# Patient Record
Sex: FEMALE | Race: WHITE | NOT HISPANIC OR LATINO | Employment: OTHER | URBAN - METROPOLITAN AREA
[De-identification: names, ages, dates, MRNs, and addresses within clinical notes are randomized per-mention and may not be internally consistent; named-entity substitution may affect disease eponyms.]

---

## 2017-01-09 DIAGNOSIS — F41.1 GENERALIZED ANXIETY DISORDER: Primary | ICD-10-CM

## 2017-01-09 NOTE — TELEPHONE ENCOUNTER
BUPROPION 150MG       Last Written Prescription Date: 3/23/2016  Last Fill Quantity: 180; # refills: 2  Last Office Visit with [facility], [facility] or  Health prescribing provider:  11/30/2016        Last PHQ-9 score on record=   PHQ-9 SCORE 11/16/2015   Total Score -   Total Score 3       AST       24   11/30/2016  ALT       28   11/30/2016

## 2017-01-11 RX ORDER — BUPROPION HYDROCHLORIDE 150 MG/1
150 TABLET, EXTENDED RELEASE ORAL 2 TIMES DAILY
Qty: 180 TABLET | Refills: 1 | Status: SHIPPED | OUTPATIENT
Start: 2017-01-11 | End: 2017-07-05

## 2017-01-11 NOTE — TELEPHONE ENCOUNTER
Pt takes med for anxiety, doesn't have depression on problem list. Pt will be due for routine px & fasting labs after 07/18/17. Refilled for 6 months.    Daniel, RN  Triage Nurse

## 2017-07-05 DIAGNOSIS — F41.1 GENERALIZED ANXIETY DISORDER: ICD-10-CM

## 2017-07-05 NOTE — TELEPHONE ENCOUNTER
buPROPion (WELLBUTRIN SR) 150 MG 12 hr tablet       Last Written Prescription Date: 1/11/2017  Last Fill Quantity: 180; # refills: 1  Last Office Visit with [facility], [facility] or [facility] prescribing provider:  11/30/2016        Last PHQ-9 score on record=   PHQ-9 SCORE 11/16/2015   Total Score -   Total Score 3       Lab Results   Component Value Date    AST 24 11/30/2016     Lab Results   Component Value Date    ALT 28 11/30/2016

## 2017-07-10 RX ORDER — BUPROPION HYDROCHLORIDE 150 MG/1
150 TABLET, EXTENDED RELEASE ORAL 2 TIMES DAILY
Qty: 180 TABLET | Refills: 1 | Status: SHIPPED | OUTPATIENT
Start: 2017-07-10 | End: 2017-12-06

## 2017-07-10 NOTE — TELEPHONE ENCOUNTER
Prescription approved per [facility] Refill Protocol.    Refilled through November, 2017, then due for office visit.    Eli Lau, MSN, RN-BC  Care Coordinator  [facility]

## 2017-08-14 ENCOUNTER — OFFICE VISIT (OUTPATIENT)
Dept: URGENT CARE | Facility: URGENT CARE | Age: 62
End: 2017-08-14
Payer: COMMERCIAL

## 2017-08-14 VITALS
WEIGHT: 119.6 LBS | SYSTOLIC BLOOD PRESSURE: 126 MMHG | HEART RATE: 72 BPM | TEMPERATURE: 97.5 F | DIASTOLIC BLOOD PRESSURE: 72 MMHG | OXYGEN SATURATION: 98 % | BODY MASS INDEX: 19.75 KG/M2

## 2017-08-14 DIAGNOSIS — R30.0 DYSURIA: ICD-10-CM

## 2017-08-14 DIAGNOSIS — N39.0 URINARY TRACT INFECTION WITHOUT HEMATURIA, SITE UNSPECIFIED: Primary | ICD-10-CM

## 2017-08-14 DIAGNOSIS — R82.90 NONSPECIFIC FINDING ON EXAMINATION OF URINE: ICD-10-CM

## 2017-08-14 LAB
ALBUMIN UR-MCNC: NEGATIVE MG/DL
APPEARANCE UR: ABNORMAL
BACTERIA #/AREA URNS HPF: ABNORMAL /HPF
BILIRUB UR QL STRIP: NEGATIVE
COLOR UR AUTO: YELLOW
GLUCOSE UR STRIP-MCNC: NEGATIVE MG/DL
HGB UR QL STRIP: ABNORMAL
KETONES UR STRIP-MCNC: NEGATIVE MG/DL
LEUKOCYTE ESTERASE UR QL STRIP: ABNORMAL
NITRATE UR QL: NEGATIVE
NON-SQ EPI CELLS #/AREA URNS LPF: ABNORMAL /LPF
PH UR STRIP: 5.5 PH (ref 5–7)
RBC #/AREA URNS AUTO: ABNORMAL /HPF (ref 0–2)
SP GR UR STRIP: 1.02 (ref 1–1.03)
URN SPEC COLLECT METH UR: ABNORMAL
UROBILINOGEN UR STRIP-ACNC: 0.2 EU/DL (ref 0.2–1)
WBC #/AREA URNS AUTO: >100 /HPF (ref 0–2)

## 2017-08-14 PROCEDURE — 99213 OFFICE O/P EST LOW 20 MIN: CPT | Performed by: PHYSICIAN ASSISTANT

## 2017-08-14 PROCEDURE — 81001 URINALYSIS AUTO W/SCOPE: CPT | Performed by: PHYSICIAN ASSISTANT

## 2017-08-14 PROCEDURE — 87086 URINE CULTURE/COLONY COUNT: CPT | Performed by: PHYSICIAN ASSISTANT

## 2017-08-14 RX ORDER — FLUCONAZOLE 150 MG/1
150 TABLET ORAL
Qty: 2 TABLET | Refills: 0 | Status: SHIPPED | OUTPATIENT
Start: 2017-08-14 | End: 2017-12-06

## 2017-08-14 RX ORDER — CIPROFLOXACIN 500 MG/1
500 TABLET, FILM COATED ORAL 2 TIMES DAILY
Qty: 10 TABLET | Refills: 0 | Status: SHIPPED | OUTPATIENT
Start: 2017-08-14 | End: 2017-08-19

## 2017-08-14 NOTE — NURSING NOTE
"Chief Complaint   Patient presents with     Urgent Care     Urinary Problem     Pt states has had frequency and discomfort while urinating since this morning.        Initial /72 (BP Location: Right arm, Patient Position: Chair, Cuff Size: Adult Regular)  Pulse 72  Temp 97.5  F (36.4  C) (Tympanic)  Wt 119 lb 9.6 oz (54.3 kg)  SpO2 98%  BMI 19.75 kg/m2 Estimated body mass index is 19.75 kg/(m^2) as calculated from the following:    Height as of 11/30/16: 5' 5.25\" (1.657 m).    Weight as of this encounter: 119 lb 9.6 oz (54.3 kg).  Medication Reconciliation: unable or not appropriate to perform   Rosita Mathur CMA ([facility]) 8/14/2017 1:50 PM    "

## 2017-08-14 NOTE — PROGRESS NOTES
SUBJECTIVE:   Mar Zeng is a 61 year old female who  presents today for a possible UTI. Symptoms of dysuria, frequency, burning and suprapubic pain and pressure have been going on for 1day(s).  Hematuria no.  gradual onset and still presentand mild.  There is no history of fever, chills, nausea or vomiting.  No history of vaginal  discharge. This patient does have a history of urinary tract infections. Patient denies long duration, rigors, flank pain, temperature > 101 degrees F., Vomiting, significant nausea or diarrhea, taking Coumadin and GFR less than 30 within the last year or vaginal discharge, vaginal odor and vaginal itching     Past Medical History:   Diagnosis Date     NO ACTIVE PROBLEMS      Current Outpatient Prescriptions   Medication Sig Dispense Refill     ciprofloxacin (CIPRO) 500 MG tablet Take 1 tablet (500 mg) by mouth 2 times daily for 5 days 10 tablet 0     fluconazole (DIFLUCAN) 150 MG tablet Take 1 tablet (150 mg) by mouth every 3 days 2 tablet 0     buPROPion (WELLBUTRIN SR) 150 MG 12 hr tablet Take 1 tablet (150 mg) by mouth 2 times daily 180 tablet 1     fluticasone (FLONASE) 50 MCG/ACT nasal spray Spray 2 sprays into both nostrils daily 16 g 9     Fexofenadine HCl (ALLEGRA PO)        vitamin E (TOCOPHEROL) 100 UNIT capsule Take 100 Units by mouth daily       Calcium Carbonate-Vitamin D (CALCIUM + D) 600-200 MG-UNIT per tablet Take 1 tablet by mouth daily.       sulfamethoxazole-trimethoprim (BACTRIM DS) 800-160 MG per tablet Take 1 tablet by mouth 2 times daily (Patient not taking: Reported on 8/14/2017) 10 tablet 0     Social History   Substance Use Topics     Smoking status: Former Smoker     Types: Cigarettes     Smokeless tobacco: Never Used     Alcohol use 0.0 oz/week     0 Standard drinks or equivalent per week      Comment: avg:3-4 drink/wk       ROS:   Review of systems negative except as stated above.    OBJECTIVE:  /72 (BP Location: Right arm, Patient Position:  Chair, Cuff Size: Adult Regular)  Pulse 72  Temp 97.5  F (36.4  C) (Tympanic)  Wt 119 lb 9.6 oz (54.3 kg)  SpO2 98%  BMI 19.75 kg/m2  GENERAL APPEARANCE: healthy, alert and no distress  RESP: lungs clear to auscultation - no rales, rhonchi or wheezes  CV: regular rates and rhythm, normal S1 S2, no murmur noted  ABDOMEN:  soft, nontender, no HSM or masses and bowel sounds normal  BACK: No CVA tenderness  SKIN: no suspicious lesions or rashes    Results for orders placed or performed in visit on 08/14/17   UA reflex to Microscopic and Culture   Result Value Ref Range    Color Urine Yellow     Appearance Urine Slightly Cloudy     Glucose Urine Negative NEG mg/dL    Bilirubin Urine Negative NEG    Ketones Urine Negative NEG mg/dL    Specific Gravity Urine 1.025 1.003 - 1.035    Blood Urine Trace (A) NEG    pH Urine 5.5 5.0 - 7.0 pH    Protein Albumin Urine Negative NEG mg/dL    Urobilinogen Urine 0.2 0.2 - 1.0 EU/dL    Nitrite Urine Negative NEG    Leukocyte Esterase Urine Large (A) NEG    Source Midstream Urine    Urine Microscopic   Result Value Ref Range    WBC Urine >100 (A) 0 - 2 /HPF    RBC Urine 2-5 (A) 0 - 2 /HPF    Squamous Epithelial /LPF Urine Few FEW /LPF    Bacteria Urine Moderate (A) NEG /HPF         ASSESSMENT:   Lower, uncomplicated urinary tract infection.    PLAN:  Culture pending.  Cipro as directed.  Diflucan as needed as for yeast post antibiotic use  Drink plenty of fluids.  Prevention and treatment of UTI's discussed.Signs and symptoms of pyelonephritis mentioned.  Follow up with primary care physician if not improving

## 2017-08-14 NOTE — MR AVS SNAPSHOT
"              After Visit Summary   2017    Mar Zeng    MRN: [id number]           Patient Information     Date Of Birth          1955        Visit Information        Provider Department      2017 12:30 PM Anastasiya Palmer PA-C [facility]        Today's Diagnoses     Urinary tract infection without hematuria, site unspecified    -  1    Dysuria        Nonspecific finding on examination of urine           Follow-ups after your visit        Who to contact     If you have questions or need follow up information about today's clinic visit or your schedule please contact [facility] directly at [phone].  Normal or non-critical lab and imaging results will be communicated to you by PayNearMehart, letter or phone within 4 business days after the clinic has received the results. If you do not hear from us within 7 days, please contact the clinic through PayNearMehart or phone. If you have a critical or abnormal lab result, we will notify you by phone as soon as possible.  Submit refill requests through Pertino or call your pharmacy and they will forward the refill request to us. Please allow 3 business days for your refill to be completed.          Additional Information About Your Visit        MyChart Information     Pertino lets you send messages to your doctor, view your test results, renew your prescriptions, schedule appointments and more. To sign up, go to [email or web address] . Click on \"Log in\" on the left side of the screen, which will take you to the Welcome page. Then click on \"Sign up Now\" on the right side of the page.     You will be asked to enter the access code listed below, as well as some personal information. Please follow the directions to create your username and password.     Your access code is: [email or web address]  Expires: 2017  2:40 PM     Your access code will  in 90 days. If you need help or a new code, please call your [place] " clinic or [phone].        Care EveryWhere ID     This is your Care EveryWhere ID. This could be used by other organizations to access your [place] medical records  [id number]        Your Vitals Were     Pulse Temperature Pulse Oximetry BMI (Body Mass Index)          72 97.5  F (36.4  C) (Tympanic) 98% 19.75 kg/m2         Blood Pressure from Last 3 Encounters:   08/14/17 126/72   11/30/16 124/70   08/23/16 133/86    Weight from Last 3 Encounters:   08/14/17 119 lb 9.6 oz (54.3 kg)   11/30/16 124 lb 3.2 oz (56.3 kg)   08/23/16 118 lb (53.5 kg)              We Performed the Following     UA reflex to Microscopic and Culture     Urine Culture Aerobic Bacterial     Urine Microscopic          Today's Medication Changes          These changes are accurate as of: 8/14/17  2:40 PM.  If you have any questions, ask your nurse or doctor.               Start taking these medicines.        Dose/Directions    ciprofloxacin 500 MG tablet   Commonly known as:  CIPRO   Used for:  Urinary tract infection without hematuria, site unspecified   Started by:  Anastasiya Palmer PA-C        Dose:  500 mg   Take 1 tablet (500 mg) by mouth 2 times daily for 5 days   Quantity:  10 tablet   Refills:  0       fluconazole 150 MG tablet   Commonly known as:  DIFLUCAN   Used for:  Urinary tract infection without hematuria, site unspecified   Started by:  Anastasiya Palmer PA-C        Dose:  150 mg   Take 1 tablet (150 mg) by mouth every 3 days   Quantity:  2 tablet   Refills:  0            Where to get your medicines      These medications were sent to [id number] - [place], [place] - [address]  [address], [facility] [address]     Phone:  [phone]     ciprofloxacin 500 MG tablet    fluconazole 150 MG tablet                Primary Care Provider Office Phone # Fax #    [facility] Es[facility] [phone]       [address]  [facility]  [address]        Equal Access to Services     [facility]: Hadii fazal ferris bala Romeo, waadolfoda luqadaha, qaybta kameganda ildaandrewchuyita, waxmingo alexandra truongdesmondsally hauser. So [facility] [phone].    ATENCIÓN: Si habla español, tiene a narayan disposición servicios gratuitos de asistencia lingüística. Anthonyame al [phone].    We comply with applicable federal civil rights laws and [place] laws. We do not discriminate on the basis of race, color, national origin, age, disability sex, sexual orientation or gender identity.            Thank you!     Thank you for choosing [facility]  for your care. Our goal is always to provide you with excellent care. Hearing back from our patients is one way we can continue to improve our services. Please take a few minutes to complete the written survey that you may receive in the mail after your visit with us. Thank you!             Your Updated Medication List - Protect others around you: Learn how to safely use, store and throw away your medicines at www.disposemymeds.org.          This list is accurate as of: 8/14/17  2:40 PM.  Always use your most recent med list.                   Brand Name Dispense Instructions for use Diagnosis    ALLEGRA PO           buPROPion 150 MG 12 hr tablet    WELLBUTRIN SR    180 tablet    Take 1 tablet (150 mg) by mouth 2 times daily    Generalized anxiety disorder       calcium + D 600-200 MG-UNIT Tabs   Generic drug:  calcium carbonate-vitamin D      Take 1 tablet by mouth daily.        ciprofloxacin 500 MG tablet    CIPRO    10 tablet    Take 1 tablet (500 mg) by mouth 2 times daily for 5 days    Urinary tract infection without hematuria, site unspecified       fluconazole 150 MG tablet    DIFLUCAN    2 tablet    Take 1 tablet (150 mg) by mouth every 3 days    Urinary tract infection without hematuria, site unspecified       fluticasone 50 MCG/ACT spray    FLONASE    16 g    Spray 2 sprays into both nostrils daily    Plugged  feeling in ear, bilateral       sulfamethoxazole-trimethoprim 800-160 MG per tablet    BACTRIM DS    10 tablet    Take 1 tablet by mouth 2 times daily    Acute cystitis with hematuria       vitamin E 100 UNIT capsule    TOCOPHEROL     Take 100 Units by mouth daily

## 2017-08-16 LAB
BACTERIA SPEC CULT: NORMAL
SPECIMEN SOURCE: NORMAL

## 2017-09-20 ENCOUNTER — OFFICE VISIT (OUTPATIENT)
Dept: PODIATRY | Facility: CLINIC | Age: 62
End: 2017-09-20
Payer: COMMERCIAL

## 2017-09-20 VITALS
BODY MASS INDEX: 20.09 KG/M2 | WEIGHT: 120.6 LBS | SYSTOLIC BLOOD PRESSURE: 124 MMHG | DIASTOLIC BLOOD PRESSURE: 72 MMHG | HEIGHT: 65 IN

## 2017-09-20 DIAGNOSIS — L60.3 DYSTROPHIC NAIL: ICD-10-CM

## 2017-09-20 DIAGNOSIS — B35.1 ONYCHOMYCOSIS OF TOENAIL: Primary | ICD-10-CM

## 2017-09-20 PROCEDURE — 99203 OFFICE O/P NEW LOW 30 MIN: CPT | Performed by: PODIATRIST

## 2017-09-20 RX ORDER — THERMOMETER, ELECTRONIC,ORAL
EACH MISCELLANEOUS DAILY
Qty: 30 G | Refills: 3 | Status: SHIPPED | OUTPATIENT
Start: 2017-09-20 | End: 2017-12-06

## 2017-09-20 NOTE — MR AVS SNAPSHOT
After Visit Summary   9/20/2017    aMr Zeng    MRN: [id number]           Patient Information     Date Of Birth          1955        Visit Information        Provider Department      9/20/2017 9:30 AM Melissa Hu DPM, Podiatry/Foot and Ankle Surgery [facility]        Today's Diagnoses     Onychomycosis of toenail    -  1    Dystrophic nail          Care Instructions    NAIL FUNGUS / ONYCHOMYCOSIS   Nail fungus is not a hygiene problem and will not likely lead to significant medical   problems. The nails may get thick causing pain and possibly local skin infection.   Treatments include debridement (trimming), oral antifungals, topical antifungals and complete removal of the nail. Most fungal nails are not treated.   Topicals such as tea tree oil can be helpful for surface fungus and may, at best, limit   progression. Over the counter creams (such as Lamisil) can also be used however, their effectiveness is also quite low.  Topical treatment with Pen lac is expensive and often not covered by insurance. Pen lac has an approximate 8% success rate. Topical therapy recommendations is to apply twice a day for at least 3-4 months as it takes 9 months for new nail to grow out.    Experts suggest soaking your feet for 15 to 20 minutes in a mixture of 1 cup vinegar to 4 cups warm water. Be sure to rinse well and pat your feet dry when you're done. You can soak your feet like this daily. But if your skin becomes irritated, try soaking only two to three times a week. Vicks VapoRub, as with vinegar, there have been no controlled clinical trials to assess the effectiveness of Vicks VapoRub on nail fungus, but there have been numerous anecdotal reports that it works. There's no consensus on how often to apply this product, so check with your doctor before using it on your nails.     Oral therapies include Sporanox and Lamisil. Oral therapies are also expensive and not very effective.  "Side effects such as liver disease are the main concern. Return of fungus is common even if the treatment worked.     Other Tips:  - Penlac nail medication apply daily x 4 months; remove old polish first day of each week  - Antifungal cream/powder (Zeasorb) - apply daily to feet and shoes x 2 months  - Clean shoes with Lysol or in washing machine every few weeks  - Rotate shoe gear; give them 24 hours to dry out between days wearing them  - Clean pair of socks in morning, clean pair in afternoon if your feet sweat  - Shower shoes used in public showers/pools            Follow-ups after your visit        Who to contact     If you have questions or need follow up information about today's clinic visit or your schedule please contact [facility] directly at [phone].  Normal or non-critical lab and imaging results will be communicated to you by Competitive Technologieshart, letter or phone within 4 business days after the clinic has received the results. If you do not hear from us within 7 days, please contact the clinic through Competitive Technologieshart or phone. If you have a critical or abnormal lab result, we will notify you by phone as soon as possible.  Submit refill requests through The Daily Voice or call your pharmacy and they will forward the refill request to us. Please allow 3 business days for your refill to be completed.          Additional Information About Your Visit        The Daily Voice Information     The Daily Voice lets you send messages to your doctor, view your test results, renew your prescriptions, schedule appointments and more. To sign up, go to [email or web address] . Click on \"Log in\" on the left side of the screen, which will take you to the Welcome page. Then click on \"Sign up Now\" on the right side of the page.     You will be asked to enter the access code listed below, as well as some personal information. Please follow the directions to create your username and password.     Your access code is: [email or web address]  Expires: " "2017  2:40 PM     Your access code will  in 90 days. If you need help or a new code, please call your [place] clinic or [phone].        Care EveryWhere ID     This is your Care EveryWhere ID. This could be used by other organizations to access your [place] medical records  [id number]        Your Vitals Were     Height BMI (Body Mass Index)                5' 5.25\" (1.657 m) 19.92 kg/m2           Blood Pressure from Last 3 Encounters:   17 124/72   17 126/72   16 124/70    Weight from Last 3 Encounters:   17 120 lb 9.6 oz (54.7 kg)   17 119 lb 9.6 oz (54.3 kg)   16 124 lb 3.2 oz (56.3 kg)              Today, you had the following     No orders found for display         Today's Medication Changes          These changes are accurate as of: 17  9:47 AM.  If you have any questions, ask your nurse or doctor.               Start taking these medicines.        Dose/Directions    tolnaftate 1 % cream   Commonly known as:  TINACTIN   Used for:  Onychomycosis of toenail, Dystrophic nail   Started by:  Melissa Hu, DPMUSTAPHA, Podiatry/Foot and Ankle Surgery        Apply topically daily Apply to affected nails daily   Quantity:  30 g   Refills:  3            Where to get your medicines      These medications were sent to [facility] - [facility] [address]  [address], [facility] [address]     Phone:  [phone]     tolnaftate 1 % cream                Primary Care Provider Office Phone # Fax #    Gail [facility]errier [facility] [phone] [address] [place] [address]        Equal Access to Services     RO [facility]ERRER[facility]: Aries penningtono Soheena, waaxda luqadaha, qaybta kaalmada adeegyada, sophia hauser. So [facility] [phone].    ATENCIÓN: Si habla español, tiene a narayan disposición servicios gratuitos de asistencia lingüística. Llame al " [phone].    We comply with applicable federal civil rights laws and [place] laws. We do not discriminate on the basis of race, color, national origin, age, disability sex, sexual orientation or gender identity.            Thank you!     Thank you for choosing [facility]  for your care. Our goal is always to provide you with excellent care. Hearing back from our patients is one way we can continue to improve our services. Please take a few minutes to complete the written survey that you may receive in the mail after your visit with us. Thank you!             Your Updated Medication List - Protect others around you: Learn how to safely use, store and throw away your medicines at www.disposemymeds.org.          This list is accurate as of: 9/20/17  9:47 AM.  Always use your most recent med list.                   Brand Name Dispense Instructions for use Diagnosis    ALLEGRA PO           buPROPion 150 MG 12 hr tablet    WELLBUTRIN SR    180 tablet    Take 1 tablet (150 mg) by mouth 2 times daily    Generalized anxiety disorder       calcium + D 600-200 MG-UNIT Tabs   Generic drug:  calcium carbonate-vitamin D      Take 1 tablet by mouth daily.        fluconazole 150 MG tablet    DIFLUCAN    2 tablet    Take 1 tablet (150 mg) by mouth every 3 days    Urinary tract infection without hematuria, site unspecified       fluticasone 50 MCG/ACT spray    FLONASE    16 g    Spray 2 sprays into both nostrils daily    Plugged feeling in ear, bilateral       sulfamethoxazole-trimethoprim 800-160 MG per tablet    BACTRIM DS    10 tablet    Take 1 tablet by mouth 2 times daily    Acute cystitis with hematuria       tolnaftate 1 % cream    TINACTIN    30 g    Apply topically daily Apply to affected nails daily    Onychomycosis of toenail, Dystrophic nail       vitamin E 100 UNIT capsule    TOCOPHEROL     Take 100 Units by mouth daily

## 2017-09-20 NOTE — PATIENT INSTRUCTIONS
NAIL FUNGUS / ONYCHOMYCOSIS   Nail fungus is not a hygiene problem and will not likely lead to significant medical   problems. The nails may get thick causing pain and possibly local skin infection.   Treatments include debridement (trimming), oral antifungals, topical antifungals and complete removal of the nail. Most fungal nails are not treated.   Topicals such as tea tree oil can be helpful for surface fungus and may, at best, limit   progression. Over the counter creams (such as Lamisil) can also be used however, their effectiveness is also quite low.  Topical treatment with Pen lac is expensive and often not covered by insurance. Pen lac has an approximate 8% success rate. Topical therapy recommendations is to apply twice a day for at least 3-4 months as it takes 9 months for new nail to grow out.    Experts suggest soaking your feet for 15 to 20 minutes in a mixture of 1 cup vinegar to 4 cups warm water. Be sure to rinse well and pat your feet dry when you're done. You can soak your feet like this daily. But if your skin becomes irritated, try soaking only two to three times a week. Vicks VapoRub, as with vinegar, there have been no controlled clinical trials to assess the effectiveness of Vicks VapoRub on nail fungus, but there have been numerous anecdotal reports that it works. There's no consensus on how often to apply this product, so check with your doctor before using it on your nails.     Oral therapies include Sporanox and Lamisil. Oral therapies are also expensive and not very effective. Side effects such as liver disease are the main concern. Return of fungus is common even if the treatment worked.     Other Tips:  - Penlac nail medication apply daily x 4 months; remove old polish first day of each week  - Antifungal cream/powder (Zeasorb) - apply daily to feet and shoes x 2 months  - Clean shoes with Lysol or in washing machine every few weeks  - Rotate shoe gear; give them 24 hours to dry out  between days wearing them  - Clean pair of socks in morning, clean pair in afternoon if your feet sweat  - Shower shoes used in public showers/pools

## 2017-09-20 NOTE — LETTER
9/20/2017         RE: Mar Zeng  [address]  [facility] [address]        Dear Colleague,    Thank you for referring your patient, Mar Zeng, to the [facility]. Please see a copy of my visit note below.    PATIENT HISTORY:  Mar Zeng is a 62 year old female who presents to clinic for discoloration of both great toenails. Thinks it started after a pedicure about a month ago. No pain. Thinks it is a fungus ans is wondering what can be done to get rid of it.     Review of Systems:  Patient denies fever, chills, rash, wound, stiffness, limping, numbness, weakness, heart burn, blood in stool, chest pain with activity, calf pain when walking, shortness of breath with activity, chronic cough, easy bleeding/bruising, swelling of ankles, excessive thirst, fatigue, depression, anxiety.       PAST MEDICAL HISTORY:   Past Medical History:   Diagnosis Date     NO ACTIVE PROBLEMS         PAST SURGICAL HISTORY:   Past Surgical History:   Procedure Laterality Date     COLONOSCOPY  8/23/2016    Dr. Shaw [facility]     COLONOSCOPY N/A 8/23/2016    Procedure: COLONOSCOPY;  Surgeon: Peter Shaw MD;  Location: [facility]     FINGER SURGERY      right little finger joint replacement     HYSTERECTOMY VAGINAL  age 30    retained ovaries. no cervix      HYSTERECTOMY, PAP NO LONGER INDICATED       ORTHOPEDIC SURGERY       SURGICAL HISTORY OF -   10/25/13    left index cyst removal     SURGICAL HISTORY OF -       Bilat knee miniscus repair        MEDICATIONS:   Current Outpatient Prescriptions:      tolnaftate (TINACTIN) 1 % cream, Apply topically daily Apply to affected nails daily, Disp: 30 g, Rfl: 3     buPROPion (WELLBUTRIN SR) 150 MG 12 hr tablet, Take 1 tablet (150 mg) by mouth 2 times daily, Disp: 180 tablet, Rfl: 1     fluticasone (FLONASE) 50 MCG/ACT nasal spray, Spray 2 sprays into both nostrils daily, Disp: 16 g, Rfl: 9     Fexofenadine HCl (ALLEGRA PO), , Disp: , Rfl:      " vitamin E (TOCOPHEROL) 100 UNIT capsule, Take 100 Units by mouth daily, Disp: , Rfl:      Calcium Carbonate-Vitamin D (CALCIUM + D) 600-200 MG-UNIT per tablet, Take 1 tablet by mouth daily., Disp: , Rfl:      fluconazole (DIFLUCAN) 150 MG tablet, Take 1 tablet (150 mg) by mouth every 3 days (Patient not taking: Reported on 9/20/2017), Disp: 2 tablet, Rfl: 0     sulfamethoxazole-trimethoprim (BACTRIM DS) 800-160 MG per tablet, Take 1 tablet by mouth 2 times daily (Patient not taking: Reported on 8/14/2017), Disp: 10 tablet, Rfl: 0     ALLERGIES:    Allergies   Allergen Reactions     Codeine Itching     Hay Fever & [A.R.M.]         SOCIAL HISTORY:   Social History     Social History     Marital status:      Spouse name: N/A     Number of children: N/A     Years of education: N/A     Occupational History     Not on file.     Social History Main Topics     Smoking status: Former Smoker     Types: Cigarettes     Smokeless tobacco: Never Used     Alcohol use 0.0 oz/week     0 Standard drinks or equivalent per week      Comment: avg:3-4 drink/wk     Drug use: No     Sexual activity: Yes     Partners: Male     Other Topics Concern     Parent/Sibling W/ Cabg, Mi Or Angioplasty Before 65f 55m? No     Social History Narrative    [place] in [place]        FAMILY HISTORY:   Family History   Problem Relation Age of Onset     Hypertension Father      CANCER Father      CANCER Maternal Grandmother      CANCER Maternal Grandfather      CANCER Paternal Grandmother      CANCER Paternal Grandfather      Asthma Brother      Hypertension Brother      Thyroid Disease Daughter      Colon Cancer No family hx of         EXAM:Vitals: /72  Ht 1.657 m (5' 5.25\")  Wt 54.7 kg (120 lb 9.6 oz)  BMI 19.92 kg/m2  BMI= Body mass index is 19.92 kg/(m^2).    General appearance: Patient is alert and fully cooperative with history & exam.  No sign of distress is noted during the visit.     Psychiatric: Affect is pleasant & appropriate.  " Patient appears motivated to improve health.     Respiratory: Breathing is regular & unlabored while sitting.     HEENT: Hearing is intact to spoken word.  Speech is clear.  No gross evidence of visual impairment that would impact ambulation.     Dermatologic: discoloration, onycholysis and subungual debri noted to both great toenails.      Vascular: DP & PT pulses are intact & regular bilaterally.  No significant edema or varicosities noted.  CFT and skin temperature is normal to both lower extremities.     Neurologic: Lower extremity sensation is intact to light touch.  No evidence of weakness or contracture in the lower extremities.  No evidence of neuropathy.     Musculoskeletal: Patient is ambulatory without assistive device or brace.  No gross ankle deformity noted.  No foot or ankle joint effusion is noted.     ASSESSMENT:    Onychomycosis of toenail  Dystrophic nail       PLAN:  Reviewed patient's chart in epic. Discussed causes and treatments of nail fungus.  Explained that even if a culture comes back negative, a patient could still have nail fungus.  Discussed treatment options with patient and explained that there isn't one treatment that is 100% effective.  Discussed oral lamisil which is the most effective at about 70% but which can have liver effects.  Explained that if she wanted to try this that she would need serial blood draws to test her liver function.  Discussed over the counter antifungal creams.  Explained that these are about 50% effective and need to be applied twice a day for about 3-4 months.  Also talked about prescription penlac which is a nail laquer.  Again this is also only 50% effective.  Also discussed that if there was damage to the nail and the nail is now dystrophic that non of the above is going to change the nail.  If there was damage, there is note anything that can be done for the nail to correct it.  Discussed that if it becomes painful, we can remove the nail in clinic.         At this time, she is going to try prescription antifungal cream. Will call with questions or concerns.          Melissa Hu DPM, Podiatry/Foot and Ankle Surgery          Again, thank you for allowing me to participate in the care of your patient.        Sincerely,        Melissa Hu DPM, Podiatry/Foot and Ankle Surgery

## 2017-09-20 NOTE — NURSING NOTE
"Chief Complaint   Patient presents with     Toenail     pt worried about nail fungus on both great toes [id number]       Initial /72  Ht 5' 5.25\" (1.657 m)  Wt 120 lb 9.6 oz (54.7 kg)  BMI 19.92 kg/m2 Estimated body mass index is 19.92 kg/(m^2) as calculated from the following:    Height as of this encounter: 5' 5.25\" (1.657 m).    Weight as of this encounter: 120 lb 9.6 oz (54.7 kg).  Medication Reconciliation: complete   Jordin Adan MA      "

## 2017-09-20 NOTE — PROGRESS NOTES
PATIENT HISTORY:  Mar Zeng is a 62 year old female who presents to clinic for discoloration of both great toenails. Thinks it started after a pedicure about a month ago. No pain. Thinks it is a fungus ans is wondering what can be done to get rid of it.     Review of Systems:  Patient denies fever, chills, rash, wound, stiffness, limping, numbness, weakness, heart burn, blood in stool, chest pain with activity, calf pain when walking, shortness of breath with activity, chronic cough, easy bleeding/bruising, swelling of ankles, excessive thirst, fatigue, depression, anxiety.       PAST MEDICAL HISTORY:   Past Medical History:   Diagnosis Date     NO ACTIVE PROBLEMS         PAST SURGICAL HISTORY:   Past Surgical History:   Procedure Laterality Date     COLONOSCOPY  8/23/2016    Dr. Shaw [facility]     COLONOSCOPY N/A 8/23/2016    Procedure: COLONOSCOPY;  Surgeon: Peter Shaw MD;  Location:  GI     FINGER SURGERY      right little finger joint replacement     HYSTERECTOMY VAGINAL  age 30    retained ovaries. no cervix      HYSTERECTOMY, PAP NO LONGER INDICATED       ORTHOPEDIC SURGERY       SURGICAL HISTORY OF -   10/25/13    left index cyst removal     SURGICAL HISTORY OF -       Bilat knee miniscus repair        MEDICATIONS:   Current Outpatient Prescriptions:      tolnaftate (TINACTIN) 1 % cream, Apply topically daily Apply to affected nails daily, Disp: 30 g, Rfl: 3     buPROPion (WELLBUTRIN SR) 150 MG 12 hr tablet, Take 1 tablet (150 mg) by mouth 2 times daily, Disp: 180 tablet, Rfl: 1     fluticasone (FLONASE) 50 MCG/ACT nasal spray, Spray 2 sprays into both nostrils daily, Disp: 16 g, Rfl: 9     Fexofenadine HCl (ALLEGRA PO), , Disp: , Rfl:      vitamin E (TOCOPHEROL) 100 UNIT capsule, Take 100 Units by mouth daily, Disp: , Rfl:      Calcium Carbonate-Vitamin D (CALCIUM + D) 600-200 MG-UNIT per tablet, Take 1 tablet by mouth daily., Disp: , Rfl:      fluconazole (DIFLUCAN) 150 MG tablet, Take 1  "tablet (150 mg) by mouth every 3 days (Patient not taking: Reported on 9/20/2017), Disp: 2 tablet, Rfl: 0     sulfamethoxazole-trimethoprim (BACTRIM DS) 800-160 MG per tablet, Take 1 tablet by mouth 2 times daily (Patient not taking: Reported on 8/14/2017), Disp: 10 tablet, Rfl: 0     ALLERGIES:    Allergies   Allergen Reactions     Codeine Itching     Hay Fever & [A.R.M.]         SOCIAL HISTORY:   Social History     Social History     Marital status:      Spouse name: N/A     Number of children: N/A     Years of education: N/A     Occupational History     Not on file.     Social History Main Topics     Smoking status: Former Smoker     Types: Cigarettes     Smokeless tobacco: Never Used     Alcohol use 0.0 oz/week     0 Standard drinks or equivalent per week      Comment: avg:3-4 drink/wk     Drug use: No     Sexual activity: Yes     Partners: Male     Other Topics Concern     Parent/Sibling W/ Cabg, Mi Or Angioplasty Before 65f 55m? No     Social History Narrative    [place] in [place]        FAMILY HISTORY:   Family History   Problem Relation Age of Onset     Hypertension Father      CANCER Father      CANCER Maternal Grandmother      CANCER Maternal Grandfather      CANCER Paternal Grandmother      CANCER Paternal Grandfather      Asthma Brother      Hypertension Brother      Thyroid Disease Daughter      Colon Cancer No family hx of         EXAM:Vitals: /72  Ht 1.657 m (5' 5.25\")  Wt 54.7 kg (120 lb 9.6 oz)  BMI 19.92 kg/m2  BMI= Body mass index is 19.92 kg/(m^2).    General appearance: Patient is alert and fully cooperative with history & exam.  No sign of distress is noted during the visit.     Psychiatric: Affect is pleasant & appropriate.  Patient appears motivated to improve health.     Respiratory: Breathing is regular & unlabored while sitting.     HEENT: Hearing is intact to spoken word.  Speech is clear.  No gross evidence of visual impairment that would impact ambulation.   "   Dermatologic: discoloration, onycholysis and subungual debri noted to both great toenails.      Vascular: DP & PT pulses are intact & regular bilaterally.  No significant edema or varicosities noted.  CFT and skin temperature is normal to both lower extremities.     Neurologic: Lower extremity sensation is intact to light touch.  No evidence of weakness or contracture in the lower extremities.  No evidence of neuropathy.     Musculoskeletal: Patient is ambulatory without assistive device or brace.  No gross ankle deformity noted.  No foot or ankle joint effusion is noted.     ASSESSMENT:    Onychomycosis of toenail  Dystrophic nail       PLAN:  Reviewed patient's chart in epic. Discussed causes and treatments of nail fungus.  Explained that even if a culture comes back negative, a patient could still have nail fungus.  Discussed treatment options with patient and explained that there isn't one treatment that is 100% effective.  Discussed oral lamisil which is the most effective at about 70% but which can have liver effects.  Explained that if she wanted to try this that she would need serial blood draws to test her liver function.  Discussed over the counter antifungal creams.  Explained that these are about 50% effective and need to be applied twice a day for about 3-4 months.  Also talked about prescription penlac which is a nail laquer.  Again this is also only 50% effective.  Also discussed that if there was damage to the nail and the nail is now dystrophic that non of the above is going to change the nail.  If there was damage, there is note anything that can be done for the nail to correct it.  Discussed that if it becomes painful, we can remove the nail in clinic.        At this time, she is going to try prescription antifungal cream. Will call with questions or concerns.          Melissa Hu DPM, Podiatry/Foot and Ankle Surgery

## 2017-12-04 ENCOUNTER — TRANSFERRED RECORDS (OUTPATIENT)
Dept: HEALTH INFORMATION MANAGEMENT | Facility: CLINIC | Age: 62
End: 2017-12-04

## 2017-12-04 LAB
ALT SERPL-CCNC: 20 IU/L (ref 0–32)
AST SERPL-CCNC: 17 IU/L (ref 0–40)
CREAT SERPL-MCNC: 0.83 MG/DL (ref 0.57–1)
GFR SERPL CREATININE-BSD FRML MDRD: 75 ML/MIN/1.73M2
GLUCOSE SERPL-MCNC: 95 MG/DL (ref 65–99)
HBA1C MFR BLD: 5.6 % (ref 4.8–5.6)
POTASSIUM SERPL-SCNC: 4.2 MMOL/L (ref 3.5–5.2)
TSH SERPL-ACNC: 0.57 UIU/ML (ref 0.45–4.5)

## 2017-12-06 ENCOUNTER — OFFICE VISIT (OUTPATIENT)
Dept: FAMILY MEDICINE | Facility: CLINIC | Age: 62
End: 2017-12-06
Payer: COMMERCIAL

## 2017-12-06 VITALS
OXYGEN SATURATION: 100 % | WEIGHT: 118.5 LBS | TEMPERATURE: 97.9 F | SYSTOLIC BLOOD PRESSURE: 122 MMHG | DIASTOLIC BLOOD PRESSURE: 70 MMHG | BODY MASS INDEX: 19.57 KG/M2 | HEART RATE: 82 BPM

## 2017-12-06 DIAGNOSIS — G47.00 INSOMNIA, UNSPECIFIED TYPE: ICD-10-CM

## 2017-12-06 DIAGNOSIS — F41.1 GENERALIZED ANXIETY DISORDER: ICD-10-CM

## 2017-12-06 DIAGNOSIS — Z12.31 ENCOUNTER FOR SCREENING MAMMOGRAM FOR BREAST CANCER: ICD-10-CM

## 2017-12-06 DIAGNOSIS — Z00.00 ENCOUNTER FOR ROUTINE ADULT HEALTH EXAMINATION WITHOUT ABNORMAL FINDINGS: Primary | ICD-10-CM

## 2017-12-06 DIAGNOSIS — J34.89 RHINORRHEA: ICD-10-CM

## 2017-12-06 PROCEDURE — 84443 ASSAY THYROID STIM HORMONE: CPT | Performed by: NURSE PRACTITIONER

## 2017-12-06 PROCEDURE — 80053 COMPREHEN METABOLIC PANEL: CPT | Performed by: NURSE PRACTITIONER

## 2017-12-06 PROCEDURE — 36415 COLL VENOUS BLD VENIPUNCTURE: CPT | Performed by: NURSE PRACTITIONER

## 2017-12-06 PROCEDURE — 99213 OFFICE O/P EST LOW 20 MIN: CPT | Mod: 25 | Performed by: NURSE PRACTITIONER

## 2017-12-06 PROCEDURE — 99396 PREV VISIT EST AGE 40-64: CPT | Performed by: NURSE PRACTITIONER

## 2017-12-06 PROCEDURE — 80061 LIPID PANEL: CPT | Performed by: NURSE PRACTITIONER

## 2017-12-06 RX ORDER — MIRTAZAPINE 15 MG/1
7.5 TABLET, FILM COATED ORAL AT BEDTIME
Qty: 30 TABLET | Refills: 1 | Status: SHIPPED | OUTPATIENT
Start: 2017-12-06 | End: 2018-01-29

## 2017-12-06 RX ORDER — BUPROPION HYDROCHLORIDE 150 MG/1
150 TABLET, EXTENDED RELEASE ORAL DAILY
Qty: 90 TABLET | Refills: 3 | Status: SHIPPED | OUTPATIENT
Start: 2017-12-06 | End: 2019-10-16

## 2017-12-06 RX ORDER — MONTELUKAST SODIUM 10 MG/1
10 TABLET ORAL AT BEDTIME
Qty: 30 TABLET | Refills: 1 | Status: SHIPPED | OUTPATIENT
Start: 2017-12-06 | End: 2018-02-23

## 2017-12-06 NOTE — NURSING NOTE
"Chief Complaint   Patient presents with     Physical       Initial /70  Pulse 82  Temp 97.9  F (36.6  C) (Oral)  Wt 118 lb 8 oz (53.8 kg)  SpO2 100%  BMI 19.57 kg/m2 Estimated body mass index is 19.57 kg/(m^2) as calculated from the following:    Height as of 9/20/17: 5' 5.25\" (1.657 m).    Weight as of this encounter: 118 lb 8 oz (53.8 kg).  Medication Reconciliation: complete   Mariaa SMART M.A.      "

## 2017-12-06 NOTE — MR AVS SNAPSHOT
After Visit Summary   12/6/2017    Mar Zeng    MRN: [id number]           Patient Information     Date Of Birth          1955        Visit Information        Provider Department      12/6/2017 10:00 AM Gail Crespo Ra, [facility]        Today's Diagnoses     Encounter for screening mammogram for breast cancer    -  1    Generalized anxiety disorder        Insomnia, unspecified type        Rhinorrhea          Care Instructions    Start the mirtazapine (remeron) every evening.  Also take the singulair every evening- this is for the runny nose.  Update me in about 1-2 months regarding your progress.    Preventive Health Recommendations  Female Ages 50 - 64    Yearly exam: See your health care provider every year in order to  o Review health changes.   o Discuss preventive care.    o Review your medicines if your doctor has prescribed any.      Get a Pap test every three years (unless you have an abnormal result and your provider advises testing more often).    If you get Pap tests with HPV test, you only need to test every 5 years, unless you have an abnormal result.     You do not need a Pap test if your uterus was removed (hysterectomy) and you have not had cancer.    You should be tested each year for STDs (sexually transmitted diseases) if you're at risk.     Have a mammogram every 1 to 2 years.    Have a colonoscopy at age 50, or have a yearly FIT test (stool test). These exams screen for colon cancer.      Have a cholesterol test every 5 years, or more often if advised.    Have a diabetes test (fasting glucose) every three years. If you are at risk for diabetes, you should have this test more often.     If you are at risk for osteoporosis (brittle bone disease), think about having a bone density scan (DEXA).    Shots: Get a flu shot each year. Get a tetanus shot every 10 years.    Nutrition:     Eat at least 5 servings of fruits and vegetables each  "day.    Eat whole-grain bread, whole-wheat pasta and brown rice instead of white grains and rice.    Talk to your provider about Calcium and Vitamin D.     Lifestyle    Exercise at least 150 minutes a week (30 minutes a day, 5 days a week). This will help you control your weight and prevent disease.    Limit alcohol to one drink per day.    No smoking.     Wear sunscreen to prevent skin cancer.     See your dentist every six months for an exam and cleaning.    See your eye doctor every 1 to 2 years.            Follow-ups after your visit        Future tests that were ordered for you today     Open Future Orders        Priority Expected Expires Ordered    *MA Screening Digital Bilateral Routine  12/6/2018 12/6/2017            Who to contact     If you have questions or need follow up information about today's clinic visit or your schedule please contact [facility] directly at [phone].  Normal or non-critical lab and imaging results will be communicated to you by [email or web address], letter or phone within 4 business days after the clinic has received the results. If you do not hear from us within 7 days, please contact the clinic through [email or web address] or phone. If you have a critical or abnormal lab result, we will notify you by phone as soon as possible.  Submit refill requests through [employer] or call your pharmacy and they will forward the refill request to us. Please allow 3 business days for your refill to be completed.          Additional Information About Your Visit        [email or web address] Information     [employer] lets you send messages to your doctor, view your test results, renew your prescriptions, schedule appointments and more. To sign up, go to [email or web address] . Click on \"Log in\" on the left side of the screen, which will take you to the Welcome page. Then click on \"Sign up Now\" on the right side of the page.     You will be asked to enter the access code listed below, as well as some personal " information. Please follow the directions to create your username and password.     Your access code is: [email or web address]  Expires: 3/6/2018 10:42 AM     Your access code will  in 90 days. If you need help or a new code, please call your [place] clinic or [phone].        Care EveryWhere ID     This is your Care EveryWhere ID. This could be used by other organizations to access your [place] medical records  [id number]        Your Vitals Were     Pulse Temperature Pulse Oximetry BMI (Body Mass Index)          82 97.9  F (36.6  C) (Oral) 100% 19.57 kg/m2         Blood Pressure from Last 3 Encounters:   17 122/70   17 124/72   17 126/72    Weight from Last 3 Encounters:   17 118 lb 8 oz (53.8 kg)   17 120 lb 9.6 oz (54.7 kg)   17 119 lb 9.6 oz (54.3 kg)              We Performed the Following     Comprehensive metabolic panel (BMP + Alb, Alk Phos, ALT, AST, Total. Bili, TP)     Lipid panel reflex to direct LDL Fasting     OFFICE/OUTPT VISIT,EST,LEVL III     TSH with free T4 reflex          Today's Medication Changes          These changes are accurate as of: 17 10:42 AM.  If you have any questions, ask your nurse or doctor.               Start taking these medicines.        Dose/Directions    mirtazapine 15 MG tablet   Commonly known as:  REMERON   Used for:  Insomnia, unspecified type   Started by:  Gail Crespo Ra, APRN CNP        Dose:  7.5 mg   Take 0.5 tablets (7.5 mg) by mouth At Bedtime   Quantity:  30 tablet   Refills:  1       montelukast 10 MG tablet   Commonly known as:  SINGULAIR   Used for:  Rhinorrhea   Started by:  Gail Crespo Ra, APRN CNP        Dose:  10 mg   Take 1 tablet (10 mg) by mouth At Bedtime   Quantity:  30 tablet   Refills:  1         These medicines have changed or have updated prescriptions.        Dose/Directions    buPROPion 150 MG 12 hr tablet   Commonly known as:  WELLBUTRIN SR   This may have changed:  when to take this   Used  for:  Generalized anxiety disorder   Changed by:  Gail Crespo Ra, APRN CNP        Dose:  150 mg   Take 1 tablet (150 mg) by mouth daily   Quantity:  90 tablet   Refills:  3            Where to get your medicines      These medications were sent to [facility] - [place], [place] - [address]  [address], [facility] [address]     Phone:  [phone]     buPROPion 150 MG 12 hr tablet    mirtazapine 15 MG tablet    montelukast 10 MG tablet                Primary Care Provider Office Phone # Fax #    [facility] Ra [phone] [address] [facility] [address]        Equal Access to Services     [facility]: Hadii aad ku hadasho Soomaali, waaxda luqadaha, qaybta kaalmada adeegyada, waxay yarelyin hayyun siddiqi . So [facility] [place] [phone].    ATENCIÓN: Si habla español, tiene a narayan disposición servicios gratuitos de asistencia lingüística. [facility] [phone].    We comply with applicable federal civil rights laws and [place] laws. We do not discriminate on the basis of race, color, national origin, age, disability, sex, sexual orientation, or gender identity.            Thank you!     Thank you for choosing [facility]  for your care. Our goal is always to provide you with excellent care. Hearing back from our patients is one way we can continue to improve our services. Please take a few minutes to complete the written survey that you may receive in the mail after your visit with us. Thank you!             Your Updated Medication List - Protect others around you: Learn how to safely use, store and throw away your medicines at www.disposemymeds.org.          This list is accurate as of: 12/6/17 10:42 AM.  Always use your most recent med list.                   Brand Name Dispense Instructions for use Diagnosis    ALLEGRA PO           buPROPion 150 MG 12 hr tablet    WELLBUTRIN SR     90 tablet    Take 1 tablet (150 mg) by mouth daily    Generalized anxiety disorder       calcium + D 600-200 MG-UNIT Tabs   Generic drug:  calcium carbonate-vitamin D      Take 1 tablet by mouth daily.        fluticasone 50 MCG/ACT spray    FLONASE    16 g    Spray 2 sprays into both nostrils daily    Plugged feeling in ear, bilateral       mirtazapine 15 MG tablet    REMERON    30 tablet    Take 0.5 tablets (7.5 mg) by mouth At Bedtime    Insomnia, unspecified type       montelukast 10 MG tablet    SINGULAIR    30 tablet    Take 1 tablet (10 mg) by mouth At Bedtime    Rhinorrhea       vitamin E 100 UNIT capsule    TOCOPHEROL     Take 100 Units by mouth daily

## 2017-12-06 NOTE — PATIENT INSTRUCTIONS
Start the mirtazapine (remeron) every evening.  Also take the singulair every evening- this is for the runny nose.  Update me in about 1-2 months regarding your progress.    Preventive Health Recommendations  Female Ages 50 - 64    Yearly exam: See your health care provider every year in order to  o Review health changes.   o Discuss preventive care.    o Review your medicines if your doctor has prescribed any.      Get a Pap test every three years (unless you have an abnormal result and your provider advises testing more often).    If you get Pap tests with HPV test, you only need to test every 5 years, unless you have an abnormal result.     You do not need a Pap test if your uterus was removed (hysterectomy) and you have not had cancer.    You should be tested each year for STDs (sexually transmitted diseases) if you're at risk.     Have a mammogram every 1 to 2 years.    Have a colonoscopy at age 50, or have a yearly FIT test (stool test). These exams screen for colon cancer.      Have a cholesterol test every 5 years, or more often if advised.    Have a diabetes test (fasting glucose) every three years. If you are at risk for diabetes, you should have this test more often.     If you are at risk for osteoporosis (brittle bone disease), think about having a bone density scan (DEXA).    Shots: Get a flu shot each year. Get a tetanus shot every 10 years.    Nutrition:     Eat at least 5 servings of fruits and vegetables each day.    Eat whole-grain bread, whole-wheat pasta and brown rice instead of white grains and rice.    Talk to your provider about Calcium and Vitamin D.     Lifestyle    Exercise at least 150 minutes a week (30 minutes a day, 5 days a week). This will help you control your weight and prevent disease.    Limit alcohol to one drink per day.    No smoking.     Wear sunscreen to prevent skin cancer.     See your dentist every six months for an exam and cleaning.    See your eye doctor every 1 to 2  years.

## 2017-12-06 NOTE — PROGRESS NOTES
SUBJECTIVE:   CC: Mar Zegn is an 62 year old woman who presents for preventive health visit.     Physical   Annual:     Getting at least 3 servings of Calcium per day::  Yes    Bi-annual eye exam::  Yes    Dental care twice a year::  Yes    Sleep apnea or symptoms of sleep apnea::  Sleep apnea    Diet::  Regular (no restrictions)    Frequency of exercise::  2-3 days/week    Duration of exercise::  30-45 minutes    Taking medications regularly::  Yes    Medication side effects::  None    Additional concerns today::  YES  Thyroid check    Pt has had years of insomnia.  Tried trazodone and ambien without success.  Was on benzos prn at one point.  Wondering if there is another option.    Also has chronic rhinorrhea.  Uses flonase and allegra and claritin.  No success.    Today's PHQ-2 Score: PHQ-2 ( 1999 Pfizer) 12/6/2017   Q1: Little interest or pleasure in doing things 0   Q2: Feeling down, depressed or hopeless 0   PHQ-2 Score 0   Q1: Little interest or pleasure in doing things Not at all   Q2: Feeling down, depressed or hopeless Not at all   PHQ-2 Score 0       Abuse: Current or Past(Physical, Sexual or Emotional)- No  Do you feel safe in your environment - Yes    Social History   Substance Use Topics     Smoking status: Former Smoker     Types: Cigarettes     Smokeless tobacco: Never Used     Alcohol use 0.0 oz/week     0 Standard drinks or equivalent per week      Comment: avg:3-4 drink/wk     The patient does not drink >3 drinks per day nor >7 drinks per week.    Reviewed orders with patient.  Reviewed health maintenance and updated orders accordingly - Yes  Patient Active Problem List   Diagnosis     CARDIOVASCULAR SCREENING; LDL GOAL LESS THAN 160     Multiple pigmented nevi     Advanced directives, counseling/discussion     Seasonal allergic rhinitis     Hematoma of rectus sheath     Generalized anxiety disorder     Past Surgical History:   Procedure Laterality Date     COLONOSCOPY  8/23/2016     Dr. Shaw [facility]     COLONOSCOPY N/A 8/23/2016    Procedure: COLONOSCOPY;  Surgeon: Peter Shaw MD;  Location: [facility]     FINGER SURGERY      right little finger joint replacement     HYSTERECTOMY VAGINAL  age 30    retained ovaries. no cervix      HYSTERECTOMY, PAP NO LONGER INDICATED       ORTHOPEDIC SURGERY       SURGICAL HISTORY OF -   10/25/13    left index cyst removal     SURGICAL HISTORY OF -       Bilat knee miniscus repair       Social History   Substance Use Topics     Smoking status: Former Smoker     Types: Cigarettes     Smokeless tobacco: Never Used     Alcohol use 0.0 oz/week     0 Standard drinks or equivalent per week      Comment: avg:3-4 drink/wk     Family History   Problem Relation Age of Onset     Hypertension Father      CANCER Father      CANCER Maternal Grandmother      CANCER Maternal Grandfather      CANCER Paternal Grandmother      CANCER Paternal Grandfather      Asthma Brother      Hypertension Brother      Thyroid Disease Daughter      Colon Cancer No family hx of              Patient over age 50, mutual decision to screen reflected in health maintenance.      Pertinent mammograms are reviewed under the imaging tab.  History of abnormal Pap smear: Status post benign hysterectomy. Health Maintenance and Surgical History updated.    Reviewed and updated as needed this visit by clinical staffTobacco  Allergies  Meds  Med Hx  Surg Hx  Fam Hx  Soc Hx        Reviewed and updated as needed this visit by Provider            Review of Systems  C: NEGATIVE for fever, chills, change in weight  I: NEGATIVE for worrisome rashes, moles or lesions  E: NEGATIVE for vision changes or irritation  ENT: NEGATIVE for ear, mouth and throat problems  R: NEGATIVE for significant cough or SOB  B: NEGATIVE for masses, tenderness or discharge  CV: NEGATIVE for chest pain, palpitations or peripheral edema  GI: NEGATIVE for nausea, abdominal pain, heartburn, or change in bowel habits  : NEGATIVE for  unusual urinary or vaginal symptoms. No vaginal bleeding.  M: NEGATIVE for significant arthralgias or myalgia  N: NEGATIVE for weakness, dizziness or paresthesias  P: NEGATIVE for changes in mood or affect      OBJECTIVE:   There were no vitals taken for this visit.  Physical Exam  GENERAL: healthy, alert and no distress  EYES: Eyes grossly normal to inspection, PERRL and conjunctivae and sclerae normal  HENT: ear canals and TM's normal, nose and mouth without ulcers or lesions  NECK: no adenopathy, no asymmetry, masses, or scars and thyroid normal to palpation  RESP: lungs clear to auscultation - no rales, rhonchi or wheezes  BREAST: normal without masses, tenderness or nipple discharge and no palpable axillary masses or adenopathy  CV: regular rate and rhythm, normal S1 S2, no S3 or S4, no murmur, click or rub, no peripheral edema and peripheral pulses strong  ABDOMEN: soft, nontender, no hepatosplenomegaly, no masses and bowel sounds normal  MS: no gross musculoskeletal defects noted, no edema  SKIN: no suspicious lesions or rashes  NEURO: Normal strength and tone, mentation intact and speech normal  PSYCH: mentation appears normal, affect normal/bright    ASSESSMENT/PLAN:   1. Encounter for routine adult health examination without abnormal findings  - *MA Screening Digital Bilateral; Future  - Lipid panel reflex to direct LDL Fasting  - Comprehensive metabolic panel (BMP + Alb, Alk Phos, ALT, AST, Total. Bili, TP)  - TSH with free T4 reflex  62 y.o. Female, here for WWE.  Will schedule mammogram.  Colonoscopy utd.    2. Insomnia, unspecified type  Discussed options.  Reviewed r/b/se.  Trial remeron.  Update me in about 1-2 months.  Pt agrees with plan and verbalized understanding.  - mirtazapine (REMERON) 15 MG tablet; Take 0.5 tablets (7.5 mg) by mouth At Bedtime  Dispense: 30 tablet; Refill: 1  - OFFICE/OUTPT VISIT,EST,LEVL III    3. Rhinorrhea  Discussed options.  Trial singulair.  - montelukast (SINGULAIR)  "10 MG tablet; Take 1 tablet (10 mg) by mouth At Bedtime  Dispense: 30 tablet; Refill: 1  - OFFICE/OUTPT VISIT,JOSE CARLOS FAIR III    4. Generalized anxiety disorder  Continue with current regimen.  - buPROPion (WELLBUTRIN SR) 150 MG 12 hr tablet; Take 1 tablet (150 mg) by mouth daily  Dispense: 90 tablet; Refill: 3    5. Encounter for screening mammogram for breast cancer  - *MA Screening Digital Bilateral; Future    COUNSELING:  Reviewed preventive health counseling, as reflected in patient instructions         reports that she has quit smoking. Her smoking use included Cigarettes. She has never used smokeless tobacco.    Estimated body mass index is 19.92 kg/(m^2) as calculated from the following:    Height as of 9/20/17: 5' 5.25\" (1.657 m).    Weight as of 9/20/17: 120 lb 9.6 oz (54.7 kg).         Counseling Resources:  ATP IV Guidelines  Pooled Cohorts Equation Calculator  Breast Cancer Risk Calculator  FRAX Risk Assessment  ICSI Preventive Guidelines  Dietary Guidelines for Americans, 2010  USDA's MyPlate  ASA Prophylaxis  Lung CA Screening    DUKE Esquivel Ra, CNP  [facility]  "

## 2017-12-07 ENCOUNTER — HOSPITAL ENCOUNTER (OUTPATIENT)
Dept: MAMMOGRAPHY | Facility: CLINIC | Age: 62
Discharge: HOME | End: 2017-12-07
Attending: NURSE PRACTITIONER | Admitting: NURSE PRACTITIONER
Payer: COMMERCIAL

## 2017-12-07 DIAGNOSIS — Z00.00 ENCOUNTER FOR ROUTINE ADULT HEALTH EXAMINATION WITHOUT ABNORMAL FINDINGS: ICD-10-CM

## 2017-12-07 DIAGNOSIS — Z12.31 ENCOUNTER FOR SCREENING MAMMOGRAM FOR BREAST CANCER: ICD-10-CM

## 2017-12-07 LAB
ALBUMIN SERPL-MCNC: 3.9 G/DL (ref 3.4–5)
ALP SERPL-CCNC: 82 U/L (ref 40–150)
ALT SERPL W P-5'-P-CCNC: 26 U/L (ref 0–50)
ANION GAP SERPL CALCULATED.3IONS-SCNC: 7 MMOL/L (ref 3–14)
AST SERPL W P-5'-P-CCNC: 14 U/L (ref 0–45)
BILIRUB SERPL-MCNC: 0.5 MG/DL (ref 0.2–1.3)
BUN SERPL-MCNC: 16 MG/DL (ref 7–30)
CALCIUM SERPL-MCNC: 8.8 MG/DL (ref 8.5–10.1)
CHLORIDE SERPL-SCNC: 104 MMOL/L (ref 94–109)
CHOLEST SERPL-MCNC: 224 MG/DL
CO2 SERPL-SCNC: 30 MMOL/L (ref 20–32)
CREAT SERPL-MCNC: 0.92 MG/DL (ref 0.52–1.04)
GFR SERPL CREATININE-BSD FRML MDRD: 62 ML/MIN/1.7M2
GLUCOSE SERPL-MCNC: 83 MG/DL (ref 70–99)
HDLC SERPL-MCNC: 91 MG/DL
LDLC SERPL CALC-MCNC: 118 MG/DL
NONHDLC SERPL-MCNC: 133 MG/DL
POTASSIUM SERPL-SCNC: 4.3 MMOL/L (ref 3.4–5.3)
PROT SERPL-MCNC: 7 G/DL (ref 6.8–8.8)
SODIUM SERPL-SCNC: 141 MMOL/L (ref 133–144)
TRIGL SERPL-MCNC: 76 MG/DL
TSH SERPL DL<=0.005 MIU/L-ACNC: 0.73 MU/L (ref 0.4–4)

## 2017-12-07 PROCEDURE — G0202 SCR MAMMO BI INCL CAD: HCPCS

## 2017-12-11 ENCOUNTER — OFFICE VISIT (OUTPATIENT)
Dept: FAMILY MEDICINE | Facility: CLINIC | Age: 62
End: 2017-12-11
Payer: COMMERCIAL

## 2017-12-11 VITALS
DIASTOLIC BLOOD PRESSURE: 80 MMHG | HEART RATE: 68 BPM | BODY MASS INDEX: 20.69 KG/M2 | WEIGHT: 125.3 LBS | TEMPERATURE: 97.7 F | OXYGEN SATURATION: 100 % | SYSTOLIC BLOOD PRESSURE: 128 MMHG

## 2017-12-11 DIAGNOSIS — R82.998 CASTS URINARY HYALINE: Primary | ICD-10-CM

## 2017-12-11 LAB
ALBUMIN UR-MCNC: ABNORMAL MG/DL
ALBUMIN UR-MCNC: NEGATIVE MG/DL
APPEARANCE UR: ABNORMAL
APPEARANCE UR: CLEAR
BILIRUB UR QL STRIP: ABNORMAL
BILIRUB UR QL STRIP: NEGATIVE
COLOR UR AUTO: ABNORMAL
COLOR UR AUTO: YELLOW
GLUCOSE UR STRIP-MCNC: ABNORMAL MG/DL
GLUCOSE UR STRIP-MCNC: NEGATIVE MG/DL
HGB UR QL STRIP: ABNORMAL
HGB UR QL STRIP: NEGATIVE
KETONES UR STRIP-MCNC: ABNORMAL MG/DL
KETONES UR STRIP-MCNC: NEGATIVE MG/DL
LEUKOCYTE ESTERASE UR QL STRIP: ABNORMAL
LEUKOCYTE ESTERASE UR QL STRIP: NEGATIVE
NITRATE UR QL: ABNORMAL
NITRATE UR QL: NEGATIVE
PH UR STRIP: 5.5 PH (ref 5–7)
PH UR STRIP: ABNORMAL PH (ref 5–7)
RBC #/AREA URNS AUTO: NORMAL /HPF
SOURCE: ABNORMAL
SOURCE: NORMAL
SP GR UR STRIP: 1.01 (ref 1–1.03)
SP GR UR STRIP: ABNORMAL (ref 1–1.03)
UROBILINOGEN UR STRIP-ACNC: 0.2 EU/DL (ref 0.2–1)
UROBILINOGEN UR STRIP-ACNC: ABNORMAL EU/DL (ref 0.2–1)
WBC #/AREA URNS AUTO: NORMAL /HPF

## 2017-12-11 PROCEDURE — 81001 URINALYSIS AUTO W/SCOPE: CPT | Performed by: NURSE PRACTITIONER

## 2017-12-11 PROCEDURE — 99213 OFFICE O/P EST LOW 20 MIN: CPT | Performed by: NURSE PRACTITIONER

## 2017-12-11 NOTE — MR AVS SNAPSHOT
"              After Visit Summary   2017    Mar Zeng    MRN: [id number]           Patient Information     Date Of Birth          1955        Visit Information        Provider Department      2017 10:20 AM Gail Crespo Ra, APRN CNP [facility]        Today's Diagnoses     Casts urinary hyaline    -  1       Follow-ups after your visit        Who to contact     If you have questions or need follow up information about today's clinic visit or your schedule please contact [facility] directly at [phone].  Normal or non-critical lab and imaging results will be communicated to you by [employer], letter or phone within 4 business days after the clinic has received the results. If you do not hear from us within 7 days, please contact the clinic through [employer] or phone. If you have a critical or abnormal lab result, we will notify you by phone as soon as possible.  Submit refill requests through [employer] or call your pharmacy and they will forward the refill request to us. Please allow 3 business days for your refill to be completed.          Additional Information About Your Visit        MyChart Information     [employer] lets you send messages to your doctor, view your test results, renew your prescriptions, schedule appointments and more. To sign up, go to [email or web address] . Click on \"Log in\" on the left side of the screen, which will take you to the Welcome page. Then click on \"Sign up Now\" on the right side of the page.     You will be asked to enter the access code listed below, as well as some personal information. Please follow the directions to create your username and password.     Your access code is: [email or web address]  Expires: 3/6/2018 10:42 AM     Your access code will  in 90 days. If you need help or a new code, please call your [facility] or [phone].        Care EveryWhere ID     This is your Care EveryWhere ID. This could be used " by other organizations to access your [facility] medical records  [id number]        Your Vitals Were     Pulse Temperature Pulse Oximetry BMI (Body Mass Index)          68 97.7  F (36.5  C) (Oral) 100% 20.69 kg/m2         Blood Pressure from Last 3 Encounters:   12/11/17 128/80   12/06/17 122/70   09/20/17 124/72    Weight from Last 3 Encounters:   12/11/17 125 lb 4.8 oz (56.8 kg)   12/06/17 118 lb 8 oz (53.8 kg)   09/20/17 120 lb 9.6 oz (54.7 kg)              We Performed the Following     *UA reflex to Microscopic and Culture ([place] [facility] (except [place] and [place])     UA with Microscopic        Primary Care Provider Office Phone # Fax #    Gail Toñito Crespo APRMICHELA [facility] [phone] [address] [facility] [address]        Equal Access to Services     RO HERRERA : Hadii aad ku hadasho Soomaali, waaxda luqadaha, qaybta kaalmada adeegyada, waxay yarelyin hayadrianon charmaine siddiqi . So [facility] [phone].    ATENCIÓN: Si habla español, tiene a narayan disposición servicios gratuitos de asistencia lingüística. Llame al [phone].    We comply with applicable federal civil rights laws and [place] laws. We do not discriminate on the basis of race, color, national origin, age, disability, sex, sexual orientation, or gender identity.            Thank you!     Thank you for choosing [facility]  for your care. Our goal is always to provide you with excellent care. Hearing back from our patients is one way we can continue to improve our services. Please take a few minutes to complete the written survey that you may receive in the mail after your visit with us. Thank you!             Your Updated Medication List - Protect others around you: Learn how to safely use, store and throw away your medicines at www.disposemymeds.org.          This list is accurate as of: 12/11/17 11:54 AM.  Always use your most recent med list.                   Brand Name Dispense  Instructions for use Diagnosis    ALLEGRA PO           buPROPion 150 MG 12 hr tablet    WELLBUTRIN SR    90 tablet    Take 1 tablet (150 mg) by mouth daily    Generalized anxiety disorder       calcium + D 600-200 MG-UNIT Tabs   Generic drug:  calcium carbonate-vitamin D      Take 1 tablet by mouth daily.        fluticasone 50 MCG/ACT spray    FLONASE    16 g    Spray 2 sprays into both nostrils daily    Plugged feeling in ear, bilateral       mirtazapine 15 MG tablet    REMERON    30 tablet    Take 0.5 tablets (7.5 mg) by mouth At Bedtime    Insomnia, unspecified type       montelukast 10 MG tablet    SINGULAIR    30 tablet    Take 1 tablet (10 mg) by mouth At Bedtime    Rhinorrhea       vitamin E 100 UNIT capsule    TOCOPHEROL     Take 100 Units by mouth daily

## 2017-12-11 NOTE — NURSING NOTE
"Chief Complaint   Patient presents with     Recheck Medication     Urine check from Derm       Initial /80  Pulse 68  Temp 97.7  F (36.5  C) (Oral)  Wt 125 lb 4.8 oz (56.8 kg)  SpO2 100%  BMI 20.69 kg/m2 Estimated body mass index is 20.69 kg/(m^2) as calculated from the following:    Height as of 9/20/17: 5' 5.25\" (1.657 m).    Weight as of this encounter: 125 lb 4.8 oz (56.8 kg).  Medication Reconciliation: complete   Mariaa SMART M.A.      "

## 2017-12-11 NOTE — PROGRESS NOTES
SUBJECTIVE:   Mar Zeng is a 62 year old female who presents to clinic today for the following health issues:      Follow up to UA from Dermatology    Pt presents with requests for follow up from labs done at outside clinic.  She denies any urinary symptoms.  She had a UA completed which was normal aside from the presence of hyaline casts.  Was told she needed to be seen by her PCP.  She has had normal UAs over the past year and also has had a normal metabolic panel.    Problem list and histories reviewed & adjusted, as indicated.  Additional history: as documented    Patient Active Problem List   Diagnosis     CARDIOVASCULAR SCREENING; LDL GOAL LESS THAN 160     Multiple pigmented nevi     Advanced directives, counseling/discussion     Seasonal allergic rhinitis     Hematoma of rectus sheath     Generalized anxiety disorder     Past Surgical History:   Procedure Laterality Date     COLONOSCOPY  8/23/2016    Dr. Shaw [facility]     COLONOSCOPY N/A 8/23/2016    Procedure: COLONOSCOPY;  Surgeon: Peter Shaw MD;  Location: [facility]     FINGER SURGERY      right little finger joint replacement     HYSTERECTOMY VAGINAL  age 30    retained ovaries. no cervix      HYSTERECTOMY, PAP NO LONGER INDICATED       ORTHOPEDIC SURGERY       SURGICAL HISTORY OF -   10/25/13    left index cyst removal     SURGICAL HISTORY OF -       Bilat knee miniscus repair       Social History   Substance Use Topics     Smoking status: Former Smoker     Types: Cigarettes     Smokeless tobacco: Never Used     Alcohol use 0.0 oz/week     0 Standard drinks or equivalent per week      Comment: avg:3-4 drink/wk     Family History   Problem Relation Age of Onset     Hypertension Father      CANCER Father      CANCER Maternal Grandmother      CANCER Maternal Grandfather      CANCER Paternal Grandmother      CANCER Paternal Grandfather      Asthma Brother      Hypertension Brother      Thyroid Disease Daughter      Colon Cancer No family hx  of              Reviewed and updated as needed this visit by clinical staffTobacco  Allergies  Meds  Med Hx  Surg Hx  Fam Hx  Soc Hx      Reviewed and updated as needed this visit by Provider         ROS:  SEE HPI.    OBJECTIVE:     /80  Pulse 68  Temp 97.7  F (36.5  C) (Oral)  Wt 125 lb 4.8 oz (56.8 kg)  SpO2 100%  BMI 20.69 kg/m2  Body mass index is 20.69 kg/(m^2).  GENERAL: healthy, alert and no distress  PSYCH: mentation appears normal, affect normal/bright    Diagnostic Test Results:  Results for orders placed or performed in visit on 12/11/17 (from the past 24 hour(s))   *UA reflex to Microscopic and Culture ([place] [facility] (except [place] and [place])   Result Value Ref Range    Color Urine Canceled, Test credited     Appearance Urine Canceled, Test credited     Glucose Urine Canceled, Test credited (A) NEG^Negative mg/dL    Bilirubin Urine Canceled, Test credited (A) NEG^Negative    Ketones Urine Canceled, Test credited (A) NEG^Negative mg/dL    Specific Gravity Urine Canceled, Test credited 1.003 - 1.035    Blood Urine Canceled, Test credited (A) NEG^Negative    pH Urine Canceled, Test credited 5.0 - 7.0 pH    Protein Albumin Urine Canceled, Test credited (A) NEG^Negative mg/dL    Urobilinogen Urine Canceled, Test credited 0.2 - 1.0 EU/dL    Nitrite Urine Canceled, Test credited (A) NEG^Negative    Leukocyte Esterase Urine Canceled, Test credited (A) NEG^Negative    Source Canceled, Test credited    UA with Microscopic   Result Value Ref Range    Color Urine Yellow     Appearance Urine Clear     Glucose Urine Negative NEG^Negative mg/dL    Bilirubin Urine Negative NEG^Negative    Ketones Urine Negative NEG^Negative mg/dL    Specific Gravity Urine 1.015 1.003 - 1.035    pH Urine 5.5 5.0 - 7.0 pH    Protein Albumin Urine Negative NEG^Negative mg/dL    Urobilinogen Urine 0.2 0.2 - 1.0 EU/dL    Nitrite Urine Negative NEG^Negative    Blood Urine Negative NEG^Negative    Leukocyte  Esterase Urine Negative NEG^Negative    Source Midstream Urine     WBC Urine O - 2 OTO2^O - 2 /HPF    RBC Urine O - 2 OTO2^O - 2 /HPF       ASSESSMENT/PLAN:   1. Casts urinary hyaline  Pt here for repeat after outside lab showed hyaline casts.  Explained that this is not uncommon and is typically transient.  Normal today, no follow up needed.  Pt agrees with plan and verbalized understanding.  - *UA reflex to Microscopic and Culture ([place] [facility] (except [place] and [place])  - UA with Microscopic    DUKE Esquivel Ra, CNP  [facility]

## 2017-12-18 ENCOUNTER — TELEPHONE (OUTPATIENT)
Dept: FAMILY MEDICINE | Facility: CLINIC | Age: 62
End: 2017-12-18

## 2017-12-18 DIAGNOSIS — J11.1 INFLUENZA: Primary | ICD-10-CM

## 2017-12-18 RX ORDER — OSELTAMIVIR PHOSPHATE 75 MG/1
75 CAPSULE ORAL 2 TIMES DAILY
Qty: 10 CAPSULE | Refills: 0 | Status: SHIPPED | OUTPATIENT
Start: 2017-12-18 | End: 2018-01-25

## 2017-12-18 NOTE — TELEPHONE ENCOUNTER
Pt calls.  Her granddtr was diagnosed with the flu.  She put the whole family on tamiflu.  She is ache and hot and cold.  She is in [place] visiting family.      She would like it to go to [facility], [address] [place] [address], [phone] is the phone number.  The fax number is [phone].    Spoke to Gail and she will write a prescription for this.

## 2018-01-25 ENCOUNTER — TELEPHONE (OUTPATIENT)
Dept: FAMILY MEDICINE | Facility: CLINIC | Age: 63
End: 2018-01-25

## 2018-01-25 NOTE — LETTER
[facility]  [address] [facility] [address]  Phone: [phone]    January 25, 2018        Mar Zeng  [address] [address]          To whom it may concern:    RE: Mar Zeng    Please excuse Mar from work through 1/27/18.    Please contact me for questions or concerns.      Sincerely,        DUKE Esquivel Ra CNP

## 2018-01-25 NOTE — TELEPHONE ENCOUNTER
Patient informed. Agreed with home cares.     Patient stated she feels her stomach is still 'so so'. She will call for the Zofran if needed.     She is requesting a note for work. She is to work Friday and Saturday and does not feel she may make it.     LITO, are you ok with writing this as she was not actually seen in clinic? Please advise.     Mila CHARLTON RN, BSN, PHN  [place] [facility]

## 2018-01-25 NOTE — TELEPHONE ENCOUNTER
Patient is requesting Tamiflu.  Her daughter was ill with the same symptoms and was given Tamiflu.  Advised patient that this may only shorten course of illness by one day, and she is improving now.  No chronic health conditions    Has had vomiting and diarrhea since Tuesday.  Achey all over.  Headache over her eyes.  Temp for the past two days-unsure how high her fever went.  Today temp is 99.  No vomiting or diarrhea today.  No blood noted in the emesis or stool.  No complaint of abdominal pain.  Has been able to drink clear liquids-mostly water.  Urinating several times per day.    Advised patient that this sounds like a viral gastroenteritis.  Discussed drinking small amounts of clear liquids frequently and gradually increasing this amount as tolerated.   If no emesis or diarrhea, may advance to BRAT diet as tolerated.  Influenza-Like Illness (JOANA) Protocol    Mar Zeng      Age: 62 year old     YOB: 1955    Are you currently sick or have you had close contact with someone who is currently sick?  Yes, this patient is currently sick with GI symptoms only.     Adult Clinical Evaluation    Is this patient experiencing ANY of the following?  Unconsciousness or unresponsiveness No   Difficulty breathing or swallowing No   Blue or dusky lips, skin, or nail beds No   Chest pain No   Severe confusion or delirium No   Seizure activity: ongoing or stopped No   Severe dehydration or signs of shock No   Patient sounds very sick on the phone No     Is this patient experiencing ANY of the following?  Fever > 104 or shaking chills No   Wheezing with minimal response to usual wheezing medications or new wheezing No   Repeated vomiting or diarrhea with signs of dehydration (no urination within last 12 hours) No   Flu-like symptoms that initially improved but returned with fever and a worse cough No   Stiff or painful neck No   Severe headache No     Does the patient have any of the following?  Measured  fever > 100 degrees No-Not today   Chills or feels very warm to the touch No   Cough No   Sore throat No   Muscle/ body aches Yes   Headaches Yes   Fatigue (tiredness) Yes     Nursing Plan  Routed chart to provider    Provided home care instructions    General home care instruction:      Avoid contact with people in your household who are at increased risk for more severe complications of influenza (such as pregnant women or people who have a chronic health condition, for example diabetes, heart disease, asthma, or emphysema).    Stay home from work, school, childcare or other public places until your fever (37.8 degrees Celsius [100 degrees Fahrenheit]) has been gone for at least 24 hours, except to seek medical care. (Fever should be gone without the use of fever-reducing medications.) Use a surgical mask if available, or cover your mouth and nose with a tissue if possible if you need to seek medical care. Contact your work place, school, or  as they may have longer exclusion times.    You may continue to shed virus after your fever is gone. Limit your contact with high-risk individuals for 10 days after your symptoms started and be especially careful to cover your coughs/sneezes and wash your hands.    Cover your cough and wash your hands often, and especially after coughing, sneezing, blowing your nose.    Drink plenty of fluids (such as water, broth, sports drinks, electrolyte beverages for children) to prevent dehydration.    Avoid tobacco and second hand smoke.    Get plenty of rest.    Use over-the-counter pain relievers as needed per  instructions.    Do not give aspirin (acetylsalicylic acid) or products that contain aspirin (e.g. bismuth subsalicylate - Pepto Bismol) to children or teenagers 18 years or younger.    A small number of people with influenza do not have fever. If you have respiratory symptoms and are at increased risk for complications of influenza, contact your health care  provider to discuss these symptoms.    For parents of infants:    If possible, only family members who are not sick should care for infants.    Wash your hands with soap and water, or an alcohol-based hand rub (if your hands are not visibly soiled) before caring for your infant.    Cover your mouth and nose with a tissue when coughing or sneezing, and clean your hands.    Contact a health care provider to discuss your illness within 1-2 days if you are    Pregnant    Immunocompromised    Call 911 if you experience:    Difficulty breathing or shortness of breath    Pain or pressure in the chest    Confusion or less responsive than normal    Seizure activity: ongoing or stopped    Severe dehydration or signs of shock     Blue or dusky lips, skin, or nail beds    If further questions/concerns or if new symptoms develop, call your PCP or [facility] as soon as possible.    When to seek medical attention    Contact your health care provider right away if you experience:    A painful sore throat accompanied by fever persists for more than 48 hours    Ear pain, sinus pain, persistent vomiting and/or diarrhea    Oral temperature greater than 104  Fahrenheit (40  Celsius)    Dehydration (e.g., mouth feeling dry, dizzy when sitting/standing, decreased urine output)    Severe or persistent vomiting; unable to keep fluids down    Improvement in flu-like symptoms (fever and cough or sore throat) but then return of fever and worse cough or sore throat    Not drinking enough fluid    Any other concerns not stated above      Additional educational resources include:    http://www.[employer]    http://www.cdc.gov/flu/  Evelia Hughes

## 2018-01-25 NOTE — TELEPHONE ENCOUNTER
Agree, she likely does not need tamiflu with symptoms improving.  Would only help with fever and she is already seeing this improve.  Also took tamiflu within the past month.  Would recommend she complete conservative cares as recommended by nurse.  If she is still having nausea I could send some zofran in for her.  Let me know if she is interested in that- just don't think she would get enough from tamiflu and it would very possibly cause additional GI symptoms.  LITO

## 2018-01-27 ENCOUNTER — MYC MEDICAL ADVICE (OUTPATIENT)
Dept: FAMILY MEDICINE | Facility: CLINIC | Age: 63
End: 2018-01-27

## 2018-01-27 DIAGNOSIS — F41.1 GENERALIZED ANXIETY DISORDER: Primary | ICD-10-CM

## 2018-01-29 RX ORDER — HYDROXYZINE HYDROCHLORIDE 25 MG/1
25-50 TABLET, FILM COATED ORAL
Qty: 60 TABLET | Refills: 1 | Status: SHIPPED | OUTPATIENT
Start: 2018-01-29 | End: 2018-12-17

## 2018-01-29 NOTE — TELEPHONE ENCOUNTER
Could try hydroxyzine (vistaril) prn.  Non habit forming.  Works quickly.  Wears off relatively quickly.  Let me know.  LITO

## 2018-02-23 DIAGNOSIS — J34.89 RHINORRHEA: ICD-10-CM

## 2018-02-23 NOTE — TELEPHONE ENCOUNTER
"Requested Prescriptions   Pending Prescriptions Disp Refills     montelukast (SINGULAIR) 10 MG tablet [Pharmacy Med Name: MONTELUKAST 10MG TABLETS]  Last Written Prescription Date:  12/6/17  Last Fill Quantity: 30,  # refills: 1   Last office visit: 12/11/2017 with prescribing provider:  12/11/2017     Future Office Visit:     90 tablet 0     Sig: TAKE 1 TABLET(10 MG) BY MOUTH AT BEDTIME    Leukotriene Inhibitors Protocol Passed    2/23/2018  2:24 PM       Passed - Patient is age 12 or older    If patient is under 16, ok to refill using age based dosing.          Passed - Recent or future visit with authorizing provider's specialty    Patient had office visit in the last year or has a visit in the next 30 days with authorizing provider.  See \"Patient Info\" tab in inbasket, or \"Choose Columns\" in Meds & Orders section of the refill encounter.               "

## 2018-02-27 RX ORDER — MONTELUKAST SODIUM 10 MG/1
TABLET ORAL
Qty: 90 TABLET | Refills: 1 | Status: SHIPPED | OUTPATIENT
Start: 2018-02-27 | End: 2018-12-17

## 2018-02-27 NOTE — TELEPHONE ENCOUNTER
Prescription approved per [facility].  Kelsi Tipton RN

## 2018-03-01 DIAGNOSIS — H93.8X3 PLUGGED FEELING IN EAR, BILATERAL: ICD-10-CM

## 2018-03-01 RX ORDER — FLUTICASONE PROPIONATE 50 MCG
SPRAY, SUSPENSION (ML) NASAL
Qty: 48 ML | Refills: 2 | Status: SHIPPED | OUTPATIENT
Start: 2018-03-01 | End: 2019-05-13

## 2018-03-01 NOTE — TELEPHONE ENCOUNTER
"Requested Prescriptions   Pending Prescriptions Disp Refills     fluticasone (FLONASE) 50 MCG/ACT spray [Pharmacy Med Name: FLUTICASONE 50MCG KANG SP (120SP) RX]  Last Written Prescription Date:  9/28/16  Last Fill Quantity: 16G,  # refills: 9   Last office visit: 12/11/2017 with prescribing provider:  12/11/2017     Future Office Visit:     48 mL 0     Sig: SHAKE LIQUID AND USE 2 SPRAYS IN EACH NOSTRIL DAILY    Inhaled Steroids Protocol Passed    3/1/2018 10:20 AM       Passed - Patient is age 12 or older       Passed - Recent or future visit with authorizing provider's specialty    Patient had office visit in the last year or has a visit in the next 30 days with authorizing provider.  See \"Patient Info\" tab in inbasket, or \"Choose Columns\" in Meds & Orders section of the refill encounter.               "

## 2018-06-09 ENCOUNTER — OFFICE VISIT (OUTPATIENT)
Dept: URGENT CARE | Facility: URGENT CARE | Age: 63
End: 2018-06-09
Payer: COMMERCIAL

## 2018-06-09 VITALS
DIASTOLIC BLOOD PRESSURE: 68 MMHG | OXYGEN SATURATION: 98 % | HEART RATE: 78 BPM | SYSTOLIC BLOOD PRESSURE: 126 MMHG | TEMPERATURE: 98.9 F | WEIGHT: 127.8 LBS | BODY MASS INDEX: 21.1 KG/M2

## 2018-06-09 DIAGNOSIS — M25.562 ACUTE PAIN OF LEFT KNEE: ICD-10-CM

## 2018-06-09 DIAGNOSIS — R30.0 DYSURIA: Primary | ICD-10-CM

## 2018-06-09 DIAGNOSIS — M77.52 LEFT ANKLE TENDINITIS: ICD-10-CM

## 2018-06-09 LAB
ALBUMIN UR-MCNC: NEGATIVE MG/DL
APPEARANCE UR: CLEAR
BILIRUB UR QL STRIP: NEGATIVE
COLOR UR AUTO: YELLOW
GLUCOSE UR STRIP-MCNC: NEGATIVE MG/DL
HGB UR QL STRIP: NEGATIVE
KETONES UR STRIP-MCNC: NEGATIVE MG/DL
LEUKOCYTE ESTERASE UR QL STRIP: NEGATIVE
NITRATE UR QL: NEGATIVE
PH UR STRIP: 6 PH (ref 5–7)
SOURCE: NORMAL
SP GR UR STRIP: <=1.005 (ref 1–1.03)
SPECIMEN SOURCE: NORMAL
UROBILINOGEN UR STRIP-ACNC: 0.2 EU/DL (ref 0.2–1)
WET PREP SPEC: NORMAL

## 2018-06-09 PROCEDURE — 99213 OFFICE O/P EST LOW 20 MIN: CPT | Performed by: FAMILY MEDICINE

## 2018-06-09 PROCEDURE — 87210 SMEAR WET MOUNT SALINE/INK: CPT | Performed by: PHYSICIAN ASSISTANT

## 2018-06-09 PROCEDURE — 81003 URINALYSIS AUTO W/O SCOPE: CPT | Performed by: PHYSICIAN ASSISTANT

## 2018-06-09 RX ORDER — IBUPROFEN 800 MG/1
800 TABLET, FILM COATED ORAL DAILY
COMMUNITY
End: 2022-11-10

## 2018-06-09 NOTE — PROGRESS NOTES
SUBJECTIVE:   Mar Zeng is a 62 year old female presenting with two complaints:      Complaint #1:  Urinary frequency and urgency for the past two days.  In addition, patient has noticed white milky vaginal discharge since yesterday.  no fevers.  No nausea.  No vomiting.  No flank pain.  .  Onset of symptoms was one day ago.  Course of illness is still present. .      Complaint #2:  Patient underwent a left knee replacement in March 2018.   She has now noticed swelling at the upper left calf, posterior knee pain, swelling at the left inner ankle.  No recent injury.  Patient rides her bicycle a lot.  Normal ROM at the left knee and normal ROM at the left ankle.       Treatments:  Ice, elevation, Ibuprofen    Past Medical History:   Diagnosis Date     NO ACTIVE PROBLEMS    Left Knee Replacement Surgery March 2018    Current Outpatient Prescriptions   Medication Sig Dispense Refill     ASPIRIN PO        buPROPion (WELLBUTRIN SR) 150 MG 12 hr tablet Take 1 tablet (150 mg) by mouth daily 90 tablet 3     Calcium Carbonate-Vitamin D (CALCIUM + D) 600-200 MG-UNIT per tablet Take 1 tablet by mouth daily.       Fexofenadine HCl (ALLEGRA PO)        fluticasone (FLONASE) 50 MCG/ACT spray SHAKE LIQUID AND USE 2 SPRAYS IN EACH NOSTRIL DAILY 48 mL 2     hydrOXYzine (ATARAX) 25 MG tablet Take 1-2 tablets (25-50 mg) by mouth nightly as needed for other (sleep) (Patient not taking: Reported on 6/9/2018) 60 tablet 1     ibuprofen (ADVIL/MOTRIN) 800 MG tablet Take 800 mg by mouth daily       montelukast (SINGULAIR) 10 MG tablet TAKE 1 TABLET(10 MG) BY MOUTH AT BEDTIME 90 tablet 1     vitamin E (TOCOPHEROL) 100 UNIT capsule Take 100 Units by mouth daily       Social History   Substance Use Topics     Smoking status: Former Smoker     Types: Cigarettes     Smokeless tobacco: Never Used     Alcohol use 0.0 oz/week     0 Standard drinks or equivalent per week      Comment: avg:3-4 drink/wk       ROS:  Review of systems negative  except as stated above.    OBJECTIVE:  /68 (BP Location: Right arm, Patient Position: Chair, Cuff Size: Adult Regular)  Pulse 78  Temp 98.9  F (37.2  C) (Oral)  Wt 127 lb 12.8 oz (58 kg)  SpO2 98%  BMI 21.1 kg/m2  GENERAL APPEARANCE: healthy, alert and no distress  Extremities:   Left Knee:  No edema.  No increased warmth There is pain with palpation over the muscles of the popliteal fossa.  No cysts at the popliteal fossa.  Normal ROM at the left knee.  Tests for ligament stability are within normal limits.  Normal Cindy Test.  No pain with movement of the patella.    Left Ankle:  There is tenderness just posterior to the medial malleolus.  Normal ROM.  No other areas of tenderness.      GAIT:  within normal limits     BACK:  No costovertebral angle pain with percussioin.    SKIN: no suspicious lesions or rashes    LAB:    Results for orders placed or performed in visit on 06/09/18   UA reflex to Microscopic and Culture   Result Value Ref Range    Color Urine Yellow     Appearance Urine Clear     Glucose Urine Negative NEG^Negative mg/dL    Bilirubin Urine Negative NEG^Negative    Ketones Urine Negative NEG^Negative mg/dL    Specific Gravity Urine <=1.005 1.003 - 1.035    Blood Urine Negative NEG^Negative    pH Urine 6.0 5.0 - 7.0 pH    Protein Albumin Urine Negative NEG^Negative mg/dL    Urobilinogen Urine 0.2 0.2 - 1.0 EU/dL    Nitrite Urine Negative NEG^Negative    Leukocyte Esterase Urine Negative NEG^Negative    Source Midstream Urine    Wet prep   Result Value Ref Range    Specimen Description Vagina     Wet Prep No Trichomonas seen     Wet Prep No clue cells seen     Wet Prep No yeast seen          ASSESSMENT:    1)  Dysuria and vaginal discharge  2)  Left Knee Pain (Acute).  No evidence of dislocation/subluxation/infection/inflammation.    3)  Left ankle tendinitis.      PLAN:    For the knee pain and ankle pain:  Ibuprofen, tylenol  Ice  Rest  follow up with the primary care provider if not  better in 10-14 days.     Lonnie Rowland MD

## 2018-06-09 NOTE — MR AVS SNAPSHOT
After Visit Summary   6/9/2018    Mar Zeng    MRN: [id number]           Patient Information     Date Of Birth          1955        Visit Information        Provider Department      6/9/2018 3:30 PM Lonnie Rowland MD [facility]        Today's Diagnoses     Dysuria    -  1    Acute pain of left knee        Left ankle tendinitis          Care Instructions    Rest the left knee and left ankle for 7-10 days.     Place ice onto the painful areas for 10 minutes at a time, every 2-3 hours while awake.      follow up with your primary care provider if not better in 10-14 days.     Continue the Ibuprofen.  You can also add Tylenol for additional pain relief.                Follow-ups after your visit        Who to contact     If you have questions or need follow up information about today's clinic visit or your schedule please contact [facility] directly at [phone].  Normal or non-critical lab and imaging results will be communicated to you by MyChart, letter or phone within 4 business days after the clinic has received the results. If you do not hear from us within 7 days, please contact the clinic through [employer] or phone. If you have a critical or abnormal lab result, we will notify you by phone as soon as possible.  Submit refill requests through [employer] or call your pharmacy and they will forward the refill request to us. Please allow 3 business days for your refill to be completed.          Additional Information About Your Visit        MyChart Information     [employer] gives you secure access to your electronic health record. If you see a primary care provider, you can also send messages to your care team and make appointments. If you have questions, please call your primary care clinic.  If you do not have a primary care provider, please call [phone] and they will assist you.        Care EveryWhere ID     This is your Care EveryWhere ID. This could be  used by other organizations to access your [place] medical records  [id number]        Your Vitals Were     Pulse Temperature Pulse Oximetry BMI (Body Mass Index)          78 98.9  F (37.2  C) (Oral) 98% 21.1 kg/m2         Blood Pressure from Last 3 Encounters:   06/09/18 126/68   12/11/17 128/80   12/06/17 122/70    Weight from Last 3 Encounters:   06/09/18 127 lb 12.8 oz (58 kg)   12/11/17 125 lb 4.8 oz (56.8 kg)   12/06/17 118 lb 8 oz (53.8 kg)              We Performed the Following     UA reflex to Microscopic and Culture     Wet prep        Primary Care Provider Office Phone # Fax #    Gail DUKE Guerrier [facility] [phone] [id number]  [facility] [id number]        Equal Access to Services     [facility] JAVIER : Hadii aad ku hadasho Soomaali, waaxda luqadaha, qaybta kaalmada adeegyada, sophia morris hayyun siddiqi . So [facility] [phone].    ATENCIÓN: Si habla español, tiene a narayan disposición servicios gratuitos de asistencia lingüística. Anthonyame al [phone].    We comply with applicable federal civil rights laws and [place] laws. We do not discriminate on the basis of race, color, national origin, age, disability, sex, sexual orientation, or gender identity.            Thank you!     Thank you for choosing [facility]  for your care. Our goal is always to provide you with excellent care. Hearing back from our patients is one way we can continue to improve our services. Please take a few minutes to complete the written survey that you may receive in the mail after your visit with us. Thank you!             Your Updated Medication List - Protect others around you: Learn how to safely use, store and throw away your medicines at www.disposemymeds.org.          This list is accurate as of 6/9/18  4:31 PM.  Always use your most recent med list.                   Brand Name Dispense Instructions for use Diagnosis    ALLEGRA PO           ASPIRIN PO            buPROPion 150 MG 12 hr tablet    WELLBUTRIN SR    90 tablet    Take 1 tablet (150 mg) by mouth daily    Generalized anxiety disorder       calcium + D 600-200 MG-UNIT Tabs   Generic drug:  calcium carbonate-vitamin D      Take 1 tablet by mouth daily.        fluticasone 50 MCG/ACT spray    FLONASE    48 mL    SHAKE LIQUID AND USE 2 SPRAYS IN EACH NOSTRIL DAILY    Plugged feeling in ear, bilateral       hydrOXYzine 25 MG tablet    ATARAX    60 tablet    Take 1-2 tablets (25-50 mg) by mouth nightly as needed for other (sleep)    Generalized anxiety disorder       ibuprofen 800 MG tablet    ADVIL/MOTRIN     Take 800 mg by mouth daily        montelukast 10 MG tablet    SINGULAIR    90 tablet    TAKE 1 TABLET(10 MG) BY MOUTH AT BEDTIME    Rhinorrhea       vitamin E 100 UNIT capsule    TOCOPHEROL     Take 100 Units by mouth daily

## 2018-09-05 ENCOUNTER — TRANSFERRED RECORDS (OUTPATIENT)
Dept: HEALTH INFORMATION MANAGEMENT | Facility: CLINIC | Age: 63
End: 2018-09-05

## 2018-09-27 ENCOUNTER — OFFICE VISIT (OUTPATIENT)
Dept: FAMILY MEDICINE | Facility: CLINIC | Age: 63
End: 2018-09-27
Payer: COMMERCIAL

## 2018-09-27 VITALS
RESPIRATION RATE: 16 BRPM | TEMPERATURE: 98.5 F | WEIGHT: 126.7 LBS | HEIGHT: 65 IN | SYSTOLIC BLOOD PRESSURE: 118 MMHG | OXYGEN SATURATION: 98 % | BODY MASS INDEX: 21.11 KG/M2 | DIASTOLIC BLOOD PRESSURE: 80 MMHG | HEART RATE: 81 BPM

## 2018-09-27 DIAGNOSIS — R53.83 FATIGUE, UNSPECIFIED TYPE: Primary | ICD-10-CM

## 2018-09-27 DIAGNOSIS — Z23 NEED FOR PROPHYLACTIC VACCINATION AND INOCULATION AGAINST INFLUENZA: ICD-10-CM

## 2018-09-27 DIAGNOSIS — M25.50 MULTIPLE JOINT PAIN: ICD-10-CM

## 2018-09-27 LAB
CRP SERPL-MCNC: <2.9 MG/L (ref 0–8)
ERYTHROCYTE [DISTWIDTH] IN BLOOD BY AUTOMATED COUNT: 12.8 % (ref 10–15)
ERYTHROCYTE [SEDIMENTATION RATE] IN BLOOD BY WESTERGREN METHOD: 9 MM/H (ref 0–30)
HCT VFR BLD AUTO: 36.2 % (ref 35–47)
HGB BLD-MCNC: 11.7 G/DL (ref 11.7–15.7)
MCH RBC QN AUTO: 29.1 PG (ref 26.5–33)
MCHC RBC AUTO-ENTMCNC: 32.3 G/DL (ref 31.5–36.5)
MCV RBC AUTO: 90 FL (ref 78–100)
PLATELET # BLD AUTO: 305 10E9/L (ref 150–450)
RBC # BLD AUTO: 4.02 10E12/L (ref 3.8–5.2)
WBC # BLD AUTO: 6.5 10E9/L (ref 4–11)

## 2018-09-27 PROCEDURE — 86431 RHEUMATOID FACTOR QUANT: CPT | Performed by: NURSE PRACTITIONER

## 2018-09-27 PROCEDURE — 85652 RBC SED RATE AUTOMATED: CPT | Performed by: NURSE PRACTITIONER

## 2018-09-27 PROCEDURE — 99214 OFFICE O/P EST MOD 30 MIN: CPT | Performed by: NURSE PRACTITIONER

## 2018-09-27 PROCEDURE — 86038 ANTINUCLEAR ANTIBODIES: CPT | Performed by: NURSE PRACTITIONER

## 2018-09-27 PROCEDURE — 86140 C-REACTIVE PROTEIN: CPT | Performed by: NURSE PRACTITIONER

## 2018-09-27 PROCEDURE — 86618 LYME DISEASE ANTIBODY: CPT | Performed by: NURSE PRACTITIONER

## 2018-09-27 PROCEDURE — 80048 BASIC METABOLIC PNL TOTAL CA: CPT | Performed by: NURSE PRACTITIONER

## 2018-09-27 PROCEDURE — 90471 IMMUNIZATION ADMIN: CPT | Performed by: NURSE PRACTITIONER

## 2018-09-27 PROCEDURE — 84443 ASSAY THYROID STIM HORMONE: CPT | Performed by: NURSE PRACTITIONER

## 2018-09-27 PROCEDURE — 36415 COLL VENOUS BLD VENIPUNCTURE: CPT | Performed by: NURSE PRACTITIONER

## 2018-09-27 PROCEDURE — 85027 COMPLETE CBC AUTOMATED: CPT | Performed by: NURSE PRACTITIONER

## 2018-09-27 PROCEDURE — 90686 IIV4 VACC NO PRSV 0.5 ML IM: CPT | Performed by: NURSE PRACTITIONER

## 2018-09-27 NOTE — MR AVS SNAPSHOT
After Visit Summary   9/27/2018    Mar Zeng    MRN: [id number]           Patient Information     Date Of Birth          1955        Visit Information        Provider Department      9/27/2018 2:20 PM Gail Crespo Ra, APRN CNP [facility]        Today's Diagnoses     Fatigue, unspecified type    -  1    Multiple joint pain          Care Instructions    We will talk more after labs come back.            Follow-ups after your visit        Follow-up notes from your care team     Return in about 2 weeks (around 10/11/2018) for follow up.      Who to contact     If you have questions or need follow up information about today's clinic visit or your schedule please contact [facility] directly at [phone].  Normal or non-critical lab and imaging results will be communicated to you by [employer], letter or phone within 4 business days after the clinic has received the results. If you do not hear from us within 7 days, please contact the clinic through [employer] or phone. If you have a critical or abnormal lab result, we will notify you by phone as soon as possible.  Submit refill requests through [employer] or call your pharmacy and they will forward the refill request to us. Please allow 3 business days for your refill to be completed.          Additional Information About Your Visit        MyChart Information     [employer] gives you secure access to your electronic health record. If you see a primary care provider, you can also send messages to your care team and make appointments. If you have questions, please call your primary care clinic.  If you do not have a primary care provider, please call [phone] and they will assist you.        Care EveryWhere ID     This is your Care EveryWhere ID. This could be used by other organizations to access your [place] medical records  [id number]        Your Vitals Were     Pulse Temperature Respirations Height Pulse  "Oximetry BMI (Body Mass Index)    81 98.5  F (36.9  C) (Tympanic) 16 5' 5\" (1.651 m) 98% 21.08 kg/m2       Blood Pressure from Last 3 Encounters:   09/27/18 118/80   06/09/18 126/68   12/11/17 128/80    Weight from Last 3 Encounters:   09/27/18 126 lb 11.2 oz (57.5 kg)   06/09/18 127 lb 12.8 oz (58 kg)   12/11/17 125 lb 4.8 oz (56.8 kg)              We Performed the Following     Anti Nuclear Elizabeth IgG by IFA with Reflex     Basic metabolic panel     CBC with platelets     CRP, inflammation     ESR: Erythrocyte sedimentation rate     Lyme Disease Elizabeth with reflex to WB Serum     Rheumatoid factor     TSH with free T4 reflex        Primary Care Provider Office Phone # Fax #    Gail Crespo, APRN [facility] [phone]       [id number] [facility] [address]        Equal Access to Services     RO HERRERA : Hadii aad ku hadasho Soomaali, waaxda luqadaha, qaybta kaalmada adeegyada, waxay idiin hayaan charmaine siddiqi . So [facility] [phone].    ATENCIÓN: Si habla arjun, tiene a narayan disposición servicios gratuitos de asistencia lingüística. Llame al [phone].    We comply with applicable federal civil rights laws and [place] laws. We do not discriminate on the basis of race, color, national origin, age, disability, sex, sexual orientation, or gender identity.            Thank you!     Thank you for choosing [facility]  for your care. Our goal is always to provide you with excellent care. Hearing back from our patients is one way we can continue to improve our services. Please take a few minutes to complete the written survey that you may receive in the mail after your visit with us. Thank you!             Your Updated Medication List - Protect others around you: Learn how to safely use, store and throw away your medicines at www.disposemymeds.org.          This list is accurate as of 9/27/18  2:44 PM.  Always use your most recent med list.                   Brand Name Dispense " Instructions for use Diagnosis    ALLEGRA PO           ASPIRIN PO           buPROPion 150 MG 12 hr tablet    WELLBUTRIN SR    90 tablet    Take 1 tablet (150 mg) by mouth daily    Generalized anxiety disorder       calcium + D 600-200 MG-UNIT Tabs   Generic drug:  calcium carbonate-vitamin D      Take 1 tablet by mouth daily.        fluticasone 50 MCG/ACT spray    FLONASE    48 mL    SHAKE LIQUID AND USE 2 SPRAYS IN EACH NOSTRIL DAILY    Plugged feeling in ear, bilateral       hydrOXYzine 25 MG tablet    ATARAX    60 tablet    Take 1-2 tablets (25-50 mg) by mouth nightly as needed for other (sleep)    Generalized anxiety disorder       ibuprofen 800 MG tablet    ADVIL/MOTRIN     Take 800 mg by mouth daily        montelukast 10 MG tablet    SINGULAIR    90 tablet    TAKE 1 TABLET(10 MG) BY MOUTH AT BEDTIME    Rhinorrhea       vitamin E 100 UNIT capsule    TOCOPHEROL     Take 100 Units by mouth daily

## 2018-09-27 NOTE — PROGRESS NOTES
SUBJECTIVE:   Mar Zeng is a 63 year old female who presents to clinic today for the following health issues:      Fatigue      Duration: few weeks    Description (location/character/radiation): fatigue, body aches    Intensity:  severe    Accompanying signs and symptoms: None    History (similar episodes/previous evaluation): None    Precipitating or alleviating factors: None    Therapies tried and outcome: supplements      Pt presents with concerns regarding symptoms for the past month.  Generalized fatigue and multiple intermittent joint pain.  No fevers.  No URI symptoms.  No chest pain, palpitations, sob.  Normal intake.  Normal bowel habits.  No rashes.  She has trouble with sleep chronically, wakes frequently.  No change.  Mood is stable.  No recent travel.  No med changes.    Problem list and histories reviewed & adjusted, as indicated.  Additional history: as documented    Patient Active Problem List   Diagnosis     CARDIOVASCULAR SCREENING; LDL GOAL LESS THAN 160     Multiple pigmented nevi     Advanced directives, counseling/discussion     Seasonal allergic rhinitis     Hematoma of rectus sheath     Generalized anxiety disorder     Past Surgical History:   Procedure Laterality Date     COLONOSCOPY  8/23/2016    Dr. Shaw [facility]     COLONOSCOPY N/A 8/23/2016    Procedure: COLONOSCOPY;  Surgeon: Peter Shaw MD;  Location:  GI     FINGER SURGERY      right little finger joint replacement     HYSTERECTOMY VAGINAL  age 30    retained ovaries. no cervix      HYSTERECTOMY, PAP NO LONGER INDICATED       ORTHOPEDIC SURGERY       SURGICAL HISTORY OF -   10/25/13    left index cyst removal     SURGICAL HISTORY OF -       Bilat knee miniscus repair       Social History   Substance Use Topics     Smoking status: Former Smoker     Types: Cigarettes     Smokeless tobacco: Never Used     Alcohol use 0.0 oz/week     0 Standard drinks or equivalent per week      Comment: avg:3-4 drink/wk     Family  "History   Problem Relation Age of Onset     Hypertension Father      Cancer Father      Cancer Maternal Grandmother      Cancer Maternal Grandfather      Cancer Paternal Grandmother      Cancer Paternal Grandfather      Asthma Brother      Hypertension Brother      Thyroid Disease Daughter      Colon Cancer No family hx of            Reviewed and updated as needed this visit by clinical staff       Reviewed and updated as needed this visit by Provider         ROS:  SEE HPI.    OBJECTIVE:     /80 (BP Location: Right arm, Patient Position: Chair, Cuff Size: Adult Regular)  Pulse 81  Temp 98.5  F (36.9  C) (Tympanic)  Resp 16  Ht 5' 5\" (1.651 m)  Wt 126 lb 11.2 oz (57.5 kg)  SpO2 98%  BMI 21.08 kg/m2  Body mass index is 21.08 kg/(m^2).  GENERAL: healthy, alert and no distress  EYES: Eyes grossly normal to inspection  HENT: ear canals and TM's normal, nose and mouth without ulcers or lesions  NECK: no adenopathy, no asymmetry, masses, or scars and thyroid normal to palpation  RESP: lungs clear to auscultation - no rales, rhonchi or wheezes  CV: regular rate and rhythm, normal S1 S2, no S3 or S4, no murmur, click or rub, no peripheral edema and peripheral pulses strong  ABDOMEN: soft, nontender, no hepatosplenomegaly, no masses and bowel sounds normal  PSYCH: mentation appears normal, affect normal/bright    Diagnostic Test Results:  none     ASSESSMENT/PLAN:   1. Fatigue, unspecified type  63 y.o. Female, hx of anxiety, here today with concerns regarding fatigue and joint pain.  Discussed initial work up.  Will talk more after labs are reviewed.  Pt agrees with plan and verbalized understanding.  - CBC with platelets  - Basic metabolic panel  - TSH with free T4 reflex  - Rheumatoid factor  - Anti Nuclear Elizabeth IgG by IFA with Reflex  - ESR: Erythrocyte sedimentation rate  - Lyme Disease Elizabeth with reflex to WB Serum  - CRP, inflammation    2. Multiple joint pain  - CBC with platelets  - Basic metabolic panel  - " TSH with free T4 reflex  - Rheumatoid factor  - Anti Nuclear Elizabeth IgG by IFA with Reflex  - ESR: Erythrocyte sedimentation rate  - Lyme Disease Elizabeth with reflex to WB Serum  - CRP, inflammation    DUKE Esquivel Ra, CNP  [facility]

## 2018-09-27 NOTE — PROGRESS NOTES

## 2018-09-28 LAB
ANA SER QL IF: NEGATIVE
ANION GAP SERPL CALCULATED.3IONS-SCNC: 7 MMOL/L (ref 3–14)
B BURGDOR IGG+IGM SER QL: 0.04 (ref 0–0.89)
BUN SERPL-MCNC: 16 MG/DL (ref 7–30)
CALCIUM SERPL-MCNC: 8.6 MG/DL (ref 8.5–10.1)
CHLORIDE SERPL-SCNC: 107 MMOL/L (ref 94–109)
CO2 SERPL-SCNC: 27 MMOL/L (ref 20–32)
CREAT SERPL-MCNC: 1.18 MG/DL (ref 0.52–1.04)
GFR SERPL CREATININE-BSD FRML MDRD: 46 ML/MIN/1.7M2
GLUCOSE SERPL-MCNC: 90 MG/DL (ref 70–99)
POTASSIUM SERPL-SCNC: 4.1 MMOL/L (ref 3.4–5.3)
RHEUMATOID FACT SER NEPH-ACNC: <20 IU/ML (ref 0–20)
SODIUM SERPL-SCNC: 141 MMOL/L (ref 133–144)
TSH SERPL DL<=0.005 MIU/L-ACNC: 0.59 MU/L (ref 0.4–4)

## 2018-10-01 DIAGNOSIS — R79.89 ELEVATED SERUM CREATININE: Primary | ICD-10-CM

## 2018-10-05 ENCOUNTER — TELEPHONE (OUTPATIENT)
Dept: FAMILY MEDICINE | Facility: CLINIC | Age: 63
End: 2018-10-05

## 2018-10-05 NOTE — TELEPHONE ENCOUNTER
Looks like [facility] message sent 10/1/18, but she does not know how to use that. Adv her of LITO's note. She had no questions- offered to schedule her lab, but [place] was [place] and couldn't do anything for a couple months.   Mackenzie CEDILLO RN

## 2018-12-07 ENCOUNTER — TELEPHONE (OUTPATIENT)
Dept: FAMILY MEDICINE | Facility: CLINIC | Age: 63
End: 2018-12-07

## 2018-12-07 DIAGNOSIS — M25.50 MULTIPLE JOINT PAIN: Primary | ICD-10-CM

## 2018-12-07 NOTE — TELEPHONE ENCOUNTER
Reason for call:  Other   Patient called regarding (reason for call): call back  Additional comments: Patient looking for a rheumatology referral. Patient would like a couple of options to check out.    Phone number to reach patient:  Home number on file [phone] (home)    Best Time:  any    Can we leave a detailed message on this number?  YES     Obdulia [facility] - TC/FD  12/7/2018 8:25 AM

## 2018-12-07 NOTE — TELEPHONE ENCOUNTER
Called and informed patient. She opted to not schedule with [facility] due to wait. Provided phone number to consultants.

## 2018-12-17 ENCOUNTER — HOSPITAL ENCOUNTER (OUTPATIENT)
Dept: MAMMOGRAPHY | Facility: CLINIC | Age: 63
Discharge: HOME | End: 2018-12-17
Attending: NURSE PRACTITIONER | Admitting: NURSE PRACTITIONER
Payer: COMMERCIAL

## 2018-12-17 ENCOUNTER — TELEPHONE (OUTPATIENT)
Dept: FAMILY MEDICINE | Facility: CLINIC | Age: 63
End: 2018-12-17

## 2018-12-17 ENCOUNTER — OFFICE VISIT (OUTPATIENT)
Dept: FAMILY MEDICINE | Facility: CLINIC | Age: 63
End: 2018-12-17
Payer: COMMERCIAL

## 2018-12-17 VITALS
BODY MASS INDEX: 20.68 KG/M2 | DIASTOLIC BLOOD PRESSURE: 74 MMHG | TEMPERATURE: 98.3 F | OXYGEN SATURATION: 99 % | SYSTOLIC BLOOD PRESSURE: 112 MMHG | RESPIRATION RATE: 14 BRPM | WEIGHT: 124.3 LBS | HEART RATE: 101 BPM

## 2018-12-17 DIAGNOSIS — Z12.31 VISIT FOR SCREENING MAMMOGRAM: ICD-10-CM

## 2018-12-17 DIAGNOSIS — R53.83 FATIGUE, UNSPECIFIED TYPE: Primary | ICD-10-CM

## 2018-12-17 DIAGNOSIS — F41.1 GENERALIZED ANXIETY DISORDER: Primary | ICD-10-CM

## 2018-12-17 PROCEDURE — 77063 BREAST TOMOSYNTHESIS BI: CPT

## 2018-12-17 PROCEDURE — 99213 OFFICE O/P EST LOW 20 MIN: CPT | Performed by: NURSE PRACTITIONER

## 2018-12-17 RX ORDER — LORAZEPAM 1 MG/1
1 TABLET ORAL
COMMUNITY
Start: 2011-12-06 | End: 2018-12-19

## 2018-12-17 NOTE — PROGRESS NOTES
SUBJECTIVE:   Mar Zeng is a 63 year old female who presents to clinic today for the following health issues:      Medication Followup of Lorazepam    Taking Medication as prescribed: yes    Side Effects:  None    Medication Helping Symptoms:  yes     Pt presents with requests for refill of lorazepam.  She uses this for sleep about once monthly.  She has used this as needed for years.  Still has some of the med at home, unsure of strength.  The prescription is .    Problem list and histories reviewed & adjusted, as indicated.  Additional history: as documented    Patient Active Problem List   Diagnosis     CARDIOVASCULAR SCREENING; LDL GOAL LESS THAN 160     Multiple pigmented nevi     Advanced directives, counseling/discussion     Seasonal allergic rhinitis     Hematoma of rectus sheath     Generalized anxiety disorder     Past Surgical History:   Procedure Laterality Date     COLONOSCOPY  2016    Dr. Shaw [facility]     COLONOSCOPY N/A 2016    Procedure: COLONOSCOPY;  Surgeon: Peter Shaw MD;  Location: [facility]     FINGER SURGERY      right little finger joint replacement     HYSTERECTOMY VAGINAL  age 30    retained ovaries. no cervix      HYSTERECTOMY, PAP NO LONGER INDICATED       ORTHOPEDIC SURGERY       SURGICAL HISTORY OF -   10/25/13    left index cyst removal     SURGICAL HISTORY OF -       Bilat knee miniscus repair       Social History     Tobacco Use     Smoking status: Former Smoker     Types: Cigarettes     Smokeless tobacco: Never Used   Substance Use Topics     Alcohol use: Yes     Alcohol/week: 0.0 oz     Comment: avg:3-4 drink/wk     Family History   Problem Relation Age of Onset     Hypertension Father      Cancer Father      Cancer Maternal Grandmother      Cancer Maternal Grandfather      Cancer Paternal Grandmother      Cancer Paternal Grandfather      Asthma Brother      Hypertension Brother      Thyroid Disease Daughter      Colon Cancer No family hx of             Reviewed and updated as needed this visit by clinical staff       Reviewed and updated as needed this visit by Provider         ROS:  SEE HPI.    OBJECTIVE:     /74 (BP Location: Right arm, Patient Position: Chair, Cuff Size: Adult Regular)   Pulse 101   Temp 98.3  F (36.8  C) (Tympanic)   Resp 14   Wt 56.4 kg (124 lb 4.8 oz)   SpO2 99%   BMI 20.68 kg/m    Body mass index is 20.68 kg/m .  GENERAL: healthy, alert and no distress  PSYCH: mentation appears normal, affect normal/bright    Diagnostic Test Results:  none     ASSESSMENT/PLAN:   1. Generalized anxiety disorder  Uses lorazepam once monthly.  Will let me know the strength of her prescription and I will send in #15.  Reviewed no drinking, driving, or big decisions.  Pt agrees with plan and verbalized understanding.    DUKE Esquivel Ra [facility]

## 2018-12-19 DIAGNOSIS — G47.00 INSOMNIA, UNSPECIFIED TYPE: Primary | ICD-10-CM

## 2018-12-19 RX ORDER — LORAZEPAM 1 MG/1
1 TABLET ORAL
Qty: 15 TABLET | Refills: 0 | Status: SHIPPED | OUTPATIENT
Start: 2018-12-19 | End: 2022-10-26

## 2018-12-19 NOTE — TELEPHONE ENCOUNTER
Medication not listed in the current med list or history, please advise.     LORazepam (ATIVAN) 1 MG tablet

## 2018-12-19 NOTE — TELEPHONE ENCOUNTER
"Last Written Prescription Date:  historical  Last Fill Quantity: n/a,  # refills: n/a   Last office visit: 12/17/2018 with prescribing provider:  MACKENZIE Crespo   Future Office Visit:      Kj SAMAYOA 12/17/18:   Generalized anxiety disorder  Uses lorazepam once monthly.  Will let me know the strength of her prescription and I will send in #15.  Reviewed no drinking, driving, or big decisions.  Pt agrees with plan and verbalized understanding.    Called patient. She no longer had this information at home so she had \"FV looked it up on the computer\". It showed 1 mg tablets.     Routing to [facility].    Molly HERRING Triage RN      "

## 2019-03-05 ENCOUNTER — OFFICE VISIT (OUTPATIENT)
Dept: PEDIATRICS | Facility: CLINIC | Age: 64
End: 2019-03-05
Payer: COMMERCIAL

## 2019-03-05 VITALS
TEMPERATURE: 98.5 F | RESPIRATION RATE: 20 BRPM | SYSTOLIC BLOOD PRESSURE: 128 MMHG | WEIGHT: 125 LBS | BODY MASS INDEX: 20.8 KG/M2 | DIASTOLIC BLOOD PRESSURE: 70 MMHG | HEART RATE: 80 BPM

## 2019-03-05 DIAGNOSIS — N30.00 ACUTE CYSTITIS WITHOUT HEMATURIA: Primary | ICD-10-CM

## 2019-03-05 DIAGNOSIS — H04.203 WATERY EYES: ICD-10-CM

## 2019-03-05 DIAGNOSIS — Z79.2 PROPHYLACTIC ANTIBIOTIC: ICD-10-CM

## 2019-03-05 DIAGNOSIS — R30.0 DYSURIA: ICD-10-CM

## 2019-03-05 DIAGNOSIS — R82.90 NONSPECIFIC FINDING ON EXAMINATION OF URINE: ICD-10-CM

## 2019-03-05 LAB
ALBUMIN UR-MCNC: 30 MG/DL
APPEARANCE UR: CLEAR
BACTERIA #/AREA URNS HPF: ABNORMAL /HPF
BILIRUB UR QL STRIP: NEGATIVE
COLOR UR AUTO: YELLOW
GLUCOSE UR STRIP-MCNC: NEGATIVE MG/DL
HGB UR QL STRIP: ABNORMAL
KETONES UR STRIP-MCNC: NEGATIVE MG/DL
LEUKOCYTE ESTERASE UR QL STRIP: ABNORMAL
NITRATE UR QL: NEGATIVE
NON-SQ EPI CELLS #/AREA URNS LPF: ABNORMAL /LPF
PH UR STRIP: 6 PH (ref 5–7)
RBC #/AREA URNS AUTO: ABNORMAL /HPF
SOURCE: ABNORMAL
SP GR UR STRIP: 1.01 (ref 1–1.03)
UROBILINOGEN UR STRIP-ACNC: 0.2 EU/DL (ref 0.2–1)
WBC #/AREA URNS AUTO: >100 /HPF

## 2019-03-05 PROCEDURE — 87086 URINE CULTURE/COLONY COUNT: CPT | Performed by: FAMILY MEDICINE

## 2019-03-05 PROCEDURE — 99214 OFFICE O/P EST MOD 30 MIN: CPT | Performed by: FAMILY MEDICINE

## 2019-03-05 PROCEDURE — 81001 URINALYSIS AUTO W/SCOPE: CPT | Performed by: FAMILY MEDICINE

## 2019-03-05 PROCEDURE — 87088 URINE BACTERIA CULTURE: CPT | Performed by: FAMILY MEDICINE

## 2019-03-05 PROCEDURE — 87186 SC STD MICRODIL/AGAR DIL: CPT | Performed by: FAMILY MEDICINE

## 2019-03-05 RX ORDER — NITROFURANTOIN 25; 75 MG/1; MG/1
100 CAPSULE ORAL 2 TIMES DAILY
Qty: 10 CAPSULE | Refills: 0 | Status: SHIPPED | OUTPATIENT
Start: 2019-03-05 | End: 2019-03-10

## 2019-03-05 RX ORDER — POLYMYXIN B SULFATE AND TRIMETHOPRIM 1; 10000 MG/ML; [USP'U]/ML
1-2 SOLUTION OPHTHALMIC EVERY 4 HOURS
Qty: 1 BOTTLE | Refills: 0 | Status: SHIPPED | OUTPATIENT
Start: 2019-03-05 | End: 2019-03-05

## 2019-03-05 RX ORDER — FLUCONAZOLE 150 MG/1
150 TABLET ORAL ONCE
Qty: 2 TABLET | Refills: 0 | Status: SHIPPED | OUTPATIENT
Start: 2019-03-05 | End: 2019-03-05

## 2019-03-05 NOTE — PATIENT INSTRUCTIONS
Take medication macrobid twice a day for 5 days    Symptoms should improve within the next 2-3 days. If no improvement, call or return for recommendation    Drink at least 5-6 glasses of water a day    If you develop flank pain, fevers, nausea/vomiting call immediately for assistance    --  If this continues to bother you make appt to see optometry or ophthalmology

## 2019-03-05 NOTE — PROGRESS NOTES
Subjective:   Mar Zeng is a 63 year old female who presents for   Chief Complaint   Patient presents with     Urinary Problem     Eye Problem     Lt eye.  Feels like something in it. irritated and runny   1)  Symptoms started 3 days ago when she was in [place]. No pain with urination but has uncomfortable feeling. No fevers/chills/vomiting/nausea/flank pain  Pt presents in clinic today with concerns of frequent urination x two to three days. Hx of UTI's.    2) Symptoms not getting better with this eye over last few days. She has ongoing runny eye(not worsening but still present). Vision in this left eye is not blurry. No redness. Waking up in the morning there has been a little bit of mattering.     Patient Active Problem List    Diagnosis Date Noted     Generalized anxiety disorder 06/08/2015     Priority: Medium     Diagnosis updated by automated process. Provider to review and confirm.       Hematoma of rectus sheath 01/23/2014     Priority: Medium     Seasonal allergic rhinitis 08/30/2013     Priority: Medium     Advanced directives, counseling/discussion 01/22/2013     Priority: Medium     6/9/15 Advance Care Planning invitation sent. SABRINA Carbajal RN      Discussed advance care planning with patient; however, patient declined at this time. 1/22/2013          Multiple pigmented nevi 12/20/2012     Priority: Medium     CARDIOVASCULAR SCREENING; LDL GOAL LESS THAN 160 03/12/2012     Priority: Medium       Current Outpatient Medications   Medication     ASPIRIN PO     buPROPion (WELLBUTRIN SR) 150 MG 12 hr tablet     Calcium Carbonate-Vitamin D (CALCIUM + D) 600-200 MG-UNIT per tablet     Fexofenadine HCl (ALLEGRA PO)     fluconazole (DIFLUCAN) 150 MG tablet     fluticasone (FLONASE) 50 MCG/ACT spray     ibuprofen (ADVIL/MOTRIN) 800 MG tablet     LORazepam (ATIVAN) 1 MG tablet     nitroFURantoin macrocrystal-monohydrate (MACROBID) 100 MG capsule     vitamin E (TOCOPHEROL) 100 UNIT capsule     No current  facility-administered medications for this visit.      ROS:  As above per HPI    Objective:   /70   Pulse 80   Temp 98.5  F (36.9  C) (Oral)   Resp 20   Wt 56.7 kg (125 lb)   BMI 20.80 kg/m  , Body mass index is 20.8 kg/m .  Gen:  NAD, well-nourished, sitting in chair comfortably  HEENT: EOMI, sclera anicteric, Head normocephalic, ; nares patent; moist mucous membranes, no hyperemia of eye, no eye discharge, pupils equil and reactive to light  Neck: trachea midline, no thyromegaly  CV:  Hemodynamically stable  Pulm:  no increased work of breathing  Extrem: no cyanosis, edema or clubbing  Skin: no obvious rashes or abnormalities  Psych: Euthymic, linear thoughts, normal rate of speech    Results for orders placed or performed in visit on 03/05/19   *UA reflex to Microscopic and Culture ([place] and [facility] (except [place] and [place])   Result Value Ref Range    Color Urine Yellow     Appearance Urine Clear     Glucose Urine Negative NEG^Negative mg/dL    Bilirubin Urine Negative NEG^Negative    Ketones Urine Negative NEG^Negative mg/dL    Specific Gravity Urine 1.015 1.003 - 1.035    Blood Urine Moderate (A) NEG^Negative    pH Urine 6.0 5.0 - 7.0 pH    Protein Albumin Urine 30 (A) NEG^Negative mg/dL    Urobilinogen Urine 0.2 0.2 - 1.0 EU/dL    Nitrite Urine Negative NEG^Negative    Leukocyte Esterase Urine Large (A) NEG^Negative    Source Midstream Urine    Urine Microscopic   Result Value Ref Range    WBC Urine >100 (A) OTO5^0 - 5 /HPF    RBC Urine 2-5 (A) OTO2^O - 2 /HPF    Squamous Epithelial /LPF Urine Few FEW^Few /LPF    Bacteria Urine Moderate (A) NEG^Negative /HPF     Assessment & Plan:     Mar Zeng, 63 year old female who presents with:  Acute cystitis without hematuria  No evidence of pyelonephritis. Macrobid 5 days prescribed. Will f/u results of urine test  - nitroFURantoin macrocrystal-monohydrate (MACROBID) 100 MG capsule  Dispense: 10 capsule; Refill: 0  - *UA reflex  to Microscopic and Culture ([place] and [facility] (except [place] and [place])  - Urine Culture Aerobic Bacterial  - Urine Microscopic    Prophylactic antibiotic  - fluconazole (DIFLUCAN) 150 MG tablet  Dispense: 2 tablet; Refill: 0    Watery eyes  Mild symptoms without disturbances in visual acuity. No sensation of foreign body in eye. Eyelid without abnormality and visual inspection grossly does not reveal foreign body. If symptoms were to continue or worsen would recommend full eye exam from specialist. Doubt iritis. Doesn't appear to be conjunctivitis.       Shane George MD   [facility]    The use of Dragon/LK FREEMAN dictation services may have been used to construct the content in this note; any grammatical or spelling errors are non-intentional. Please contact the author of this note directly if you are in need of any clarification.

## 2019-03-06 LAB
BACTERIA SPEC CULT: ABNORMAL
SPECIMEN SOURCE: ABNORMAL

## 2019-03-08 ENCOUNTER — OFFICE VISIT (OUTPATIENT)
Dept: FAMILY MEDICINE | Facility: CLINIC | Age: 64
End: 2019-03-08
Payer: COMMERCIAL

## 2019-03-08 VITALS
RESPIRATION RATE: 16 BRPM | SYSTOLIC BLOOD PRESSURE: 130 MMHG | TEMPERATURE: 97.4 F | HEART RATE: 79 BPM | OXYGEN SATURATION: 99 % | DIASTOLIC BLOOD PRESSURE: 80 MMHG | WEIGHT: 132.5 LBS | BODY MASS INDEX: 22.08 KG/M2 | HEIGHT: 65 IN

## 2019-03-08 DIAGNOSIS — R82.90 NONSPECIFIC FINDING ON EXAMINATION OF URINE: ICD-10-CM

## 2019-03-08 DIAGNOSIS — R30.0 DYSURIA: Primary | ICD-10-CM

## 2019-03-08 DIAGNOSIS — R30.0 DYSURIA: ICD-10-CM

## 2019-03-08 DIAGNOSIS — N39.0 URINARY TRACT INFECTION WITHOUT HEMATURIA, SITE UNSPECIFIED: Primary | ICD-10-CM

## 2019-03-08 LAB
ALBUMIN UR-MCNC: 100 MG/DL
APPEARANCE UR: CLEAR
BACTERIA #/AREA URNS HPF: ABNORMAL /HPF
BILIRUB UR QL STRIP: NEGATIVE
COLOR UR AUTO: YELLOW
GLUCOSE UR STRIP-MCNC: NEGATIVE MG/DL
HGB UR QL STRIP: ABNORMAL
KETONES UR STRIP-MCNC: NEGATIVE MG/DL
LEUKOCYTE ESTERASE UR QL STRIP: ABNORMAL
NITRATE UR QL: NEGATIVE
PH UR STRIP: 6 PH (ref 5–7)
RBC #/AREA URNS AUTO: ABNORMAL /HPF
SOURCE: ABNORMAL
SP GR UR STRIP: 1.02 (ref 1–1.03)
UROBILINOGEN UR STRIP-ACNC: 0.2 EU/DL (ref 0.2–1)
WBC #/AREA URNS AUTO: >100 /HPF
WBC CLUMPS #/AREA URNS HPF: PRESENT /HPF

## 2019-03-08 PROCEDURE — 99213 OFFICE O/P EST LOW 20 MIN: CPT | Performed by: PHYSICIAN ASSISTANT

## 2019-03-08 PROCEDURE — 81001 URINALYSIS AUTO W/SCOPE: CPT | Performed by: PHYSICIAN ASSISTANT

## 2019-03-08 PROCEDURE — 87086 URINE CULTURE/COLONY COUNT: CPT | Performed by: PHYSICIAN ASSISTANT

## 2019-03-08 RX ORDER — SULFAMETHOXAZOLE/TRIMETHOPRIM 800-160 MG
1 TABLET ORAL 2 TIMES DAILY
Qty: 14 TABLET | Refills: 0 | Status: SHIPPED | OUTPATIENT
Start: 2019-03-08 | End: 2019-03-15

## 2019-03-08 ASSESSMENT — MIFFLIN-ST. JEOR: SCORE: 1151.9

## 2019-03-08 NOTE — PROGRESS NOTES
SUBJECTIVE:   Mar Zeng is a 63 year old female who presents to clinic today for the following health issues:    URINARY TRACT SYMPTOMS      Duration: Seen on 3/5/19 at [facility]     Description  dysuria and frequency    Intensity:  moderate    Accompanying signs and symptoms:  Fever/chills: no   Flank pain no   Nausea and vomiting: no   Vaginal symptoms: none  Abdominal/Pelvic Pain: YES- lower    History  History of frequent UTI's: YES  History of kidney stones: no   Sexually Active: YES  Possibility of pregnancy: No    Precipitating or alleviating factors: None    Therapies tried and outcome: course of antibiotics - Macrobid   Outcome: effective until last night     -Mar is a 62yo female who presents with continued urinary symptoms since being started on abx by [facility] on 3/5  -She was initially seen for urinary frequency but without dysuria  -The urine did show uti and was started on macrobid  -the symptoms seemed to improve very briefly however LAST night frequency increased and now pain  -there is no blood  -no fevers, chills  -no pain to the flank      Problem list and histories reviewed & adjusted, as indicated.  Additional history: as documented    Patient Active Problem List   Diagnosis     CARDIOVASCULAR SCREENING; LDL GOAL LESS THAN 160     Multiple pigmented nevi     Advanced directives, counseling/discussion     Seasonal allergic rhinitis     Hematoma of rectus sheath     Generalized anxiety disorder     Past Surgical History:   Procedure Laterality Date     COLONOSCOPY  8/23/2016    Dr. Shaw [facility]     COLONOSCOPY N/A 8/23/2016    Procedure: COLONOSCOPY;  Surgeon: Peter Shaw MD;  Location:  GI     FINGER SURGERY      right little finger joint replacement     HYSTERECTOMY VAGINAL  age 30    retained ovaries. no cervix      HYSTERECTOMY, PAP NO LONGER INDICATED       ORTHOPEDIC SURGERY       SURGICAL HISTORY OF -   10/25/13    left index cyst removal     SURGICAL HISTORY OF  "-       Bilat knee miniscus repair       Social History     Tobacco Use     Smoking status: Former Smoker     Types: Cigarettes     Smokeless tobacco: Never Used   Substance Use Topics     Alcohol use: Yes     Alcohol/week: 0.0 oz     Comment: avg:3-4 drink/wk     Family History   Problem Relation Age of Onset     Hypertension Father      Cancer Father      Cancer Maternal Grandmother      Cancer Maternal Grandfather      Cancer Paternal Grandmother      Cancer Paternal Grandfather      Asthma Brother      Hypertension Brother      Thyroid Disease Daughter      Colon Cancer No family hx of            Reviewed and updated as needed this visit by clinical staff       Reviewed and updated as needed this visit by Provider         ROS:  Constitutional, HEENT, cardiovascular, pulmonary, gi and gu systems are negative, except as otherwise noted.    OBJECTIVE:     /80   Pulse 79   Temp 97.4  F (36.3  C) (Tympanic)   Resp 16   Ht 1.651 m (5' 5\")   Wt 60.1 kg (132 lb 8 oz)   SpO2 99%   BMI 22.05 kg/m    Body mass index is 22.05 kg/m .  GENERAL: healthy, alert and no distress  RESP: lungs clear to auscultation - no rales, rhonchi or wheezes  CV: regular rates and rhythm  ABDOMEN: tenderness suprapubic  BACK: no CVA tenderness    Diagnostic Test Results:  Results for orders placed or performed in visit on 03/08/19 (from the past 24 hour(s))   *UA reflex to Microscopic and Culture ([place] and [facility] (except [place] and [place])   Result Value Ref Range    Color Urine Yellow     Appearance Urine Clear     Glucose Urine Negative NEG^Negative mg/dL    Bilirubin Urine Negative NEG^Negative    Ketones Urine Negative NEG^Negative mg/dL    Specific Gravity Urine 1.025 1.003 - 1.035    Blood Urine Moderate (A) NEG^Negative    pH Urine 6.0 5.0 - 7.0 pH    Protein Albumin Urine 100 (A) NEG^Negative mg/dL    Urobilinogen Urine 0.2 0.2 - 1.0 EU/dL    Nitrite Urine Negative NEG^Negative    Leukocyte Esterase Urine " Large (A) NEG^Negative    Source Midstream Urine    Urine Microscopic   Result Value Ref Range    WBC Urine >100 (A) OTO5^0 - 5 /HPF    RBC Urine 25-50 (A) OTO2^O - 2 /HPF    WBC Clumps Present (A) NEG^Negative /HPF    Bacteria Urine Moderate (A) NEG^Negative /HPF       ASSESSMENT/PLAN:   1. Urinary tract infection without hematuria, site unspecified  2. Dysuria  3. Nonspecific finding on examination of urine  Mar's culture from [facility] showed sensitivity to macrobid but her symptoms have worsened and in fact her urine has not improved. I will have her stop the current abx and switch to bactrim. Follow-up per symptoms or culture   - sulfamethoxazole-trimethoprim (BACTRIM DS/SEPTRA DS) 800-160 MG tablet; Take 1 tablet by mouth 2 times daily for 7 days  Dispense: 14 tablet; Refill: 0  - *UA reflex to Microscopic and Culture ([place] and [facility] (except [place] and [place])  - Urine Microscopic  - Urine Culture Aerobic Bacterial      Jordin Proctor PA-C  [facility]

## 2019-03-09 LAB
BACTERIA SPEC CULT: NORMAL
SPECIMEN SOURCE: NORMAL

## 2019-03-19 DIAGNOSIS — R82.90 ABNORMAL URINALYSIS: Primary | ICD-10-CM

## 2019-03-26 DIAGNOSIS — R79.89 ELEVATED SERUM CREATININE: ICD-10-CM

## 2019-03-26 DIAGNOSIS — R82.90 ABNORMAL URINALYSIS: ICD-10-CM

## 2019-03-26 LAB
ALBUMIN UR-MCNC: NEGATIVE MG/DL
ANION GAP SERPL CALCULATED.3IONS-SCNC: 5 MMOL/L (ref 3–14)
APPEARANCE UR: CLEAR
BILIRUB UR QL STRIP: NEGATIVE
BUN SERPL-MCNC: 11 MG/DL (ref 7–30)
CALCIUM SERPL-MCNC: 9 MG/DL (ref 8.5–10.1)
CHLORIDE SERPL-SCNC: 101 MMOL/L (ref 94–109)
CO2 SERPL-SCNC: 31 MMOL/L (ref 20–32)
COLOR UR AUTO: YELLOW
CREAT SERPL-MCNC: 0.74 MG/DL (ref 0.52–1.04)
GFR SERPL CREATININE-BSD FRML MDRD: 85 ML/MIN/{1.73_M2}
GLUCOSE SERPL-MCNC: 83 MG/DL (ref 70–99)
GLUCOSE UR STRIP-MCNC: NEGATIVE MG/DL
HGB UR QL STRIP: NEGATIVE
KETONES UR STRIP-MCNC: NEGATIVE MG/DL
LEUKOCYTE ESTERASE UR QL STRIP: NEGATIVE
NITRATE UR QL: NEGATIVE
PH UR STRIP: 7 PH (ref 5–7)
POTASSIUM SERPL-SCNC: 3.7 MMOL/L (ref 3.4–5.3)
SODIUM SERPL-SCNC: 137 MMOL/L (ref 133–144)
SOURCE: NORMAL
SP GR UR STRIP: 1.01 (ref 1–1.03)
UROBILINOGEN UR STRIP-ACNC: 0.2 EU/DL (ref 0.2–1)

## 2019-03-26 PROCEDURE — 36415 COLL VENOUS BLD VENIPUNCTURE: CPT | Performed by: NURSE PRACTITIONER

## 2019-03-26 PROCEDURE — 80048 BASIC METABOLIC PNL TOTAL CA: CPT | Performed by: NURSE PRACTITIONER

## 2019-03-26 PROCEDURE — 81003 URINALYSIS AUTO W/O SCOPE: CPT | Performed by: PHYSICIAN ASSISTANT

## 2019-05-06 ENCOUNTER — TRANSFERRED RECORDS (OUTPATIENT)
Dept: HEALTH INFORMATION MANAGEMENT | Facility: CLINIC | Age: 64
End: 2019-05-06

## 2019-05-13 DIAGNOSIS — H93.8X3 PLUGGED FEELING IN EAR, BILATERAL: ICD-10-CM

## 2019-05-13 RX ORDER — FLUTICASONE PROPIONATE 50 MCG
SPRAY, SUSPENSION (ML) NASAL
Qty: 48 ML | Refills: 2 | Status: SHIPPED | OUTPATIENT
Start: 2019-05-13 | End: 2022-11-10

## 2019-05-13 NOTE — TELEPHONE ENCOUNTER
"Requested Prescriptions   Pending Prescriptions Disp Refills     fluticasone (FLONASE) 50 MCG/ACT nasal spray [Pharmacy Med Name: FLUTICASONE 50MCG NASAL SP (120) RX]  Last Written Prescription Date:  3/1/18  Last Fill Quantity: 48ml,  # refills: 2    Last office visit: 3/8/2019 with prescribing provider:  Jordin Proctor PA-C       Future Office Visit:     48 mL 0     Sig: SHAKE LIQUID AND USE 2 SPRAYS IN EACH NOSTRIL DAILY       Inhaled Steroids Protocol Passed - 5/13/2019  3:23 AM        Passed - Patient is age 12 or older        Passed - Recent (12 mo) or future (30 days) visit within the authorizing provider's specialty     Patient had office visit in the last 12 months or has a visit in the next 30 days with authorizing provider or within the authorizing provider's specialty.  See \"Patient Info\" tab in inbasket, or \"Choose Columns\" in Meds & Orders section of the refill encounter.              Passed - Medication is active on med list          "

## 2019-05-13 NOTE — TELEPHONE ENCOUNTER
Prescription approved per [facility] Refill Protocol.    Shruthi Martini RN

## 2019-10-10 ENCOUNTER — NURSE TRIAGE (OUTPATIENT)
Dept: FAMILY MEDICINE | Facility: CLINIC | Age: 64
End: 2019-10-10

## 2019-10-10 NOTE — TELEPHONE ENCOUNTER
Patient states today's pain is similar to when she had the hematoma of rectus sheath back in 2014 but not as severe, left lower abdomen. When she pushes there it doesn't really hurt. No discoloration, swelling or injury noted. Patient states she is gaining weight now, when before she lost weight. Rating pain today as off and on, a dull 2. Sometimes when she eats it gets worse. No diarrhea or constipation, no vomiting. Does not hurt worse with activity/movement. She has not found anything that helps it. It lessens at times but never completely goes away.     Huddled with PCP. Advises UC for tonight.    She is currently in [place] watching her 3 grandchildren. Would like to schedule an appointment with Gail next week.   Appointment was scheduled, but strongly advised if no one else can watch the grandchildren, then just bring them in to the local UC so she can be assessed down there. Patient stated understanding and is in agreement with plan.    Stanislaw Moon RN

## 2019-10-16 ENCOUNTER — OFFICE VISIT (OUTPATIENT)
Dept: FAMILY MEDICINE | Facility: CLINIC | Age: 64
End: 2019-10-16
Payer: MEDICARE

## 2019-10-16 VITALS
WEIGHT: 129.1 LBS | BODY MASS INDEX: 21.48 KG/M2 | OXYGEN SATURATION: 98 % | RESPIRATION RATE: 16 BRPM | SYSTOLIC BLOOD PRESSURE: 136 MMHG | DIASTOLIC BLOOD PRESSURE: 80 MMHG | HEART RATE: 72 BPM | TEMPERATURE: 98.1 F

## 2019-10-16 DIAGNOSIS — F41.1 GENERALIZED ANXIETY DISORDER: ICD-10-CM

## 2019-10-16 DIAGNOSIS — R10.32 LLQ ABDOMINAL PAIN: Primary | ICD-10-CM

## 2019-10-16 LAB
ALBUMIN UR-MCNC: NEGATIVE MG/DL
ANION GAP SERPL CALCULATED.3IONS-SCNC: 7 MMOL/L (ref 3–14)
APPEARANCE UR: CLEAR
BACTERIA #/AREA URNS HPF: ABNORMAL /HPF
BASOPHILS # BLD AUTO: 0 10E9/L (ref 0–0.2)
BASOPHILS NFR BLD AUTO: 0.5 %
BILIRUB UR QL STRIP: NEGATIVE
BUN SERPL-MCNC: 16 MG/DL (ref 7–30)
CALCIUM SERPL-MCNC: 9 MG/DL (ref 8.5–10.1)
CHLORIDE SERPL-SCNC: 104 MMOL/L (ref 94–109)
CO2 SERPL-SCNC: 25 MMOL/L (ref 20–32)
COLOR UR AUTO: YELLOW
CREAT SERPL-MCNC: 0.8 MG/DL (ref 0.52–1.04)
DIFFERENTIAL METHOD BLD: NORMAL
EOSINOPHIL # BLD AUTO: 0.1 10E9/L (ref 0–0.7)
EOSINOPHIL NFR BLD AUTO: 1.9 %
ERYTHROCYTE [DISTWIDTH] IN BLOOD BY AUTOMATED COUNT: 12.7 % (ref 10–15)
GFR SERPL CREATININE-BSD FRML MDRD: 78 ML/MIN/{1.73_M2}
GLUCOSE SERPL-MCNC: 83 MG/DL (ref 70–99)
GLUCOSE UR STRIP-MCNC: NEGATIVE MG/DL
HCT VFR BLD AUTO: 38.2 % (ref 35–47)
HGB BLD-MCNC: 12.5 G/DL (ref 11.7–15.7)
HGB UR QL STRIP: ABNORMAL
HYALINE CASTS #/AREA URNS LPF: ABNORMAL /LPF
KETONES UR STRIP-MCNC: NEGATIVE MG/DL
LEUKOCYTE ESTERASE UR QL STRIP: NEGATIVE
LYMPHOCYTES # BLD AUTO: 1.8 10E9/L (ref 0.8–5.3)
LYMPHOCYTES NFR BLD AUTO: 30.7 %
MCH RBC QN AUTO: 30 PG (ref 26.5–33)
MCHC RBC AUTO-ENTMCNC: 32.7 G/DL (ref 31.5–36.5)
MCV RBC AUTO: 92 FL (ref 78–100)
MONOCYTES # BLD AUTO: 0.7 10E9/L (ref 0–1.3)
MONOCYTES NFR BLD AUTO: 12.7 %
NEUTROPHILS # BLD AUTO: 3.2 10E9/L (ref 1.6–8.3)
NEUTROPHILS NFR BLD AUTO: 54.2 %
NITRATE UR QL: NEGATIVE
NON-SQ EPI CELLS #/AREA URNS LPF: ABNORMAL /LPF
PH UR STRIP: 6 PH (ref 5–7)
PLATELET # BLD AUTO: 318 10E9/L (ref 150–450)
POTASSIUM SERPL-SCNC: 4.3 MMOL/L (ref 3.4–5.3)
RBC # BLD AUTO: 4.17 10E12/L (ref 3.8–5.2)
RBC #/AREA URNS AUTO: ABNORMAL /HPF
SODIUM SERPL-SCNC: 136 MMOL/L (ref 133–144)
SOURCE: ABNORMAL
SP GR UR STRIP: 1.01 (ref 1–1.03)
UROBILINOGEN UR STRIP-ACNC: 0.2 EU/DL (ref 0.2–1)
WBC # BLD AUTO: 5.8 10E9/L (ref 4–11)
WBC #/AREA URNS AUTO: ABNORMAL /HPF

## 2019-10-16 PROCEDURE — 85025 COMPLETE CBC W/AUTO DIFF WBC: CPT | Performed by: NURSE PRACTITIONER

## 2019-10-16 PROCEDURE — 81001 URINALYSIS AUTO W/SCOPE: CPT | Performed by: NURSE PRACTITIONER

## 2019-10-16 PROCEDURE — 80048 BASIC METABOLIC PNL TOTAL CA: CPT | Performed by: NURSE PRACTITIONER

## 2019-10-16 PROCEDURE — 99214 OFFICE O/P EST MOD 30 MIN: CPT | Performed by: NURSE PRACTITIONER

## 2019-10-16 PROCEDURE — 36415 COLL VENOUS BLD VENIPUNCTURE: CPT | Performed by: NURSE PRACTITIONER

## 2019-10-16 RX ORDER — BUPROPION HYDROCHLORIDE 150 MG/1
150 TABLET ORAL EVERY MORNING
Qty: 90 TABLET | Refills: 0 | Status: SHIPPED | OUTPATIENT
Start: 2019-10-16 | End: 2020-01-10

## 2019-10-16 NOTE — PROGRESS NOTES
Subjective     Mar Zeng is a 64 year old female who presents to clinic today for the following health issues:    HPI   Abdominal Pain      Duration: 2 weeks    Description (location/character/radiation): Left lower abdomin       Associated flank pain: mild    Intensity:  moderate    Accompanying signs and symptoms:        Fever/Chills: no        Gas/Bloating: no        Nausea/vomitting: no        Diarrhea: no        Dysuria or Hematuria: no     History (previous similar pain/trauma/previous testing): none    Precipitating or alleviating factors:       Pain worse with eating/BM/urination: eating, sitting up       Pain relieved by BM: no     Therapies tried and outcome: None    LMP:  not applicable    Symptoms over the past 2 weeks, no change.  No fevers.  Taking in food and fluids as usual.  No black or bloody stools, no change in bowel habits.  Pain LLQ/ L side pelvic area.  Also low back L side.  No blood in the urine.  No redness or rash.  Feeling more fatigued than usual.    Patient Active Problem List   Diagnosis     CARDIOVASCULAR SCREENING; LDL GOAL LESS THAN 160     Multiple pigmented nevi     Advanced directives, counseling/discussion     Seasonal allergic rhinitis     Hematoma of rectus sheath     Generalized anxiety disorder     Past Surgical History:   Procedure Laterality Date     COLONOSCOPY  8/23/2016    Dr. Shaw [facility]     COLONOSCOPY N/A 8/23/2016    Procedure: COLONOSCOPY;  Surgeon: Peter Shaw MD;  Location: [facility]     FINGER SURGERY      right little finger joint replacement     HYSTERECTOMY VAGINAL  age 30    retained ovaries. no cervix      HYSTERECTOMY, PAP NO LONGER INDICATED       ORTHOPEDIC SURGERY       SURGICAL HISTORY OF -   10/25/13    left index cyst removal     SURGICAL HISTORY OF -       Bilat knee miniscus repair       Social History     Tobacco Use     Smoking status: Former Smoker     Types: Cigarettes     Smokeless tobacco: Never Used   Substance Use Topics      Alcohol use: Yes     Alcohol/week: 0.0 standard drinks     Comment: avg:3-4 drink/wk     Family History   Problem Relation Age of Onset     Hypertension Father      Cancer Father      Cancer Maternal Grandmother      Cancer Maternal Grandfather      Cancer Paternal Grandmother      Cancer Paternal Grandfather      Asthma Brother      Hypertension Brother      Thyroid Disease Daughter      Colon Cancer No family hx of              Reviewed and updated as needed this visit by Provider         Review of Systems   SEE HPI.        Objective    /80 (BP Location: Right arm, Patient Position: Chair, Cuff Size: Adult Regular)   Pulse 72   Temp 98.1  F (36.7  C) (Tympanic)   Resp 16   Wt 58.6 kg (129 lb 1.6 oz)   SpO2 98%   BMI 21.48 kg/m    Body mass index is 21.48 kg/m .  Physical Exam   GENERAL: healthy, alert and no distress  NECK: no adenopathy, no asymmetry, masses, or scars and thyroid normal to palpation  RESP: lungs clear to auscultation - no rales, rhonchi or wheezes  CV: regular rate and rhythm, normal S1 S2, no S3 or S4, no murmur, click or rub, no peripheral edema and peripheral pulses strong  ABDOMEN: soft, nontender, no hepatosplenomegaly, no masses and bowel sounds normal  PSYCH: mentation appears normal, affect normal/bright    Diagnostic Test Results:  Lab Results   Component Value Date    WBC 5.8 10/16/2019     Lab Results   Component Value Date    RBC 4.17 10/16/2019     Lab Results   Component Value Date    HGB 12.5 10/16/2019     Lab Results   Component Value Date    HCT 38.2 10/16/2019     No components found for: MCT  Lab Results   Component Value Date    MCV 92 10/16/2019     Lab Results   Component Value Date    MCH 30.0 10/16/2019     Lab Results   Component Value Date    MCHC 32.7 10/16/2019     Lab Results   Component Value Date    RDW 12.7 10/16/2019     Lab Results   Component Value Date     10/16/2019     Color Urine (no units)   Date Value   10/16/2019 Yellow     Appearance  Urine (no units)   Date Value   10/16/2019 Clear     Glucose Urine (mg/dL)   Date Value   10/16/2019 Negative     Bilirubin Urine (no units)   Date Value   10/16/2019 Negative     Ketones Urine (mg/dL)   Date Value   10/16/2019 Negative     Specific Gravity Urine (no units)   Date Value   10/16/2019 1.015     pH Urine (pH)   Date Value   10/16/2019 6.0     Protein Albumin Urine (mg/dL)   Date Value   10/16/2019 Negative     Urobilinogen Urine (EU/dL)   Date Value   10/16/2019 0.2     Nitrite Urine (no units)   Date Value   10/16/2019 Negative     Leukocyte Esterase Urine (no units)   Date Value   10/16/2019 Negative           Assessment & Plan     1. LLQ abdominal pain  64 y.o. female, here with LLQ pain x2 weeks, no improvement.  CBC unremarkable, normal UA.  BMP pending.  S/p hysterectomy.  Normal colonoscopy 2016.  Discussed options.  CT ordered.  Pt agrees with plan and verbalized understanding.  - *UA reflex to Microscopic and Culture ([place] and [facility] (except [place] and [place])  - CBC with platelets and differential  - Basic metabolic panel  - CT Abdomen Pelvis w Contrast; Future    2. Generalized anxiety disorder  Taking wellbutrin for the past few months without results.  Wondering if additional change is needed to help with symptoms.  Add prozac daily.  Follow up in 6 weeks.  Pt agrees with plan and verbalized understanding.  - FLUoxetine (PROZAC) 20 MG capsule; Take 1 capsule (20 mg) by mouth daily  Dispense: 90 capsule; Refill: 0  - buPROPion (WELLBUTRIN XL) 150 MG 24 hr tablet; Take 1 tablet (150 mg) by mouth every morning  Dispense: 90 tablet; Refill: 0      No follow-ups on file.    DUKE Esquivel Ra [facility]

## 2019-10-17 ENCOUNTER — HOSPITAL ENCOUNTER (OUTPATIENT)
Dept: CT IMAGING | Facility: CLINIC | Age: 64
Discharge: HOME | End: 2019-10-17
Attending: NURSE PRACTITIONER | Admitting: NURSE PRACTITIONER
Payer: MEDICARE

## 2019-10-17 ENCOUNTER — MYC MEDICAL ADVICE (OUTPATIENT)
Dept: FAMILY MEDICINE | Facility: CLINIC | Age: 64
End: 2019-10-17

## 2019-10-17 ENCOUNTER — TELEPHONE (OUTPATIENT)
Dept: FAMILY MEDICINE | Facility: CLINIC | Age: 64
End: 2019-10-17

## 2019-10-17 DIAGNOSIS — R10.32 ABDOMINAL PAIN, LEFT LOWER QUADRANT: Primary | ICD-10-CM

## 2019-10-17 DIAGNOSIS — R10.32 LLQ ABDOMINAL PAIN: ICD-10-CM

## 2019-10-17 PROCEDURE — 25000128 H RX IP 250 OP 636: Performed by: NURSE PRACTITIONER

## 2019-10-17 PROCEDURE — 25000125 ZZHC RX 250: Performed by: NURSE PRACTITIONER

## 2019-10-17 PROCEDURE — 74177 CT ABD & PELVIS W/CONTRAST: CPT

## 2019-10-17 RX ORDER — IOPAMIDOL 755 MG/ML
500 INJECTION, SOLUTION INTRAVASCULAR ONCE
Status: COMPLETED | OUTPATIENT
Start: 2019-10-17 | End: 2019-10-17

## 2019-10-17 RX ADMIN — IOPAMIDOL 65 ML: 755 INJECTION, SOLUTION INTRAVENOUS at 10:41

## 2019-10-17 RX ADMIN — SODIUM CHLORIDE 46 ML: 9 INJECTION, SOLUTION INTRAVENOUS at 10:41

## 2019-10-17 NOTE — TELEPHONE ENCOUNTER
LM to contact clinic.    CT abdomen neg diverticulitis or for specific cause for abdominal pain.    Did show area of dilation in kidney and possible obstruction.    No infection or blood noted by [facility] in lab results.    Would recommend seeing Urology for evaluation of pain.    If patient agrees route to [facility] to place referral.    Sherry Greenwood RN

## 2019-10-17 NOTE — TELEPHONE ENCOUNTER
Discussed provider message with patient. No change in symptoms. Ok to place referral.    Placed urology referral per verbal [facility].    Sherry Greenwood RN

## 2019-10-30 ENCOUNTER — OFFICE VISIT (OUTPATIENT)
Dept: UROLOGY | Facility: CLINIC | Age: 64
End: 2019-10-30
Payer: MEDICARE

## 2019-10-30 VITALS
HEIGHT: 66 IN | WEIGHT: 125 LBS | DIASTOLIC BLOOD PRESSURE: 70 MMHG | SYSTOLIC BLOOD PRESSURE: 130 MMHG | BODY MASS INDEX: 20.09 KG/M2 | HEART RATE: 72 BPM

## 2019-10-30 DIAGNOSIS — R39.15 URINARY URGENCY: Primary | ICD-10-CM

## 2019-10-30 DIAGNOSIS — R10.84 ABDOMINAL PAIN, GENERALIZED: Primary | ICD-10-CM

## 2019-10-30 DIAGNOSIS — R10.84 ABDOMINAL PAIN, GENERALIZED: ICD-10-CM

## 2019-10-30 LAB
ALBUMIN UR-MCNC: ABNORMAL MG/DL
APPEARANCE UR: CLEAR
BILIRUB UR QL STRIP: NEGATIVE
COLOR UR AUTO: YELLOW
GLUCOSE UR STRIP-MCNC: NEGATIVE MG/DL
HGB UR QL STRIP: ABNORMAL
KETONES UR STRIP-MCNC: ABNORMAL MG/DL
LEUKOCYTE ESTERASE UR QL STRIP: ABNORMAL
NITRATE UR QL: NEGATIVE
PH UR STRIP: 6.5 PH (ref 5–7)
SOURCE: ABNORMAL
SP GR UR STRIP: 1.02 (ref 1–1.03)
UROBILINOGEN UR STRIP-ACNC: 0.2 EU/DL (ref 0.2–1)

## 2019-10-30 PROCEDURE — 99203 OFFICE O/P NEW LOW 30 MIN: CPT | Performed by: PHYSICIAN ASSISTANT

## 2019-10-30 PROCEDURE — 81003 URINALYSIS AUTO W/O SCOPE: CPT | Mod: QW | Performed by: PHYSICIAN ASSISTANT

## 2019-10-30 RX ORDER — ESTRADIOL 0.1 MG/G
CREAM VAGINAL
Qty: 42.5 G | Refills: 3 | Status: SHIPPED | OUTPATIENT
Start: 2019-10-30 | End: 2020-07-23

## 2019-10-30 ASSESSMENT — MIFFLIN-ST. JEOR: SCORE: 1128.75

## 2019-10-30 ASSESSMENT — PAIN SCALES - GENERAL: PAINLEVEL: NO PAIN (1)

## 2019-10-30 NOTE — LETTER
10/30/2019       RE: Mar Zeng  [address] [facility] [address]     Dear Colleague,    Thank you for referring your patient, Mar Zeng, to the [facility] [place] at [facility]. Please see a copy of my visit note below.    CC: pelvicaliectasis, LLQ abdominal pain    HPI:  Mar Zeng is a 64 year old female who presents in consultation from Gail Crespo for evaluation of the above. Noted on CT 10/17/19 after complaining of 2 months of intermittent LLQ pain. Notes urgency, frequency and a time of recurrent UTIs (earlier this year, e coli). No gross hematuria.     Past Medical History:   Diagnosis Date     NO ACTIVE PROBLEMS      Past Surgical History:   Procedure Laterality Date     COLONOSCOPY  8/23/2016    Dr. Shaw [facility]     COLONOSCOPY N/A 8/23/2016    Procedure: COLONOSCOPY;  Surgeon: Peter Shaw MD;  Location:  GI     FINGER SURGERY      right little finger joint replacement     HYSTERECTOMY VAGINAL  age 30    retained ovaries. no cervix      HYSTERECTOMY, PAP NO LONGER INDICATED       ORTHOPEDIC SURGERY       SURGICAL HISTORY OF -   10/25/13    left index cyst removal     SURGICAL HISTORY OF -       Bilat knee miniscus repair       Social History     Socioeconomic History     Marital status:      Spouse name: Not on file     Number of children: Not on file     Years of education: Not on file     Highest education level: Not on file   Occupational History     Not on file   Social Needs     Financial resource strain: Not on file     Food insecurity:     Worry: Not on file     Inability: Not on file     Transportation needs:     Medical: Not on file     Non-medical: Not on file   Tobacco Use     Smoking status: Former Smoker     Types: Cigarettes     Smokeless tobacco: Never Used   Substance and Sexual Activity     Alcohol use: Yes     Alcohol/week: 0.0 standard drinks     " Comment: avg:3-4 drink/wk     Drug use: No     Sexual activity: Yes     Partners: Male   Lifestyle     Physical activity:     Days per week: Not on file     Minutes per session: Not on file     Stress: Not on file   Relationships     Social connections:     Talks on phone: Not on file     Gets together: Not on file     Attends Mu-[employer] service: Not on file     Active member of club or organization: Not on file     Attends meetings of clubs or organizations: Not on file     Relationship status: Not on file     Intimate partner violence:     Fear of current or ex partner: Not on file     Emotionally abused: Not on file     Physically abused: Not on file     Forced sexual activity: Not on file   Other Topics Concern     Parent/sibling w/ CABG, MI or angioplasty before 65F 55M? No   Social History Narrative    [place] in [place]     Family History   Problem Relation Age of Onset     Hypertension Father      Cancer Father      Cancer Maternal Grandmother      Cancer Maternal Grandfather      Cancer Paternal Grandmother      Cancer Paternal Grandfather      Asthma Brother      Hypertension Brother      Thyroid Disease Daughter      Colon Cancer No family hx of      ROS:14 point ROS neg other than the symptoms noted above in the HPI.    Allergies   Allergen Reactions     Codeine Itching     Hay Fever & [A.R.M.]      Current Outpatient Medications   Medication     ASPIRIN PO     buPROPion (WELLBUTRIN XL) 150 MG 24 hr tablet     Calcium Carbonate-Vitamin D (CALCIUM + D) 600-200 MG-UNIT per tablet     Fexofenadine HCl (ALLEGRA PO)     FLUoxetine (PROZAC) 20 MG capsule     fluticasone (FLONASE) 50 MCG/ACT nasal spray     ibuprofen (ADVIL/MOTRIN) 800 MG tablet     LORazepam (ATIVAN) 1 MG tablet     vitamin E (TOCOPHEROL) 100 UNIT capsule     No current facility-administered medications for this visit.      PEx:   /70   Pulse 72   Ht 1.676 m (5' 6\")   Wt 56.7 kg (125 lb)   BMI 20.18 kg/m       PSYCH: NAD  EYES: " EOMI  MOUTH: MMM  NECK: Supple, no notable adenopathy  RESP: Unlabored breathing  CARDIAC: No LE edema  SKIN: Warm, no rashes  ABD: soft, Nontender  NEURO: AAO x3    CT ABDOMEN PELVIS WITH CONTRAST 10/17/2019 10:49 AM     TECHNIQUE: Images from diaphragm to pubic symphysis 65mL Isovue-370 IV  contrast.  Radiation dose for this scan was reduced using automated exposure  control, adjustment of the mA and/or kV according to patient size, or  iterative reconstruction technique.     HISTORY: Abdominal pain pain, diverticulitis suspected; left lower  quadrant abdominal pain     COMPARISON: 8/4/2014 CT abdomen and pelvis     FINDINGS:   Abdomen and Pelvis: Lung bases clear. Normal-appearing liver,  gallbladder, spleen, pancreas and adrenal glands. There is dilated  right renal pelvicalyceal system. Symmetric enhancement of the kidneys  and there is no right perinephric stranding. No hydroureter. Possibly  due to a right ureteropelvic junction level obstruction but this is  new since the prior CT.     No periaortic or pelvic adenopathy. Unremarkable bladder. No free  fluid. No acute bowel abnormality. Appendix is not identified. No  aggressive bone lesions.                                                              IMPRESSION:   1. No evidence for acute diverticulitis or specific cause for left  lower quadrant pain identified.  2. There is pelvicaliectasis of the right renal collecting system  without hydroureter or definite cause identified suggesting  obstruction at the ureteropelvic junction level. No right perinephric  stranding. Symmetric enhancement of both nephrograms.       Urine: trace blood, leuk est (Not enough for further testing)          A/P: Mar Zeng is a 64 year old female with pelvicaliectasis, LLQ pain, urgency, frequency  -Lasix renogram and follow up with Dr. Arriola.   -Estrogen cream three times a week near urethra (pea-sized amount): If >$40, she will let me know and we can get via a  compound pharmacy.    Eduarda Bolton PA-C  [facility]    20 minutes were spent with the patient today, > 50% in counseling and coordination of care

## 2019-10-30 NOTE — PROGRESS NOTES
CC: pelvicaliectasis, LLQ abdominal pain    HPI:  Mar Zeng is a 64 year old female who presents in consultation from Gail Crespo for evaluation of the above. Noted on CT 10/17/19 after complaining of 2 months of intermittent LLQ pain. Notes urgency, frequency and a time of recurrent UTIs (earlier this year, e coli). No gross hematuria.     Past Medical History:   Diagnosis Date     NO ACTIVE PROBLEMS        Past Surgical History:   Procedure Laterality Date     COLONOSCOPY  8/23/2016    Dr. Shaw [facility]     COLONOSCOPY N/A 8/23/2016    Procedure: COLONOSCOPY;  Surgeon: Peter Shaw MD;  Location: [facility]     FINGER SURGERY      right little finger joint replacement     HYSTERECTOMY VAGINAL  age 30    retained ovaries. no cervix      HYSTERECTOMY, PAP NO LONGER INDICATED       ORTHOPEDIC SURGERY       SURGICAL HISTORY OF -   10/25/13    left index cyst removal     SURGICAL HISTORY OF -       Bilat knee miniscus repair       Social History     Socioeconomic History     Marital status:      Spouse name: Not on file     Number of children: Not on file     Years of education: Not on file     Highest education level: Not on file   Occupational History     Not on file   Social Needs     Financial resource strain: Not on file     Food insecurity:     Worry: Not on file     Inability: Not on file     Transportation needs:     Medical: Not on file     Non-medical: Not on file   Tobacco Use     Smoking status: Former Smoker     Types: Cigarettes     Smokeless tobacco: Never Used   Substance and Sexual Activity     Alcohol use: Yes     Alcohol/week: 0.0 standard drinks     Comment: avg:3-4 drink/wk     Drug use: No     Sexual activity: Yes     Partners: Male   Lifestyle     Physical activity:     Days per week: Not on file     Minutes per session: Not on file     Stress: Not on file   Relationships     Social connections:     Talks on phone: Not on file     Gets together: Not on file     Attends  "Luther[employer] service: Not on file     Active member of club or organization: Not on file     Attends meetings of clubs or organizations: Not on file     Relationship status: Not on file     Intimate partner violence:     Fear of current or ex partner: Not on file     Emotionally abused: Not on file     Physically abused: Not on file     Forced sexual activity: Not on file   Other Topics Concern     Parent/sibling w/ CABG, MI or angioplasty before 65F 55M? No   Social History Narrative    [place] in [place]       Family History   Problem Relation Age of Onset     Hypertension Father      Cancer Father      Cancer Maternal Grandmother      Cancer Maternal Grandfather      Cancer Paternal Grandmother      Cancer Paternal Grandfather      Asthma Brother      Hypertension Brother      Thyroid Disease Daughter      Colon Cancer No family hx of        ROS:14 point ROS neg other than the symptoms noted above in the HPI.    Allergies   Allergen Reactions     Codeine Itching     Hay Fever & [A.R.M.]        Current Outpatient Medications   Medication     ASPIRIN PO     buPROPion (WELLBUTRIN XL) 150 MG 24 hr tablet     Calcium Carbonate-Vitamin D (CALCIUM + D) 600-200 MG-UNIT per tablet     Fexofenadine HCl (ALLEGRA PO)     FLUoxetine (PROZAC) 20 MG capsule     fluticasone (FLONASE) 50 MCG/ACT nasal spray     ibuprofen (ADVIL/MOTRIN) 800 MG tablet     LORazepam (ATIVAN) 1 MG tablet     vitamin E (TOCOPHEROL) 100 UNIT capsule     No current facility-administered medications for this visit.          PEx:   /70   Pulse 72   Ht 1.676 m (5' 6\")   Wt 56.7 kg (125 lb)   BMI 20.18 kg/m      PSYCH: NAD  EYES: EOMI  MOUTH: MMM  NECK: Supple, no notable adenopathy  RESP: Unlabored breathing  CARDIAC: No LE edema  SKIN: Warm, no rashes  ABD: soft, Nontender  NEURO: AAO x3    CT ABDOMEN PELVIS WITH CONTRAST 10/17/2019 10:49 AM     TECHNIQUE: Images from diaphragm to pubic symphysis 65mL Isovue-370 IV  contrast.  Radiation dose for this " scan was reduced using automated exposure  control, adjustment of the mA and/or kV according to patient size, or  iterative reconstruction technique.     HISTORY: Abdominal pain pain, diverticulitis suspected; left lower  quadrant abdominal pain     COMPARISON: 8/4/2014 CT abdomen and pelvis     FINDINGS:   Abdomen and Pelvis: Lung bases clear. Normal-appearing liver,  gallbladder, spleen, pancreas and adrenal glands. There is dilated  right renal pelvicalyceal system. Symmetric enhancement of the kidneys  and there is no right perinephric stranding. No hydroureter. Possibly  due to a right ureteropelvic junction level obstruction but this is  new since the prior CT.     No periaortic or pelvic adenopathy. Unremarkable bladder. No free  fluid. No acute bowel abnormality. Appendix is not identified. No  aggressive bone lesions.                                                                      IMPRESSION:   1. No evidence for acute diverticulitis or specific cause for left  lower quadrant pain identified.  2. There is pelvicaliectasis of the right renal collecting system  without hydroureter or definite cause identified suggesting  obstruction at the ureteropelvic junction level. No right perinephric  stranding. Symmetric enhancement of both nephrograms.          Urine: trace blood, leuk est (Not enough for further testing)          A/P: Mar Zeng is a 64 year old female with pelvicaliectasis, LLQ pain, urgency, frequency  -Lasix renogram and follow up with Dr. Arriola.   -Estrogen cream three times a week near urethra (pea-sized amount): If >$40, she will let me know and we can get via a compound pharmacy.    Eduarda Bolton PA-C  [facility]    20 minutes were spent with the patient today, > 50% in counseling and coordination of care

## 2019-10-30 NOTE — PATIENT INSTRUCTIONS
Lasix renogram and follow up with Dr. Arriola.     Below is a list of things that can irritate the bladder and should be eliminated:      Caffeinated soft drinks.    Coffee.    Tea.    Chocolate.    Tomato-based foods.    Acidic juices and fruits. (includes cranberry juice)    Alcohol.    Carbonated drinks.    Aspartame/Nutrasweet.    Estrogen cream three times a week near urethra (pea-sized amount): If >$40, let me know and we can get via a compound pharmacy.

## 2019-11-04 ENCOUNTER — HOSPITAL ENCOUNTER (OUTPATIENT)
Dept: NUCLEAR MEDICINE | Facility: CLINIC | Age: 64
Setting detail: NUCLEAR MEDICINE
Discharge: HOME | End: 2019-11-04
Attending: PHYSICIAN ASSISTANT | Admitting: PHYSICIAN ASSISTANT
Payer: MEDICARE

## 2019-11-04 DIAGNOSIS — R10.84 ABDOMINAL PAIN, GENERALIZED: ICD-10-CM

## 2019-11-04 PROCEDURE — 34300033 ZZH RX 343: Performed by: PHYSICIAN ASSISTANT

## 2019-11-04 PROCEDURE — A9562 TC99M MERTIATIDE: HCPCS | Performed by: PHYSICIAN ASSISTANT

## 2019-11-04 PROCEDURE — 25000128 H RX IP 250 OP 636

## 2019-11-04 PROCEDURE — 78708 K FLOW/FUNCT IMAGE W/DRUG: CPT

## 2019-11-04 RX ORDER — FUROSEMIDE 10 MG/ML
INJECTION INTRAMUSCULAR; INTRAVENOUS
Status: COMPLETED
Start: 2019-11-04 | End: 2019-11-04

## 2019-11-04 RX ADMIN — TECHNESCAN TC 99M MERTIATIDE 10 MCI.: 1 INJECTION, POWDER, LYOPHILIZED, FOR SOLUTION INTRAVENOUS at 08:13

## 2019-11-04 RX ADMIN — FUROSEMIDE 20 MG: 10 INJECTION, SOLUTION INTRAVENOUS at 08:13

## 2019-11-20 ENCOUNTER — TELEPHONE (OUTPATIENT)
Dept: UROLOGY | Facility: CLINIC | Age: 64
End: 2019-11-20

## 2019-11-20 NOTE — TELEPHONE ENCOUNTER
Called Mar back with this response. Left message requesting she call to re-schedule with Dr. Arriola, or if has has any questions.  SHERRON Gustafson, HEIDY Adam, Sarah L, RN   Caller: Unspecified (Today,  8:12 AM)             I did not realize she has symptoms, she should keep follow up with MD in that case to discuss.     [facility]    Phone Message    May a detailed message be left on voicemail: no    Reason for Call: Other: Pt wanted to discuss why she still has kidney pain and a dilated area in her left kidney. This is based on her NM Renogram results. Please call Pt back.     Action Taken: Message routed to:  Other:  CLINICAL POOL   Yes

## 2019-11-22 NOTE — TELEPHONE ENCOUNTER
Left message to discuss her ongoing issues. Lasix renogram is normal and should not have pain related to kidney.     Eduarda Bolton PA-C  [facility]  [phone]

## 2019-11-25 ENCOUNTER — OFFICE VISIT (OUTPATIENT)
Dept: UROLOGY | Facility: CLINIC | Age: 64
End: 2019-11-25
Payer: MEDICARE

## 2019-11-25 VITALS
OXYGEN SATURATION: 98 % | WEIGHT: 125 LBS | SYSTOLIC BLOOD PRESSURE: 146 MMHG | HEIGHT: 66 IN | DIASTOLIC BLOOD PRESSURE: 70 MMHG | BODY MASS INDEX: 20.09 KG/M2 | HEART RATE: 71 BPM

## 2019-11-25 DIAGNOSIS — R39.15 URINARY URGENCY: Primary | ICD-10-CM

## 2019-11-25 LAB — RESIDUAL VOLUME (RV) (EXTERNAL): 0

## 2019-11-25 PROCEDURE — 51798 US URINE CAPACITY MEASURE: CPT | Performed by: PHYSICIAN ASSISTANT

## 2019-11-25 PROCEDURE — 99213 OFFICE O/P EST LOW 20 MIN: CPT | Mod: 25 | Performed by: PHYSICIAN ASSISTANT

## 2019-11-25 ASSESSMENT — MIFFLIN-ST. JEOR: SCORE: 1128.75

## 2019-11-25 ASSESSMENT — PAIN SCALES - GENERAL: PAINLEVEL: MILD PAIN (2)

## 2019-11-25 NOTE — NURSING NOTE
Chief Complaint   Patient presents with     Follow Up     patient is here for follow up.      Landy Montano, CMA

## 2019-11-25 NOTE — LETTER
11/25/2019       RE: Mar Zeng  [address] [facility] [address]     Dear Colleague,    Thank you for referring your patient, Mar Zeng, to the [facility] [place] at [facility]. Please see a copy of my visit note below.    CC: pelvicaliectasis, LLQ abdominal pain    HPI:  Mar Zeng is a 64 year old female who presents in follow up of the above. Noted on CT 10/17/19 after complaining of 2-3 months of intermittent LLQ pain. Notes urgency, frequency and a time of recurrent UTIs (earlier this year, e coli). No gross hematuria.     Lasix renogram was normal. Pain continues and wonders about next step.     Past Medical History:   Diagnosis Date     Mumps      NO ACTIVE PROBLEMS        Past Surgical History:   Procedure Laterality Date     COLONOSCOPY  8/23/2016    Dr. Shaw [facility]     COLONOSCOPY N/A 8/23/2016    Procedure: COLONOSCOPY;  Surgeon: Peter Shaw MD;  Location: [facility]     FINGER SURGERY      right little finger joint replacement     HYSTERECTOMY VAGINAL  age 30    retained ovaries. no cervix      HYSTERECTOMY, PAP NO LONGER INDICATED       ORTHOPEDIC SURGERY       SURGICAL HISTORY OF -   10/25/13    left index cyst removal     SURGICAL HISTORY OF -       Bilat knee miniscus repair       Social History     Socioeconomic History     Marital status:      Spouse name: Not on file     Number of children: Not on file     Years of education: Not on file     Highest education level: Not on file   Occupational History     Not on file   Social Needs     Financial resource strain: Not on file     Food insecurity:     Worry: Not on file     Inability: Not on file     Transportation needs:     Medical: Not on file     Non-medical: Not on file   Tobacco Use     Smoking status: Former Smoker     Packs/day: 0.00     Types: Cigarettes     Smokeless tobacco: Never Used   Substance and Sexual  Activity     Alcohol use: Yes     Alcohol/week: 0.0 standard drinks     Comment: avg:3-4 drink/wk     Drug use: No     Sexual activity: Yes     Partners: Male   Lifestyle     Physical activity:     Days per week: Not on file     Minutes per session: Not on file     Stress: Not on file   Relationships     Social connections:     Talks on phone: Not on file     Gets together: Not on file     Attends [employer] service: Not on file     Active member of club or organization: Not on file     Attends meetings of clubs or organizations: Not on file     Relationship status: Not on file     Intimate partner violence:     Fear of current or ex partner: Not on file     Emotionally abused: Not on file     Physically abused: Not on file     Forced sexual activity: Not on file   Other Topics Concern     Parent/sibling w/ CABG, MI or angioplasty before 65F 55M? No   Social History Narrative    [place] in [place]       Family History   Problem Relation Age of Onset     Hypertension Father      Cancer Father      Cancer Maternal Grandmother      Cancer Maternal Grandfather      Cancer Paternal Grandmother      Cancer Paternal Grandfather      Asthma Brother      Hypertension Brother      Thyroid Disease Daughter      Colon Cancer No family hx of        ROS:14 point ROS neg other than the symptoms noted above in the HPI.    Allergies   Allergen Reactions     Codeine Itching     Hay Fever & [A.R.M.]        Current Outpatient Medications   Medication     buPROPion (WELLBUTRIN XL) 150 MG 24 hr tablet     estradiol (ESTRACE VAGINAL) 0.1 MG/GM vaginal cream     FLUoxetine (PROZAC) 20 MG capsule     fluticasone (FLONASE) 50 MCG/ACT nasal spray     LORazepam (ATIVAN) 1 MG tablet     ASPIRIN PO     Calcium Carbonate-Vitamin D (CALCIUM + D) 600-200 MG-UNIT per tablet     Fexofenadine HCl (ALLEGRA PO)     ibuprofen (ADVIL/MOTRIN) 800 MG tablet     vitamin E (TOCOPHEROL) 100 UNIT capsule     No current facility-administered medications for this  "visit.          PEx:   BP (!) 146/70 (BP Location: Left arm, Patient Position: Sitting, Cuff Size: Adult Regular)   Pulse 71   Ht 1.676 m (5' 6\")   Wt 56.7 kg (125 lb)   SpO2 98%   BMI 20.18 kg/m       PSYCH: NAD  EYES: EOMI  MOUTH: MMM  NECK: Supple, no notable adenopathy  RESP: Unlabored breathing  CARDIAC: No LE edema  SKIN: Warm, no rashes  ABD: soft, Nontender  NEURO: AAO x3    CT ABDOMEN PELVIS WITH CONTRAST 10/17/2019 10:49 AM     TECHNIQUE: Images from diaphragm to pubic symphysis 65mL Isovue-370 IV  contrast.  Radiation dose for this scan was reduced using automated exposure  control, adjustment of the mA and/or kV according to patient size, or  iterative reconstruction technique.     HISTORY: Abdominal pain pain, diverticulitis suspected; left lower  quadrant abdominal pain     COMPARISON: 8/4/2014 CT abdomen and pelvis     FINDINGS:   Abdomen and Pelvis: Lung bases clear. Normal-appearing liver,  gallbladder, spleen, pancreas and adrenal glands. There is dilated  right renal pelvicalyceal system. Symmetric enhancement of the kidneys  and there is no right perinephric stranding. No hydroureter. Possibly  due to a right ureteropelvic junction level obstruction but this is  new since the prior CT.     No periaortic or pelvic adenopathy. Unremarkable bladder. No free  fluid. No acute bowel abnormality. Appendix is not identified. No  aggressive bone lesions.                                                                      IMPRESSION:   1. No evidence for acute diverticulitis or specific cause for left  lower quadrant pain identified.  2. There is pelvicaliectasis of the right renal collecting system  without hydroureter or definite cause identified suggesting  obstruction at the ureteropelvic junction level. No right perinephric  stranding. Symmetric enhancement of both nephrograms.          Urine: trace blood, leuk est (Not enough for further testing)          A/P: Marmara Watkinsin is a 64 year " old female with pelvicaliectasis, LLQ pain, urgency, frequency  -Lasix renogram normal.   -Doubt urologic source for her pain. Would follow up with PCP and discuss if a TV US would be warranted.     Eduarda Bolton PA-C  [facility]    20 minutes were spent with the patient today, > 50% in counseling and coordination of care                  Again, thank you for allowing me to participate in the care of your patient.      Sincerely,    Eduarda Bolton PA-C, HEIDY

## 2019-11-25 NOTE — PROGRESS NOTES
CC: pelvicaliectasis, LLQ abdominal pain    HPI:  Mar Zeng is a 64 year old female who presents in follow up of the above. Noted on CT 10/17/19 after complaining of 2-3 months of intermittent LLQ pain. Notes urgency, frequency and a time of recurrent UTIs (earlier this year, e coli). No gross hematuria.     Lasix renogram was normal. Pain continues and wonders about next step.     Past Medical History:   Diagnosis Date     Mumps      NO ACTIVE PROBLEMS        Past Surgical History:   Procedure Laterality Date     COLONOSCOPY  8/23/2016    Dr. Shaw [facility]     COLONOSCOPY N/A 8/23/2016    Procedure: COLONOSCOPY;  Surgeon: Peter Shaw MD;  Location: [facility]     FINGER SURGERY      right little finger joint replacement     HYSTERECTOMY VAGINAL  age 30    retained ovaries. no cervix      HYSTERECTOMY, PAP NO LONGER INDICATED       ORTHOPEDIC SURGERY       SURGICAL HISTORY OF -   10/25/13    left index cyst removal     SURGICAL HISTORY OF -       Bilat knee miniscus repair       Social History     Socioeconomic History     Marital status:      Spouse name: Not on file     Number of children: Not on file     Years of education: Not on file     Highest education level: Not on file   Occupational History     Not on file   Social Needs     Financial resource strain: Not on file     Food insecurity:     Worry: Not on file     Inability: Not on file     Transportation needs:     Medical: Not on file     Non-medical: Not on file   Tobacco Use     Smoking status: Former Smoker     Packs/day: 0.00     Types: Cigarettes     Smokeless tobacco: Never Used   Substance and Sexual Activity     Alcohol use: Yes     Alcohol/week: 0.0 standard drinks     Comment: avg:3-4 drink/wk     Drug use: No     Sexual activity: Yes     Partners: Male   Lifestyle     Physical activity:     Days per week: Not on file     Minutes per session: Not on file     Stress: Not on file   Relationships     Social connections:     Talks  "on phone: Not on file     Gets together: Not on file     Attends [employer] service: Not on file     Active member of club or organization: Not on file     Attends meetings of clubs or organizations: Not on file     Relationship status: Not on file     Intimate partner violence:     Fear of current or ex partner: Not on file     Emotionally abused: Not on file     Physically abused: Not on file     Forced sexual activity: Not on file   Other Topics Concern     Parent/sibling w/ CABG, MI or angioplasty before 65F 55M? No   Social History Narrative    [place] in [place]       Family History   Problem Relation Age of Onset     Hypertension Father      Cancer Father      Cancer Maternal Grandmother      Cancer Maternal Grandfather      Cancer Paternal Grandmother      Cancer Paternal Grandfather      Asthma Brother      Hypertension Brother      Thyroid Disease Daughter      Colon Cancer No family hx of        ROS:14 point ROS neg other than the symptoms noted above in the HPI.    Allergies   Allergen Reactions     Codeine Itching     Hay Fever & [A.R.M.]        Current Outpatient Medications   Medication     buPROPion (WELLBUTRIN XL) 150 MG 24 hr tablet     estradiol (ESTRACE VAGINAL) 0.1 MG/GM vaginal cream     FLUoxetine (PROZAC) 20 MG capsule     fluticasone (FLONASE) 50 MCG/ACT nasal spray     LORazepam (ATIVAN) 1 MG tablet     ASPIRIN PO     Calcium Carbonate-Vitamin D (CALCIUM + D) 600-200 MG-UNIT per tablet     Fexofenadine HCl (ALLEGRA PO)     ibuprofen (ADVIL/MOTRIN) 800 MG tablet     vitamin E (TOCOPHEROL) 100 UNIT capsule     No current facility-administered medications for this visit.          PEx:   BP (!) 146/70 (BP Location: Left arm, Patient Position: Sitting, Cuff Size: Adult Regular)   Pulse 71   Ht 1.676 m (5' 6\")   Wt 56.7 kg (125 lb)   SpO2 98%   BMI 20.18 kg/m      PSYCH: NAD  EYES: EOMI  MOUTH: MMM  NECK: Supple, no notable adenopathy  RESP: Unlabored breathing  CARDIAC: No LE edema  SKIN: " Warm, no rashes  ABD: soft, Nontender  NEURO: AAO x3    CT ABDOMEN PELVIS WITH CONTRAST 10/17/2019 10:49 AM     TECHNIQUE: Images from diaphragm to pubic symphysis 65mL Isovue-370 IV  contrast.  Radiation dose for this scan was reduced using automated exposure  control, adjustment of the mA and/or kV according to patient size, or  iterative reconstruction technique.     HISTORY: Abdominal pain pain, diverticulitis suspected; left lower  quadrant abdominal pain     COMPARISON: 8/4/2014 CT abdomen and pelvis     FINDINGS:   Abdomen and Pelvis: Lung bases clear. Normal-appearing liver,  gallbladder, spleen, pancreas and adrenal glands. There is dilated  right renal pelvicalyceal system. Symmetric enhancement of the kidneys  and there is no right perinephric stranding. No hydroureter. Possibly  due to a right ureteropelvic junction level obstruction but this is  new since the prior CT.     No periaortic or pelvic adenopathy. Unremarkable bladder. No free  fluid. No acute bowel abnormality. Appendix is not identified. No  aggressive bone lesions.                                                                      IMPRESSION:   1. No evidence for acute diverticulitis or specific cause for left  lower quadrant pain identified.  2. There is pelvicaliectasis of the right renal collecting system  without hydroureter or definite cause identified suggesting  obstruction at the ureteropelvic junction level. No right perinephric  stranding. Symmetric enhancement of both nephrograms.          Urine: trace blood, leuk est (Not enough for further testing)          A/P: Mar Zeng is a 64 year old female with pelvicaliectasis, LLQ pain, urgency, frequency  -Lasix renogram normal.   -Doubt urologic source for her pain. Would follow up with PCP and discuss if a TV US would be warranted.     Eduarda Bolton PA-C  [facility]    20 minutes were spent with the patient today, > 50% in counseling and coordination of  care

## 2019-12-03 ENCOUNTER — TELEPHONE (OUTPATIENT)
Dept: FAMILY MEDICINE | Facility: CLINIC | Age: 64
End: 2019-12-03

## 2019-12-03 DIAGNOSIS — R10.32 LLQ ABDOMINAL PAIN: Primary | ICD-10-CM

## 2019-12-03 NOTE — TELEPHONE ENCOUNTER
Can we reach out to pt and see how she is doing.  Any more pain?  If so I talked with urology and we agreed to start with a pelvic US.  I can order if she would like to proceed due to continued symptoms.  Let me know.  LITO.

## 2019-12-03 NOTE — TELEPHONE ENCOUNTER
Called the pt to advise of below.  Number given to call to schedule if she does not hear from them.

## 2019-12-03 NOTE — TELEPHONE ENCOUNTER
Called the pt to discuss.      She is still having pain and it is off and on.  It is every day, but not constant.  It is kind of a weird, odd, uncomfortable feeling.    She would like the pelvic us ordered.

## 2020-01-09 DIAGNOSIS — F41.1 GENERALIZED ANXIETY DISORDER: ICD-10-CM

## 2020-01-09 NOTE — TELEPHONE ENCOUNTER
"Requested Prescriptions   Pending Prescriptions Disp Refills     FLUoxetine (PROZAC) 20 MG capsule [Pharmacy Med Name: FLUOXETINE 20MG CAPSULES] 90 capsule 0     Sig: TAKE 1 CAPSULE(20 MG) BY MOUTH DAILY   Last Written Prescription Date:  10/16/19  Last Fill Quantity: 90,  # refills: 0   Last office visit: 10/16/2019 with prescribing provider:  Gail Crespo Ra, APRN CNP   Future Office Visit:        SSRIs Protocol Passed - 1/9/2020  5:53 AM   PHQ-9 SCORE 6/1/2011 11/16/2015   PHQ-9 Total Score 10 -   PHQ-9 Total Score - 3     TARIQ-7 SCORE 11/16/2015   Total Score 0            Passed - Recent (12 mo) or future (30 days) visit within the authorizing provider's specialty     Patient has had an office visit with the authorizing provider or a provider within the authorizing providers department within the previous 12 mos or has a future within next 30 days. See \"Patient Info\" tab in inbasket, or \"Choose Columns\" in Meds & Orders section of the refill encounter.              Passed - Medication is Bupropion     If the medication is Bupropion (Wellbutrin), and the patient is taking for smoking cessation; OK to refill.          Passed - Medication is active on med list        Passed - Patient is age 18 or older        Passed - No active pregnancy on record        Passed - No positive pregnancy test in last 12 months        buPROPion (WELLBUTRIN XL) 150 MG 24 hr tablet [Pharmacy Med Name: BUPROPION XL 150MG TABLETS (24 H)] 90 tablet 0     Sig: TAKE 1 TABLET(150 MG) BY MOUTH EVERY MORNING   Last Written Prescription Date:  10/16/19  Last Fill Quantity: 90,  # refills: 0   Last office visit: 10/16/2019 with prescribing provider:  Gail Crespo Ra, APRN CNP   Future Office Visit:        SSRIs Protocol Passed - 1/9/2020  5:53 AM   PHQ-9 SCORE 6/1/2011 11/16/2015   PHQ-9 Total Score 10 -   PHQ-9 Total Score - 3     TARIQ-7 SCORE 11/16/2015   Total Score 0            Passed - Recent (12 mo) or future (30 days) visit within the " "authorizing provider's specialty     Patient has had an office visit with the authorizing provider or a provider within the authorizing providers department within the previous 12 mos or has a future within next 30 days. See \"Patient Info\" tab in inbasket, or \"Choose Columns\" in Meds & Orders section of the refill encounter.              Passed - Medication is Bupropion     If the medication is Bupropion (Wellbutrin), and the patient is taking for smoking cessation; OK to refill.          Passed - Medication is active on med list        Passed - Patient is age 18 or older        Passed - No active pregnancy on record        Passed - No positive pregnancy test in last 12 months         "

## 2020-01-10 RX ORDER — BUPROPION HYDROCHLORIDE 150 MG/1
TABLET ORAL
Qty: 90 TABLET | Refills: 0 | Status: SHIPPED | OUTPATIENT
Start: 2020-01-10 | End: 2020-04-08

## 2020-01-10 NOTE — TELEPHONE ENCOUNTER
LOV note RTC 6 weeks. Huddled with NIURKA Beckwith. Verbal ok to send refill to pharmacy.    Sherry Greenwood RN

## 2020-03-15 ENCOUNTER — HEALTH MAINTENANCE LETTER (OUTPATIENT)
Age: 65
End: 2020-03-15

## 2020-03-16 ENCOUNTER — E-VISIT (OUTPATIENT)
Dept: FAMILY MEDICINE | Facility: CLINIC | Age: 65
End: 2020-03-16
Payer: COMMERCIAL

## 2020-03-16 DIAGNOSIS — Z53.9 ERRONEOUS ENCOUNTER--DISREGARD: Primary | ICD-10-CM

## 2020-04-08 DIAGNOSIS — F41.1 GENERALIZED ANXIETY DISORDER: ICD-10-CM

## 2020-04-08 RX ORDER — BUPROPION HYDROCHLORIDE 150 MG/1
TABLET ORAL
Qty: 90 TABLET | Refills: 0 | Status: SHIPPED | OUTPATIENT
Start: 2020-04-08 | End: 2020-07-16

## 2020-07-16 DIAGNOSIS — F41.1 GENERALIZED ANXIETY DISORDER: ICD-10-CM

## 2020-07-16 RX ORDER — BUPROPION HYDROCHLORIDE 150 MG/1
TABLET ORAL
Qty: 90 TABLET | Refills: 0 | Status: SHIPPED | OUTPATIENT
Start: 2020-07-16 | End: 2020-08-14

## 2020-07-16 NOTE — TELEPHONE ENCOUNTER
Prescription approved per [facility] Refill Protocol.    Sherry Greenwood RN

## 2020-07-23 ENCOUNTER — VIRTUAL VISIT (OUTPATIENT)
Dept: FAMILY MEDICINE | Facility: CLINIC | Age: 65
End: 2020-07-23
Payer: MEDICARE

## 2020-07-23 DIAGNOSIS — F41.1 GENERALIZED ANXIETY DISORDER: Primary | ICD-10-CM

## 2020-07-23 PROCEDURE — 99441 ZZC PHYSICIAN TELEPHONE EVALUATION 5-10 MIN: CPT | Performed by: NURSE PRACTITIONER

## 2020-07-23 RX ORDER — CITALOPRAM HYDROBROMIDE 20 MG/1
20 TABLET ORAL DAILY
Qty: 90 TABLET | Refills: 0 | Status: SHIPPED | OUTPATIENT
Start: 2020-07-23 | End: 2020-09-10

## 2020-07-23 NOTE — PROGRESS NOTES
"Mar Zeng is a 64 year old female who is being evaluated via a billable telephone visit.      The patient has been notified of following:     \"This telephone visit will be conducted via a call between you and your physician/provider. We have found that certain health care needs can be provided without the need for a physical exam.  This service lets us provide the care you need with a short phone conversation.  If a prescription is necessary we can send it directly to your pharmacy.  If lab work is needed we can place an order for that and you can then stop by our lab to have the test done at a later time.    Telephone visits are billed at different rates depending on your insurance coverage. During this emergency period, for some insurers they may be billed the same as an in-person visit.  Please reach out to your insurance provider with any questions.    If during the course of the call the physician/provider feels a telephone visit is not appropriate, you will not be charged for this service.\"    Patient has given verbal consent for Telephone visit?  Yes    What phone number would you like to be contacted at? [phone]    How would you like to obtain your AVS? Declined     Subjective     Mar Zeng is a 64 year old female who presents via phone visit today for the following health issues:    HPI    Patient would like to discuss possible ADHD medication, states that her family has started to notice her being more hyperactive and states that she has been having trouble sleeping        Increased anxiety.  Has been with family lately who told her she should be tested for adhd.  Difficulty focusing.  Not able to sit still.  Some difficulty sleeping.  Did not tolerate prozac, felt it made her heart beat faster.  Has not taken anything else in the past.    Patient Active Problem List   Diagnosis     CARDIOVASCULAR SCREENING; LDL GOAL LESS THAN 160     Multiple pigmented nevi     Advanced " directives, counseling/discussion     Seasonal allergic rhinitis     Hematoma of rectus sheath     Generalized anxiety disorder     Past Surgical History:   Procedure Laterality Date     COLONOSCOPY  8/23/2016    Dr. Shaw [facility]     COLONOSCOPY N/A 8/23/2016    Procedure: COLONOSCOPY;  Surgeon: Peter Shaw MD;  Location: [facility]     FINGER SURGERY      right little finger joint replacement     HYSTERECTOMY VAGINAL  age 30    retained ovaries. no cervix      HYSTERECTOMY, PAP NO LONGER INDICATED       ORTHOPEDIC SURGERY       SURGICAL HISTORY OF -   10/25/13    left index cyst removal     SURGICAL HISTORY OF -       Bilat knee miniscus repair       Social History     Tobacco Use     Smoking status: Former Smoker     Packs/day: 0.00     Types: Cigarettes     Smokeless tobacco: Never Used   Substance Use Topics     Alcohol use: Yes     Alcohol/week: 0.0 standard drinks     Comment: avg:3-4 drink/wk     Family History   Problem Relation Age of Onset     Hypertension Father      Cancer Father      Cancer Maternal Grandmother      Cancer Maternal Grandfather      Cancer Paternal Grandmother      Cancer Paternal Grandfather      Asthma Brother      Hypertension Brother      Thyroid Disease Daughter      Colon Cancer No family hx of            Reviewed and updated as needed this visit by Provider         Review of Systems   Constitutional, HEENT, cardiovascular, pulmonary, gi and gu systems are negative, except as otherwise noted.       Objective   Reported vitals:  There were no vitals taken for this visit.   healthy, alert and no distress  PSYCH: Alert and oriented times 3; coherent speech, normal   rate and volume, able to articulate logical thoughts, able   to abstract reason, no tangential thoughts, no hallucinations   or delusions  Her affect is normal  RESP: No cough, no audible wheezing, able to talk in full sentences  Remainder of exam unable to be completed due to telephone visits    Diagnostic Test  Results:  none         Assessment/Plan:    1. Generalized anxiety disorder  Discussed options.  Add celexa.  Reviewed r/b/se.  Pt agrees with plan and verbalized understanding.  - citalopram (CELEXA) 20 MG tablet; Take 1 tablet (20 mg) by mouth daily  Dispense: 90 tablet; Refill: 0    No follow-ups on file.      Phone call duration:  6 minutes    DUKE Esquivel Ra CNP

## 2020-07-23 NOTE — PATIENT INSTRUCTIONS
Start the citalopram in the evening.  Remember, if you have some mild side effects, these should resolve after the first week.  You should start to feel some sort of an improvement over the first 2 weeks.  Let me know if you do not.  See me in 4 weeks for a recheck, sooner if necessary.

## 2020-08-14 ENCOUNTER — ANCILLARY PROCEDURE (OUTPATIENT)
Dept: MAMMOGRAPHY | Facility: CLINIC | Age: 65
End: 2020-08-14
Payer: MEDICARE

## 2020-08-14 ENCOUNTER — OFFICE VISIT (OUTPATIENT)
Dept: FAMILY MEDICINE | Facility: CLINIC | Age: 65
End: 2020-08-14
Payer: MEDICARE

## 2020-08-14 ENCOUNTER — DOCUMENTATION ONLY (OUTPATIENT)
Dept: LAB | Facility: CLINIC | Age: 65
End: 2020-08-14

## 2020-08-14 VITALS
TEMPERATURE: 98.3 F | HEART RATE: 74 BPM | DIASTOLIC BLOOD PRESSURE: 74 MMHG | OXYGEN SATURATION: 98 % | HEIGHT: 65 IN | WEIGHT: 135.6 LBS | BODY MASS INDEX: 22.59 KG/M2 | RESPIRATION RATE: 16 BRPM | SYSTOLIC BLOOD PRESSURE: 136 MMHG

## 2020-08-14 DIAGNOSIS — Z12.31 VISIT FOR SCREENING MAMMOGRAM: ICD-10-CM

## 2020-08-14 DIAGNOSIS — Z23 ENCOUNTER FOR IMMUNIZATION: ICD-10-CM

## 2020-08-14 DIAGNOSIS — Z13.6 CARDIOVASCULAR SCREENING; LDL GOAL LESS THAN 160: Primary | ICD-10-CM

## 2020-08-14 DIAGNOSIS — Z00.00 ENCOUNTER FOR MEDICARE ANNUAL WELLNESS EXAM: Primary | ICD-10-CM

## 2020-08-14 DIAGNOSIS — F41.1 GENERALIZED ANXIETY DISORDER: ICD-10-CM

## 2020-08-14 DIAGNOSIS — J30.2 SEASONAL ALLERGIC RHINITIS, UNSPECIFIED TRIGGER: ICD-10-CM

## 2020-08-14 DIAGNOSIS — G47.00 INSOMNIA, UNSPECIFIED TYPE: ICD-10-CM

## 2020-08-14 DIAGNOSIS — Z78.0 ASYMPTOMATIC MENOPAUSAL STATE: ICD-10-CM

## 2020-08-14 DIAGNOSIS — Z13.820 SCREENING FOR OSTEOPOROSIS: ICD-10-CM

## 2020-08-14 PROCEDURE — 77067 SCR MAMMO BI INCL CAD: CPT | Mod: TC

## 2020-08-14 PROCEDURE — 99213 OFFICE O/P EST LOW 20 MIN: CPT | Mod: 25 | Performed by: NURSE PRACTITIONER

## 2020-08-14 PROCEDURE — G0402 INITIAL PREVENTIVE EXAM: HCPCS | Performed by: NURSE PRACTITIONER

## 2020-08-14 PROCEDURE — 90732 PPSV23 VACC 2 YRS+ SUBQ/IM: CPT | Performed by: NURSE PRACTITIONER

## 2020-08-14 PROCEDURE — G0009 ADMIN PNEUMOCOCCAL VACCINE: HCPCS | Performed by: NURSE PRACTITIONER

## 2020-08-14 RX ORDER — LORATADINE 10 MG/1
10 TABLET ORAL DAILY PRN
COMMUNITY
End: 2022-11-10

## 2020-08-14 RX ORDER — LORAZEPAM 1 MG/1
1 TABLET ORAL
Qty: 15 TABLET | Refills: 0 | Status: CANCELLED | OUTPATIENT
Start: 2020-08-14

## 2020-08-14 RX ORDER — MONTELUKAST SODIUM 10 MG/1
10 TABLET ORAL AT BEDTIME
Qty: 90 TABLET | Refills: 0 | Status: SHIPPED | OUTPATIENT
Start: 2020-08-14 | End: 2020-11-16

## 2020-08-14 RX ORDER — BUPROPION HYDROCHLORIDE 150 MG/1
TABLET ORAL
Qty: 90 TABLET | Refills: 3 | Status: SHIPPED | OUTPATIENT
Start: 2020-08-14 | End: 2021-10-11

## 2020-08-14 RX ORDER — CITALOPRAM HYDROBROMIDE 20 MG/1
20 TABLET ORAL DAILY
Qty: 90 TABLET | Refills: 0 | Status: CANCELLED | OUTPATIENT
Start: 2020-08-14

## 2020-08-14 ASSESSMENT — ENCOUNTER SYMPTOMS
DIARRHEA: 0
HEMATOCHEZIA: 0
ABDOMINAL PAIN: 0
FEVER: 0
EYE PAIN: 0
CHILLS: 0
MYALGIAS: 0
FREQUENCY: 0
SHORTNESS OF BREATH: 0
CONSTIPATION: 0
ARTHRALGIAS: 0
HEMATURIA: 0
JOINT SWELLING: 0
HEARTBURN: 0
NERVOUS/ANXIOUS: 1
PARESTHESIAS: 0
SORE THROAT: 0
WEAKNESS: 0
DIZZINESS: 0
COUGH: 0
PALPITATIONS: 0
DYSURIA: 0
NAUSEA: 0
HEADACHES: 1

## 2020-08-14 ASSESSMENT — ANXIETY QUESTIONNAIRES
3. WORRYING TOO MUCH ABOUT DIFFERENT THINGS: NOT AT ALL
2. NOT BEING ABLE TO STOP OR CONTROL WORRYING: NOT AT ALL
IF YOU CHECKED OFF ANY PROBLEMS ON THIS QUESTIONNAIRE, HOW DIFFICULT HAVE THESE PROBLEMS MADE IT FOR YOU TO DO YOUR WORK, TAKE CARE OF THINGS AT HOME, OR GET ALONG WITH OTHER PEOPLE: SOMEWHAT DIFFICULT
7. FEELING AFRAID AS IF SOMETHING AWFUL MIGHT HAPPEN: NOT AT ALL
1. FEELING NERVOUS, ANXIOUS, OR ON EDGE: SEVERAL DAYS
5. BEING SO RESTLESS THAT IT IS HARD TO SIT STILL: NEARLY EVERY DAY
GAD7 TOTAL SCORE: 7
6. BECOMING EASILY ANNOYED OR IRRITABLE: NOT AT ALL

## 2020-08-14 ASSESSMENT — ACTIVITIES OF DAILY LIVING (ADL): CURRENT_FUNCTION: NO ASSISTANCE NEEDED

## 2020-08-14 ASSESSMENT — PATIENT HEALTH QUESTIONNAIRE - PHQ9
5. POOR APPETITE OR OVEREATING: NEARLY EVERY DAY
SUM OF ALL RESPONSES TO PHQ QUESTIONS 1-9: 9

## 2020-08-14 ASSESSMENT — MIFFLIN-ST. JEOR: SCORE: 1160.96

## 2020-08-14 NOTE — PROGRESS NOTES
"SUBJECTIVE:   Mar Zeng is a 64 year old female who presents for Preventive Visit.    Are you in the first 12 months of your Medicare coverage?  Yes,  Visual Acuity:  Right Eye: 20/25   Left Eye: 2040  Both Eyes: 20/32    Healthy Habits:     In general, how would you rate your overall health?  Good    Frequency of exercise:  4-5 days/week    Duration of exercise:  15-30 minutes    Do you usually eat at least 4 servings of fruit and vegetables a day, include whole grains    & fiber and avoid regularly eating high fat or \"junk\" foods?  Yes    Taking medications regularly:  Yes    Barriers to taking medications:  Side effects    Medication side effects:  Other (headaches)    Ability to successfully perform activities of daily living:  No assistance needed    Home Safety:  No safety concerns identified    Hearing Impairment:  No hearing concerns    In the past 6 months, have you been bothered by leaking of urine?  No    In general, how would you rate your overall mental or emotional health?  Good      PHQ-2 Total Score: 0    Additional concerns today:  No    Patient notes she is having headaches every day since starting Celexa  Wondering about taking ibuprofen for people age 65+  Would like to discuss PCV13 and shingles vaccines    Do you feel safe in your environment? Yes    Have you ever done Advance Care Planning? (For example, a Health Directive, POLST, or a discussion with a medical provider or your loved ones about your wishes): No, advance care planning information given to patient to review.  Patient declined advance care planning discussion at this time.      Fall risk  Fallen 2 or more times in the past year?: No  Any fall with injury in the past year?: No    Cognitive Screening   1) Repeat 3 items (Leader, Season, Table)    2) Clock draw: NORMAL  3) 3 item recall: Recalls 3 objects  Results: 3 items recalled: COGNITIVE IMPAIRMENT LESS LIKELY    Mini-CogTM Copyright S [employer]on[employer]. Licensed by the author " for use in [facility]; reprinted with permission (denice@.[facility]). All rights reserved.      Do you have sleep apnea, excessive snoring or daytime drowsiness?: no    Reviewed and updated as needed this visit by clinical staff  Tobacco  Allergies  Meds  Med Hx  Surg Hx  Fam Hx  Soc Hx        Reviewed and updated as needed this visit by Provider        Social History     Tobacco Use     Smoking status: Former Smoker     Packs/day: 0.00     Types: Cigarettes     Smokeless tobacco: Never Used   Substance Use Topics     Alcohol use: Yes     Alcohol/week: 0.0 standard drinks     Comment: avg:3-4 drink/wk       Alcohol Use 8/14/2020   Prescreen: >3 drinks/day or >7 drinks/week? No   Prescreen: >3 drinks/day or >7 drinks/week? -       Current providers sharing in care for this patient include:   Patient Care Team:  Gail Crespo Ra, APRN CNP as PCP - General (Family Practice)  Gail Crespo Ra, APRN CNP as Assigned PCP    The following health maintenance items are reviewed in Epic and correct as of today:  Health Maintenance   Topic Date Due     DEXA  1955     HEPATITIS C SCREENING  1955     HIV SCREENING  09/07/1969     ZOSTER IMMUNIZATION (1 of 2) 09/07/2004     TARIQ ASSESSMENT  05/16/2016     PNEUMOCOCCAL IMMUNIZATION 65+ LOW/MEDIUM RISK (1 of 2 - PCV13) 09/07/2019     ADVANCE CARE PLANNING  06/09/2020     INFLUENZA VACCINE (1) 09/01/2020     FALL RISK ASSESSMENT  10/16/2020     MAMMO SCREENING  12/17/2020     MEDICARE ANNUAL WELLNESS VISIT  08/14/2021     LIPID  12/06/2022     DTAP/TDAP/TD IMMUNIZATION (4 - Td) 07/18/2026     COLORECTAL CANCER SCREENING  08/23/2026     PHQ-2  Completed     IPV IMMUNIZATION  Aged Out     MENINGITIS IMMUNIZATION  Aged Out     HEPATITIS B IMMUNIZATION  Aged Out     Patient Active Problem List   Diagnosis     CARDIOVASCULAR SCREENING; LDL GOAL LESS THAN 160     Multiple pigmented nevi     Advanced directives, counseling/discussion     Seasonal allergic  rhinitis     Hematoma of rectus sheath     Generalized anxiety disorder     Past Surgical History:   Procedure Laterality Date     [facility]  [facility]/23/2016    [facility]     COLONOSCOPY N/A 8/23/2016    Procedure: COLONOSCOPY;  Surgeon: Peter Shaw MD;  Location: [facility]     FINGER SURGERY      right little finger joint replacement     HYSTERECTOMY VAGINAL  age 30    retained ovaries. no cervix      HYSTERECTOMY, PAP NO LONGER INDICATED       ORTHOPEDIC SURGERY       SURGICAL HISTORY OF -   10/25/13    left index cyst removal     SURGICAL HISTORY OF -       Bilat knee miniscus repair       Social History     Tobacco Use     Smoking status: Former Smoker     Packs/day: 0.00     Types: Cigarettes     Smokeless tobacco: Never Used   Substance Use Topics     Alcohol use: Yes     Alcohol/week: 0.0 standard drinks     Comment: avg:3-4 drink/wk     Family History   Problem Relation Age of Onset     Hypertension Father      Cancer Father      Cancer Maternal Grandmother      Cancer Maternal Grandfather      Cancer Paternal Grandmother      Cancer Paternal Grandfather      No Known Problems Mother      No Known Problems Sister      No Known Problems Son      No Known Problems Son      Asthma Brother      Hypertension Brother      Thyroid Disease Daughter      Colon Cancer No family hx of          Pneumonia Vaccine:Adults age 65+ who have not received previous Pneumovax (PPSV23) or PCV13 as an adult: Should first be given PCV13 AND then should be given PPSV23 6-12 months after PCV13    Review of Systems   Constitutional: Negative for chills and fever.   HENT: Negative for congestion, ear pain, hearing loss and sore throat.    Eyes: Negative for pain and visual disturbance.   Respiratory: Negative for cough and shortness of breath.    Cardiovascular: Negative for chest pain, palpitations and peripheral edema.   Gastrointestinal: Negative for abdominal pain, constipation, diarrhea, heartburn, hematochezia and nausea.  "  Genitourinary: Negative for dysuria, frequency, genital sores, hematuria and urgency.   Musculoskeletal: Negative for arthralgias, joint swelling and myalgias.   Skin: Negative for rash.   Neurological: Positive for headaches. Negative for dizziness, weakness and paresthesias.   Psychiatric/Behavioral: Negative for mood changes. The patient is nervous/anxious.        OBJECTIVE:   /74 (BP Location: Right arm, Patient Position: Chair, Cuff Size: Adult Regular)   Pulse 74   Temp 98.3  F (36.8  C) (Oral)   Resp 16   Ht 1.651 m (5' 5\")   Wt 61.5 kg (135 lb 9.6 oz)   LMP  (LMP Unknown)   SpO2 98%   Breastfeeding No   BMI 22.57 kg/m   Estimated body mass index is 22.57 kg/m  as calculated from the following:    Height as of this encounter: 1.651 m (5' 5\").    Weight as of this encounter: 61.5 kg (135 lb 9.6 oz).  Physical Exam  GENERAL: healthy, alert and no distress  EYES: Eyes grossly normal to inspection  HENT: ear canals and TM's normal, nose and mouth without ulcers or lesions  NECK: no adenopathy, no asymmetry, masses, or scars and thyroid normal to palpation  RESP: lungs clear to auscultation - no rales, rhonchi or wheezes  CV: regular rate and rhythm, normal S1 S2, no S3 or S4, no murmur, click or rub, no peripheral edema and peripheral pulses strong  ABDOMEN: soft, nontender, no hepatosplenomegaly, no masses and bowel sounds normal  NEURO: Normal strength and tone, mentation intact and speech normal  PSYCH: mentation appears normal, affect normal/bright    Diagnostic Test Results:  none     ASSESSMENT / PLAN:   1. Encounter for Medicare annual wellness exam    2. Generalized anxiety disorder  Continue with citalopram, update me in 1 week.  - buPROPion (WELLBUTRIN XL) 150 MG 24 hr tablet; TAKE 1 TABLET(150 MG) BY MOUTH EVERY MORNING  Dispense: 90 tablet; Refill: 3    3. Insomnia, unspecified type  Uses ativan when needed, not routine.  Still has prescription left.    4. Encounter for " "immunization  - SCREENING QUESTIONS FOR ADULT IMMUNIZATIONS  - ADMIN MEDICARE: Pneumococcal Vaccine ()    5. Screening for osteoporosis  - DX Hip/Pelvis/Spine; Future    6. Asymptomatic menopausal state   - DX Hip/Pelvis/Spine; Future    7. Seasonal allergic rhinitis, unspecified trigger  Trial singulair.  - montelukast (SINGULAIR) 10 MG tablet; Take 1 tablet (10 mg) by mouth At Bedtime  Dispense: 90 tablet; Refill: 0    COUNSELING:  Reviewed preventive health counseling, as reflected in patient instructions    Estimated body mass index is 22.57 kg/m  as calculated from the following:    Height as of this encounter: 1.651 m (5' 5\").    Weight as of this encounter: 61.5 kg (135 lb 9.6 oz).         reports that she has quit smoking. Her smoking use included cigarettes. She smoked 0.00 packs per day. She has never used smokeless tobacco.      Appropriate preventive services were discussed with this patient, including applicable screening as appropriate for cardiovascular disease, diabetes, osteopenia/osteoporosis, and glaucoma.  As appropriate for age/gender, discussed screening for colorectal cancer, prostate cancer, breast cancer, and cervical cancer. Checklist reviewing preventive services available has been given to the patient.    Reviewed patients plan of care and provided an AVS. The Basic Care Plan (routine screening as documented in Health Maintenance) for Mar meets the Care Plan requirement. This Care Plan has been established and reviewed with the Patient.    Counseling Resources:  ATP IV Guidelines  Pooled Cohorts Equation Calculator  Breast Cancer Risk Calculator  FRAX Risk Assessment  ICSI Preventive Guidelines  Dietary Guidelines for Americans, 2010  USDA's MyPlate  ASA Prophylaxis  Lung CA Screening    DUKE Esquivel Ra, CNP  [facility]    Identified Health Risks:  "

## 2020-08-14 NOTE — PATIENT INSTRUCTIONS
Patient Education   Personalized Prevention Plan  You are due for the preventive services outlined below.  Your care team is available to assist you in scheduling these services.  If you have already completed any of these items, please share that information with your care team to update in your medical record.  Health Maintenance Due   Topic Date Due     Osteoporosis Screening  1955     Hepatitis C Screening  1955     HIV Screening  09/07/1969     Zoster (Shingles) Vaccine (1 of 2) 09/07/2004     Anxiety Assessment  05/16/2016     Pneumococcal Vaccine (1 of 2 - PCV13) 09/07/2019     Discuss Advance Care Planning  06/09/2020

## 2020-08-15 ASSESSMENT — ANXIETY QUESTIONNAIRES: GAD7 TOTAL SCORE: 7

## 2020-08-18 DIAGNOSIS — Z13.6 CARDIOVASCULAR SCREENING; LDL GOAL LESS THAN 160: ICD-10-CM

## 2020-08-18 LAB
ALBUMIN SERPL-MCNC: 3.6 G/DL (ref 3.4–5)
ALP SERPL-CCNC: 99 U/L (ref 40–150)
ALT SERPL W P-5'-P-CCNC: 19 U/L (ref 0–50)
ANION GAP SERPL CALCULATED.3IONS-SCNC: 8 MMOL/L (ref 3–14)
AST SERPL W P-5'-P-CCNC: 23 U/L (ref 0–45)
BILIRUB SERPL-MCNC: 0.3 MG/DL (ref 0.2–1.3)
BUN SERPL-MCNC: 11 MG/DL (ref 7–30)
CALCIUM SERPL-MCNC: 8.8 MG/DL (ref 8.5–10.1)
CHLORIDE SERPL-SCNC: 106 MMOL/L (ref 94–109)
CHOLEST SERPL-MCNC: 231 MG/DL
CO2 SERPL-SCNC: 27 MMOL/L (ref 20–32)
CREAT SERPL-MCNC: 0.8 MG/DL (ref 0.52–1.04)
GFR SERPL CREATININE-BSD FRML MDRD: 77 ML/MIN/{1.73_M2}
GLUCOSE SERPL-MCNC: 82 MG/DL (ref 70–99)
HDLC SERPL-MCNC: 72 MG/DL
LDLC SERPL CALC-MCNC: 137 MG/DL
NONHDLC SERPL-MCNC: 159 MG/DL
POTASSIUM SERPL-SCNC: 4.1 MMOL/L (ref 3.4–5.3)
PROT SERPL-MCNC: 7 G/DL (ref 6.8–8.8)
SODIUM SERPL-SCNC: 141 MMOL/L (ref 133–144)
TRIGL SERPL-MCNC: 110 MG/DL

## 2020-08-18 PROCEDURE — 80053 COMPREHEN METABOLIC PANEL: CPT | Performed by: NURSE PRACTITIONER

## 2020-08-18 PROCEDURE — 80061 LIPID PANEL: CPT | Performed by: NURSE PRACTITIONER

## 2020-08-18 PROCEDURE — 36415 COLL VENOUS BLD VENIPUNCTURE: CPT | Performed by: NURSE PRACTITIONER

## 2020-08-26 DIAGNOSIS — F41.1 GENERALIZED ANXIETY DISORDER: Primary | ICD-10-CM

## 2020-08-26 NOTE — TELEPHONE ENCOUNTER
Reason for call:  Other   Patient called regarding (reason for call): call back  Additional comments: Patient stated that Gail told her to call back if the headaches hadn't gone away after a week. Patient is still experiencing the headaches.    Phone number to reach patient:  Cell number on file:    Telephone Information:   Mobile [phone]       Best Time:  any    Can we leave a detailed message on this number?  YES    Travel screening: Not Applicable     [place],

## 2020-08-26 NOTE — TELEPHONE ENCOUNTER
"Patient stated had discussed HA at px appt. Possibly due to Celexa.     C/o daily \"bad\" HAs. Denies nausea and vision disturbances.    Please advise.    Sherry Greenwood RN    "

## 2020-08-28 NOTE — TELEPHONE ENCOUNTER
She could trial without celexa and see if the headaches resolve.  If so, would recommend starting different med.  LITO.

## 2020-08-28 NOTE — TELEPHONE ENCOUNTER
Called patient- and she stated she never had headaches before she started this medication and she has not had these type of headaches for years- so she is thinking it is from the medication.     She is willing to try without- but would like to know how long- she does think this is because of the medication.     Will send to LITO- please advise.     Aria Arias RN on 8/28/2020 at 3:42 PM

## 2020-09-01 NOTE — TELEPHONE ENCOUNTER
Pt calls back.  Advised of below.  She has not had headaches since she went off the medicine last week - the end of the week.  She is interested in knowing what options there are.      Will forward to Gail Crespo.

## 2020-09-01 NOTE — TELEPHONE ENCOUNTER
Should resolve over the course of the week.  Confirm this at the end of the week and we can talk about other options.  If she is concerned about symptoms returning quickly we could start something right away.  Let me know what she prefers.  LITO.

## 2020-09-09 NOTE — TELEPHONE ENCOUNTER
Could trial buspar 5mg BID.  This is specifically for anxiety.  Let me know if she is interested.  LITO.

## 2020-09-09 NOTE — TELEPHONE ENCOUNTER
Pt calls back.  Advised of below.  Yes, she would be interested in trying buspar.      Will forward to Gail Crespo.

## 2020-09-10 RX ORDER — BUSPIRONE HYDROCHLORIDE 5 MG/1
TABLET ORAL
Qty: 60 TABLET | Refills: 1 | Status: SHIPPED | OUTPATIENT
Start: 2020-09-10 | End: 2021-06-07

## 2020-09-10 RX ORDER — BUSPIRONE HYDROCHLORIDE 5 MG/1
5 TABLET ORAL 2 TIMES DAILY
Qty: 60 TABLET | Status: CANCELLED | OUTPATIENT
Start: 2020-09-10

## 2020-12-07 ENCOUNTER — ANCILLARY PROCEDURE (OUTPATIENT)
Dept: BONE DENSITY | Facility: CLINIC | Age: 65
End: 2020-12-07
Attending: NURSE PRACTITIONER
Payer: MEDICARE

## 2020-12-07 DIAGNOSIS — Z78.0 ASYMPTOMATIC MENOPAUSAL STATE: ICD-10-CM

## 2020-12-07 DIAGNOSIS — Z13.820 SCREENING FOR OSTEOPOROSIS: ICD-10-CM

## 2020-12-07 PROCEDURE — 77080 DXA BONE DENSITY AXIAL: CPT | Performed by: INTERNAL MEDICINE

## 2020-12-15 ENCOUNTER — TELEPHONE (OUTPATIENT)
Dept: FAMILY MEDICINE | Facility: CLINIC | Age: 65
End: 2020-12-15

## 2020-12-15 NOTE — TELEPHONE ENCOUNTER
Reason for Call:  Request for results:    Name of test or procedure: dexa    Date of test of procedure: 12-7-20    Location of the test or procedure: [facility] to leave the result message on voice mail or with a family member? YES    Phone number Patient can be reached at:  Cell number on file:    Telephone Information:   Mobile [phone]       Additional comments: patient would like a call with results.  She is not using her MyChart.    Call taken on 12/15/2020 at 10:14 AM by PIOTR SOTO

## 2020-12-21 ENCOUNTER — TELEPHONE (OUTPATIENT)
Dept: FAMILY MEDICINE | Facility: CLINIC | Age: 65
End: 2020-12-21

## 2020-12-21 NOTE — TELEPHONE ENCOUNTER
Reason for Call:  Request for results:     Name of test or procedure: dexa     Date of test of procedure: 12-7-20     Location of the test or procedure: [facility]     OK to leave the result message on voice mail or with a family member? YES     Phone number Patient can be reached at:  Cell number on file:        Telephone Information:   Mobile [phone]         Additional comments: patient would like a call with results.  She is not using her MyChart.  She states this is her 3rd call to the clinic (see encounter from 12/15). She would like a call back today if possible.

## 2021-01-10 ENCOUNTER — HEALTH MAINTENANCE LETTER (OUTPATIENT)
Age: 66
End: 2021-01-10

## 2021-02-04 ENCOUNTER — NURSE TRIAGE (OUTPATIENT)
Dept: NURSING | Facility: CLINIC | Age: 66
End: 2021-02-04

## 2021-02-04 DIAGNOSIS — Z20.822 EXPOSURE TO COVID-19 VIRUS: ICD-10-CM

## 2021-02-04 DIAGNOSIS — Z20.822 EXPOSURE TO COVID-19 VIRUS: Primary | ICD-10-CM

## 2021-02-04 NOTE — TELEPHONE ENCOUNTER
"\"Going to be watching my grandkids next week and  Would like a Covid antibody test.\"  Patient with possible exposure 21 days ago.    This does NOT test you for 'active' infection or tell you if you are contagious.    Are you a healthcare worker? no  Do you currently have a cough, fever, body aches, shortness of breath, or difficulty breathing?  No  Did you previously have cough, fever, body aches, shortness of breath, or difficulty breathing that have now resolved? No previous covid symptoms.   Have you been exposed to (or come into close contact with) someone who tested POSITIVE for COVID-19 or someone who had a possible case of COVID-19?  Possible exposure 21 days ago.  Possible exposure > 14 days ago.  Lab order placed per SARS-CoV-2 Serology test Standing Order using indication \"Possible exposure >14 days ago\" and diagnosis code  \"Exposure to COVID-19 Virus\" (Z20.828)      The patient was informed: \"Testing is limited each day and it may take time for testing to be available to everyone who has called. You will receive a call within 48-72 hours to schedule the serology testing. Please confirm the best number to reach you is [phone]. If you have any questions about scheduling, call [phone].\"     Jodi Jimenez RN  [facility]          "

## 2021-02-06 LAB
SARS-COV-2 AB PNL SERPL IA: NORMAL
SARS-COV-2 IGG SERPL IA-ACNC: NORMAL

## 2021-03-09 DIAGNOSIS — J30.2 SEASONAL ALLERGIC RHINITIS, UNSPECIFIED TRIGGER: ICD-10-CM

## 2021-03-10 RX ORDER — MONTELUKAST SODIUM 10 MG/1
TABLET ORAL
Qty: 90 TABLET | Refills: 1 | Status: SHIPPED | OUTPATIENT
Start: 2021-03-10 | End: 2021-06-07

## 2021-06-07 ENCOUNTER — VIRTUAL VISIT (OUTPATIENT)
Dept: FAMILY MEDICINE | Facility: CLINIC | Age: 66
End: 2021-06-07
Payer: MEDICARE

## 2021-06-07 DIAGNOSIS — F41.1 GENERALIZED ANXIETY DISORDER: ICD-10-CM

## 2021-06-07 DIAGNOSIS — R41.840 CONCENTRATION DEFICIT: Primary | ICD-10-CM

## 2021-06-07 PROCEDURE — 99441 PR PHYSICIAN TELEPHONE EVALUATION 5-10 MIN: CPT | Mod: 95 | Performed by: NURSE PRACTITIONER

## 2021-06-07 ASSESSMENT — PAIN SCALES - GENERAL: PAINLEVEL: NO PAIN (0)

## 2021-06-07 NOTE — PROGRESS NOTES
"Mar is a 65 year old who is being evaluated via a billable telephone visit.      What phone number would you like to be contacted at? [phone]  How would you like to obtain your AVS? MyChart    Assessment & Plan     Concentration deficit  Testing ordered.  Discuss after results obtained.  - MENTAL HEALTH REFERRAL  - Adult; Assessments and Testing; ADHD; G: [facility] [phone]; We will contact you to schedule the appointment or please call with any questions    Generalized anxiety disorder  Pt does not believe this is contributing to her symptoms.  Continue with current regimen.      No follow-ups on file.    Gail Crespo, DUKE CNP  [facility] [place]    Subjective   Mar is a 65 year old who presents for the following health issues     HPI     Concern - ADHD concerns  Onset: x 1 month increased  Description: harder to concentrate \"hardly can stand myself\"  Intensity: n/a  Progression of Symptoms:  worsening  Accompanying Signs & Symptoms: agitated in side  Previous history of similar problem: no  Precipitating factors:        Worsened by: n/a   Alleviating factors:        Improved by: n/a  Therapies tried and outcome:  none     Interested in testing for adhd.  Feels her anxiety is well controlled with her medication but her lack of ability to focus causes anxiety at times.  She finds it hard to finish tasks or concentrate.  She previously would wake with panic attacks but this does not occur.  She wakes in the night but no anxiety or panic.  She has considered testing in the past but now believes it is necessary.    Review of Systems   Constitutional, HEENT, cardiovascular, pulmonary, gi and gu systems are negative, except as otherwise noted.      Objective           Vitals:  No vitals were obtained today due to virtual visit.    Physical Exam   healthy, alert and no distress  PSYCH: Alert and oriented times 3; coherent speech, normal   rate and volume, able to " articulate logical thoughts, able   to abstract reason, no tangential thoughts, no hallucinations   or delusions  Her affect is normal  RESP: No cough, no audible wheezing, able to talk in full sentences  Remainder of exam unable to be completed due to telephone visits                Phone call duration: 5 minutes

## 2021-06-11 ENCOUNTER — OFFICE VISIT (OUTPATIENT)
Dept: FAMILY MEDICINE | Facility: CLINIC | Age: 66
End: 2021-06-11
Payer: MEDICARE

## 2021-06-11 VITALS
HEIGHT: 65 IN | DIASTOLIC BLOOD PRESSURE: 74 MMHG | WEIGHT: 135 LBS | OXYGEN SATURATION: 98 % | RESPIRATION RATE: 16 BRPM | HEART RATE: 78 BPM | SYSTOLIC BLOOD PRESSURE: 124 MMHG | BODY MASS INDEX: 22.49 KG/M2 | TEMPERATURE: 98.4 F

## 2021-06-11 DIAGNOSIS — B35.1 ONYCHOMYCOSIS: Primary | ICD-10-CM

## 2021-06-11 PROCEDURE — 99213 OFFICE O/P EST LOW 20 MIN: CPT | Performed by: PHYSICIAN ASSISTANT

## 2021-06-11 RX ORDER — TERBINAFINE HYDROCHLORIDE 250 MG/1
250 TABLET ORAL DAILY
Qty: 90 TABLET | Refills: 0 | Status: CANCELLED | OUTPATIENT
Start: 2021-06-11 | End: 2021-09-09

## 2021-06-11 ASSESSMENT — ANXIETY QUESTIONNAIRES
3. WORRYING TOO MUCH ABOUT DIFFERENT THINGS: SEVERAL DAYS
7. FEELING AFRAID AS IF SOMETHING AWFUL MIGHT HAPPEN: NOT AT ALL
6. BECOMING EASILY ANNOYED OR IRRITABLE: NEARLY EVERY DAY
IF YOU CHECKED OFF ANY PROBLEMS ON THIS QUESTIONNAIRE, HOW DIFFICULT HAVE THESE PROBLEMS MADE IT FOR YOU TO DO YOUR WORK, TAKE CARE OF THINGS AT HOME, OR GET ALONG WITH OTHER PEOPLE: SOMEWHAT DIFFICULT
1. FEELING NERVOUS, ANXIOUS, OR ON EDGE: NEARLY EVERY DAY
5. BEING SO RESTLESS THAT IT IS HARD TO SIT STILL: NEARLY EVERY DAY
GAD7 TOTAL SCORE: 14
2. NOT BEING ABLE TO STOP OR CONTROL WORRYING: SEVERAL DAYS

## 2021-06-11 ASSESSMENT — PAIN SCALES - GENERAL: PAINLEVEL: NO PAIN (0)

## 2021-06-11 ASSESSMENT — MIFFLIN-ST. JEOR: SCORE: 1153.24

## 2021-06-11 ASSESSMENT — PATIENT HEALTH QUESTIONNAIRE - PHQ9: 5. POOR APPETITE OR OVEREATING: NEARLY EVERY DAY

## 2021-06-11 NOTE — PROGRESS NOTES
"    Assessment & Plan     Onychomycosis  Discussed options. She would like to follow-up with podiatry before deciding about medication. Referral placed.  - Orthopedic & Spine  Referral; Future    Return in about 2 months (around 8/11/2021) for Preventive Physical Exam.    HEIDY Pinedo [facility] ALEX Manuel is a 65 year old who presents for the following health issues     HPI     DiscussToenail fungus    Ongoing thickened toenails for the past year at least. She was seen for this in [place] and prescribed a topical medication that she has been using without improvement. Would like to know other options. No toe pain. Otherwise feeling well. No other concerns at this time.    Review of Systems   Constitutional, HEENT, cardiovascular, pulmonary, gi and gu systems are negative, except as otherwise noted.      Objective    /74   Pulse 78   Temp 98.4  F (36.9  C) (Oral)   Resp 16   Ht 1.651 m (5' 5\")   Wt 61.2 kg (135 lb)   LMP  (LMP Unknown)   SpO2 98%   BMI 22.47 kg/m    Body mass index is 22.47 kg/m .  Physical Exam   GENERAL: healthy, alert and no distress  RESP: lungs clear to auscultation - no rales, rhonchi or wheezes  CV: regular rate and rhythm, normal S1 S2, no S3 or S4, no murmur, click or rub, no peripheral edema and peripheral pulses strong  NEURO: Normal strength and tone, mentation intact and speech normal  PSYCH: mentation appears normal, affect normal/bright  Foot exam: normal DP and PT pulses, no trophic changes or ulcerative lesions, and nail exam shows several thickened toenails  "

## 2021-06-12 ASSESSMENT — ANXIETY QUESTIONNAIRES: GAD7 TOTAL SCORE: 14

## 2021-07-06 ENCOUNTER — VIRTUAL VISIT (OUTPATIENT)
Dept: PSYCHOLOGY | Facility: CLINIC | Age: 66
End: 2021-07-06
Attending: NURSE PRACTITIONER
Payer: MEDICARE

## 2021-07-06 DIAGNOSIS — F41.1 GENERALIZED ANXIETY DISORDER: Primary | ICD-10-CM

## 2021-07-06 PROCEDURE — 90834 PSYTX W PT 45 MINUTES: CPT | Mod: 95 | Performed by: PSYCHOLOGIST

## 2021-07-13 ENCOUNTER — VIRTUAL VISIT (OUTPATIENT)
Dept: PSYCHOLOGY | Facility: CLINIC | Age: 66
End: 2021-07-13
Payer: MEDICARE

## 2021-07-13 DIAGNOSIS — F90.0 ADHD (ATTENTION DEFICIT HYPERACTIVITY DISORDER), INATTENTIVE TYPE: Primary | ICD-10-CM

## 2021-07-13 PROCEDURE — 90791 PSYCH DIAGNOSTIC EVALUATION: CPT | Mod: 95 | Performed by: PSYCHOLOGIST

## 2021-07-26 ASSESSMENT — COLUMBIA-SUICIDE SEVERITY RATING SCALE - C-SSRS
ATTEMPT LIFETIME: NO
ATTEMPT PAST THREE MONTHS: NO
1. IN THE PAST MONTH, HAVE YOU WISHED YOU WERE DEAD OR WISHED YOU COULD GO TO SLEEP AND NOT WAKE UP?: NO
2. HAVE YOU ACTUALLY HAD ANY THOUGHTS OF KILLING YOURSELF?: NO
1. IN THE PAST MONTH, HAVE YOU WISHED YOU WERE DEAD OR WISHED YOU COULD GO TO SLEEP AND NOT WAKE UP?: NO
2. HAVE YOU ACTUALLY HAD ANY THOUGHTS OF KILLING YOURSELF LIFETIME?: NO

## 2021-07-26 NOTE — PROGRESS NOTES
Progress Note - Initial Visit  Disclaimer: Voice recognition software was used to generate this note. As a result, wrong word or 'sound-a-like' substitutions may have occurred due to the inherent limitations of voice recognition software. There may be errors in the script that have gone undetected. Please consider this when interpreting information found in this chart.     Client Name:  Mar Zeng Date: 2021         Service Type: Individual     Visit Start Time: 1:00 PM  visit End Time: 1:45 PM    Visit #: 1    Attendees: Client attended alone    Service Modality:  Video Visit:      Provider verified identity through the following two step process.  Patient provided:  Patient     Telemedicine Visit: The patient's condition can be safely assessed and treated via synchronous audio and visual telemedicine encounter.      Reason for Telemedicine Visit: Services only offered telehealth    Originating Site (Patient Location): Patient's home    Distant Site (Provider Location): Provider Remote Setting- Home Office    Consent:  The patient/guardian has verbally consented to: the potential risks and benefits of telemedicine (video visit) versus in person care; bill my insurance or make self-payment for services provided; and responsibility for payment of non-covered services.     Patient would like the video invitation sent by:  My Chart    Mode of Communication:  Video Conference via well    As the provider I attest to compliance with applicable laws and regulations related to telemedicine.       DATA:   Interactive Complexity: No   Crisis: No     Presenting Concerns/  Current Stressors:   Client presents for session 1 of ADHD assessment.  She notes her children and friends are complaining that she is driving him crazy.  She has difficulty sitting still, difficulty focusing, starting multiple projects and taking forever to finish the ones that she does.  Things have gotten really bad in the  last few months but others have been noting for several years.  Has to graduate, one quarter college.  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  Currently she babysits a lot.  Describes herself as an average student did well in typing gym and physical education.  Recalls a number of potential talks both in school and on the job.  She has trouble finding things.  And has compensated for this by making her house very neat and constantly rearranging things.  Has difficulty remembering where she puts stuff if she does not.  She is not aware of any family history and parents are .  Recalls that her family wanted to put her on medications when she was in school but she declined.  Notes one granddaughter on medication for ADHD and reporting good results.  Finds that she overcompensates for her difficulties, very hard worker, never late on deadlines.   after 26 years of marriage 3 children.  She is not sure of any symptoms in her children.  She believes her attention problems did play a role in the dissolution of her marriage.  No personal or family history of drug abuse.  She reports she has been on and off Wellbutrin for many years since her divorce she occasionally awakens in the middle of the night with a panic attack; symptoms include palpitation, akathisia which may last 1 to 2 hours.  She reports no panic attacks for several years.  She was a second of 3 children growing up in [facility] [place] [facility], describes a good childhood denies any risky or self-injurious behavior.      ASSESSMENT:  Mental Status Assessment:  Appearance:   Appropriate   Eye Contact:   Good   Psychomotor Behavior: Restless   Attitude:   Cooperative  Interested  Orientation:   All  Speech   Rate / Production: Normal/ Responsive   Volume:  Normal   Mood:    Normal  Affect:    Appropriate   Thought  Content:  Clear   Thought Form:  Coherent   Insight:    Fair       Safety Issues and Plan for Safety and Risk Management:     [place] Suicide Severity Rating Scale (Lifetime/Recent)  [place] Suicide Severity Rating (Lifetime/Recent) 7/26/2021   1. Wish to be Dead (Lifetime) No   1. Wish to be Dead (Recent) No   2. Non-Specific Active Suicidal Thoughts (Lifetime) No   2. Non-Specific Active Suicidal Thoughts (Recent) No   Actual Attempt (Lifetime) No   Actual Attempt (Past 3 Months) No   Has subject engaged in non-suicidal self-injurious behavior? (Lifetime) No   Has subject engaged in non-suicidal self-injurious behavior? (Past 3 Months) No     Patient denies current fears or concerns for personal safety.  Patient denies current or recent suicidal ideation or behaviors.  Patient denies current or recent homicidal ideation or behaviors.  Patient denies current or recent self injurious behavior or ideation.  Patient denies other safety concerns.  Recommended that patient call 911 or go to the local ED should there be a change in any of these risk factors.  Patient reports there are no firearms in the house.     Diagnostic Criteria:  A. Excessive anxiety and worry about a number of events or activities (such as work or school performance).   B. The person finds it difficult to control the worry.  C. Select 3 or more symptoms (required for diagnosis). Only one item is required in children.   - Restlessness or feeling keyed up or on edge.    - Difficulty concentrating or mind going blank.    - Muscle tension.       DSM5 Diagnoses: (Sustained by DSM5 Criteria Listed Above)  Diagnoses: 300.02 (F41.1) Generalized Anxiety Disorder  Psychosocial & Contextual Factors: Rule out ADHD inattentive  WHODAS 2.0 (12 item): No flowsheet data found.  Intervention:   Educated on treatment planning and started identifying goals and interventions for treatment plan  Collateral Reports Completed:  Not Applicable      PLAN: (Homework,  other):  1. Provider will continue Diagnostic Assessment.  Client will complete ADHD rating scales.  Client has been having difficulty with computer so these will be mailed.  Fabio Mark  July 26, 2021

## 2021-08-04 NOTE — PROGRESS NOTES
Progress Note - Initial Visit  Disclaimer: Voice recognition software was used to generate this note. As a result, wrong word or 'sound-a-like' substitutions may have occurred due to the inherent limitations of voice recognition software. There may be errors in the script that have gone undetected. Please consider this when interpreting information found in this chart.     Client Name:  Mar Zeng Date: 2021         Service Type: Individual     Visit Start Time: 5:00 PM  visit End Time: 5:45 PM    Visit #: 2    Attendees: Client attended alone    Service Modality:  Video Visit:      Provider verified identity through the following two step process.  Patient provided:  Patient     Telemedicine Visit: The patient's condition can be safely assessed and treated via synchronous audio and visual telemedicine encounter.      Reason for Telemedicine Visit: Services only offered telehealth    Originating Site (Patient Location): Patient's home    Distant Site (Provider Location): Provider Remote Setting- Home Office    Consent:  The patient/guardian has verbally consented to: the potential risks and benefits of telemedicine (video visit) versus in person care; bill my insurance or make self-payment for services provided; and responsibility for payment of non-covered services.     Patient would like the video invitation sent by:  My Chart    Mode of Communication:  Video Conference via Amwell    As the provider I attest to compliance with applicable laws and regulations related to telemedicine.       DATA:   Interactive Complexity: No   Crisis: No     Presenting Concerns/  Current Stressors:   Completed ADHD diagnostic assmt. See [facility] extended documentation.      ASSESSMENT:  Mental Status Assessment:  Appearance:   Appropriate   Eye Contact:   Good   Psychomotor Behavior: Restless   Attitude:   Cooperative  Interested  Orientation:   All  Speech   Rate / Production: Normal/  Responsive   Volume:  Normal   Mood:    Normal  Affect:    Appropriate   Thought Content:  Clear   Thought Form:  Coherent   Insight:    Fair       Safety Issues and Plan for Safety and Risk Management:     [place] Suicide Severity Rating Scale (Lifetime/Recent)  [place] Suicide Severity Rating (Lifetime/Recent) 7/26/2021   1. Wish to be Dead (Lifetime) No   1. Wish to be Dead (Recent) No   2. Non-Specific Active Suicidal Thoughts (Lifetime) No   2. Non-Specific Active Suicidal Thoughts (Recent) No   Actual Attempt (Lifetime) No   Actual Attempt (Past 3 Months) No   Has subject engaged in non-suicidal self-injurious behavior? (Lifetime) No   Has subject engaged in non-suicidal self-injurious behavior? (Past 3 Months) No     Patient denies current fears or concerns for personal safety.  Patient denies current or recent suicidal ideation or behaviors.  Patient denies current or recent homicidal ideation or behaviors.  Patient denies current or recent self injurious behavior or ideation.  Patient denies other safety concerns.  Recommended that patient call 911 or go to the local ED should there be a change in any of these risk factors.  Patient reports there are no firearms in the house.     Diagnostic Criteria:  A. Excessive anxiety and worry about a number of events or activities (such as work or school performance).   B. The person finds it difficult to control the worry.  C. Select 3 or more symptoms (required for diagnosis). Only one item is required in children.   - Restlessness or feeling keyed up or on edge.    - Difficulty concentrating or mind going blank.    - Muscle tension.       DSM5 Diagnoses: (Sustained by DSM5 Criteria Listed Above)  Diagnoses: 300.02 (F41.1) Generalized Anxiety Disorder  Psychosocial & Contextual Factors: Rule out ADHD inattentive  WHODAS 2.0 (12 item): No flowsheet data found.  Intervention:   Educated on treatment planning and started identifying goals and interventions for  treatment plan  Collateral Reports Completed:  Not Applicable      PLAN: (Homework, other):   Client will complete ADHD rating scales.  Client has been having difficulty with computer so these will be mailed.  Fabio Mark  July 26, 2021

## 2021-08-20 ENCOUNTER — ANCILLARY PROCEDURE (OUTPATIENT)
Dept: MAMMOGRAPHY | Facility: CLINIC | Age: 66
End: 2021-08-20
Attending: NURSE PRACTITIONER
Payer: MEDICARE

## 2021-08-20 DIAGNOSIS — Z12.31 VISIT FOR SCREENING MAMMOGRAM: ICD-10-CM

## 2021-08-20 PROCEDURE — 77063 BREAST TOMOSYNTHESIS BI: CPT | Mod: TC | Performed by: RADIOLOGY

## 2021-08-20 PROCEDURE — 77067 SCR MAMMO BI INCL CAD: CPT | Mod: TC | Performed by: RADIOLOGY

## 2021-08-23 ENCOUNTER — VIRTUAL VISIT (OUTPATIENT)
Dept: PSYCHOLOGY | Facility: CLINIC | Age: 66
End: 2021-08-23
Payer: MEDICARE

## 2021-08-23 DIAGNOSIS — F90.2 ADHD (ATTENTION DEFICIT HYPERACTIVITY DISORDER), COMBINED TYPE: Primary | ICD-10-CM

## 2021-08-23 PROCEDURE — 90834 PSYTX W PT 45 MINUTES: CPT | Mod: 95 | Performed by: PSYCHOLOGIST

## 2021-08-23 PROCEDURE — 96130 PSYCL TST EVAL PHYS/QHP 1ST: CPT | Mod: 95 | Performed by: PSYCHOLOGIST

## 2021-08-23 PROCEDURE — 96131 PSYCL TST EVAL PHYS/QHP EA: CPT | Mod: 95 | Performed by: PSYCHOLOGIST

## 2021-08-27 ENCOUNTER — VIRTUAL VISIT (OUTPATIENT)
Dept: FAMILY MEDICINE | Facility: CLINIC | Age: 66
End: 2021-08-27
Payer: MEDICARE

## 2021-08-27 DIAGNOSIS — R41.840 CONCENTRATION DEFICIT: Primary | ICD-10-CM

## 2021-08-27 PROCEDURE — 99441 PR PHYSICIAN TELEPHONE EVALUATION 5-10 MIN: CPT | Performed by: NURSE PRACTITIONER

## 2021-08-27 RX ORDER — TIMOLOL MALEATE 5 MG/ML
1 SOLUTION/ DROPS OPHTHALMIC
COMMUNITY
Start: 2021-01-19

## 2021-08-27 RX ORDER — AMOXICILLIN 500 MG/1
CAPSULE ORAL
COMMUNITY
Start: 2021-01-06 | End: 2023-09-29

## 2021-08-27 RX ORDER — DOXYCYCLINE 100 MG/1
TABLET ORAL
COMMUNITY
Start: 2021-01-06 | End: 2022-02-08

## 2021-08-27 RX ORDER — HYDROCORTISONE 2.5 %
CREAM (GRAM) TOPICAL
COMMUNITY
Start: 2021-01-06 | End: 2022-11-10

## 2021-08-27 RX ORDER — FLUOROURACIL 50 MG/G
CREAM TOPICAL
COMMUNITY
Start: 2021-03-05

## 2021-08-27 ASSESSMENT — PAIN SCALES - GENERAL: PAINLEVEL: NO PAIN (0)

## 2021-08-27 NOTE — PROGRESS NOTES
"Mar is a 65 year old who is being evaluated via a billable telephone visit.      What phone number would you like to be contacted at? ***  How would you like to obtain your AVS? {AVS Preference:180581}    {PROVIDER CHARTING PREFERENCE:031117}    Subjective   Mar is a 65 year old who presents for the following health issues {ACCOMPANIED BY STATEMENT (Optional):130669}    HPI     Concern - Patient would like to discuss ADHD report.  Onset: ***  Description: ***  Intensity: {.:057327}  Progression of Symptoms:  {.:500840}  Accompanying Signs & Symptoms: ***  Previous history of similar problem: ***  Precipitating factors:        Worsened by: ***  Alleviating factors:        Improved by: ***  Therapies tried and outcome: {NONE DEFAULTED:822009::\" none \"}    {additonal problems for provider to add (Optional):053091}    Review of Systems   {ROS COMP (Optional):043090}      Objective           Vitals:  No vitals were obtained today due to virtual visit.    Physical Exam   {GENERAL APPEARANCE:50::\"healthy\",\"alert\",\"no distress\"}  PSYCH: Alert and oriented times 3; coherent speech, normal   rate and volume, able to articulate logical thoughts, able   to abstract reason, no tangential thoughts, no hallucinations   or delusions  Her affect is { :8164472::\"normal\"}  RESP: No cough, no audible wheezing, able to talk in full sentences  Remainder of exam unable to be completed due to telephone visits    {Diagnostic Test Results (Optional):280481}    {AMBULATORY ATTESTATION (Optional):246948}        Phone call duration: *** minutes  "

## 2021-08-27 NOTE — PROGRESS NOTES
Mar is a 65 year old who is being evaluated via a billable telephone visit.      What phone number would you like to be contacted at? [phone]  How would you like to obtain your AVS? Zeeharsilvia    Assessment & Plan     Concentration deficit  Wait for report.  We will then start treatment with stimulant and follow up 3 weeks after starting.  Pt agrees with plan and verbalized understanding.    No follow-ups on file.    Gail Crespo, DUKE FALL  [facility]   Mar is a 65 year old who presents for the following health issues     HPI     Patient would like to discuss ADHD report.  Patient stated that she doesn't want the Covid 19 immunization at this moment.    Recently completed adhd testing.  Talked with psychologist.  They are writing the report currently but she reports dx of adhd.  She is interested in treating with medication.  Continues wellbutrin for anxiety.  Mood is stable.  Sleep is a chronic issue.      Review of Systems   Constitutional, HEENT, cardiovascular, pulmonary, gi and gu systems are negative, except as otherwise noted.      Objective           Vitals:  No vitals were obtained today due to virtual visit.    Physical Exam   healthy, alert and no distress  PSYCH: Alert and oriented times 3; coherent speech, normal   rate and volume, able to articulate logical thoughts, able   to abstract reason, no tangential thoughts, no hallucinations   or delusions  Her affect is normal  RESP: No cough, no audible wheezing, able to talk in full sentences  Remainder of exam unable to be completed due to telephone visits                Phone call duration: 5 minutes

## 2021-08-30 ENCOUNTER — FCC EXTENDED DOCUMENTATION (OUTPATIENT)
Dept: PSYCHOLOGY | Facility: CLINIC | Age: 66
End: 2021-08-30

## 2021-08-30 NOTE — PROGRESS NOTES
[facility]  ADHD Evaluation    This note consists of symbols derived from keyboarding, dictation and/or voice recognition software. As a result, there may be errors in the script that have gone undetected. Please consider this when interpreting information found in this chart.     Patient: Mar Zeng  YOB: 1955  MRN: [id number]  Date(s) of assessment:  Diagnostic Assessment 7/6/2021 and 7/13/2021, Allyssa self-report and collateral measures scored and interpreted 8/23/2021 and Feedback session 8/23/2021    Information about appointment:  Client attended three sessions to aid in determining client's mental health diagnosis or diagnoses and treatment recommendations that best address client concerns. Client records includingmedical were reviewed. A diagnostic assessment was conducted at the initial appointment. Client completed several rating scales to assist in assessing attention-related and other mental health symptoms that may be causing impairments in functioning. Rating scales were also completed by a collateral contact.    Assessment tools:      Allyssa Adult ADHD Rating Scale-IV: Self and Other Reports (BAARS-IV), Allyssa Functional Impairment Scale: Self and Other Reports (BFIS), Allyssa Deficits in Executive Functioning Scale: Self and Other Reports (BDEFS), Patient Health Questionnaire-9 (PHQ-9) and Generalized Anxiety Disorder-7 (TARIQ-7)    Assessment Results:    Behavioral Observations:  She notes her children and friends are complaining that she is driving him crazy.  She has difficulty sitting still, difficulty focusing, starting multiple projects and taking forever to finish the ones that she does.  Things have gotten really bad in the last few months but others have been noting for several years.  Has to graduate, one quarter college.  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  Currently she babysits a lot.  Describes herself as an average student did well in typing gym and physical education.  Recalls a number of potential talks both in school and on the job.  She has trouble finding things.  And has compensated for this by making her house very neat and constantly rearranging things.  Has difficulty remembering where she puts stuff if she does not.  She is not aware of any family history and parents are .  Recalls that her family wanted to put her on medications when she was in school but she declined.  Notes one granddaughter on medication for ADHD and reporting good results.  Finds that she overcompensates for her difficulties, very hard worker, never late on deadlines.   after 26 years of marriage 3 children.  She is not sure of any symptoms in her children.  She believes her attention problems did play a role in the dissolution of her marriage.  No personal or family history of drug abuse.  She reports she has been on and off Wellbutrin for many years since her divorce she occasionally awakens in the middle of the night with a panic attack; symptoms include palpitation, akathisia which may last 1 to 2 hours.  She reports no panic attacks for several years.  She was a second of 3 children growing up in [facility], describes a good childhood denies any risky or self-injurious behavior.       Allyssa Adult ADHD Rating Scale-IV: Self and Other Reports (BAARS-IV)  The BAARS-IV assesses for symptoms of ADHD that are experienced in one's daily life. This assessment measure includes self and collateral rating scales designed to provide information regarding current and childhood symptoms of ADHD including inattention, hyperactivity, and impulsivity. Self-report scores are reported as percentiles. Scores at the 76th-83rd percentile are considered marginal, scores at the 84th-92nd percentile are considered borderline,  "scores at the 93rd-95th percentile are considered mild, scores at the 96th-98th percentile are considered moderate, and those at the 99th percentile are considered severe. Collateral or \"other\" rating scales are reported as number of symptoms observed in comparison to those reported by the client. Norms and percentile scores are not available for collateral reports.     Current Symptoms Scale--Self Report:   Client completed the self-report inventory of current symptoms.   BAARS-IV SR Raw Score %tile     %tile   Inattention Total  30 99  Symptom Count 8 99   Hyperactivity Total 20 99  Symptom Count 5    Impulsivity Total 13 99  Symptom Count 4 99   Sluggish Cog Tempo 28 99  Symptom Count 7 99   Total ADHD Score 63 99  Total ADHD SX Count 17 99           Client indicated that the reported symptoms have resulted in impaired functioning in school, home, work and social relationships. Overall, the results suggest the client is experiencing Severe ADHD symptoms.     Current Symptoms Scale--Other Report:  Client's Daughter completed the collateral report inventory of current symptoms.     BAARS-IV OR  Raw Score     Inattention Total  23 Symptom Count 4   Hyperactivity Total 18 Symptom Count 4   Impulsivity Total 14 Symptom Count 3   Sluggish Cog Tempo 21 Symptom Count 4   Total ADHD Score 55 Total ADHD SX Count 11         The collateral contact indicated the client demonstrates impaired functioning in home and social relationships} The collateral- and self-report scores are significantly different. The collateral contact's scores were generally lower than the client's report.    Childhood Symptoms Scale--Self-Report:  Client completed the self-report inventory of childhood symptoms.   BAARS-IV Child SR Raw Score %tile     %tile   Inattention Total  29 99  Symptom Count 7 99   Impulsivity Total 33 99  Symptom Count 9 99   Total Score 62 99        Symptom Count 16 99              Client indicated that the reported symptoms " "resulted in impaired functioning in school, home, work and social relationships Overall, the results suggest the client experienced  Severe symptoms of ADHD as a child.     Childhood Symptoms Scale--Other Report:  Unfortunately due to client's age and family situation childhood collateral information was not available.                          Allyssa Functional Impairment Scale: Self and Other Reports (BFIS)  The BFIS is used to assess an individuals' psychosocial impairment in major life/daily activities that may be due to a mental health disorder. This assessment measure includes self and collateral rating scales. Self-report scores are reported as percentiles. Scores at the 76th-83rd percentile are considered marginal, scores at the 84th-92nd percentile are considered borderline, scores at the 93rd-95th percentile are considered mild, scores at the 96th-98th percentile are considered moderate, and those at the 99th percentile are considered severe.Collateral or \"other\" rating scales are reported as number of symptoms observed in comparison to those reported by the client. Norms and percentile scores are not available for collateral reports.     Results indicate the client identified impairment (scores at or greater than 93rd percentile) in the following areas: home-family, home-chores, social-friends and daily responsibilities The client's Mean Impairment Score was 2.90 (84th percentile) indicating the client is reporting Borderline impairment in functioning across domains. Client's daughter completed the collateral rating scale, which indicated discrepant results. The collateral contact's scores were generally higher than the client's report.     Allyssa Deficits in Executive Functioning Scale (BDEFS)  The BDEFS is a measure used for evaluating dimensions of adult executive functioning in daily life.This assessment measure includes self and collateral rating scales. Self-report scores are reported as " "percentiles. Scores at the 76th-83rd percentile are considered marginal, scores at the 84th-92nd percentile are considered borderline, scores at the 93rd-95th percentile are considered mild, scores at the 96th-98th percentile are considered moderate, and those at the 99th percentile are considered severe.Collateral or \"other\" rating scales are reported as number of symptoms observed in comparison to those reported by the client. Norms and percentile scores are not available for collateral reports.     BDEFS-LF SR  Raw Score %tile   Time Management Section 1 Total  53 99   Org/problem solving Section 2 Total 77 99   Self Restraint Section 3 Total 57 99   Self Motivation Section 4 Total 19 92   Emotional regulation Section 5 Total 39 99   Total EF Summary Score 245 99   EF Symptom Count 55    ADHD-EF Index Score 24 97         These scores suggest the client has Severe deficits in executive functioning. These deficits are likely to be due to ADHD.     Client's daughter completed the collateral rating scale, which indicated similar results. The collateral contact's scores were generally lower than the client's report.    Patient Health Questionnaire- 9 (PHQ-9)   This questionnaire is designed to assess for depression in adults.  Based on the score, she is experiencing moderate symptoms of depression. Client identified the following symptoms of depression: difficulty with sleep, poor appetite or overeating, feeling bad about self, poor concentration and restlessness or lethargy.       Generalized Anxiety Disorder Questionnaire (TARIQ-7)  This questionnaire is designed to assess for anxiety in adults.  Based on the score, she is experiencing moderate symptoms of anxiety. Client identified the following symptoms of anxiety: feeling on edge/nervous/anxious, difficulty controlling worry, worrying about many different things, trouble relaxing, being restless and becoming easily annoyed or irritable      Summary (based on " clinical interview, review of records, test results):    Current Symptoms:  Inattention:Severe   Hyperactivity:Severe   Childhood Symptoms:Severe   Functional impairment 4/10 Domains. Severity, Borderline  Deficits in Executive Functioning: Severe   Depression: moderate   Anxiety: moderate     ADHD is a neurodevelopmental disorder. As such, to qualify for a diagnosis of ADHD an individual must show some symptoms of the disorder before the age of 12; these symptoms must currently be present to a degree that is inconsistent with their developmental level; the symptoms must negatively impact the individual;  they must be present for more than six months;and  they must occur in multiple areas of the individuals life (e.g. Home and school).     Client would appear to meet criteria for ADHD combined type presentation.  This is the most common in adults.  Her general modest impairment in functional activities is likely the result of 65 years spent trying to manage this disorder on her own.  She shows occupations and educational levels that fit with her personality and style and this is very common for individuals of this age.  Also as a female who graduated high school before 1980 the fact that it was recommended she go on medications as a child is significant.      DSM5 Diagnoses: (Sustained by DSM5 Criteria Listed Above)  Diagnoses: Attention-Deficit/Hyperactivity Disorder  314.01 (F90.2) Combined presentation;   Psychosocial & Contextual Factors: Associated depression and anxiety  WHODAS 2.0 (12 item) Raw Score: See FCC extended documentation      Recommendations:    Schedule an appointment with your physician to discuss a medication evaluation., Access resources through websites, books, and articles such as those provided  in the handout., Consider working with an ADHD  or individual therapist to learn skills to  assist with symptom management, as well as ways to improve relationships,  etc that may have been  impacted by your symptoms. , Consider attending workshops or support groups through [employer] ([email or web address]). and Schedule a follow-up appointment with me in about six weeks to review symptoms, treatment involvement, and struggles and/or successes.    Fabio Mark

## 2021-08-30 NOTE — PROGRESS NOTES
Progress Note  Disclaimer: Voice recognition software was used to generate this note. As a result, wrong word or 'sound-a-like' substitutions may have occurred due to the inherent limitations of voice recognition software. There may be errors in the script that have gone undetected. Please consider this when interpreting information found in this chart.       Client Name: Mar Zeng     Date: 8/23/2021      Service Type: Individual      Session Start Time: 4:00 PM  session End Time: 4:45 PM     Session Length: 45    Session #: 3    Attendees: Client attended alone    Service Modality:  Video Visit:      Provider verified identity through the following two step process.  Patient provided:  Patient photo    Telemedicine Visit: The patient's condition can be safely assessed and treated via synchronous audio and visual telemedicine encounter.      Reason for Telemedicine Visit: Services only offered telehealth    Originating Site (Patient Location): Patient's other Boat    Distant Site (Provider Location): Provider Remote Setting- Home Office    Consent:  The patient/guardian has verbally consented to: the potential risks and benefits of telemedicine (video visit) versus in person care; bill my insurance or make self-payment for services provided; and responsibility for payment of non-covered services.     Patient would like the video invitation sent by:  My Chart    Mode of Communication:  Video Conference via [facility]    As the provider I attest to compliance with applicable laws and regulations related to telemedicine.     Treatment Plan Last Reviewed: 8/23/2021  PHQ-9 / TARIQ-7 : See flow sheets    DATA  Interactive Complexity: No  Crisis: No            DATA   ADHD Assessment feedback session. Reviewed results of testing. See [facility] extended documentation for results. Explained diagnosis and treatment options. Recommended medication evaluation be scheduled with PCP.  Also provided and reviewed ADHD patient handout. Pt will schedule follow-up with me after seeing PCP.     Progress Since Last Session (Related to Symptoms / Goals / Homework):   Symptoms: Improving Starting to make some changes    Homework: Achieved / completed to satisfaction      Episode of Care Goals: Achieved / completed to satisfaction - ACTION (Actively working towards change); Intervened by reinforcing change plan / affirming steps taken     Current / Ongoing Stressors and Concerns:   Difficulty with attention and concentration     Treatment Objective(s) Addressed in This Session:   Reviewed results of testing, provided ADHD Psychoeducation tips and resources, encouraged client to make appointment for medication evaluation.       Intervention:   psychoeducation regarding ADHD        ASSESSMENT: Current Emotional / Mental Status (status of significant symptoms):   Risk status (Self / Other harm or suicidal ideation)   Client denies current fears or concerns for personal safety.   Client denies current or recent suicidal ideation or behaviors.   Client denies current or recent homicidal ideation or behaviors.   Client denies current or recent self injurious behavior or ideation.   Client denies other safety concerns.   Recommended that patient call 911 or go to the local ED should there be a change in any of these risk factors.     Appearance:   Appropriate    Eye Contact:   Good    Psychomotor Behavior: Normal    Attitude:   Cooperative    Orientation:   All   Speech    Rate / Production: Normal     Volume:  Normal    Mood:    Normal   Affect:    Appropriate    Thought Content:  Clear    Thought Form:  Coherent  Logical    Insight:    Good      Medication Review:   No changes to current psychiatric medication(s)     Medication Compliance:   Yes     Changes in Health Issues:   None reported     Chemical Use Review:   Substance Use: Chemical use reviewed, no active concerns identified      Tobacco Use: No  current tobacco use.       Collateral Reports Completed:   Not Applicable    PLAN: (Client Tasks / Therapist Tasks / Other)  See [facility] extended documentation for testing report.        Fabio Mark    Psychological Testing   Billing/Services Summary       Initial interview/Record Review 7/6/2021 08752    Intake 7/13/2021 56596    Interactive feedback Session 8/23/2021 97588    Testing Evaluation Services Base: 48310  (1st 60 mins) Add-on: 51058  (each addtl 60 mins)   Test Scoring and Interpretation  (Start/Stop), (Date, Day #) 60 minutes   Integration/Report Generation   (Start/Stop), (Date, Day #) 60 minutes   Clinical Decision Making/Battery Modification   (Start/Stop), (Date, Day #) 0 minutes   Post-Service Work   (Start/Stop), (Date, Day #) 0 minutes   Total Time: 120 minutes (2 hours, 00 minutes)   Total Units:              Diagnosis(es): (ICD-10)  F 90.2

## 2021-10-09 DIAGNOSIS — F41.1 GENERALIZED ANXIETY DISORDER: ICD-10-CM

## 2021-10-11 RX ORDER — BUPROPION HYDROCHLORIDE 150 MG/1
TABLET ORAL
Qty: 90 TABLET | Refills: 1 | Status: SHIPPED | OUTPATIENT
Start: 2021-10-11 | End: 2022-03-18

## 2021-10-23 ENCOUNTER — HEALTH MAINTENANCE LETTER (OUTPATIENT)
Age: 66
End: 2021-10-23

## 2021-10-26 ENCOUNTER — VIRTUAL VISIT (OUTPATIENT)
Dept: FAMILY MEDICINE | Facility: CLINIC | Age: 66
End: 2021-10-26
Payer: MEDICARE

## 2021-10-26 DIAGNOSIS — F90.2 ATTENTION DEFICIT HYPERACTIVITY DISORDER (ADHD), COMBINED TYPE: Primary | ICD-10-CM

## 2021-10-26 PROCEDURE — 99441 PR PHYSICIAN TELEPHONE EVALUATION 5-10 MIN: CPT | Mod: 95 | Performed by: NURSE PRACTITIONER

## 2021-10-26 RX ORDER — DEXTROAMPHETAMINE SACCHARATE, AMPHETAMINE ASPARTATE, DEXTROAMPHETAMINE SULFATE AND AMPHETAMINE SULFATE 5; 5; 5; 5 MG/1; MG/1; MG/1; MG/1
20 TABLET ORAL DAILY
Qty: 30 TABLET | Refills: 0 | Status: SHIPPED | OUTPATIENT
Start: 2021-10-26 | End: 2022-02-08

## 2021-10-26 NOTE — PROGRESS NOTES
"Mar is a 66 year old who is being evaluated via a billable telephone visit.      What phone number would you like to be contacted at? [phone]  How would you like to obtain your AVS? MyChart    Assessment & Plan     Attention deficit hyperactivity disorder (ADHD), combined type  No effects with 10mg once daily.  Increase to 20mg daily.  Recheck in 2 weeks.  - amphetamine-dextroamphetamine (ADDERALL) 20 MG tablet; Take 1 tablet (20 mg) by mouth daily                 Return in about 2 weeks (around 11/9/2021) for telephone visit, follow up.    Gail Crespo, DU[facility] CNP  [facility]    Marely Manuel is a 66 year old who presents for the following health issues     HPI     Medication Followup of \"all of them\"     Taking Medication as prescribed yes    Side Effects:  None    Medication Helping Symptoms:  NO     Med follow up.  No effects from 10mg of adderall.  No side effects.      Review of Systems   Constitutional, HEENT, cardiovascular, pulmonary, gi and gu systems are negative, except as otherwise noted.      Objective           Vitals:  No vitals were obtained today due to virtual visit.    Physical Exam   healthy, alert and no distress  PSYCH: Alert and oriented times 3; coherent speech, normal   rate and volume, able to articulate logical thoughts, able   to abstract reason, no tangential thoughts, no hallucinations   or delusions  Her affect is normal  RESP: No cough, no audible wheezing, able to talk in full sentences  Remainder of exam unable to be completed due to telephone visits                Phone call duration: 5 minutes  "

## 2021-11-02 ENCOUNTER — VIRTUAL VISIT (OUTPATIENT)
Dept: PSYCHOLOGY | Facility: CLINIC | Age: 66
End: 2021-11-02
Payer: MEDICARE

## 2021-11-02 DIAGNOSIS — F90.2 ADHD (ATTENTION DEFICIT HYPERACTIVITY DISORDER), COMBINED TYPE: Primary | ICD-10-CM

## 2021-11-02 PROCEDURE — 90834 PSYTX W PT 45 MINUTES: CPT | Mod: 95 | Performed by: PSYCHOLOGIST

## 2021-11-02 NOTE — PROGRESS NOTES
Progress Note  Disclaimer: Voice recognition software was used to generate this note. As a result, wrong word or 'sound-a-like' substitutions may have occurred due to the inherent limitations of voice recognition software. There may be errors in the script that have gone undetected. Please consider this when interpreting information found in this chart.       Client Name: Mar Zeng     Date: 8/23/2021      Service Type: Individual      Session Start Time: 3:00 PM  session End Time: 3:45 PM     Session Length: 45    Session #: 4    Attendees: Client attended alone    Service Modality:  Video Visit:      Provider verified identity through the following two step process.  Patient provided:  Patient photo    Telemedicine Visit: The patient's condition can be safely assessed and treated via synchronous audio and visual telemedicine encounter.      Reason for Telemedicine Visit: Services only offered telehealth    Originating Site (Patient Location): Patient's other Boat    Distant Site (Provider Location): Provider Remote Setting- Home Office    Consent:  The patient/guardian has verbally consented to: the potential risks and benefits of telemedicine (video visit) versus in person care; bill my insurance or make self-payment for services provided; and responsibility for payment of non-covered services.     Patient would like the video invitation sent by:  My Chart    Mode of Communication:  Video Conference via [facility]    As the provider I attest to compliance with applicable laws and regulations related to telemedicine.     Treatment Plan Last Reviewed: 8/23/2021  PHQ-9 / TARIQ-7 : See flow sheets    DATA  Interactive Complexity: No  Crisis: No            DATA   ADHD Assessment follow-up session. Client is walking and doing stationary bike, diet is good, low carb.      Progress Since Last Session (Related to Symptoms / Goals / Homework):   Symptoms: Improving  Starting to make some changes    Homework: Achieved / completed to satisfaction      Episode of Care Goals: Achieved / completed to satisfaction - ACTION (Actively working towards change); Intervened by reinforcing change plan / affirming steps taken     Current / Ongoing Stressors and Concerns:   Difficulty with attention and concentration     Treatment Objective(s) Addressed in This Session:     PCP upped adderall to 20 nothing yet. Wellbutrin 150 since 2006. Started for panic.   Has been traveling a lot with her kids. So not much done on ADHD resources.   Going away for Thanksgiving with kids. First vacation with everybody in a long time.        Intervention:   psychoeducation regarding ADHD   Talked about sleep hygeine.     ASSESSMENT: Current Emotional / Mental Status (status of significant symptoms):   Risk status (Self / Other harm or suicidal ideation)   Client denies current fears or concerns for personal safety.   Client denies current or recent suicidal ideation or behaviors.   Client denies current or recent homicidal ideation or behaviors.   Client denies current or recent self injurious behavior or ideation.   Client denies other safety concerns.   Recommended that patient call 911 or go to the local ED should there be a change in any of these risk factors.     Appearance:   Appropriate    Eye Contact:   Good    Psychomotor Behavior: Normal    Attitude:   Cooperative    Orientation:   All   Speech    Rate / Production: Normal     Volume:  Normal    Mood:    Normal   Affect:    Appropriate    Thought Content:  Clear    Thought Form:  Coherent  Logical    Insight:    Good      Medication Review:   No changes to current psychiatric medication(s)     Medication Compliance:   Yes     Changes in Health Issues:   None reported  Diagnosis:  1. ADHD (attention deficit hyperactivity disorder), combined type         Chemical Use Review:   Substance Use: Chemical use reviewed, no active concerns identified       Tobacco Use: No current tobacco use.       Collateral Reports Completed:   Not Applicable    PLAN: (Client Tasks / Therapist Tasks / Other)  See [facility] extended documentation for testing report.        Fabio Mark

## 2021-11-09 ENCOUNTER — VIRTUAL VISIT (OUTPATIENT)
Dept: FAMILY MEDICINE | Facility: CLINIC | Age: 66
End: 2021-11-09
Payer: MEDICARE

## 2021-11-09 DIAGNOSIS — F90.2 ATTENTION DEFICIT HYPERACTIVITY DISORDER (ADHD), COMBINED TYPE: Primary | ICD-10-CM

## 2021-11-09 PROCEDURE — 99441 PR PHYSICIAN TELEPHONE EVALUATION 5-10 MIN: CPT | Mod: 95 | Performed by: NURSE PRACTITIONER

## 2021-11-09 RX ORDER — DEXTROAMPHETAMINE SACCHARATE, AMPHETAMINE ASPARTATE, DEXTROAMPHETAMINE SULFATE AND AMPHETAMINE SULFATE 5; 5; 5; 5 MG/1; MG/1; MG/1; MG/1
20 TABLET ORAL 2 TIMES DAILY
Qty: 60 TABLET | Refills: 0 | Status: SHIPPED | OUTPATIENT
Start: 2021-11-09 | End: 2022-02-08

## 2021-11-09 NOTE — PROGRESS NOTES
Mar is a 66 year old who is being evaluated via a billable telephone visit.      What phone number would you like to be contacted at? [phone]  How would you like to obtain your AVS? Maycol    Assessment & Plan     Attention deficit hyperactivity disorder (ADHD), combined type  Some improvement with increased dose.  Plan to step up to 20mg in the am, 10mg in the afternoon.  If needed increase to 20mg BID after 1 week.  Follow up in 4 weeks.  - amphetamine-dextroamphetamine (ADDERALL) 20 MG tablet; Take 1 tablet (20 mg) by mouth 2 times daily        Return in about 4 weeks (around 12/7/2021) for telephone visit, adhd recheck.    Gail Crespo, [facility]    Marely Manuel is a 66 year old who presents for the following health issues     HPI     Medication Followup of Adderall    Taking Medication as prescribed: yes    Side Effects:  None    Medication Helping Symptoms:  NO     Medication Followup of Wellbutrin    Taking Medication as prescribed: yes    Side Effects:  None    Medication Helping Symptoms:  yes     Increased to 20mg once per day.  Noticed some improvement in irritability.  Daughter still thinks she doesn't notice a difference.  Pt feels it is more helpful earlier in the day.  Tolerating well.    Review of Systems   Constitutional, HEENT, cardiovascular, pulmonary, gi and gu systems are negative, except as otherwise noted.      Objective           Vitals:  No vitals were obtained today due to virtual visit.    Physical Exam   healthy, alert and no distress  PSYCH: Alert and oriented times 3; coherent speech, normal   rate and volume, able to articulate logical thoughts, able   to abstract reason, no tangential thoughts, no hallucinations   or delusions  Her affect is normal  RESP: No cough, no audible wheezing, able to talk in full sentences  Remainder of exam unable to be completed due to telephone visits                Phone call duration: 5 minutes

## 2021-12-06 ENCOUNTER — VIRTUAL VISIT (OUTPATIENT)
Dept: FAMILY MEDICINE | Facility: CLINIC | Age: 66
End: 2021-12-06
Payer: MEDICARE

## 2021-12-06 DIAGNOSIS — F90.2 ATTENTION DEFICIT HYPERACTIVITY DISORDER (ADHD), COMBINED TYPE: Primary | ICD-10-CM

## 2021-12-06 PROCEDURE — 99441 PR PHYSICIAN TELEPHONE EVALUATION 5-10 MIN: CPT | Mod: 95 | Performed by: NURSE PRACTITIONER

## 2021-12-06 NOTE — Clinical Note
Can we get this to the prior auth team.  She is not tolerating IR adderall but nothing else is covered.  Would like to know options.  LITO

## 2021-12-06 NOTE — PROGRESS NOTES
Mar is a 66 year old who is being evaluated via a billable telephone visit.      What phone number would you like to be contacted at?   How would you like to obtain your AVS? MyChart    Assessment & Plan     Attention deficit hyperactivity disorder (ADHD), combined type  Discussed options.  Not tolerating IR dosing.  Will work with PA team.          No follow-ups on file.    Gail Crespo, [facility] CNP  [facility] [place]    Subjective   Mar is a 66 year old who presents for the following health issues     HPI     Increased dose.  Difficulty with side effects.  Caused more sleep issues.  + headaches.      Review of Systems   Constitutional, HEENT, cardiovascular, pulmonary, gi and gu systems are negative, except as otherwise noted.      Objective           Vitals:  No vitals were obtained today due to virtual visit.    Physical Exam   healthy, alert and no distress  PSYCH: Alert and oriented times 3; coherent speech, normal   rate and volume, able to articulate logical thoughts, able   to abstract reason, no tangential thoughts, no hallucinations   or delusions  Her affect is normal  RESP: No cough, no audible wheezing, able to talk in full sentences  Remainder of exam unable to be completed due to telephone visits                Phone call duration: 5 minutes

## 2021-12-09 PROBLEM — F90.2 ATTENTION DEFICIT HYPERACTIVITY DISORDER (ADHD), COMBINED TYPE: Status: ACTIVE | Noted: 2021-12-09

## 2021-12-10 ENCOUNTER — TELEPHONE (OUTPATIENT)
Dept: FAMILY MEDICINE | Facility: CLINIC | Age: 66
End: 2021-12-10
Payer: MEDICARE

## 2021-12-10 NOTE — TELEPHONE ENCOUNTER
Prior Authorization Retail Medication Request    Medication/Dose:   ICD code (if different than what is on RX):  Attention deficit hyperactivity disorder (ADHD), combined type [F90.2]   Previously Tried and Failed:  See chart  Rationale:      Insurance Name:  MEDICARE [[facility]  Insurance ID:  [id number]       Pharmacy Information (if different than what is on RX)  Name:    Phone:

## 2021-12-13 NOTE — TELEPHONE ENCOUNTER
I called the patient's pharmacy in [place] and they said that this needs to be prescribed by a [place] doctor.       Prior Authorization Not Needed per Insurance    Medication: amphetamine-dextroamphetamine (ADDERALL) 20 MG tablet   Insurance Company: [employer] - Phone [phone] Fax [phone]  Expected CoPay:      Pharmacy Filling the Rx: [id number] [address] AT [facility] [address]  Pharmacy Notified: Yes  Patient Notified: No

## 2021-12-13 NOTE — TELEPHONE ENCOUNTER
Central Prior Authorization Team   Phone: [phone]    PA Initiation    Medication: amphetamine-dextroamphetamine (ADDERALL) 20 MG tablet   Insurance Company: [employer]EE [employer] - Phone [phone] Fax [phone]  Pharmacy Filling the Rx: [facility] #[id number] - [place], [place] - [address] AT [facility] [address]  Filling Pharmacy Phone: [phone]  Filling Pharmacy Fax:    Start Date: 12/13/2021

## 2021-12-16 ENCOUNTER — TELEPHONE (OUTPATIENT)
Dept: FAMILY MEDICINE | Facility: CLINIC | Age: 66
End: 2021-12-16
Payer: MEDICARE

## 2021-12-16 NOTE — TELEPHONE ENCOUNTER
Patient calling stating Gail was going to look into prescribing an alternative for Adderall 20mg for patient since she is not able to tolerate it anymore.  States she tried once daily and twice daily dosing and was not able to take it.  See last Virtual Visit notes on 12/6/2021.    Patient still waiting.  States that she contacted [employer] and said that Concerta would be an alternative option?  Was given number for [employer] Prior Auth fax:  [phone].    Please call patient back when this has been resolved.  [phone]    Lynda Rose RN

## 2021-12-21 ENCOUNTER — APPOINTMENT (OUTPATIENT)
Dept: URGENT CARE | Facility: CLINIC | Age: 66
End: 2021-12-21
Payer: MEDICARE

## 2021-12-21 ENCOUNTER — MYC MEDICAL ADVICE (OUTPATIENT)
Dept: FAMILY MEDICINE | Facility: CLINIC | Age: 66
End: 2021-12-21
Payer: MEDICARE

## 2021-12-21 ENCOUNTER — TELEPHONE (OUTPATIENT)
Dept: FAMILY MEDICINE | Facility: CLINIC | Age: 66
End: 2021-12-21
Payer: MEDICARE

## 2021-12-21 DIAGNOSIS — F90.2 ATTENTION DEFICIT HYPERACTIVITY DISORDER (ADHD), COMBINED TYPE: Primary | ICD-10-CM

## 2021-12-21 RX ORDER — METHYLPHENIDATE HYDROCHLORIDE 18 MG/1
18 TABLET ORAL EVERY MORNING
Qty: 30 TABLET | Refills: 0 | Status: SHIPPED | OUTPATIENT
Start: 2021-12-21 | End: 2022-03-18

## 2021-12-21 NOTE — TELEPHONE ENCOUNTER
Spoke to Gail Zak. She had requested to check with the pt to see what she wants to do.  When Horacio at [facility] pharmacy had tried to run concerta, it looks like it was not going to be paid by insurance.  She is happy to send that in if that is what the pt wants.    She could also send in adderall, but that was giving the pt headaches.    Gail had not received any prior auth to sign.    Called the pt to discuss.  She would like concerta sent in.  She thinks it may need a prior auth.  Advised Gail can order it, she can check with the pharmacy [id number] and the pharmacy usually lets us know also if it needs a prior auth.   Advised the pt to follow up with the pharmacy tonight and she can be in touch with us tomorrow if it needs a prior auth and we will send to our prior auth team right away.

## 2021-12-21 NOTE — TELEPHONE ENCOUNTER
methylphenidate HCl ER (CONCERTA) 18 MG CR tablet 30 tablet 0 12/21/2021  --   Sig - Route: Take 1 tablet (18 mg) by mouth every morning - Oral   Sent to pharmacy as: Methylphenidate HCl ER 18 MG Oral Tablet Extended Release (CONCERTA)   Class: E-Prescribe   Earliest Fill Date: 12/21/2021   Order: [id number]   E-Prescribing Status: Receipt confirmed by pharmacy (12/21/2021  2:52 PM CST)     Prior Authorization Retail Medication Request    Medication/Dose: Methylphenidate 18 mg   ICD code (if different than what is on RX):  F90.2   Previously Tried and Failed:  Adderall- Headaches   Rationale:      Insurance Name:  Medicare and [employer]BS  Insurance ID:        Pharmacy Information (if different than what is on RX)  Name:  [facility]   Phone:  [phone]

## 2021-12-22 NOTE — TELEPHONE ENCOUNTER
Central Prior Authorization Team   Phone: [phone]    PA Initiation    Medication: Methylphenidate 18 mg   Insurance Company: [employer] - Phone [phone] Fax [phone]  Pharmacy Filling the Rx: [facility] #[id number] [facility] [id number]  Filling Pharmacy Phone: [phone]  Filling Pharmacy Fax:    Start Date: 12/22/2021

## 2021-12-22 NOTE — TELEPHONE ENCOUNTER
Prior Authorization Approval    Authorization Effective Date: 11/22/2021  Authorization Expiration Date: 12/22/2022  Medication: Methylphenidate 18 mg   Approved Dose/Quantity:    Reference #:     Insurance Company: [employer] - Phone [phone] Fax [phone]  Expected CoPay:       CoPay Card Available:      Foundation Assistance Needed:    Which Pharmacy is filling the prescription (Not needed for infusion/clinic administered): [facility] #[id number] [place] [address]  Pharmacy Notified: Yes  Patient Notified: Yes  **Instructed pharmacy to notify patient when script is ready to /ship.**

## 2022-02-08 ENCOUNTER — VIRTUAL VISIT (OUTPATIENT)
Dept: FAMILY MEDICINE | Facility: CLINIC | Age: 67
End: 2022-02-08
Payer: MEDICARE

## 2022-02-08 DIAGNOSIS — F90.2 ATTENTION DEFICIT HYPERACTIVITY DISORDER (ADHD), COMBINED TYPE: Primary | ICD-10-CM

## 2022-02-08 PROCEDURE — 99213 OFFICE O/P EST LOW 20 MIN: CPT | Mod: 95 | Performed by: NURSE PRACTITIONER

## 2022-02-08 RX ORDER — DEXTROAMPHETAMINE SACCHARATE, AMPHETAMINE ASPARTATE MONOHYDRATE, DEXTROAMPHETAMINE SULFATE AND AMPHETAMINE SULFATE 5; 5; 5; 5 MG/1; MG/1; MG/1; MG/1
20 CAPSULE, EXTENDED RELEASE ORAL DAILY
Qty: 30 CAPSULE | Refills: 0 | Status: SHIPPED | OUTPATIENT
Start: 2022-02-08 | End: 2022-03-08

## 2022-02-08 ASSESSMENT — ANXIETY QUESTIONNAIRES
3. WORRYING TOO MUCH ABOUT DIFFERENT THINGS: SEVERAL DAYS
7. FEELING AFRAID AS IF SOMETHING AWFUL MIGHT HAPPEN: NOT AT ALL
1. FEELING NERVOUS, ANXIOUS, OR ON EDGE: SEVERAL DAYS
GAD7 TOTAL SCORE: 8
2. NOT BEING ABLE TO STOP OR CONTROL WORRYING: SEVERAL DAYS
IF YOU CHECKED OFF ANY PROBLEMS ON THIS QUESTIONNAIRE, HOW DIFFICULT HAVE THESE PROBLEMS MADE IT FOR YOU TO DO YOUR WORK, TAKE CARE OF THINGS AT HOME, OR GET ALONG WITH OTHER PEOPLE: NOT DIFFICULT AT ALL
5. BEING SO RESTLESS THAT IT IS HARD TO SIT STILL: SEVERAL DAYS
6. BECOMING EASILY ANNOYED OR IRRITABLE: SEVERAL DAYS

## 2022-02-08 ASSESSMENT — PATIENT HEALTH QUESTIONNAIRE - PHQ9: 5. POOR APPETITE OR OVEREATING: NEARLY EVERY DAY

## 2022-02-08 NOTE — PROGRESS NOTES
Mar is a 66 year old who is being evaluated via a billable video visit.      How would you like to obtain your AVS? MyChart  If the video visit is dropped, the invitation should be resent by: Text to cell phone: [phone]  Will anyone else be joining your video visit? No    Video Start Time: 1:21 PM    Assessment & Plan     Attention deficit hyperactivity disorder (ADHD), combined type  Tolerating adderall IR better but likely needs XR dosing and dose titration.  Rx sent.  PA pool to start process with insurance.  - amphetamine-dextroamphetamine (ADDERALL XR) 20 MG 24 hr capsule; Take 1 capsule (20 mg) by mouth daily          No follow-ups on file.    Gail Crespo, [facility] CNP  [facility]    Marely Manuel is a 66 year old who presents for the following health issues     HPI     Medication Follow-up of Concerta. Went back to her adderall since she ran out of concerta and the side effects. She would discuss other options with you.    Taking Medication as prescribed: yes    Side Effects:  Headache, itchy in the upper body, not able to sleep    Medication Helping Symptoms:  NO     Concerta caused issues with sleep, headache, and had itching but no rash.  She restarted adderall IR at 30mg daily.  No headaches or sleep issues, but feels it is not very helpful.    Review of Systems   Constitutional, HEENT, cardiovascular, pulmonary, gi and gu systems are negative, except as otherwise noted.      Objective    Vitals - Patient Reported  Pain Score: No Pain (0)      Vitals:  No vitals were obtained today due to virtual visit.    Physical Exam   GENERAL: Healthy, alert and no distress  EYES: Eyes grossly normal to inspection.  No discharge or erythema, or obvious scleral/conjunctival abnormalities.  RESP: No audible wheeze, cough, or visible cyanosis.  No visible retractions or increased work of breathing.    SKIN: Visible skin clear. No significant rash, abnormal pigmentation or  lesions.  NEURO: Cranial nerves grossly intact.  Mentation and speech appropriate for age.  PSYCH: Mentation appears normal, affect normal/bright, judgement and insight intact, normal speech and appearance well-groomed.                Video-Visit Details    Type of service:  Video Visit    Video End Time:1:30 PM    Originating Location (pt. Location): Home    Distant Location (provider location):  [facility]     Platform used for Video Visit: Kirsty

## 2022-02-09 ASSESSMENT — ANXIETY QUESTIONNAIRES: GAD7 TOTAL SCORE: 8

## 2022-03-07 DIAGNOSIS — F90.2 ATTENTION DEFICIT HYPERACTIVITY DISORDER (ADHD), COMBINED TYPE: ICD-10-CM

## 2022-03-07 NOTE — TELEPHONE ENCOUNTER
Mar was supposed to have virtual visit with PCP. PCP is out of clinic today. Needs refill today as will be out of town tomorrow.

## 2022-03-08 RX ORDER — DEXTROAMPHETAMINE SACCHARATE, AMPHETAMINE ASPARTATE MONOHYDRATE, DEXTROAMPHETAMINE SULFATE AND AMPHETAMINE SULFATE 5; 5; 5; 5 MG/1; MG/1; MG/1; MG/1
20 CAPSULE, EXTENDED RELEASE ORAL DAILY
Qty: 30 CAPSULE | Refills: 0 | Status: SHIPPED | OUTPATIENT
Start: 2022-03-08 | End: 2022-03-18

## 2022-03-18 ENCOUNTER — VIRTUAL VISIT (OUTPATIENT)
Dept: FAMILY MEDICINE | Facility: CLINIC | Age: 67
End: 2022-03-18
Payer: MEDICARE

## 2022-03-18 DIAGNOSIS — F90.2 ATTENTION DEFICIT HYPERACTIVITY DISORDER (ADHD), COMBINED TYPE: Primary | ICD-10-CM

## 2022-03-18 DIAGNOSIS — F41.1 GENERALIZED ANXIETY DISORDER: ICD-10-CM

## 2022-03-18 PROCEDURE — 99441 PR PHYSICIAN TELEPHONE EVALUATION 5-10 MIN: CPT | Mod: 95 | Performed by: NURSE PRACTITIONER

## 2022-03-18 RX ORDER — DEXTROAMPHETAMINE SACCHARATE, AMPHETAMINE ASPARTATE MONOHYDRATE, DEXTROAMPHETAMINE SULFATE AND AMPHETAMINE SULFATE 6.25; 6.25; 6.25; 6.25 MG/1; MG/1; MG/1; MG/1
25 CAPSULE, EXTENDED RELEASE ORAL EVERY MORNING
Qty: 30 CAPSULE | Refills: 0 | Status: SHIPPED | OUTPATIENT
Start: 2022-03-18 | End: 2022-04-28

## 2022-03-18 RX ORDER — BUPROPION HYDROCHLORIDE 150 MG/1
TABLET ORAL
Qty: 90 TABLET | Refills: 1 | Status: SHIPPED | OUTPATIENT
Start: 2022-03-18 | End: 2022-08-18

## 2022-03-18 NOTE — PROGRESS NOTES
Mar is a 66 year old who is being evaluated via a billable telephone visit.      What phone number would you like to be contacted at? [phone]  How would you like to obtain your AVS? MyChart    Assessment & Plan     Attention deficit hyperactivity disorder (ADHD), combined type  Tolerating well.  Increase dose.  Update me in 2 weeks.  - amphetamine-dextroamphetamine (ADDERALL XR) 25 MG 24 hr capsule; Take 1 capsule (25 mg) by mouth every morning        No follow-ups on file.    Gail Crespo, [facility]    Marely Manuel is a 66 year old who presents for the following health issues     HPI     Medication Followup of All of them     Taking Medication as prescribed: yes    Side Effects:  None    Medication Helping Symptoms:  yes     Taking med as directed.  Feels it is helpful, wondering about a dose increase.  No side effects.    Review of Systems   Constitutional, HEENT, cardiovascular, pulmonary, gi and gu systems are negative, except as otherwise noted.      Objective           Vitals:  No vitals were obtained today due to virtual visit.    Physical Exam   healthy, alert and no distress  PSYCH: Alert and oriented times 3; coherent speech, normal   rate and volume, able to articulate logical thoughts, able   to abstract reason, no tangential thoughts, no hallucinations   or delusions  Her affect is normal  RESP: No cough, no audible wheezing, able to talk in full sentences  Remainder of exam unable to be completed due to telephone visits                Phone call duration: 5 minutes

## 2022-03-18 NOTE — PROGRESS NOTES
"Mar is a 66 year old who is being evaluated via a billable video visit.      How would you like to obtain your AVS? {AVS Preference:105701}  If the video visit is dropped, the invitation should be resent by: {video visit invitation:953554}  Will anyone else be joining your video visit? {:286749}  {If patient encounters technical issues they should call [phone] :969829}    Video Start Time: {video visit start/end time for provider to select:059296}    {PROVIDER CHARTING PREFERENCE:963996}    Subjective   Mar is a 66 year old who presents for the following health issues {ACCOMPANIED BY STATEMENT (Optional):026356}    HPI     Medication Followup of All of them     Taking Medication as prescribed: yes    Side Effects:  None    Medication Helping Symptoms:  yes     {additonal problems for provider to add (Optional):945910}    Review of Systems   {ROS COMP (Optional):906397}      Objective           Vitals:  No vitals were obtained today due to virtual visit.    Physical Exam   {video visit exam brief selected:892259::\"GENERAL: Healthy, alert and no distress\",\"EYES: Eyes grossly normal to inspection.  No discharge or erythema, or obvious scleral/conjunctival abnormalities.\",\"RESP: No audible wheeze, cough, or visible cyanosis.  No visible retractions or increased work of breathing.  \",\"SKIN: Visible skin clear. No significant rash, abnormal pigmentation or lesions.\",\"NEURO: Cranial nerves grossly intact.  Mentation and speech appropriate for age.\",\"PSYCH: Mentation appears normal, affect normal/bright, judgement and insight intact, normal speech and appearance well-groomed.\"}    {Diagnostic Test Results (Optional):095178}    {AMBULATORY ATTESTATION (Optional):277206}        Video-Visit Details    Type of service:  Video Visit    Video End Time:{video visit start/end time for provider to select:084887}    Originating Location (pt. Location): {video visit patient location:048551::\"Home\"}    Distant Location " "(provider location):  [facility]     Platform used for Video Visit: {Virtual Visit Platforms:596662::\"Ketty\"}  "

## 2022-04-28 DIAGNOSIS — F90.2 ATTENTION DEFICIT HYPERACTIVITY DISORDER (ADHD), COMBINED TYPE: ICD-10-CM

## 2022-04-28 RX ORDER — DEXTROAMPHETAMINE SACCHARATE, AMPHETAMINE ASPARTATE MONOHYDRATE, DEXTROAMPHETAMINE SULFATE AND AMPHETAMINE SULFATE 6.25; 6.25; 6.25; 6.25 MG/1; MG/1; MG/1; MG/1
25 CAPSULE, EXTENDED RELEASE ORAL EVERY MORNING
Qty: 30 CAPSULE | Refills: 0 | Status: SHIPPED | OUTPATIENT
Start: 2022-04-28 | End: 2022-04-28

## 2022-04-28 RX ORDER — DEXTROAMPHETAMINE SACCHARATE, AMPHETAMINE ASPARTATE MONOHYDRATE, DEXTROAMPHETAMINE SULFATE AND AMPHETAMINE SULFATE 6.25; 6.25; 6.25; 6.25 MG/1; MG/1; MG/1; MG/1
25 CAPSULE, EXTENDED RELEASE ORAL EVERY MORNING
Qty: 30 CAPSULE | Refills: 0 | Status: SHIPPED | OUTPATIENT
Start: 2022-04-28 | End: 2022-04-29

## 2022-04-28 NOTE — TELEPHONE ENCOUNTER
Rec'd prescriber notification that rx cannot be faxed has to be escribed.      Spoke with [facility] who said patient would need to call [phone] to get address of delivery changed.     Relayed phone number to patient to call [facility] and get address updated. Provider e-scribed rx to [facility].      Ashleigh Soto-

## 2022-04-28 NOTE — TELEPHONE ENCOUNTER
Faxed rx to number listed included next day shipping as well as the address on the rx.    Ashleigh Soto-

## 2022-04-28 NOTE — TELEPHONE ENCOUNTER
Mar calls stating that she is in the state of [place] watching grandchildren- due to their laws she cannot get her rx filled by a local pharmacy but can get it filled via a mail order. Please call patient if not an option.       Please send to [facility] [phone], write next day shipping on rx.     Mar is currently at [address] [place] [address]    Mar [phone], ok to leave detailed message.    Ashleigh Soto-

## 2022-04-29 DIAGNOSIS — F90.2 ATTENTION DEFICIT HYPERACTIVITY DISORDER (ADHD), COMBINED TYPE: ICD-10-CM

## 2022-04-29 RX ORDER — DEXTROAMPHETAMINE SACCHARATE, AMPHETAMINE ASPARTATE MONOHYDRATE, DEXTROAMPHETAMINE SULFATE AND AMPHETAMINE SULFATE 6.25; 6.25; 6.25; 6.25 MG/1; MG/1; MG/1; MG/1
25 CAPSULE, EXTENDED RELEASE ORAL EVERY MORNING
Qty: 90 CAPSULE | Refills: 0 | Status: SHIPPED | OUTPATIENT
Start: 2022-04-29 | End: 2022-08-10

## 2022-04-29 NOTE — TELEPHONE ENCOUNTER
Pharmacy called, they can not accept fax prescriptions for this, needs to be sent electronically.    Sent back to Gail UMAÑA

## 2022-04-29 NOTE — TELEPHONE ENCOUNTER
Pt calling stating that insurance will cover 90 day supply at same cost of 30 day, pt would like 90 day if possible.  -Kat Jnesen

## 2022-07-29 ENCOUNTER — TELEPHONE (OUTPATIENT)
Dept: FAMILY MEDICINE | Facility: CLINIC | Age: 67
End: 2022-07-29

## 2022-08-08 ENCOUNTER — TELEPHONE (OUTPATIENT)
Dept: FAMILY MEDICINE | Facility: CLINIC | Age: 67
End: 2022-08-08

## 2022-08-08 NOTE — TELEPHONE ENCOUNTER
Patient Quality Outreach    Patient is due for the following:   Physical Annual Wellness Visit    Next Steps:   Patient has upcoming appointment, these items will be addressed at that time.    Type of outreach:    Chart review performed, no outreach needed.      Questions for provider review:    None     Shiva Alfredo MA

## 2022-08-10 DIAGNOSIS — F90.2 ATTENTION DEFICIT HYPERACTIVITY DISORDER (ADHD), COMBINED TYPE: ICD-10-CM

## 2022-08-10 RX ORDER — DEXTROAMPHETAMINE SACCHARATE, AMPHETAMINE ASPARTATE MONOHYDRATE, DEXTROAMPHETAMINE SULFATE AND AMPHETAMINE SULFATE 6.25; 6.25; 6.25; 6.25 MG/1; MG/1; MG/1; MG/1
25 CAPSULE, EXTENDED RELEASE ORAL EVERY MORNING
Qty: 90 CAPSULE | Refills: 0 | Status: SHIPPED | OUTPATIENT
Start: 2022-08-10 | End: 2022-12-20

## 2022-08-10 NOTE — TELEPHONE ENCOUNTER
amphetamine-dextroamphetamine (ADDERALL XR) 25 MG 24 hr capsule      Last Written Prescription Date:  04/29/22  Last Fill Quantity: 90,   # refills: 0  Last Office Visit: 03/18/22  Future Office visit:    Next 5 appointments (look out 90 days)    Aug 18, 2022 11:30 AM  (Arrive by 11:10 AM)  Annual Wellness Visit with [facility]KE Esquivel Ra, CNP  [facility] [address] [facility] [address] [phone]           Routing refill request to provider for review/approval because:  Drug not on the [facility], [facility] or [facility] refill protocol or controlled substance

## 2022-08-18 ENCOUNTER — OFFICE VISIT (OUTPATIENT)
Dept: FAMILY MEDICINE | Facility: CLINIC | Age: 67
End: 2022-08-18
Payer: MEDICARE

## 2022-08-18 VITALS
DIASTOLIC BLOOD PRESSURE: 78 MMHG | BODY MASS INDEX: 18.43 KG/M2 | TEMPERATURE: 98.5 F | RESPIRATION RATE: 15 BRPM | SYSTOLIC BLOOD PRESSURE: 136 MMHG | WEIGHT: 117.4 LBS | HEART RATE: 88 BPM | HEIGHT: 67 IN | OXYGEN SATURATION: 99 %

## 2022-08-18 DIAGNOSIS — J30.2 SEASONAL ALLERGIC RHINITIS, UNSPECIFIED TRIGGER: ICD-10-CM

## 2022-08-18 DIAGNOSIS — G47.00 INSOMNIA, UNSPECIFIED TYPE: ICD-10-CM

## 2022-08-18 DIAGNOSIS — F90.2 ATTENTION DEFICIT HYPERACTIVITY DISORDER (ADHD), COMBINED TYPE: ICD-10-CM

## 2022-08-18 DIAGNOSIS — F41.1 GENERALIZED ANXIETY DISORDER: ICD-10-CM

## 2022-08-18 DIAGNOSIS — Z13.6 CARDIOVASCULAR SCREENING; LDL GOAL LESS THAN 160: ICD-10-CM

## 2022-08-18 DIAGNOSIS — Z00.00 ENCOUNTER FOR MEDICARE ANNUAL WELLNESS EXAM: Primary | ICD-10-CM

## 2022-08-18 DIAGNOSIS — Z12.31 VISIT FOR SCREENING MAMMOGRAM: ICD-10-CM

## 2022-08-18 DIAGNOSIS — Z12.11 SPECIAL SCREENING FOR MALIGNANT NEOPLASMS, COLON: ICD-10-CM

## 2022-08-18 PROCEDURE — 99397 PER PM REEVAL EST PAT 65+ YR: CPT | Performed by: NURSE PRACTITIONER

## 2022-08-18 PROCEDURE — 99214 OFFICE O/P EST MOD 30 MIN: CPT | Mod: 25 | Performed by: NURSE PRACTITIONER

## 2022-08-18 PROCEDURE — 36415 COLL VENOUS BLD VENIPUNCTURE: CPT | Performed by: NURSE PRACTITIONER

## 2022-08-18 PROCEDURE — 80061 LIPID PANEL: CPT | Performed by: NURSE PRACTITIONER

## 2022-08-18 PROCEDURE — 80053 COMPREHEN METABOLIC PANEL: CPT | Performed by: NURSE PRACTITIONER

## 2022-08-18 RX ORDER — BUPROPION HYDROCHLORIDE 150 MG/1
TABLET ORAL
Qty: 90 TABLET | Refills: 3 | Status: SHIPPED | OUTPATIENT
Start: 2022-08-18 | End: 2022-12-20

## 2022-08-18 RX ORDER — TRAZODONE HYDROCHLORIDE 50 MG/1
50 TABLET, FILM COATED ORAL AT BEDTIME
Qty: 90 TABLET | Refills: 0 | Status: SHIPPED | OUTPATIENT
Start: 2022-08-18 | End: 2022-10-12

## 2022-08-18 RX ORDER — DEXTROAMPHETAMINE SACCHARATE, AMPHETAMINE ASPARTATE MONOHYDRATE, DEXTROAMPHETAMINE SULFATE AND AMPHETAMINE SULFATE 6.25; 6.25; 6.25; 6.25 MG/1; MG/1; MG/1; MG/1
25 CAPSULE, EXTENDED RELEASE ORAL DAILY
Qty: 30 CAPSULE | Refills: 0 | Status: SHIPPED | OUTPATIENT
Start: 2022-09-10 | End: 2022-10-10

## 2022-08-18 RX ORDER — DEXTROAMPHETAMINE SACCHARATE, AMPHETAMINE ASPARTATE MONOHYDRATE, DEXTROAMPHETAMINE SULFATE AND AMPHETAMINE SULFATE 6.25; 6.25; 6.25; 6.25 MG/1; MG/1; MG/1; MG/1
25 CAPSULE, EXTENDED RELEASE ORAL DAILY
Qty: 30 CAPSULE | Refills: 0 | Status: SHIPPED | OUTPATIENT
Start: 2022-10-10 | End: 2022-11-09

## 2022-08-18 RX ORDER — DEXTROAMPHETAMINE SACCHARATE, AMPHETAMINE ASPARTATE MONOHYDRATE, DEXTROAMPHETAMINE SULFATE AND AMPHETAMINE SULFATE 6.25; 6.25; 6.25; 6.25 MG/1; MG/1; MG/1; MG/1
25 CAPSULE, EXTENDED RELEASE ORAL DAILY
Qty: 30 CAPSULE | Refills: 0 | Status: SHIPPED | OUTPATIENT
Start: 2022-11-10 | End: 2022-12-10

## 2022-08-18 ASSESSMENT — ANXIETY QUESTIONNAIRES
4. TROUBLE RELAXING: SEVERAL DAYS
GAD7 TOTAL SCORE: 2
7. FEELING AFRAID AS IF SOMETHING AWFUL MIGHT HAPPEN: NOT AT ALL
GAD7 TOTAL SCORE: 2
3. WORRYING TOO MUCH ABOUT DIFFERENT THINGS: NOT AT ALL
5. BEING SO RESTLESS THAT IT IS HARD TO SIT STILL: SEVERAL DAYS
1. FEELING NERVOUS, ANXIOUS, OR ON EDGE: NOT AT ALL
2. NOT BEING ABLE TO STOP OR CONTROL WORRYING: NOT AT ALL
8. IF YOU CHECKED OFF ANY PROBLEMS, HOW DIFFICULT HAVE THESE MADE IT FOR YOU TO DO YOUR WORK, TAKE CARE OF THINGS AT HOME, OR GET ALONG WITH OTHER PEOPLE?: NOT DIFFICULT AT ALL
7. FEELING AFRAID AS IF SOMETHING AWFUL MIGHT HAPPEN: NOT AT ALL
IF YOU CHECKED OFF ANY PROBLEMS ON THIS QUESTIONNAIRE, HOW DIFFICULT HAVE THESE PROBLEMS MADE IT FOR YOU TO DO YOUR WORK, TAKE CARE OF THINGS AT HOME, OR GET ALONG WITH OTHER PEOPLE: NOT DIFFICULT AT ALL
6. BECOMING EASILY ANNOYED OR IRRITABLE: NOT AT ALL

## 2022-08-18 ASSESSMENT — ENCOUNTER SYMPTOMS
FEVER: 0
NERVOUS/ANXIOUS: 0
JOINT SWELLING: 0
EYE PAIN: 0
NAUSEA: 0
MYALGIAS: 0
COUGH: 0
CONSTIPATION: 0
HEADACHES: 0
PALPITATIONS: 0
ARTHRALGIAS: 0
SHORTNESS OF BREATH: 0
HEARTBURN: 0
ABDOMINAL PAIN: 0
WEAKNESS: 0
HEMATOCHEZIA: 0
FREQUENCY: 0
SORE THROAT: 0
DYSURIA: 0
BREAST MASS: 0
HEMATURIA: 0
DIARRHEA: 0
CHILLS: 0
DIZZINESS: 0
PARESTHESIAS: 0

## 2022-08-18 ASSESSMENT — ACTIVITIES OF DAILY LIVING (ADL): CURRENT_FUNCTION: NO ASSISTANCE NEEDED

## 2022-08-18 ASSESSMENT — PAIN SCALES - GENERAL: PAINLEVEL: NO PAIN (0)

## 2022-08-18 NOTE — PROGRESS NOTES
"SUBJECTIVE:   Mar Zeng is a 66 year old female who presents for Preventive Visit.    Patient has been advised of split billing requirements and indicates understanding: Yes  Are you in the first 12 months of your Medicare coverage?  No    Healthy Habits:     In general, how would you rate your overall health?  Excellent    Frequency of exercise:  6-7 days/week    Duration of exercise:  30-45 minutes    Do you usually eat at least 4 servings of fruit and vegetables a day, include whole grains    & fiber and avoid regularly eating high fat or \"junk\" foods?  Yes    Taking medications regularly:  Yes    Medication side effects:  None    Ability to successfully perform activities of daily living:  No assistance needed    Home Safety:  No safety concerns identified    Hearing Impairment:  No hearing concerns    In the past 6 months, have you been bothered by leaking of urine?  No    In general, how would you rate your overall mental or emotional health?  Good      PHQ-2 Total Score: 0    Additional concerns today:  Yes    Concerns:  1) Her ears feel like she has water in them and pain radiates down to the neck through the jaw. Worse on right side.  Has gone to ENT and was told she has a lot of fluid. Tried mucinex but it has not worked lately.       2) Also any other recommended sleep medications besides Ativan   Works well but takes sparingly.  Trouble with night time waking.    Mood is stable.  Taking wellbutrin with good results.    ADHD  Taking medication, no side effects.  Working well.        Answers for HPI/ROS submitted by the patient on 8/18/2022  TARIQ 7 TOTAL SCORE: 2        Do you feel safe in your environment? Yes    Have you ever done Advance Care Planning? (For example, a Health Directive, POLST, or a discussion with a medical provider or your loved ones about your wishes): No, advance care planning information given to patient to review.  Patient declined advance care planning discussion at this " time.         Fall risk  Fallen 2 or more times in the past year?: No  Any fall with injury in the past year?: No    Cognitive Screening   1) Repeat 3 items (Leader, Season, Table)    2) Clock draw: NORMAL  3) 3 item recall: Recalls 3 objects  Results: 3 items recalled: COGNITIVE IMPAIRMENT LESS LIKELY    Mini-CogTM Copyright FARIDA Decker. Licensed by the author for use in [facility]; reprinted with permission (denice@[facility]). All rights reserved.      Do you have sleep apnea, excessive snoring or daytime drowsiness?: no    Reviewed and updated as needed this visit by clinical staff   Tobacco  Allergies  Meds   Med Hx  Surg Hx  Fam Hx  Soc Hx          Reviewed and updated as needed this visit by Provider                   Social History     Tobacco Use     Smoking status: Former Smoker     Packs/day: 0.00     Types: Cigarettes     Smokeless tobacco: Never Used   Substance Use Topics     Alcohol use: Yes     Alcohol/week: 0.0 standard drinks     Comment: avg:3-4 drink/wk         Alcohol Use 8/18/2022   Prescreen: >3 drinks/day or >7 drinks/week? No   Prescreen: >3 drinks/day or >7 drinks/week? -               Current providers sharing in care for this patient include:   Patient Care Team:  Gail Crespo Ra, APRN CNP as PCP - General (Family Practice)  Gail Crespo Ra, APRN CNP as Assigned PCP  Fabio Mark as Assigned Behavioral Health Provider    The following health maintenance items are reviewed in Epic and correct as of today:  Health Maintenance Due   Topic Date Due     ANNUAL REVIEW OF  ORDERS  Never done     COVID-19 Vaccine (1) Never done     HEPATITIS C SCREENING  Never done     LUNG CANCER SCREENING  Never done     Pneumococcal Vaccine: 65+ Years (2 - PCV) 08/14/2021     MAMMO SCREENING  08/20/2022     Patient Active Problem List   Diagnosis     CARDIOVASCULAR SCREENING; LDL GOAL LESS THAN 160     Multiple pigmented nevi     Advanced directives, counseling/discussion     Seasonal  allergic rhinitis     Hematoma of rectus sheath     Generalized anxiety disorder     Attention deficit hyperactivity disorder (ADHD), combined type     Past Surgical History:   Procedure Laterality Date     COLONOSCOPY  8/23/2016    Dr. Shaw [facility]     COLONOSCOPY N/A 8/23/2016    Procedure: COLONOSCOPY;  Surgeon: Peter Shaw MD;  Location: [facility]     FINGER SURGERY      right little finger joint replacement     HYSTERECTOMY VAGINAL  age 30    retained ovaries. no cervix      HYSTERECTOMY, PAP NO LONGER INDICATED       ORTHOPEDIC SURGERY       SURGICAL HISTORY OF -   10/25/13    left index cyst removal     SURGICAL HISTORY OF -       Bilat knee miniscus repair       Social History     Tobacco Use     Smoking status: Former Smoker     Packs/day: 0.00     Types: Cigarettes     Smokeless tobacco: Never Used   Substance Use Topics     Alcohol use: Yes     Alcohol/week: 0.0 standard drinks     Comment: avg:3-4 drink/wk     Family History   Problem Relation Age of Onset     Hypertension Father      Cancer Father      Cancer Maternal Grandmother      Cancer Maternal Grandfather      Cancer Paternal Grandmother      Cancer Paternal Grandfather      No Known Problems Mother      No Known Problems Sister      No Known Problems Son      No Known Problems Son      Asthma Brother      Hypertension Brother      Thyroid Disease Daughter      Colon Cancer No family hx of          Scheduled mammogram.  Declines pneumonia vaccine    FHS-7:   Breast CA Risk Assessment (FHS-7) 8/20/2021 8/18/2022   Did any of your first-degree relatives have breast or ovarian cancer? No No   Did any of your relatives have bilateral breast cancer? Yes No   Did any man in your family have breast cancer? No No   Did any woman in your family have breast and ovarian cancer? No No   Did any woman in your family have breast cancer before age 50 y? No Yes   Do you have 2 or more relatives with breast and/or ovarian cancer? No Yes   Do you have 2 or more  "relatives with breast and/or bowel cancer? Yes No       Mammogram Screening: Recommended mammography every 1-2 years with patient discussion and risk factor consideration  Pertinent mammograms are reviewed under the imaging tab.    Review of Systems   Constitutional: Negative for chills and fever.   HENT: Positive for ear pain. Negative for congestion, hearing loss and sore throat.    Eyes: Negative for pain and visual disturbance.   Respiratory: Negative for cough and shortness of breath.    Cardiovascular: Negative for chest pain, palpitations and peripheral edema.   Gastrointestinal: Negative for abdominal pain, constipation, diarrhea, heartburn, hematochezia and nausea.   Breasts:  Negative for tenderness, breast mass and discharge.   Genitourinary: Negative for dysuria, frequency, genital sores, hematuria, pelvic pain, urgency, vaginal bleeding and vaginal discharge.   Musculoskeletal: Negative for arthralgias, joint swelling and myalgias.   Skin: Negative for rash.   Neurological: Negative for dizziness, weakness, headaches and paresthesias.   Psychiatric/Behavioral: Negative for mood changes. The patient is not nervous/anxious.          OBJECTIVE:   /78 (BP Location: Right arm, Patient Position: Sitting, Cuff Size: Adult Regular)   Pulse 88   Temp 98.5  F (36.9  C) (Oral)   Resp 15   Ht 1.702 m (5' 7\")   Wt 53.3 kg (117 lb 6.4 oz)   LMP  (LMP Unknown)   SpO2 99%   BMI 18.39 kg/m   Estimated body mass index is 18.39 kg/m  as calculated from the following:    Height as of this encounter: 1.702 m (5' 7\").    Weight as of this encounter: 53.3 kg (117 lb 6.4 oz).  Physical Exam  GENERAL: healthy, alert and no distress  EYES: Eyes grossly normal to inspection  HENT: ear canals and TM's normal, nose and mouth without ulcers or lesions  NECK: no adenopathy, no asymmetry, masses, or scars and thyroid normal to palpation  RESP: lungs clear to auscultation - no rales, rhonchi or wheezes  CV: regular rate " "and rhythm, normal S1 S2, no S3 or S4, no murmur, click or rub, no peripheral edema and peripheral pulses strong  ABDOMEN: soft, nontender, no hepatosplenomegaly, no masses and bowel sounds normal  NEURO: Normal strength and tone, mentation intact and speech normal  PSYCH: mentation appears normal, affect normal/bright    Diagnostic Test Results:  Labs reviewed in Epic    ASSESSMENT / PLAN:   (Z00.00) Encounter for Medicare annual wellness exam  (primary encounter diagnosis)  Comment:   Plan:     (Z12.31) Visit for screening mammogram  Comment:   Plan: MA SCREENING DIGITAL BILAT - Future  (s+30)        Scheduled.    (Z13.6) CARDIOVASCULAR SCREENING; LDL GOAL LESS THAN 160  Comment:   Plan: Lipid panel reflex to direct LDL Fasting,         Comprehensive metabolic panel (BMP + Alb, Alk         Phos, ALT, AST, Total. Bili, TP)            (Z12.11) Special screening for malignant neoplasms, colon  Comment: due now.  Plan: Colonscopy Screening  Referral            (F41.1) Generalized anxiety disorder  Comment: stable.  Plan: buPROPion (WELLBUTRIN XL) 150 MG 24 hr tablet        Refilled.    (J30.2) Seasonal allergic rhinitis, unspecified trigger  Comment: taking otc med, trial of singulair without success.  Plan: Adult Allergy/Asthma Referral            (G47.00) Insomnia, unspecified type  Comment: no record of trazodone in the past.  Plan: traZODone (DESYREL) 50 MG tablet        Trial now.    adhd  Stable, refilled.  Visit in 6 months.          COUNSELING:  Reviewed preventive health counseling, as reflected in patient instructions    Estimated body mass index is 18.39 kg/m  as calculated from the following:    Height as of this encounter: 1.702 m (5' 7\").    Weight as of this encounter: 53.3 kg (117 lb 6.4 oz).        She reports that she has quit smoking. Her smoking use included cigarettes. She smoked 0.00 packs per day. She has never used smokeless tobacco.      Appropriate preventive services were discussed " with this patient, including applicable screening as appropriate for cardiovascular disease, diabetes, osteopenia/osteoporosis, and glaucoma.  As appropriate for age/gender, discussed screening for colorectal cancer, prostate cancer, breast cancer, and cervical cancer. Checklist reviewing preventive services available has been given to the patient.    Reviewed patients plan of care and provided an AVS. The Intermediate Care Plan ( asthma action plan, low back pain action plan, and migraine action plan) for Mar meets the Care Plan requirement. This Care Plan has been established and reviewed with the Patient.    Counseling Resources:  ATP IV Guidelines  Pooled Cohorts Equation Calculator  Breast Cancer Risk Calculator  Breast Cancer: Medication to Reduce Risk  FRAX Risk Assessment  ICSI Preventive Guidelines  Dietary Guidelines for Americans, 2010  USDA's MyPlate  ASA Prophylaxis  Lung CA Screening    [facility]    Identified Health Risks:

## 2022-08-18 NOTE — PATIENT INSTRUCTIONS
Patient Education   Personalized Prevention Plan  You are due for the preventive services outlined below.  Your care team is available to assist you in scheduling these services.  If you have already completed any of these items, please share that information with your care team to update in your medical record.  Health Maintenance Due   Topic Date Due    COVID-19 Vaccine (1) Never done    Pneumococcal Vaccine (2 - PCV) 08/14/2021    Mammogram  08/20/2022

## 2022-08-19 LAB
ALBUMIN SERPL-MCNC: 3.9 G/DL (ref 3.4–5)
ALP SERPL-CCNC: 94 U/L (ref 40–150)
ALT SERPL W P-5'-P-CCNC: 20 U/L (ref 0–50)
ANION GAP SERPL CALCULATED.3IONS-SCNC: 4 MMOL/L (ref 3–14)
AST SERPL W P-5'-P-CCNC: 20 U/L (ref 0–45)
BILIRUB SERPL-MCNC: 0.4 MG/DL (ref 0.2–1.3)
BUN SERPL-MCNC: 14 MG/DL (ref 7–30)
CALCIUM SERPL-MCNC: 9.6 MG/DL (ref 8.5–10.1)
CHLORIDE BLD-SCNC: 103 MMOL/L (ref 94–109)
CHOLEST SERPL-MCNC: 214 MG/DL
CO2 SERPL-SCNC: 28 MMOL/L (ref 20–32)
CREAT SERPL-MCNC: 0.87 MG/DL (ref 0.52–1.04)
FASTING STATUS PATIENT QL REPORTED: YES
GFR SERPL CREATININE-BSD FRML MDRD: 73 ML/MIN/1.73M2
GLUCOSE BLD-MCNC: 91 MG/DL (ref 70–99)
HDLC SERPL-MCNC: 95 MG/DL
LDLC SERPL CALC-MCNC: 105 MG/DL
NONHDLC SERPL-MCNC: 119 MG/DL
POTASSIUM BLD-SCNC: 4.5 MMOL/L (ref 3.4–5.3)
PROT SERPL-MCNC: 6.9 G/DL (ref 6.8–8.8)
SODIUM SERPL-SCNC: 135 MMOL/L (ref 133–144)
TRIGL SERPL-MCNC: 70 MG/DL

## 2022-09-09 ENCOUNTER — ANCILLARY PROCEDURE (OUTPATIENT)
Dept: MAMMOGRAPHY | Facility: CLINIC | Age: 67
End: 2022-09-09
Payer: MEDICARE

## 2022-09-09 DIAGNOSIS — Z12.31 VISIT FOR SCREENING MAMMOGRAM: ICD-10-CM

## 2022-09-09 PROCEDURE — 77063 BREAST TOMOSYNTHESIS BI: CPT | Mod: TC | Performed by: RADIOLOGY

## 2022-09-09 PROCEDURE — 77067 SCR MAMMO BI INCL CAD: CPT | Mod: TC | Performed by: RADIOLOGY

## 2022-09-19 ENCOUNTER — TELEPHONE (OUTPATIENT)
Dept: GASTROENTEROLOGY | Facility: CLINIC | Age: 67
End: 2022-09-19

## 2022-09-19 NOTE — TELEPHONE ENCOUNTER
Screening Questions  BLUE  KIND OF PREP RED  LOCATION [review exclusion criteria] GREEN  SEDATION TYPE        N Are you active on mychart?      Gail Crespo Ra, APRN CNP   Ordering/Referring Provider?        MEDICARE/BCBS What type of coverage do you have?      N Have you had a positive covid test in the last 90 days?     1. 18.3 BMI  [BMI 40+ - review exclusion criteria]    2. Y  Are you able to give consent for your medical care? [IF NO,RN REVIEW]        3. N  Are you taking any prescription pain medications on a routine schedule?        3a. NA EXTENDED PREP What kind of prescription?   4. N Do you have any chemical dependencies such as alcohol, street drugs, or methadone?    5. N Do you have any history of post-traumatic stress syndrome, severe anxiety or history of psychosis?      **If yes 3- 5 , please schedule with MAC sedation.**    BMI OVER 40 NEED PAC EVALUATION FOR [facility]          IF YES TO ANY 6 - 10 - HOSPITAL SETTING ONLY.     6.   N Do you need assistance transferring?     7.   N Have you had a heart or lung transplant?    8.   N Are you currently on dialysis?   9.   N Do you use daily home oxygen?   10. N Do you take nitroglycerin?   10a. NA If yes, how often?     11. [FEMALES]  N Are you currently pregnant?    11a. NA If yes, how many weeks? [ Greater than 12 weeks, OR NEEDED]    12. N Do you have Pulmonary Hypertension? *NEED PAC APPT AT [facility]*     13. N [review exclusion criteria]  Do you have any implantable devices in your body (pacemaker, defib, LVAD)?    14. N In the past 6 months, have you had any heart related issues including cardiomyopathy or heart attack?     14a. NA If yes, did it require cardiac stenting if so when?     15. N Have you had a stroke or Transient ischemic attack (TIA - aka  mini stroke ) within 6 months?      16. N Do you have mod to severe Obstructive Sleep Apnea?  [Hospital only - Ok at [place]]    17. N Do you have SEVERE AND UNCONTROLLED asthma? *NEED PAC APPT  "AT [facility]*     18. N Are you currently taking any blood thinners?     18a. If yes, inform patient to \"follow up w/ ordering provider for bridging instructions.\"    19. N Do you take the medication Phentermine?    19a. If yes, \"Hold for 7 days before procedure.  Please consult your prescribing provider if you have questions about holding this medication.\"     20. N  Do you have chronic kidney disease?      21. N  Do you have a diagnosis of diabetes?     22. N  On a regular basis do you go 3-5 days between bowel movements?     23.  Preferred LOCAL Pharmacy for Pre Prescription    [ LIST ONLY ONE PHARMACY]      [facility] #[id number] - [place], [place] - [address]          - CLOSING REMINDERS -    Informed patient they will need an adult    Cannot take any type of public or medical transportation alone    Conscious Sedation- Needs  for 6 hours after the procedure       MAC/General-Needs  for 24 hours after procedure    Pre-Procedure Covid test to be completed [[facility] PCR Testing Required]    Confirmed Nurse will call to complete assessment       - SCHEDULING DETAILS -     [facility]  Surgeon    10/19  Date of Procedure  Lower Endoscopy [Colonoscopy]  Type of Procedure Scheduled   [facility] Location  [facility] PREP-If you answer yes to questions #8, #20, #21Which Colonoscopy Prep was Sent?     MODERATE Sedation Type     N  PAC / Pre-op Required         Additional comments:            "

## 2022-09-30 RX ORDER — BISACODYL 5 MG
TABLET, DELAYED RELEASE (ENTERIC COATED) ORAL
Qty: 4 TABLET | Refills: 0 | Status: SHIPPED | OUTPATIENT
Start: 2022-09-30 | End: 2022-11-10

## 2022-10-09 ENCOUNTER — HEALTH MAINTENANCE LETTER (OUTPATIENT)
Age: 67
End: 2022-10-09

## 2022-10-19 ENCOUNTER — HOSPITAL ENCOUNTER (OUTPATIENT)
Facility: CLINIC | Age: 67
Discharge: HOME | End: 2022-10-19
Attending: INTERNAL MEDICINE | Admitting: INTERNAL MEDICINE
Payer: MEDICARE

## 2022-10-19 VITALS
DIASTOLIC BLOOD PRESSURE: 88 MMHG | BODY MASS INDEX: 18.64 KG/M2 | OXYGEN SATURATION: 94 % | HEIGHT: 66 IN | HEART RATE: 71 BPM | SYSTOLIC BLOOD PRESSURE: 147 MMHG | WEIGHT: 116 LBS | TEMPERATURE: 98.4 F | RESPIRATION RATE: 18 BRPM

## 2022-10-19 DIAGNOSIS — Z12.11 SPECIAL SCREENING FOR MALIGNANT NEOPLASMS, COLON: Primary | ICD-10-CM

## 2022-10-19 LAB — COLONOSCOPY: NORMAL

## 2022-10-19 PROCEDURE — 88305 TISSUE EXAM BY PATHOLOGIST: CPT | Mod: TC | Performed by: INTERNAL MEDICINE

## 2022-10-19 PROCEDURE — 250N000011 HC RX IP 250 OP 636: Performed by: INTERNAL MEDICINE

## 2022-10-19 PROCEDURE — 45385 COLONOSCOPY W/LESION REMOVAL: CPT | Mod: PT | Performed by: INTERNAL MEDICINE

## 2022-10-19 PROCEDURE — G0500 MOD SEDAT ENDO SERVICE >5YRS: HCPCS | Performed by: INTERNAL MEDICINE

## 2022-10-19 RX ORDER — FENTANYL CITRATE 0.05 MG/ML
50-100 INJECTION, SOLUTION INTRAMUSCULAR; INTRAVENOUS EVERY 5 MIN PRN
Status: DISCONTINUED | OUTPATIENT
Start: 2022-10-19 | End: 2022-10-19 | Stop reason: HOSPADM

## 2022-10-19 RX ORDER — NALOXONE HYDROCHLORIDE 0.4 MG/ML
0.2 INJECTION, SOLUTION INTRAMUSCULAR; INTRAVENOUS; SUBCUTANEOUS
Status: DISCONTINUED | OUTPATIENT
Start: 2022-10-19 | End: 2022-10-19 | Stop reason: HOSPADM

## 2022-10-19 RX ORDER — PROCHLORPERAZINE MALEATE 5 MG
5 TABLET ORAL EVERY 6 HOURS PRN
Status: DISCONTINUED | OUTPATIENT
Start: 2022-10-19 | End: 2022-10-19 | Stop reason: HOSPADM

## 2022-10-19 RX ORDER — NALOXONE HYDROCHLORIDE 0.4 MG/ML
0.4 INJECTION, SOLUTION INTRAMUSCULAR; INTRAVENOUS; SUBCUTANEOUS
Status: DISCONTINUED | OUTPATIENT
Start: 2022-10-19 | End: 2022-10-19 | Stop reason: HOSPADM

## 2022-10-19 RX ORDER — SIMETHICONE 40MG/0.6ML
133 SUSPENSION, DROPS(FINAL DOSAGE FORM)(ML) ORAL
Status: DISCONTINUED | OUTPATIENT
Start: 2022-10-19 | End: 2022-10-19 | Stop reason: HOSPADM

## 2022-10-19 RX ORDER — FLUMAZENIL 0.1 MG/ML
0.2 INJECTION, SOLUTION INTRAVENOUS
Status: DISCONTINUED | OUTPATIENT
Start: 2022-10-19 | End: 2022-10-19 | Stop reason: HOSPADM

## 2022-10-19 RX ORDER — ONDANSETRON 4 MG/1
4 TABLET, ORALLY DISINTEGRATING ORAL EVERY 6 HOURS PRN
Status: DISCONTINUED | OUTPATIENT
Start: 2022-10-19 | End: 2022-10-19 | Stop reason: HOSPADM

## 2022-10-19 RX ORDER — LIDOCAINE 40 MG/G
CREAM TOPICAL
Status: DISCONTINUED | OUTPATIENT
Start: 2022-10-19 | End: 2022-10-19 | Stop reason: HOSPADM

## 2022-10-19 RX ORDER — ONDANSETRON 2 MG/ML
4 INJECTION INTRAMUSCULAR; INTRAVENOUS
Status: DISCONTINUED | OUTPATIENT
Start: 2022-10-19 | End: 2022-10-19 | Stop reason: HOSPADM

## 2022-10-19 RX ORDER — ONDANSETRON 2 MG/ML
4 INJECTION INTRAMUSCULAR; INTRAVENOUS EVERY 6 HOURS PRN
Status: DISCONTINUED | OUTPATIENT
Start: 2022-10-19 | End: 2022-10-19 | Stop reason: HOSPADM

## 2022-10-19 RX ORDER — DIPHENHYDRAMINE HYDROCHLORIDE 50 MG/ML
25-50 INJECTION INTRAMUSCULAR; INTRAVENOUS
Status: DISCONTINUED | OUTPATIENT
Start: 2022-10-19 | End: 2022-10-19 | Stop reason: HOSPADM

## 2022-10-19 RX ORDER — ATROPINE SULFATE 0.1 MG/ML
1 INJECTION INTRAVENOUS
Status: DISCONTINUED | OUTPATIENT
Start: 2022-10-19 | End: 2022-10-19 | Stop reason: HOSPADM

## 2022-10-19 RX ADMIN — MIDAZOLAM HYDROCHLORIDE 1 MG: 1 INJECTION, SOLUTION INTRAMUSCULAR; INTRAVENOUS at 10:07

## 2022-10-19 RX ADMIN — FENTANYL CITRATE 50 MCG: 50 INJECTION INTRAMUSCULAR; INTRAVENOUS at 10:07

## 2022-10-19 ASSESSMENT — ACTIVITIES OF DAILY LIVING (ADL): ADLS_ACUITY_SCORE: 35

## 2022-10-19 NOTE — H&P
Pre-Endoscopy History and Physical     Mar Zeng MRN# [id number]   YOB: 1955 Age: 67 year old     Date of Procedure: 10/19/2022  Primary care provider: Gail Crespo Ra  Type of Endoscopy: Colonoscopy with possible biopsy, possible polypectomy  Reason for Procedure: screen  Type of Anesthesia Anticipated: Conscious Sedation    HPI:    Mar is a 67 year old female who will be undergoing the above procedure.      A history and physical has been performed. The patient's medications and allergies have been reviewed. The risks and benefits of the procedure and the sedation options and risks were discussed with the patient.  All questions were answered and informed consent was obtained.      She denies a personal or family history of anesthesia complications or bleeding disorders.     Patient Active Problem List   Diagnosis     CARDIOVASCULAR SCREENING; LDL GOAL LESS THAN 160     Multiple pigmented nevi     Advanced directives, counseling/discussion     Seasonal allergic rhinitis     Hematoma of rectus sheath     Generalized anxiety disorder     Attention deficit hyperactivity disorder (ADHD), combined type        Past Medical History:   Diagnosis Date     Mumps      NO ACTIVE PROBLEMS         Past Surgical History:   Procedure Laterality Date     COLONOSCOPY  8/23/2016    Dr. Shaw [facility]     COLONOSCOPY N/A 8/23/2016    Procedure: COLONOSCOPY;  Surgeon: Peter Shaw MD;  Location: [facility]     FINGER SURGERY      right little finger joint replacement     HYSTERECTOMY VAGINAL  age 30    retained ovaries. no cervix      HYSTERECTOMY, PAP NO LONGER INDICATED       ORTHOPEDIC SURGERY       SURGICAL HISTORY OF -   10/25/13    left index cyst removal     SURGICAL HISTORY OF -       Bilat knee miniscus repair       Social History     Tobacco Use     Smoking status: Former     Packs/day: 0.00     Types: Cigarettes     Smokeless tobacco: Never   Substance Use Topics     Alcohol use: Yes      Alcohol/week: 0.0 standard drinks     Comment: avg:3-4 drink/wk       Family History   Problem Relation Age of Onset     No Known Problems Mother      Hypertension Father      Cancer Father      Cancer Maternal Grandmother      Cancer Maternal Grandfather      Colon Cancer Paternal Grandmother      Cancer Paternal Grandfather      Asthma Brother      Hypertension Brother      No Known Problems Sister      No Known Problems Son      No Known Problems Son      Thyroid Disease Daughter      Colon Cancer Paternal Aunt      Colon Cancer Cousin        Prior to Admission medications    Medication Sig Start Date End Date Taking? Authorizing Provider   amoxicillin (AMOXIL) 500 MG capsule Patient takes prior to dentist appointments. 1/6/21  Yes Reported, Patient   amphetamine-dextroamphetamine (ADDERALL XR) 25 MG 24 hr capsule Take 1 capsule (25 mg) by mouth daily for 30 days 10/10/22 11/9/22 Yes Gail Crespo Ra, APRN CNP   amphetamine-dextroamphetamine (ADDERALL XR) 25 MG 24 hr capsule Take 1 capsule (25 mg) by mouth daily for 30 days 11/10/22 12/10/22 Yes Gail Crespo Ra, APRN CNP   amphetamine-dextroamphetamine (ADDERALL XR) 25 MG 24 hr capsule Take 1 capsule (25 mg) by mouth every morning 8/10/22  Yes Gail Crespo Ra, APRN CNP   bisacodyl (DULCOLAX) 5 MG EC tablet Take 2 tablets at 3 pm the day before your procedure. If your procedure is before 11 am, take 2 additional tablets at 8 pm. If your procedure is after 11 am, take 2 additional tablets at 6 am. For additional instructions refer to your colonoscopy prep instructions. 9/30/22  Yes Peter Shaw MD   buPROPion (WELLBUTRIN XL) 150 MG 24 hr tablet TAKE 1 TABLET(150 MG) BY MOUTH EVERY MORNING 8/18/22  Yes Gail Crespo Ra, APRN CNP   Fexofenadine HCl (ALLEGRA PO) Take by mouth daily as needed for allergies    Yes Reported, Patient   fluorouracil (EFUDEX) 5 % external cream APPLY SPARINGLY EXTERNALLY TO FACE EVERY SUNDAY AT NIGHT. AVOID EYES AND LIPS  "3/5/21  Yes Reported, Patient   fluticasone (FLONASE) 50 MCG/ACT nasal spray SHAKE LIQUID AND USE 2 SPRAYS IN EACH NOSTRIL DAILY 5/13/19  Yes Gail Crespo Ra, APRN CNP   hydrocortisone 2.5 % cream APPLY TO THE FACE THREE TIMES DAILY FOR 3 DAYS AFTER TREATMENT TO CALM REACTION. 1/6/21  Yes Reported, Patient   ibuprofen (ADVIL/MOTRIN) 800 MG tablet Take 800 mg by mouth daily    Yes Reported, Patient   loratadine (CLARITIN) 10 MG tablet Take 10 mg by mouth daily as needed for allergies   Yes Reported, Patient   LORazepam (ATIVAN) 1 MG tablet Take 1 tablet (1 mg) by mouth nightly as needed for sleep 12/19/18  Yes Gail Crespo Ra, APRN CNP   polyethylene glycol (GOLYTELY) 236 g suspension The night before the exam at 6 pm drink an 8-ounce glass every 15 minutes until the jug is half empty. If you arrive before 11 AM: Drink the other half of the Golytely jug at 11 PM night before procedure. If you arrive after 11 AM: Drink the other half of the Golytely jug at 6 AM day of procedure. For additional instructions refer to your colonoscopy prep instructions. 9/30/22  Yes Peter Shaw MD   timolol maleate (TIMOPTIC) 0.5 % ophthalmic solution APPLY 1 DROP TO LESION EVERY NIGHT AT BEDTIME. DO NOT USE ON NORMAL SKIN. 1/19/21  Yes Reported, Patient       Allergies   Allergen Reactions     Codeine Itching     Concerta [Methylphenidate] Itching     Hay Fever & [A.R.M.]         REVIEW OF SYSTEMS:   5 point ROS negative except as noted above in HPI, including Gen., Resp., CV, GI &  system review.    PHYSICAL EXAM:   LMP  (LMP Unknown)  Estimated body mass index is 18.39 kg/m  as calculated from the following:    Height as of 8/18/22: 1.702 m (5' 7\").    Weight as of 8/18/22: 53.3 kg (117 lb 6.4 oz).   GENERAL APPEARANCE: alert, and oriented  MENTAL STATUS: alert  AIRWAY EXAM: Mallampatti Class I (visualization of the soft palate, fauces, uvula, anterior and posterior pillars)  RESP: lungs clear to auscultation - no " rales, rhonchi or wheezes  CV: regular rates and rhythm  DIAGNOSTICS:    Not indicated    IMPRESSION   ASA Class 2 - Mild systemic disease    PLAN:   Plan for Colonoscopy with possible biopsy, possible polypectomy. We discussed the risks, benefits and alternatives and the patient wished to proceed.    The above has been forwarded to the consulting provider.      Signed Electronically by: Peter Shaw MD  October 19, 2022

## 2022-10-19 NOTE — DISCHARGE INSTRUCTIONS
Understanding Diverticulosis and Diverticulitis     Pouches or diverticula usually occur in the lower part of the colon called the sigmoid.      Diverticulitis occurs when the pouches become inflamed.     The colon (large intestine) is the last part of the digestive tract. It absorbs water from stool and changes it from a liquid to a solid. In certain cases, small pouches called diverticula can form in the colon wall. This condition is called diverticulosis. The pouches can become infected. If this happens, it becomes a more serious problem called diverticulitis. These problems can be painful. But they can be managed.   Managing Your Condition  Diet changes or taking medications are often tried first. These may be enough to bring relief. If the case is bad, surgery may be done. You and your doctor can discuss the plan that is best for you.  If You Have Diverticulosis  Diet changes are often enough to control symptoms. The main changes are adding fiber (roughage) and drinking more water. Fiber absorbs water as it travels through your colon. This helps your stool stay soft and move smoothly. Water helps this process. If needed, you may be told to take over-the-counter stool softeners. To help relieve pain, antispasmodic medications may be prescribed.  If You Have Diverticulitis  Treatment depends on how bad your symptoms are.  For mild symptoms: You may be put on a liquid diet for a short time. You may also be prescribed antibiotics. If these two steps relieve your symptoms, you may then be prescribed a high-fiber diet. If you still have symptoms, your doctor will discuss further treatment options with you.  For severe symptoms: You may need to be admitted to the hospital. There, you can be given IV antibiotics and fluids. Once symptoms are under control, the above treatments may be tried. If these don t control your condition, your doctor may discuss the option of having surgery with you.  Toad Hop to Colon  Health  Help keep your colon healthy with a diet that includes plenty of high-fiber fruits, vegetables, and whole grains. Drink plenty of liquids like water and juice. Your doctor may also recommend avoiding seeds and nuts.          7916-0187 [employer] [facility], [address] [place] [address], [place] [address]. All rights reserved. This information is not intended as a substitute for professional medical care. Always follow your healthcare professional's instructions.     Eating a High-Fiber Diet  Fiber is what gives strength and structure to plants. Most grains, beans, vegetables, and fruits contain fiber. Foods rich in fiber are often low in calories and fat, and they fill you up more. They may also reduce your risks for certain health problems. To find out the amount of fiber in canned, packaged, or frozen foods, read the  Nutrition Facts  label. It tells you how much fiber is in a serving.      Types of Fiber and Their Benefits  There are two types of fiber: insoluble and soluble. They both aid digestion and help you maintain a healthy weight.  Insoluble fiber: This is found in whole grains, cereals, certain fruits and vegetables (such as apple skin, corn, and carrots). Insoluble fiber may prevent constipation and reduce the risk of certain types of cancer.   Soluble fiber: This type of fiber is in oats, beans, and certain fruits and vegetables (such as strawberries and peas). Soluble fiber can reduce cholesterol (which may help lower the risk of heart disease), and helps control blood sugar levels.  Look for High-Fiber Foods  Whole-grain breads and cereals: Try to eat 6-8 ounces a day. Include wheat and oat bran cereals, whole-wheat muffins or toast, and corn tortillas in your meals.  Fruits: Try to eat 2 cups a day. Apples, oranges, strawberries, pears, and bananas are good sources. (Note: Fruit juice is low in fiber.)  Vegetables: Try to eat 3 cups a day. Add asparagus, carrots, broccoli, peas, and corn to your  meals.  Legumes (beans): One cup of cooked lentils gives you over 15 grams of fiber. Try navy beans, lentils, and chickpeas.  Seeds:  A small handful of seeds gives you about 3 grams of fiber. Try sunflower seeds.    Keep Track of Your Fiber  A healthy diet includes 31 grams of fiber a day if you have a 2,000-calorie diet. Keep track of how much fiber you eat. Start by reading food labels. Then eat a variety of foods high in fiber. Ask your doctor about supplemental fiber products.            6470-8062 Eliane Thomas, [address] [place] [address]. All rights reserved. This information is not intended as a substitute for professional medical care. Always follow your healthcare professional's instructions.     Understanding Colon and Rectal Polyps     The colon has a smooth lining composed of millions of cells.     The colon (also called the large intestine) is a muscular tube that forms the last part of the digestive tract. It absorbs water and stores food waste. The colon is about 4 to 6 feet long. The rectum is the last 6 inches of the colon. The colon and rectum have a smooth lining composed of millions of cells. Changes in these cells can lead to growths in the colon that can become cancerous and should be removed.     When the Colon Lining Changes  Changes that occur in the cells that line the colon or rectum can lead to growths called polyps. Over a period of years, polyps can turn cancerous. Removing polyps early may prevent cancer from ever forming.      Polyps  Polyps are fleshy clumps of tissue that form on the lining of the colon or rectum. Small polyps are usually benign (not cancerous). However, over time, cells in a polyp can change and become cancerous. The larger a polyp grows, the more likely this is to happen. Also, certain types of polyps known as adenomatous polyps are considered premalignant. This means that they will almost always become cancerous if they re not  removed.          Cancer  Almost all colorectal cancers start when polyp cells begin growing abnormally. As a cancerous tumor grows, it may involve more and more of the colon or rectum. In time, cancer can also grow beyond the colon or rectum and spread to nearby organs or to glands called lymph nodes. The cells can also travel to other parts of the body. This is known as metastasis. The earlier a cancerous tumor is removed, the better the chance of preventing its spread.        [id number] [facility], [address] [place] [address], [place] [address]. All rights reserved. This information is not intended as a substitute for professional medical care. Always follow your healthcare professional's instructions.

## 2022-10-20 LAB
PATH REPORT.COMMENTS IMP SPEC: NORMAL
PATH REPORT.COMMENTS IMP SPEC: NORMAL
PATH REPORT.FINAL DX SPEC: NORMAL
PATH REPORT.GROSS SPEC: NORMAL
PATH REPORT.MICROSCOPIC SPEC OTHER STN: NORMAL
PATH REPORT.RELEVANT HX SPEC: NORMAL
PHOTO IMAGE: NORMAL

## 2022-10-20 PROCEDURE — 88305 TISSUE EXAM BY PATHOLOGIST: CPT | Mod: 26 | Performed by: PATHOLOGY

## 2022-10-23 ENCOUNTER — MYC MEDICAL ADVICE (OUTPATIENT)
Dept: PEDIATRICS | Facility: CLINIC | Age: 67
End: 2022-10-23

## 2022-10-26 ENCOUNTER — VIRTUAL VISIT (OUTPATIENT)
Dept: INTERNAL MEDICINE | Facility: CLINIC | Age: 67
End: 2022-10-26
Payer: MEDICARE

## 2022-10-26 DIAGNOSIS — F41.1 GENERALIZED ANXIETY DISORDER: ICD-10-CM

## 2022-10-26 DIAGNOSIS — G47.00 INSOMNIA, UNSPECIFIED TYPE: ICD-10-CM

## 2022-10-26 DIAGNOSIS — F51.01 PRIMARY INSOMNIA: Primary | ICD-10-CM

## 2022-10-26 PROBLEM — M19.91 LOCALIZED, PRIMARY OSTEOARTHRITIS: Status: ACTIVE | Noted: 2017-12-20

## 2022-10-26 PROBLEM — M79.7 FIBROMYOSITIS: Status: ACTIVE | Noted: 2021-10-09

## 2022-10-26 PROBLEM — M23.40 LOOSE BODY IN KNEE: Status: ACTIVE | Noted: 2019-05-10

## 2022-10-26 PROCEDURE — 99204 OFFICE O/P NEW MOD 45 MIN: CPT | Mod: 95 | Performed by: INTERNAL MEDICINE

## 2022-10-26 RX ORDER — LORAZEPAM 0.5 MG/1
0.5 TABLET ORAL
Qty: 15 TABLET | Refills: 0 | Status: SHIPPED | OUTPATIENT
Start: 2022-10-26 | End: 2022-10-31

## 2022-10-26 RX ORDER — NORTRIPTYLINE HCL 10 MG
10 CAPSULE ORAL AT BEDTIME
Qty: 15 CAPSULE | Refills: 1 | Status: SHIPPED | OUTPATIENT
Start: 2022-10-26 | End: 2022-11-10

## 2022-10-26 RX ORDER — ZOLPIDEM TARTRATE 5 MG/1
5 TABLET ORAL
Qty: 15 TABLET | Refills: 0 | Status: SHIPPED | OUTPATIENT
Start: 2022-10-26 | End: 2022-10-31

## 2022-10-26 NOTE — PROGRESS NOTES
"Mar is a 67 year old who is being evaluated via a billable video visit.      How would you like to obtain your AVS? MyChart  If the video visit is dropped, the invitation should be resent by: Text to cell phone: [phone]  Will anyone else be joining your video visit? No  {If patient encounters technical issues they should call [phone] :[id number]}        {PROVIDER CHARTING PREFERENCE:827647}    Subjective   Mar is a 67 year old accompanied by her ALONE, presenting for the following health issues:  No chief complaint on file.      HPI     {SUPERLIST (Optional):517279}  {additonal problems for provider to add (Optional):893908}    Review of Systems   {ROS COMP (Optional):792540}      Objective           Vitals:  No vitals were obtained today due to virtual visit.    Physical Exam   {video visit exam brief selected:490706::\"GENERAL: Healthy, alert and no distress\",\"EYES: Eyes grossly normal to inspection.  No discharge or erythema, or obvious scleral/conjunctival abnormalities.\",\"RESP: No audible wheeze, cough, or visible cyanosis.  No visible retractions or increased work of breathing.  \",\"SKIN: Visible skin clear. No significant rash, abnormal pigmentation or lesions.\",\"NEURO: Cranial nerves grossly intact.  Mentation and speech appropriate for age.\",\"PSYCH: Mentation appears normal, affect normal/bright, judgement and insight intact, normal speech and appearance well-groomed.\"}    {Diagnostic Test Results (Optional):383013}    {AMBULATORY ATTESTATION (Optional):882182}        Video-Visit Details    Video Start Time: {video visit start/end time for provider to select:885804}    Type of service:  Video Visit    Video End Time:{video visit start/end time for provider to select:107437}    Originating Location (pt. Location): Home    {PROVIDER LOCATION On-site should be selected for visits conducted from your clinic location or adjoining [facility] hospital, academic office, or other nearby [facility] building. Off-site " should be selected for all other provider locations, including home:280065}    Distant Location (provider location):  On-site    Platform used for Video Visit: Kirsty

## 2022-10-26 NOTE — PATIENT INSTRUCTIONS
Trial of Ambien 5 mg bedtime it as needed    Trial of nortriptyline 10 mg as needed    Trial of lorazepam 0.5 mg as needed    MyChart communication initiated    Consider Adderall or bupropion as culprits

## 2022-10-26 NOTE — PROGRESS NOTES
Mar is a 67 year old female contacting the clinic today via video, who will use the platform: SkemA for the visit.  Phone # for Doximity, or if Amwell drops:   Telephone Information:   Mobile [phone]          ASSESSMENT and PLAN:  1. Primary insomnia  Combination of sleep onset and sleep maintenance.  Provide zolpidem, lorazepam and lower dose, and nortriptyline to try.  We will send American Society of sleep medicine recommendations  - zolpidem (AMBIEN) 5 MG tablet; Take 1 tablet (5 mg) by mouth nightly as needed for sleep  Dispense: 15 tablet; Refill: 0  - LORazepam (ATIVAN) 0.5 MG tablet; Take 1 tablet (0.5 mg) by mouth nightly as needed for sleep  Dispense: 15 tablet; Refill: 0  - nortriptyline (PAMELOR) 10 MG capsule; Take 1 capsule (10 mg) by mouth At Bedtime  Dispense: 15 capsule; Refill: 1    2. Generalized anxiety disorder  Stable and reports no worsening symptoms.  No interaction of bupropion or Adderall per her observations    3. Insomnia, unspecified type  As above  - LORazepam (ATIVAN) 0.5 MG tablet; Take 1 tablet (0.5 mg) by mouth nightly as needed for sleep  Dispense: 15 tablet; Refill: 0    Health maintenance otherwise up-to-date and reviewed     Patient Instructions   Trial of Ambien 5 mg bedtime it as needed    Trial of nortriptyline 10 mg as needed    Trial of lorazepam 0.5 mg as needed    MyChart communication initiated    Consider Adderall or bupropion as culprits            Return in about 6 months (around 4/26/2023).       CHIEF COMPLAINT:  Chief Complaint   Patient presents with     Recheck Medication     PT REPORTS RECENT PHYSICAL WITH PRIMARY A FEW MONTHS AGO. PT TRIED TRAZADONE FOR A FEW WEEKS AND DIDN'T LIKE IT NOW USING LORAZAPAM NEEDS NEW PRESCRIBTION FOR LORAZAPAM.       HPI    HISTORY OF PRESENT ILLNESS:  Mar is a 67 year old female contacting the clinic today via video for complaints of insomnia.  She contacted her primary clinic requesting lorazepam which was  "prescribed several years ago and effective.  She took 1/2 tablet every few weeks up to once a month.   reviewed shows no recent lorazepam.  She does take bupropion and Adderall, but on different doses or on days when she has not taken them she notes no change in sleeping.  She has trouble both with sleep onset and sleep maintenance.  This has been going on for many years.  Lorazepam was effective.  She recalls that she tried Ambien many years ago and does not recall her response.  No significant change in anxiety or depressive type symptoms.  Otherwise feels completely well    REVIEW OF SYSTEMS:   No nocturia    PFSH:  Social History     Social History Narrative    [place] in [place]       TOBACCO USE:  History   Smoking Status     Former     Packs/day: 0.00     Types: Cigarettes   Smokeless Tobacco     Never       VITALS:  There were no vitals filed for this visit.  LMP  (LMP Unknown)  Estimated body mass index is 18.72 kg/m  as calculated from the following:    Height as of 10/19/22: 1.676 m (5' 6\").    Weight as of 10/19/22: 52.6 kg (116 lb).    PHYSICAL EXAM:  (observations via Video)  Alert and oriented.  Briefly visualized in her car then lost sound.  Visit completed by phone    MEDICATIONS:   Current Outpatient Medications   Medication Sig Dispense Refill     amoxicillin (AMOXIL) 500 MG capsule Patient takes prior to dentist appointments.       amphetamine-dextroamphetamine (ADDERALL XR) 25 MG 24 hr capsule Take 1 capsule (25 mg) by mouth daily for 30 days 30 capsule 0     [START ON 11/10/2022] amphetamine-dextroamphetamine (ADDERALL XR) 25 MG 24 hr capsule Take 1 capsule (25 mg) by mouth daily for 30 days 30 capsule 0     amphetamine-dextroamphetamine (ADDERALL XR) 25 MG 24 hr capsule Take 1 capsule (25 mg) by mouth every morning 90 capsule 0     bisacodyl (DULCOLAX) 5 MG EC tablet Take 2 tablets at 3 pm the day before your procedure. If your procedure is before 11 am, take 2 additional tablets at 8 pm. If " your procedure is after 11 am, take 2 additional tablets at 6 am. For additional instructions refer to your colonoscopy prep instructions. 4 tablet 0     buPROPion (WELLBUTRIN XL) 150 MG 24 hr tablet TAKE 1 TABLET(150 MG) BY MOUTH EVERY MORNING 90 tablet 3     Fexofenadine HCl (ALLEGRA PO) Take by mouth daily as needed for allergies        fluorouracil (EFUDEX) 5 % external cream APPLY SPARINGLY EXTERNALLY TO FACE EVERY SUNDAY AT NIGHT. AVOID EYES AND LIPS       fluticasone (FLONASE) 50 MCG/ACT nasal spray SHAKE LIQUID AND USE 2 SPRAYS IN EACH NOSTRIL DAILY 48 mL 2     hydrocortisone 2.5 % cream APPLY TO THE FACE THREE TIMES DAILY FOR 3 DAYS AFTER TREATMENT TO CALM REACTION.       ibuprofen (ADVIL/MOTRIN) 800 MG tablet Take 800 mg by mouth daily        loratadine (CLARITIN) 10 MG tablet Take 10 mg by mouth daily as needed for allergies       LORazepam (ATIVAN) 0.5 MG tablet Take 1 tablet (0.5 mg) by mouth nightly as needed for sleep 15 tablet 0     nortriptyline (PAMELOR) 10 MG capsule Take 1 capsule (10 mg) by mouth At Bedtime 15 capsule 1     polyethylene glycol (GOLYTELY) 236 g suspension The night before the exam at 6 pm drink an 8-ounce glass every 15 minutes until the jug is half empty. If you arrive before 11 AM: Drink the other half of the Golytely jug at 11 PM night before procedure. If you arrive after 11 AM: Drink the other half of the Golytely jug at 6 AM day of procedure. For additional instructions refer to your colonoscopy prep instructions. 4000 mL 0     timolol maleate (TIMOPTIC) 0.5 % ophthalmic solution APPLY 1 DROP TO LESION EVERY NIGHT AT BEDTIME. DO NOT USE ON NORMAL SKIN.       zolpidem (AMBIEN) 5 MG tablet Take 1 tablet (5 mg) by mouth nightly as needed for sleep 15 tablet 0       Outside Notes summarized: MyChart exchange with primary clinic  Labs, x-rays, cardiology, GI tests reviewed: Up-to-date in August  Recent Labs   Lab Test 08/18/22  1145      POTASSIUM 4.5   CR 0.87     No  results found for: [id number]  Lab Results   Component Value Date    CHOL 214 08/18/2022    CHOL 231 08/18/2020     New orders: No orders of the defined types were placed in this encounter.      Independent review of:    Supplemental history by:      Patient has given verbal consent to a Video visit?  Yes  How would you like to obtain your AVS?  MyChart  Will anyone else be joining your video visit? No       Video-Visit Details  Type of service:  Video Visit  Originating Location (pt. Location): Other In her car    Distant Location (provider location):  Off-site    Shane Smith MD  [facility]    Video Start Time: 10:15 AM  Video End time:  10:39 AM  Face to face plus orders:  24 minutes  Documentation time:  3 minutes     The visit lasted a total of 27 minutes        No Yes

## 2022-10-27 DIAGNOSIS — F41.1 GENERALIZED ANXIETY DISORDER: ICD-10-CM

## 2022-10-27 RX ORDER — BUPROPION HYDROCHLORIDE 150 MG/1
TABLET ORAL
Qty: 90 TABLET | Refills: 3 | OUTPATIENT
Start: 2022-10-27

## 2022-10-31 DIAGNOSIS — F51.01 PRIMARY INSOMNIA: ICD-10-CM

## 2022-10-31 RX ORDER — LORAZEPAM 0.5 MG/1
0.5 TABLET ORAL
Qty: 15 TABLET | Refills: 0 | Status: SHIPPED | OUTPATIENT
Start: 2022-10-31 | End: 2023-10-30

## 2022-10-31 RX ORDER — ZOLPIDEM TARTRATE 5 MG/1
5 TABLET ORAL
Qty: 15 TABLET | Refills: 0 | Status: SHIPPED | OUTPATIENT
Start: 2022-10-31 | End: 2023-05-09

## 2022-11-10 ENCOUNTER — OFFICE VISIT (OUTPATIENT)
Dept: FAMILY MEDICINE | Facility: CLINIC | Age: 67
End: 2022-11-10
Payer: MEDICARE

## 2022-11-10 VITALS
DIASTOLIC BLOOD PRESSURE: 82 MMHG | OXYGEN SATURATION: 96 % | RESPIRATION RATE: 20 BRPM | SYSTOLIC BLOOD PRESSURE: 129 MMHG | HEART RATE: 95 BPM | BODY MASS INDEX: 19.03 KG/M2 | TEMPERATURE: 98.6 F | HEIGHT: 66 IN | WEIGHT: 118.4 LBS

## 2022-11-10 DIAGNOSIS — M79.672 LEFT FOOT PAIN: ICD-10-CM

## 2022-11-10 DIAGNOSIS — Z01.818 PREOP GENERAL PHYSICAL EXAM: Primary | ICD-10-CM

## 2022-11-10 PROCEDURE — 99214 OFFICE O/P EST MOD 30 MIN: CPT | Performed by: NURSE PRACTITIONER

## 2022-11-10 PROCEDURE — 93000 ELECTROCARDIOGRAM COMPLETE: CPT | Performed by: NURSE PRACTITIONER

## 2022-11-10 ASSESSMENT — PAIN SCALES - GENERAL: PAINLEVEL: MODERATE PAIN (5)

## 2022-11-10 NOTE — PATIENT INSTRUCTIONS
Preparing for Your Surgery  Getting started  A nurse will call you to review your health history and instructions. They will give you an arrival time based on your scheduled surgery time. Please be ready to share:    Your doctor s clinic name and phone number    Your medical, surgical, and anesthesia history    A list of allergies and sensitivities    A list of medicines, including herbal treatments and over-the-counter drugs    Whether the patient has a legal guardian (ask how to send us the papers in advance)  Please tell us if you re pregnant--or if there s any chance you might be pregnant. Some surgeries may injure a fetus (unborn baby), so they require a pregnancy test. Surgeries that are safe for a fetus don t always need a test, and you can choose whether to have one.   If you have a child who s having surgery, please ask for a copy of Preparing for Your Child s Surgery.    Preparing for surgery    Within 10 to 30 days of surgery: Have a pre-op exam (sometimes called an H&P, or History and Physical). This can be done at a clinic or pre-operative center.  ? If you re having a , you may not need this exam. Talk to your care team.    At your pre-op exam, talk to your care team about all medicines you take. If you need to stop any medicines before surgery, ask when to start taking them again.  ? We do this for your safety. Many medicines can make you bleed too much during surgery. Some change how well surgery (anesthesia) drugs work.    Call your insurance company to let them know you re having surgery. (If you don t have insurance, call [phone].)    Call your clinic if there s any change in your health. This includes signs of a cold or flu (sore throat, runny nose, cough, rash, fever). It also includes a scrape or scratch near the surgery site.    If you have questions on the day of surgery, call your hospital or surgery center.  COVID testing  You may need to be tested for COVID-19 before having  surgery. If so, we will give you instructions (or click here).  Eating and drinking guidelines  For your safety: Unless your surgeon tells you otherwise, follow the guidelines below.    Eat and drink as usual until 8 hours before you arrive for surgery. After that, no food or milk.    Drink clear liquids until 2 hours before you arrive. These are liquids you can see through, like water, Gatorade, and Propel Water. They also include plain black coffee and tea (no cream or milk), candy, and breath mints. You can spit out gum when you arrive.    If you drink alcohol: Stop drinking it the night before surgery.    If your care team tells you to take medicine on the morning of surgery, it s okay to take it with a sip of water.  Preventing infection    Shower or bathe the night before and morning of your surgery. Follow the instructions your clinic gave you. (If no instructions, use regular soap.)    Don t shave or clip hair near your surgery site. We ll remove the hair if needed.    Don t smoke or vape the morning of surgery. You may chew nicotine gum up to 2 hours before surgery. A nicotine patch is okay.  ? Note: Some surgeries require you to completely quit smoking and nicotine. Check with your surgeon.    Your care team will make every effort to keep you safe from infection. We will:  ? Clean our hands often with soap and water (or an alcohol-based hand rub).  ? Clean the skin at your surgery site with a special soap that kills germs.  ? Give you a special gown to keep you warm. (Cold raises the risk of infection.)  ? Wear special hair covers, masks, gowns and gloves during surgery.  ? Give antibiotic medicine, if prescribed. Not all surgeries need antibiotics.  What to bring on the day of surgery    Photo ID and insurance card    Copy of your health care directive, if you have one    Glasses and hearing aids (bring cases)  ? You can t wear contacts during surgery    Inhaler and eye drops, if you use them (tell us  about these when you arrive)    CPAP machine or breathing device, if you use them    A few personal items, if spending the night    If you have . . .  ? A pacemaker, ICD (cardiac defibrillator) or other implant: Bring the ID card.  ? An implanted stimulator: Bring the remote control.  ? A legal guardian: Bring a copy of the certified (court-stamped) guardianship papers.  Please remove any jewelry, including body piercings. Leave jewelry and other valuables at home.  If you re going home the day of surgery    You must have a responsible adult drive you home. They should stay with you overnight as well.    If you don t have someone to stay with you, and you aren t safe to go home alone, we may keep you overnight. Insurance often won t pay for this.  After surgery  If it s hard to control your pain or you need more pain medicine, please call your surgeon s office.  Questions?   If you have any questions for your care team, list them here:   ____________________________________________________________________________________________________________________________________________________________________________________________________________________________________________________________________  For informational purposes only. Not to replace the advice of your health care provider. Copyright   2003, 2019 [employer]. All rights reserved. Clinically reviewed by Judy Amaral MD. [employer] [id number] - REV 10/22.

## 2022-11-10 NOTE — PROGRESS NOTES
[address], [address]  [facility] [address]  Phone: [phone]  Primary Provider: Gail Crespo Ra  Pre-op Performing Provider: MARLENI CROUCH      PREOPERATIVE EVALUATION:  Today's date: 11/10/2022    Mar Zeng is a 67 year old female who presents for a preoperative evaluation.    Surgical Information:  Surgery/Procedure: Tendon Extension and lump removal Left Foot  Surgery Location: [facility]  Surgeon: Shane Martinez MD  Surgery Date: 11/23/22  Time of Surgery: TBD  Where patient plans to recover: At home with family  Fax number for surgical facility: [phone]    Type of Anesthesia Anticipated: to be determined    Assessment & Plan     The proposed surgical procedure is considered INTERMEDIATE risk.    (Z01.818) Preop general physical exam  (primary encounter diagnosis)  Comment: ok for surgery. No concerns   Plan: EKG 12-lead complete w/read - Clinics            (M79.002) Left foot pain  Comment:   Plan:            Risks and Recommendations:  The patient has the following additional risks and recommendations for perioperative complications:   - No identified additional risk factors other than previously addressed    Medication Instructions:  She will not take anything morning of surgery     RECOMMENDATION:  APPROVAL GIVEN to proceed with proposed procedure, without further diagnostic evaluation.        Subjective     HPI related to upcoming procedure: going for left foot surgery  Feeling well lately without concerns. No cp, sob       Preop Questions 11/10/2022   1. Have you ever had a heart attack or stroke? No   2. Have you ever had surgery on your heart or blood vessels, such as a stent placement, a coronary artery bypass, or surgery on an artery in your head, neck, heart, or legs? No   3. Do you have chest pain with activity? No   4. Do you have a history of  heart failure? No   5. Do you currently have a cold,  bronchitis or symptoms of other infection? No   6. Do you have a cough, shortness of breath, or wheezing? No   7. Do you or anyone in your family have previous history of blood clots? No   8. Do you or does anyone in your family have a serious bleeding problem such as prolonged bleeding following surgeries or cuts? No   9. Have you ever had problems with anemia or been told to take iron pills? No   10. Have you had any abnormal blood loss such as black, tarry or bloody stools, or abnormal vaginal bleeding? No   11. Have you ever had a blood transfusion? No   12. Are you willing to have a blood transfusion if it is medically needed before, during, or after your surgery? Yes   13. Have you or any of your relatives ever had problems with anesthesia? No   14. Do you have sleep apnea, excessive snoring or daytime drowsiness? No   15. Do you have any artifical heart valves or other implanted medical devices like a pacemaker, defibrillator, or continuous glucose monitor? No   16. Do you have artificial joints? YES -    17. Are you allergic to latex? No     Health Care Directive:  Patient does not have a Health Care Directive or Living Will:     Preoperative Review of :   reviewed - controlled substances reflected in medication list.      Status of Chronic Conditions:  See problem list for active medical problems.  Problems all longstanding and stable, except as noted/documented.  See ROS for pertinent symptoms related to these conditions.      Review of Systems  Constitutional, neuro, ENT, endocrine, pulmonary, cardiac, gastrointestinal, genitourinary, musculoskeletal, integument and psychiatric systems are negative, except as otherwise noted.    Patient Active Problem List    Diagnosis Date Noted     Attention deficit hyperactivity disorder (ADHD), combined type 12/09/2021     Priority: Medium     Fibromyositis 10/09/2021     Priority: Medium     Loose body in knee 05/10/2019     Priority: Medium     Localized,  primary osteoarthritis 12/20/2017     Priority: Medium     Generalized anxiety disorder 06/08/2015     Priority: Medium     Diagnosis updated by automated process. Provider to review and confirm.       Hematoma of rectus sheath 01/23/2014     Priority: Medium     Seasonal allergic rhinitis 08/30/2013     Priority: Medium     Advanced directives, counseling/discussion 01/22/2013     Priority: Medium     6/9/15 Advance Care Planning invitation sent. SABRINA Carbajal RN      Discussed advance care planning with patient; however, patient declined at this time. 1/22/2013          Multiple pigmented nevi 12/20/2012     Priority: Medium     CARDIOVASCULAR SCREENING; LDL GOAL LESS THAN 160 03/12/2012     Priority: Medium     Postmenopausal HRT (hormone replacement therapy) 12/06/2011     Priority: Medium     Formatting of this note might be different from the original.  Started on Vivelle dot 11/10       Insomnia 02/11/2009     Priority: Medium     Macrodactylia of toes 09/20/2007     Priority: Medium     Formatting of this note might be different from the original.  ELONGATED METATARSALS       Fibromyalgia 07/10/2007     Priority: Medium     Other disorders of bone and cartilage(733.99) 06/29/2007     Priority: Medium     Formatting of this note might be different from the original.  LATERAL LEFT FOOT  ; SESAMOIDITIS       Ingrowing nail 03/06/2007     Priority: Medium     Formatting of this note might be different from the original.  MEDIAL LEFT HALLUX       Hammer toe, acquired 12/22/2006     Priority: Medium     Formatting of this note might be different from the original.  Other hammer toe (acquired)       Plantar fascial fibromatosis 12/22/2006     Priority: Medium     Talipes cavus 12/22/2006     Priority: Medium     Benign neoplasm of colon 05/03/2006     Priority: Medium     Migraine 05/03/2006     Priority: Medium     Formatting of this note might be different from the original.  Epic        Past Medical History:    Diagnosis Date     Mumps      NO ACTIVE PROBLEMS      Past Surgical History:   Procedure Laterality Date     COLONOSCOPY  8/23/2016    Dr. Shaw [facility]     COLONOSCOPY N/A 8/23/2016    Procedure: COLONOSCOPY;  Surgeon: Peter Shaw MD;  Location:  GI     COLONOSCOPY N/A 10/19/2022    Procedure: COLONOSCOPY with polypectomy using cold exacto snare;  Surgeon: Peter Shaw MD;  Location:  GI     FINGER SURGERY      right little finger joint replacement     HYSTERECTOMY VAGINAL  age 30    retained ovaries. no cervix      HYSTERECTOMY, PAP NO LONGER INDICATED       ORTHOPEDIC SURGERY       SURGICAL HISTORY OF -   10/25/13    left index cyst removal     SURGICAL HISTORY OF -       Bilat knee miniscus repair     Current Outpatient Medications   Medication Sig Dispense Refill     amoxicillin (AMOXIL) 500 MG capsule Patient takes prior to dentist appointments.       amphetamine-dextroamphetamine (ADDERALL XR) 25 MG 24 hr capsule Take 1 capsule (25 mg) by mouth daily for 30 days 30 capsule 0     amphetamine-dextroamphetamine (ADDERALL XR) 25 MG 24 hr capsule Take 1 capsule (25 mg) by mouth every morning 90 capsule 0     buPROPion (WELLBUTRIN XL) 150 MG 24 hr tablet TAKE 1 TABLET(150 MG) BY MOUTH EVERY MORNING 90 tablet 3     Fexofenadine HCl (ALLEGRA PO) Take by mouth daily as needed for allergies        fluorouracil (EFUDEX) 5 % external cream APPLY SPARINGLY EXTERNALLY TO FACE EVERY SUNDAY AT NIGHT. AVOID EYES AND LIPS       LORazepam (ATIVAN) 0.5 MG tablet Take 1 tablet (0.5 mg) by mouth nightly as needed for sleep 15 tablet 0     timolol maleate (TIMOPTIC) 0.5 % ophthalmic solution APPLY 1 DROP TO LESION EVERY NIGHT AT BEDTIME. DO NOT USE ON NORMAL SKIN.       zolpidem (AMBIEN) 5 MG tablet Take 1 tablet (5 mg) by mouth nightly as needed for sleep 15 tablet 0     bisacodyl (DULCOLAX) 5 MG EC tablet Take 2 tablets at 3 pm the day before your procedure. If your procedure is before 11 am, take 2 additional  tablets at 8 pm. If your procedure is after 11 am, take 2 additional tablets at 6 am. For additional instructions refer to your colonoscopy prep instructions. (Patient not taking: Reported on 11/10/2022) 4 tablet 0     fluticasone (FLONASE) 50 MCG/ACT nasal spray SHAKE LIQUID AND USE 2 SPRAYS IN EACH NOSTRIL DAILY (Patient not taking: Reported on 11/10/2022) 48 mL 2     hydrocortisone 2.5 % cream APPLY TO THE FACE THREE TIMES DAILY FOR 3 DAYS AFTER TREATMENT TO CALM REACTION. (Patient not taking: Reported on 11/10/2022)       ibuprofen (ADVIL/MOTRIN) 800 MG tablet Take 800 mg by mouth daily  (Patient not taking: Reported on 11/10/2022)       loratadine (CLARITIN) 10 MG tablet Take 10 mg by mouth daily as needed for allergies (Patient not taking: Reported on 11/10/2022)       nortriptyline (PAMELOR) 10 MG capsule Take 1 capsule (10 mg) by mouth At Bedtime (Patient not taking: Reported on 11/10/2022) 15 capsule 1     polyethylene glycol (GOLYTELY) 236 g suspension The night before the exam at 6 pm drink an 8-ounce glass every 15 minutes until the jug is half empty. If you arrive before 11 AM: Drink the other half of the Golytely jug at 11 PM night before procedure. If you arrive after 11 AM: Drink the other half of the Golytely jug at 6 AM day of procedure. For additional instructions refer to your colonoscopy prep instructions. (Patient not taking: Reported on 11/10/2022) 4000 mL 0       Allergies   Allergen Reactions     Codeine Itching     Concerta [Methylphenidate] Itching     Hay Fever & [A.R.M.]         Social History     Tobacco Use     Smoking status: Former     Packs/day: 0.00     Types: Cigarettes     Smokeless tobacco: Never   Substance Use Topics     Alcohol use: Yes     Alcohol/week: 0.0 standard drinks     Comment: avg:3-4 drink/wk     Family History   Problem Relation Age of Onset     No Known Problems Mother      Hypertension Father      Cancer Father      Cancer Maternal Grandmother      Cancer  "Maternal Grandfather      Colon Cancer Paternal Grandmother      Cancer Paternal Grandfather      Asthma Brother      Hypertension Brother      No Known Problems Sister      No Known Problems Son      No Known Problems Son      Thyroid Disease Daughter      Colon Cancer Paternal Aunt      Colon Cancer Cousin      History   Drug Use No         Objective     /82 (BP Location: Right arm, Patient Position: Sitting, Cuff Size: Adult Regular)   Pulse 95   Temp 98.6  F (37  C) (Oral)   Resp 20   Ht 1.676 m (5' 6\")   Wt 53.7 kg (118 lb 6.4 oz)   LMP  (LMP Unknown)   SpO2 96%   BMI 19.11 kg/m      Physical Exam    GENERAL APPEARANCE: healthy, alert and no distress     EYES: EOMI, PERRL     RESP: lungs clear to auscultation - no rales, rhonchi or wheezes     CV: regular rates and rhythm, normal S1 S2, no S3 or S4 and no murmur, click or rub     MS: extremities normal- no gross deformities noted, no evidence of inflammation in joints, FROM in all extremities.     SKIN: no suspicious lesions or rashes     NEURO: Normal strength and tone, sensory exam grossly normal, mentation intact and speech normal     PSYCH: mentation appears normal. and affect normal/bright    Recent Labs   Lab Test 08/18/22  1145      POTASSIUM 4.5   CR 0.87        Diagnostics:  No labs were ordered during this visit.   EKG: appears normal, NSR, normal axis, normal intervals, no acute ST/T changes c/w ischemia, no LVH by voltage criteria    Revised Cardiac Risk Index (RCRI):  The patient has the following serious cardiovascular risks for perioperative complications:   - No serious cardiac risks = 0 points     RCRI Interpretation: 0 points: Class I (very low risk - 0.4% complication rate)           Signed Electronically by: DUKE Hendrix CNP  Copy of this evaluation report is provided to requesting physician.      "

## 2022-12-19 ENCOUNTER — TELEPHONE (OUTPATIENT)
Dept: FAMILY MEDICINE | Facility: CLINIC | Age: 67
End: 2022-12-19

## 2022-12-19 DIAGNOSIS — F90.2 ATTENTION DEFICIT HYPERACTIVITY DISORDER (ADHD), COMBINED TYPE: ICD-10-CM

## 2022-12-19 NOTE — TELEPHONE ENCOUNTER
Patient called with a question about their ADDERALL prescription. The patient is spending January and February in [place] and they are wondering if they could get a 3 month refill of their adderall. Are we able to do this? Routing to PCP; will call patient back with answer.    Minnie Coyne

## 2022-12-20 DIAGNOSIS — F41.1 GENERALIZED ANXIETY DISORDER: ICD-10-CM

## 2022-12-20 RX ORDER — BUPROPION HYDROCHLORIDE 150 MG/1
TABLET ORAL
Qty: 90 TABLET | Refills: 2 | Status: SHIPPED | OUTPATIENT
Start: 2022-12-20 | End: 2023-05-09

## 2022-12-20 RX ORDER — DEXTROAMPHETAMINE SACCHARATE, AMPHETAMINE ASPARTATE MONOHYDRATE, DEXTROAMPHETAMINE SULFATE AND AMPHETAMINE SULFATE 6.25; 6.25; 6.25; 6.25 MG/1; MG/1; MG/1; MG/1
25 CAPSULE, EXTENDED RELEASE ORAL EVERY MORNING
Qty: 90 CAPSULE | Refills: 0 | Status: SHIPPED | OUTPATIENT
Start: 2022-12-20 | End: 2023-05-01

## 2022-12-20 NOTE — TELEPHONE ENCOUNTER
That will depend on her insurance.  I can do it but am unsure if they will cover it.  Let me know what she finds out and if she wants me to send it.  LITO

## 2022-12-20 NOTE — TELEPHONE ENCOUNTER
Patient called back. She says her insurance will cover a 90 day supply. Patient also wanted to confirm that this will be sent to the [facility] on [address].    Minnie Coyne

## 2022-12-20 NOTE — TELEPHONE ENCOUNTER
Patient calling back.  Advised of below.  She will call her insurance.  She will call back after speaking to her insurance.  Adelina Kowalski RN

## 2022-12-20 NOTE — TELEPHONE ENCOUNTER
Has refills, changing pharmacies  Prescription approved per [facility] Refill Protocol.    Giovanna Chambers RN, BSN  [facility]

## 2023-05-01 ENCOUNTER — TELEPHONE (OUTPATIENT)
Dept: FAMILY MEDICINE | Facility: CLINIC | Age: 68
End: 2023-05-01
Payer: MEDICARE

## 2023-05-01 DIAGNOSIS — F90.2 ATTENTION DEFICIT HYPERACTIVITY DISORDER (ADHD), COMBINED TYPE: ICD-10-CM

## 2023-05-01 RX ORDER — DEXTROAMPHETAMINE SACCHARATE, AMPHETAMINE ASPARTATE MONOHYDRATE, DEXTROAMPHETAMINE SULFATE AND AMPHETAMINE SULFATE 6.25; 6.25; 6.25; 6.25 MG/1; MG/1; MG/1; MG/1
25 CAPSULE, EXTENDED RELEASE ORAL EVERY MORNING
Qty: 90 CAPSULE | Refills: 0 | Status: SHIPPED | OUTPATIENT
Start: 2023-05-01 | End: 2023-05-09

## 2023-05-01 NOTE — TELEPHONE ENCOUNTER
Patient called to request a refill of their ADDERALL XR. The patient reports that they are almost out of the medication.    Minnie Coyne

## 2023-05-01 NOTE — TELEPHONE ENCOUNTER
LVM for patient to call back. Let them know that their prescription has been refilled and schedule med check (virtual ok).    Minnie Coyne

## 2023-05-01 NOTE — TELEPHONE ENCOUNTER
"PDMP Review       Value Time User    State PDMP site checked  Yes 5/1/2023  4:43 PM Giovanna Lopez MD          8/18/22 last check up   12/20/22 LITO note says  \"She will need an appt with me in the spring when she returns.\"      Refilled but please call to schedule med recheck with LITO.   "

## 2023-05-09 ENCOUNTER — VIRTUAL VISIT (OUTPATIENT)
Dept: FAMILY MEDICINE | Facility: CLINIC | Age: 68
End: 2023-05-09
Payer: MEDICARE

## 2023-05-09 DIAGNOSIS — F41.1 GENERALIZED ANXIETY DISORDER: ICD-10-CM

## 2023-05-09 DIAGNOSIS — R61 GENERALIZED HYPERHIDROSIS: Primary | ICD-10-CM

## 2023-05-09 DIAGNOSIS — F90.2 ATTENTION DEFICIT HYPERACTIVITY DISORDER (ADHD), COMBINED TYPE: ICD-10-CM

## 2023-05-09 PROCEDURE — 99214 OFFICE O/P EST MOD 30 MIN: CPT | Mod: VID | Performed by: NURSE PRACTITIONER

## 2023-05-09 RX ORDER — DEXTROAMPHETAMINE SACCHARATE, AMPHETAMINE ASPARTATE MONOHYDRATE, DEXTROAMPHETAMINE SULFATE AND AMPHETAMINE SULFATE 6.25; 6.25; 6.25; 6.25 MG/1; MG/1; MG/1; MG/1
25 CAPSULE, EXTENDED RELEASE ORAL EVERY MORNING
Qty: 90 CAPSULE | Refills: 0 | Status: SHIPPED | OUTPATIENT
Start: 2023-08-01 | End: 2023-08-10

## 2023-05-09 RX ORDER — ALUMINUM CHLORIDE 20 %
SOLUTION, NON-ORAL TOPICAL AT BEDTIME
Qty: 60 ML | Refills: 0 | Status: SHIPPED | OUTPATIENT
Start: 2023-05-09 | End: 2023-11-19

## 2023-05-09 RX ORDER — BUPROPION HYDROCHLORIDE 150 MG/1
TABLET ORAL
Qty: 90 TABLET | Refills: 2 | Status: SHIPPED | OUTPATIENT
Start: 2023-05-09 | End: 2023-11-19

## 2023-05-09 NOTE — PROGRESS NOTES
Mar is a 67 year old who is being evaluated via a billable video visit.      How would you like to obtain your AVS? MyChart  If the video visit is dropped, the invitation should be resent by: Text to cell phone: [phone]  Will anyone else be joining your video visit? No        Assessment & Plan     Generalized hyperhidrosis  Trial drysol, discussed monitoring for skin irritation.  She does not wish to make any other med changes at this time, but is aware that they may be contributing to the sweating.  - aluminum chloride (DRYSOL) 20 % external solution; Apply topically At Bedtime    Generalized anxiety disorder  Stable.  Refilled.  - buPROPion (WELLBUTRIN XL) 150 MG 24 hr tablet; TAKE 1 TABLET(150 MG) BY MOUTH EVERY MORNING    Attention deficit hyperactivity disorder (ADHD), combined type  Stable.  Request an additional 3 months when needed.  Visit in 6 months.  - amphetamine-dextroamphetamine (ADDERALL XR) 25 MG 24 hr capsule; Take 1 capsule (25 mg) by mouth every morning Due for med recheck      DUKE Esquivel Ra [facility]   Mar is a 67 year old, presenting for the following health issues:  No chief complaint on file.        5/9/2023     1:05 PM   Additional Questions   Roomed by mariaelena travis         5/9/2023     1:05 PM   Patient Reported Additional Medications   Patient reports taking the following new medications none     History of Present Illness       Reason for visit:  Med follow up    She eats 4 or more servings of fruits and vegetables daily.She consumes 0 sweetened beverage(s) daily.She exercises with enough effort to increase her heart rate 30 to 60 minutes per day.  She exercises with enough effort to increase her heart rate 5 days per week.   She is taking medications regularly.       Medication Followup of Adderall XR 25mg    Taking Medication as prescribed: yes    Side Effects:  None    Medication Helping Symptoms:  yes    Mood is  stable.  Taking wellbutrin without problem.  No change desired.    adderall working well.  No change desired.  No side effects.  Chronic sleep issues, not new with stimulant.    She does note she has a lot of sweating in her axillary areas.  Tried otc remedies without relief.      Review of Systems   Constitutional, HEENT, cardiovascular, pulmonary, gi and gu systems are negative, except as otherwise noted.      Objective           Vitals:  No vitals were obtained today due to virtual visit.    Physical Exam   GENERAL: Healthy, alert and no distress  EYES: Eyes grossly normal to inspection.  No discharge or erythema, or obvious scleral/conjunctival abnormalities.  RESP: No audible wheeze, cough, or visible cyanosis.  No visible retractions or increased work of breathing.    SKIN: Visible skin clear. No significant rash, abnormal pigmentation or lesions.  NEURO: Cranial nerves grossly intact.  Mentation and speech appropriate for age.  PSYCH: Mentation appears normal, affect normal/bright, judgement and insight intact, normal speech and appearance well-groomed.                Video-Visit Details    Type of service:  Video Visit   Video Start Time: 1:28pm  Video End Time:1:35pm    Originating Location (pt. Location): Home  Distant Location (provider location):  On-site  Platform used for Video Visit: Ketty

## 2023-06-05 ENCOUNTER — OFFICE VISIT (OUTPATIENT)
Dept: FAMILY MEDICINE | Facility: CLINIC | Age: 68
End: 2023-06-05
Payer: MEDICARE

## 2023-06-05 VITALS
DIASTOLIC BLOOD PRESSURE: 74 MMHG | OXYGEN SATURATION: 100 % | TEMPERATURE: 98.2 F | WEIGHT: 116.3 LBS | HEART RATE: 98 BPM | BODY MASS INDEX: 18.69 KG/M2 | RESPIRATION RATE: 16 BRPM | HEIGHT: 66 IN | SYSTOLIC BLOOD PRESSURE: 131 MMHG

## 2023-06-05 DIAGNOSIS — L60.8 SPLINTER HEMORRHAGE OF TOENAIL: Primary | ICD-10-CM

## 2023-06-05 PROCEDURE — 99213 OFFICE O/P EST LOW 20 MIN: CPT

## 2023-06-05 ASSESSMENT — ANXIETY QUESTIONNAIRES
2. NOT BEING ABLE TO STOP OR CONTROL WORRYING: NOT AT ALL
7. FEELING AFRAID AS IF SOMETHING AWFUL MIGHT HAPPEN: NOT AT ALL
5. BEING SO RESTLESS THAT IT IS HARD TO SIT STILL: SEVERAL DAYS
6. BECOMING EASILY ANNOYED OR IRRITABLE: NOT AT ALL
IF YOU CHECKED OFF ANY PROBLEMS ON THIS QUESTIONNAIRE, HOW DIFFICULT HAVE THESE PROBLEMS MADE IT FOR YOU TO DO YOUR WORK, TAKE CARE OF THINGS AT HOME, OR GET ALONG WITH OTHER PEOPLE: NOT DIFFICULT AT ALL
3. WORRYING TOO MUCH ABOUT DIFFERENT THINGS: NOT AT ALL
7. FEELING AFRAID AS IF SOMETHING AWFUL MIGHT HAPPEN: NOT AT ALL
8. IF YOU CHECKED OFF ANY PROBLEMS, HOW DIFFICULT HAVE THESE MADE IT FOR YOU TO DO YOUR WORK, TAKE CARE OF THINGS AT HOME, OR GET ALONG WITH OTHER PEOPLE?: NOT DIFFICULT AT ALL
GAD7 TOTAL SCORE: 2
1. FEELING NERVOUS, ANXIOUS, OR ON EDGE: NOT AT ALL
4. TROUBLE RELAXING: SEVERAL DAYS
GAD7 TOTAL SCORE: 2

## 2023-06-05 NOTE — ASSESSMENT & PLAN NOTE
Physical exam is not consistent with a wart, so deferred treatment of such today. Second provider also assessed the area. Discolorations at the distal end of the nail do appear most consistent with a splinter hemorrhage. Patient does not remember any trauma. Does not seem consistent with a more concerning process such as melanoma, however patient has had history of skin cancer and has planned follow up with her dermatologist next month and I encouraged her to have them assess the area as well. Recommend epsom salts 1-2x daily for discomfort as she does have some callus at the end of the toe. Follow up with PCP if worsening or not improving. Patient expressed a comfort with and understanding of this plan. All questions were answered.

## 2023-06-05 NOTE — PROGRESS NOTES
Assessment & Plan   Problem List Items Addressed This Visit        Musculoskeletal and Integumentary    Splinter hemorrhage of toenail - Primary     Physical exam is not consistent with a wart, so deferred treatment of such today. Second provider also assessed the area. Discolorations at the distal end of the nail do appear most consistent with a splinter hemorrhage. Patient does not remember any trauma. Does not seem consistent with a more concerning process such as melanoma, however patient has had history of skin cancer and has planned follow up with her dermatologist next month and I encouraged her to have them assess the area as well. Recommend epsom salts 1-2x daily for discomfort as she does have some callus at the end of the toe. Follow up with PCP if worsening or not improving. Patient expressed a comfort with and understanding of this plan. All questions were answered.           DUKE Hinojosa CNP  [facility]AR[facility] Manuel is a 67 year old, presenting for the following health issues:  Mass (Left foot 2nd toe)        6/5/2023     1:20 PM   Additional Questions   Roomed by ac   Accompanied by self     Area of concern is on her left foot on the second digit.  Wonders if it is a wart.  Noticed some changes to the color of her nail, which prompted her visit today.  Thinks that she has some mild fungal disease.  Has been trying Aquaphor and Neosporin at home.  Tender with pressure and walking.    Mass    History of Present Illness       Reason for visit:  Toe  Symptom onset:  More than a month    She eats 2-3 servings of fruits and vegetables daily.She consumes 0 sweetened beverage(s) daily.She exercises with enough effort to increase her heart rate 30 to 60 minutes per day.  She exercises with enough effort to increase her heart rate 6 days per week.   She is taking medications regularly.  Today's TARIQ-7 Score: 2     Review of Systems         Objective    /74 (BP  "Location: Left arm, Patient Position: Sitting, Cuff Size: Adult Regular)   Pulse 98   Temp 98.2  F (36.8  C) (Oral)   Resp 16   Ht 1.676 m (5' 6\")   Wt 52.8 kg (116 lb 4.8 oz)   LMP  (LMP Unknown)   SpO2 100%   BMI 18.77 kg/m    Body mass index is 18.77 kg/m .     Physical Exam  Vitals and nursing note reviewed.   Constitutional:       Appearance: Normal appearance.   Cardiovascular:      Rate and Rhythm: Normal rate and regular rhythm.   Pulmonary:      Effort: Pulmonary effort is normal. No respiratory distress.   Feet:      Comments: Left foot, second digit: thickened toenail with flesh colored callus at the distal end of digit. Thin, brown/red streaks to the distal end of nail consistent with splinter hemorrhage  Mild fungal disease present to big toe nail  Neurological:      Mental Status: She is alert.                            "

## 2023-08-10 ENCOUNTER — PATIENT OUTREACH (OUTPATIENT)
Dept: CARE COORDINATION | Facility: CLINIC | Age: 68
End: 2023-08-10

## 2023-08-10 ENCOUNTER — VIRTUAL VISIT (OUTPATIENT)
Dept: FAMILY MEDICINE | Facility: CLINIC | Age: 68
End: 2023-08-10
Payer: MEDICARE

## 2023-08-10 DIAGNOSIS — F90.2 ATTENTION DEFICIT HYPERACTIVITY DISORDER (ADHD), COMBINED TYPE: ICD-10-CM

## 2023-08-10 DIAGNOSIS — B37.31 CANDIDIASIS OF VAGINA: ICD-10-CM

## 2023-08-10 DIAGNOSIS — J06.9 URI WITH COUGH AND CONGESTION: Primary | ICD-10-CM

## 2023-08-10 PROCEDURE — 99213 OFFICE O/P EST LOW 20 MIN: CPT | Mod: VID | Performed by: NURSE PRACTITIONER

## 2023-08-10 RX ORDER — BENZONATATE 100 MG/1
100 CAPSULE ORAL 3 TIMES DAILY PRN
Qty: 30 CAPSULE | Refills: 0 | Status: SHIPPED | OUTPATIENT
Start: 2023-08-10 | End: 2023-09-08

## 2023-08-10 RX ORDER — DEXTROAMPHETAMINE SACCHARATE, AMPHETAMINE ASPARTATE MONOHYDRATE, DEXTROAMPHETAMINE SULFATE AND AMPHETAMINE SULFATE 6.25; 6.25; 6.25; 6.25 MG/1; MG/1; MG/1; MG/1
25 CAPSULE, EXTENDED RELEASE ORAL EVERY MORNING
Qty: 90 CAPSULE | Refills: 0 | Status: SHIPPED | OUTPATIENT
Start: 2023-08-10 | End: 2024-08-14

## 2023-08-10 RX ORDER — FLUCONAZOLE 150 MG/1
TABLET ORAL
Qty: 1 TABLET | Refills: 0 | Status: SHIPPED | OUTPATIENT
Start: 2023-08-10 | End: 2023-08-11

## 2023-08-10 ASSESSMENT — ENCOUNTER SYMPTOMS
CONSTIPATION: 0
LIGHT-HEADEDNESS: 0
SINUS PRESSURE: 0
BACK PAIN: 1
RHINORRHEA: 0
FEVER: 1
MYALGIAS: 1
DIARRHEA: 1
COUGH: 1
DIAPHORESIS: 1
CHILLS: 1
FATIGUE: 1
EYE DISCHARGE: 0
WHEEZING: 0
EYE ITCHING: 0
HEADACHES: 1
DIZZINESS: 0
SHORTNESS OF BREATH: 1
SORE THROAT: 0
NAUSEA: 0

## 2023-08-10 NOTE — TELEPHONE ENCOUNTER
Sent [employer] message requesting a call back for an appt (med check in November, virtual ok). Two more attempts will be made.     Minnie Man

## 2023-08-10 NOTE — PROGRESS NOTES
Mar is a 67 year old who is being evaluated via a billable video visit.      How would you like to obtain your AVS? Mail a copy  If the video visit is dropped, the invitation should be resent by: Text to cell phone: [phone]  Will anyone else be joining your video visit? No        Assessment & Plan     URI with cough and congestion  Reviewed treatment plan. Explained the antibiotics are not indicated  Reviewed fever and pain control with tylenol and/or ibuprofen  Instructed to call or return for:  --Symptoms not improved in the next 3 days  --Worsening symptom  --New unexplained symptoms develop   - amoxicillin-clavulanate (AUGMENTIN) 875-125 MG tablet; Take 1 tablet by mouth 2 times daily  - benzonatate (TESSALON) 100 MG capsule; Take 1 capsule (100 mg) by mouth 3 times daily as needed for cough    Attention deficit hyperactivity disorder (ADHD), combined type  Chart review completed-last appointment with PCP in May.  Due for follow-up in 6 months.  Encouraged to request refill from PCP.    Candidiasis of vagina  Will treat empirically for vaginal yeast infection.  - fluconazole (DIFLUCAN) 150 MG tablet; Take 1 tab in 5 days and then repeat on the last day of your antibiotic.    Prescription drug management       DUKE Bui CNP  [facility] TAMIKO Manuel is a 67 year old, presenting for the following health issues:  No chief complaint on file.      Cough  Associated symptoms include chills, headaches, myalgias and shortness of breath (with activity). Pertinent negatives include no ear pain, no rhinorrhea, no sore throat and no wheezing.   A.D.H.D  Associated symptoms include chills, coughing, diaphoresis, fatigue, a fever, headaches and myalgias. Pertinent negatives include no nausea, rash or sore throat.   History of Present Illness       Reason for visit:  Upper reapiratory  Symptom onset:  1-2 weeks ago  Symptoms include:  Dark flem  Symptom intensity:   Severe  Symptom progression:  Worsening  Had these symptoms before:  No    She eats 2-3 servings of fruits and vegetables daily.She consumes 0 sweetened beverage(s) daily.She exercises with enough effort to increase her heart rate 60 or more minutes per day.  She exercises with enough effort to increase her heart rate 6 days per week.   She is taking medications regularly.       Please answer the questions below, rating yourself on each of the criteria shown using the scale on the right side of the page. As you answer each question, place an X in the box that best describes how you have felt and conducted yourself over the past 6 months.     Never Rarely Sometimes Often Very Often   1 How often do you have trouble wrapping up the final details of a project once the challenging parts have been done?   x     2 How often do you have difficulty getting things in order when you have to do a task that requires organization?  x      3 How often do you have problems remembering appointments or obligations? x       4 When you have a task that requires a lot of thought, how often do you avoid or delay getting started?  x      5 How often do you fidget or squirm with your hands or feet when you have to sit down for a long time?   x     6 How often do you feel overly active and compelled to do things, like you were driven by a motor?     x   7 How often do you make careless mistakes when you have to work on a boring or difficult project?   x     8 How often do you have difficulty keeping your attention when you are doing boring or repetitive work?     x   9 How often do you have difficulty concentrating on what people say to you, even when they are speaking to you directly?     x   10 How often do you misplace or have difficulty finding things at home or at work?    x    11 How often are you distracted by activity or noise around you?   x     12 How often do you leave your seat in meetings or other situations in which you are  expected to remain seated? x       13 How often do you feel restless or fidgety?    x    14 How often do you have difficulty unwinding and relaxing when you have time to yourself?     x   15 How often do you find yourself talking too much when you are in social situations?  x      16 When you're in a conversation, how often do you find yourself finishing the sentences of the people you are talking to, before they can finish them themselves?  x      17 How often do you have difficulty waiting your turn in situations when turn-taking is required?   x     18 How often do you interrupt others when they are busy? x             Acute Illness  Acute illness concerns: Cough  Onset/Duration: 2 weeks  Symptoms: Dark green, gray, sometimes black phlegm  Fever: YES- 99  Chills/Sweats: YES  Headache (location?): YES- temporal  Sinus Pressure: No  Conjunctivitis:  No  Ear Pain: no  Rhinorrhea: No  Congestion: YES  Sore Throat: No  Cough: YES-productive of green sputum  Wheeze: No  Decreased Appetite: No  Nausea: YES  Vomiting: No  Diarrhea: YES- yesterday  Dysuria/Freq.: No  Dysuria or Hematuria: No  Fatigue/Achiness: YES- fatigue and body ache/back pain  Sick/Strep Exposure: No  Therapies tried and outcome: mucinex, ibuprofen, allegra, claritin, sudafed         Has not been feeling well for the last couple of weeks. Has been on vacation, fishing in [place], and just got home yesterday. Yesterday started with diarrhea. Has cough is bringing up green/brown drainage. Is feeling some short of breath with coughing and if is up and moving around a lot. Cough is keeping up at night. Taking over the counter and that is not really helping. Does have some nausea not vomiting. COVID test yesterday that was negative. No one else that was with is ill. Diarrhea 6 times per day. Is having some lower back pain today. No pain with urination. Urine is not dark. No urinary frequency. Has been trying to drink a lot of water.  Reports every time that  takes an antibiotic gets a yeast infection.    Needs refill of Adderall.    Review of Systems   Constitutional:  Positive for chills, diaphoresis, fatigue and fever.   HENT:  Negative for ear discharge, ear pain, hearing loss, rhinorrhea, sinus pressure and sore throat.    Eyes:  Negative for discharge and itching.   Respiratory:  Positive for cough and shortness of breath (with activity). Negative for wheezing.    Gastrointestinal:  Positive for diarrhea. Negative for constipation and nausea.   Musculoskeletal:  Positive for back pain and myalgias.   Skin:  Negative for rash.   Neurological:  Positive for headaches. Negative for dizziness and light-headedness.          Objective           Vitals:  No vitals were obtained today due to virtual visit.    Physical Exam  Constitutional:       Appearance: Normal appearance.   HENT:      Nose: No congestion.   Eyes:      General: Lids are normal.   Pulmonary:      Effort: No tachypnea, bradypnea or respiratory distress.      Comments: No audible wheezing.  Speaking in full sentences.  Skin:     Coloration: Skin is not ashen, cyanotic, jaundiced or pale.   Neurological:      Mental Status: She is alert.   Psychiatric:         Mood and Affect: Mood normal.         Speech: Speech normal.         Behavior: Behavior normal.                  Video-Visit Details    Type of service:  Video Visit   Video Start Time: 9:13 AM  Video End Time:9:22 AM    Originating Location (pt. Location): Home    Distant Location (provider location):  On-site  Platform used for Video Visit: Ketty

## 2023-08-11 DIAGNOSIS — B37.31 CANDIDIASIS OF VAGINA: ICD-10-CM

## 2023-08-11 NOTE — TELEPHONE ENCOUNTER
Pharmacy calling to state that they need a new script for fluconazole (DIFLUCAN) 150 MG tablet that was filled 8/10/23.      Disp Refills Start End LOUIE   fluconazole (DIFLUCAN) 150 MG tablet 1 tablet 0 8/10/2023  No   Sig: Take 1 tab in 5 days and then repeat on the last day of your antibiotic.   Sent to pharmacy as: Fluconazole 150 MG Oral Tablet (DIFLUCAN)   Class: E-Prescribe   Order: [id number]   E-Prescribing Status: Receipt confirmed by pharmacy (8/10/2023  9:24 AM CDT)       Per current script, pt was to have 2 tablets. Pharmacy is requesting a refill for the second tablet.     Amanda House RN on 8/11/2023 at 3:19 PM

## 2023-08-13 RX ORDER — FLUCONAZOLE 150 MG/1
TABLET ORAL
Qty: 2 TABLET | Refills: 0 | Status: SHIPPED | OUTPATIENT
Start: 2023-08-13 | End: 2023-09-08

## 2023-09-03 ENCOUNTER — OFFICE VISIT (OUTPATIENT)
Dept: URGENT CARE | Facility: URGENT CARE | Age: 68
End: 2023-09-03
Payer: MEDICARE

## 2023-09-03 ENCOUNTER — NURSE TRIAGE (OUTPATIENT)
Dept: NURSING | Facility: CLINIC | Age: 68
End: 2023-09-03

## 2023-09-03 VITALS
TEMPERATURE: 97.5 F | HEART RATE: 89 BPM | DIASTOLIC BLOOD PRESSURE: 84 MMHG | SYSTOLIC BLOOD PRESSURE: 144 MMHG | RESPIRATION RATE: 16 BRPM | OXYGEN SATURATION: 98 %

## 2023-09-03 DIAGNOSIS — J22 LOWER RESP. TRACT INFECTION: Primary | ICD-10-CM

## 2023-09-03 DIAGNOSIS — J22 LOWER RESP. TRACT INFECTION: ICD-10-CM

## 2023-09-03 PROCEDURE — 99214 OFFICE O/P EST MOD 30 MIN: CPT | Performed by: PHYSICIAN ASSISTANT

## 2023-09-03 RX ORDER — FLUCONAZOLE 150 MG/1
150 TABLET ORAL
Qty: 2 TABLET | Refills: 0 | Status: ON HOLD | OUTPATIENT
Start: 2023-09-03 | End: 2023-10-26

## 2023-09-03 RX ORDER — FLUCONAZOLE 150 MG/1
150 TABLET ORAL
Qty: 2 TABLET | Refills: 0 | Status: SHIPPED | OUTPATIENT
Start: 2023-09-03 | End: 2023-09-03

## 2023-09-03 RX ORDER — PREDNISONE 20 MG/1
40 TABLET ORAL DAILY
Qty: 10 TABLET | Refills: 0 | Status: SHIPPED | OUTPATIENT
Start: 2023-09-03 | End: 2023-09-29

## 2023-09-03 RX ORDER — DOXYCYCLINE 100 MG/1
100 CAPSULE ORAL 2 TIMES DAILY
Qty: 20 CAPSULE | Refills: 0 | Status: SHIPPED | OUTPATIENT
Start: 2023-09-03 | End: 2023-09-29

## 2023-09-03 RX ORDER — PREDNISONE 20 MG/1
40 TABLET ORAL DAILY
Qty: 10 TABLET | Refills: 0 | Status: SHIPPED | OUTPATIENT
Start: 2023-09-03 | End: 2023-09-03

## 2023-09-03 RX ORDER — DOXYCYCLINE 100 MG/1
100 CAPSULE ORAL 2 TIMES DAILY
Qty: 20 CAPSULE | Refills: 0 | Status: SHIPPED | OUTPATIENT
Start: 2023-09-03 | End: 2023-09-03

## 2023-09-03 NOTE — PROGRESS NOTES
SUBJECTIVE:  Mar Zeng is a 67 year old female comes in with continued URI related symptoms primarily HAS been present for the past 3 weeks.  Patient was initially seen for this.  She was given Augmentin, Medrol and albuterol.  She states that the symptoms are not any better and chest feels congestion along with some slightly shortness of breath.  She also is now having some bilateral ear pain.  She also does complain of some sinus pain and pain in her ears.  She does have a history of chronic sinusitis.  She has not had any fevers.  She is otherwise at baseline health.      Past Medical History:   Diagnosis Date    Mumps     NO ACTIVE PROBLEMS      Patient Active Problem List   Diagnosis    Multiple pigmented nevi    Advanced directives, counseling/discussion    Seasonal allergic rhinitis    Hematoma of rectus sheath    Generalized anxiety disorder    Attention deficit hyperactivity disorder (ADHD), combined type    Benign neoplasm of colon    Fibromyositis    Hammer toe, acquired    Ingrowing nail    Insomnia    Localized, primary osteoarthritis    Loose body in knee    Macrodactylia of toes    Migraine    Other disorders of bone and cartilage(733.99)    Plantar fascial fibromatosis    Postmenopausal HRT (hormone replacement therapy)    Talipes cavus    Fibromyalgia    Splinter hemorrhage of toenail     Current Outpatient Medications   Medication    aluminum chloride (DRYSOL) 20 % external solution    amphetamine-dextroamphetamine (ADDERALL XR) 25 MG 24 hr capsule    buPROPion (WELLBUTRIN XL) 150 MG 24 hr tablet    Fexofenadine HCl (ALLEGRA PO)    fluorouracil (EFUDEX) 5 % external cream    LORazepam (ATIVAN) 0.5 MG tablet    timolol maleate (TIMOPTIC) 0.5 % ophthalmic solution    amoxicillin (AMOXIL) 500 MG capsule    amoxicillin-clavulanate (AUGMENTIN) 875-125 MG tablet    benzonatate (TESSALON) 100 MG capsule    fluconazole (DIFLUCAN) 150 MG tablet     No current facility-administered medications for  this visit.     Social History     Socioeconomic History    Marital status:      Spouse name: Not on file    Number of children: Not on file    Years of education: Not on file    Highest education level: Not on file   Occupational History    Not on file   Tobacco Use    Smoking status: Former     Packs/day: 0.00     Types: Cigarettes     Passive exposure: Never    Smokeless tobacco: Never   Vaping Use    Vaping Use: Never used   Substance and Sexual Activity    Alcohol use: Yes     Alcohol/week: 0.0 standard drinks of alcohol     Comment: avg:3-4 drink/wk    Drug use: No    Sexual activity: Yes     Partners: Male     Birth control/protection: Female Surgical   Other Topics Concern    Parent/sibling w/ CABG, MI or angioplasty before 65F 55M? No   Social History Narrative    [place] in [place]     Social Determinants of Health     Financial Resource Strain: Not on file   Food Insecurity: Not on file   Transportation Needs: Not on file   Physical Activity: Not on file   Stress: Not on file   Social Connections: Not on file   Intimate Partner Violence: Not on file   Housing Stability: Not on file     ROS  negative other than stated above    Exam:  GENERAL APPEARANCE: healthy, alert and no distress  EYES: EOMI,  PERRL  HENT: ear canals and TM's normal and nose and mouth without ulcers or lesions  RESP: Harsh spasmodic cough noted.  Scattered rhonchi noted throughout.  Normal effort and does not appear to be in distress.  CV: regular rates and rhythm, normal S1 S2, no S3 or S4 and no murmur, click or rub -  SKIN: no suspicious lesions or rashes    assessment/plan:  (J22) Lower resp. tract infection  (primary encounter diagnosis)  Comment:   Plan: : doxycycline hyclate (VIBRAMYCIN)         100 MG capsule, : predniSONE         (DELTASONE) 20 MG tablet, :         fluconazole (DIFLUCAN) 150 MG tablet        Patient declines chest x-ray at this time.  We will treat based off of exam.  Doxycycline as directed along with  prednisone.  Continue with over-the-counter med as needed for symptomatic relief.  If symptoms persist advised follow-up with pulmonology.  Does have a history of yeast with antibiotics and Diflucan was given.  Vitals are stable at this time.  Patient notes understanding is agreement with above plan.

## 2023-09-03 NOTE — TELEPHONE ENCOUNTER
Nurse Triage SBAR    Situation:   -Pharmacy change     Background:   -Patient calling, It is okay to leave a detailed message at this number.     Assessment:   -was seen in , meds were sent to her [facility]  -[facility] is closed, she needs meds sent to new pharmacy  -patient will call around and RN will call patient back   -RN called patient back  -pharmacy was changed to one that is open today    Recommendation:   Home Care  -call back with any other questions or concerns    MUSA PATEL RN on 9/3/2023 at 11:58 AM    Reason for Disposition   Caller has medicine question only, adult not sick, AND triager answers question    Protocols used: Medication Question Call-A-

## 2023-09-07 ENCOUNTER — PATIENT OUTREACH (OUTPATIENT)
Dept: CARE COORDINATION | Facility: CLINIC | Age: 68
End: 2023-09-07
Payer: MEDICARE

## 2023-09-08 ENCOUNTER — OFFICE VISIT (OUTPATIENT)
Dept: FAMILY MEDICINE | Facility: CLINIC | Age: 68
End: 2023-09-08
Payer: MEDICARE

## 2023-09-08 ENCOUNTER — ANCILLARY PROCEDURE (OUTPATIENT)
Dept: GENERAL RADIOLOGY | Facility: CLINIC | Age: 68
End: 2023-09-08
Attending: PHYSICIAN ASSISTANT
Payer: MEDICARE

## 2023-09-08 ENCOUNTER — TELEPHONE (OUTPATIENT)
Dept: FAMILY MEDICINE | Facility: CLINIC | Age: 68
End: 2023-09-08

## 2023-09-08 VITALS
HEIGHT: 66 IN | WEIGHT: 120 LBS | DIASTOLIC BLOOD PRESSURE: 63 MMHG | HEART RATE: 96 BPM | TEMPERATURE: 98.1 F | OXYGEN SATURATION: 97 % | BODY MASS INDEX: 19.29 KG/M2 | SYSTOLIC BLOOD PRESSURE: 132 MMHG

## 2023-09-08 DIAGNOSIS — R09.81 NASAL CONGESTION: Primary | ICD-10-CM

## 2023-09-08 DIAGNOSIS — R05.2 SUBACUTE COUGH: ICD-10-CM

## 2023-09-08 DIAGNOSIS — H61.22 IMPACTED CERUMEN OF LEFT EAR: ICD-10-CM

## 2023-09-08 PROCEDURE — 99214 OFFICE O/P EST MOD 30 MIN: CPT | Mod: 25 | Performed by: PHYSICIAN ASSISTANT

## 2023-09-08 PROCEDURE — 69209 REMOVE IMPACTED EAR WAX UNI: CPT | Mod: LT | Performed by: PHYSICIAN ASSISTANT

## 2023-09-08 PROCEDURE — 71046 X-RAY EXAM CHEST 2 VIEWS: CPT | Mod: TC | Performed by: RADIOLOGY

## 2023-09-08 RX ORDER — AZELASTINE HYDROCHLORIDE, FLUTICASONE PROPIONATE 137; 50 UG/1; UG/1
1 SPRAY, METERED NASAL 2 TIMES DAILY PRN
COMMUNITY
Start: 2023-04-16 | End: 2024-08-14

## 2023-09-08 RX ORDER — NEOMYCIN SULFATE, POLYMYXIN B SULFATE AND HYDROCORTISONE 10; 3.5; 1 MG/ML; MG/ML; [USP'U]/ML
3 SUSPENSION/ DROPS AURICULAR (OTIC) DAILY
Qty: 5 ML | Refills: 0 | Status: ON HOLD | OUTPATIENT
Start: 2023-09-08 | End: 2023-10-26

## 2023-09-08 RX ORDER — FEXOFENADINE HCL 180 MG/1
180 TABLET ORAL EVERY OTHER DAY
COMMUNITY

## 2023-09-08 RX ORDER — LORATADINE 10 MG/1
10 TABLET ORAL EVERY OTHER DAY
COMMUNITY

## 2023-09-08 NOTE — TELEPHONE ENCOUNTER
Patient called about the referral to the Pulmonologist [facility] [phone] and wants to see if the provider can call to pull strings to get the patient in sooner as currently the appointment is in October and the patient states she as been waiting awhile to get this sorted.    Kellee Manjarrez MA

## 2023-09-08 NOTE — PROGRESS NOTES
RN ear assessment completed prior to ear irrigation. Otoscopic exam reveals cerumen present and irrigation advised. Post Ear Irrigation exam completed and cerumen plug removed.  Pain assessment completed.     Patient tolerated procedure:  yes    RN ear assessment completed prior to ear irrigation. Patient was screened for ear irrigations exclusion critera and has the following A foreign body or matter visualized or reported in the ear canal is/are present and the procedure discontinued.  Inform the patient to schedule a follow up appointment for evaluation with Licensed Practitioner (Medical Provider).    Kellee Manjarrez MA    Answers submitted by the patient for this visit:  General Questionnaire (Submitted on 9/8/2023)  Chief Complaint: Chronic problems general questions HPI Form  How many servings of fruits and vegetables do you eat daily?: 2-3  On average, how many sweetened beverages do you drink each day (Examples: soda, juice, sweet tea, etc.  Do NOT count diet or artificially sweetened beverages)?: 0  How many minutes a day do you exercise enough to make your heart beat faster?: 30 to 60  How many days a week do you exercise enough to make your heart beat faster?: 5  How many days per week do you miss taking your medication?: 0  General Concern (Submitted on 9/8/2023)  Chief Complaint: Chronic problems general questions HPI Form  What is the reason for your visit today?: sick for 6weeks    headache cough congestion ache spitting up mucus nose full etc  When did your symptoms begin?: More than a month  How would you describe these symptoms?: Severe  Are your symptoms:: Staying the same  Have you had these symptoms before?: No

## 2023-09-08 NOTE — TELEPHONE ENCOUNTER
Called pt and relayed provider message below. Patient was given an opportunity to ask questions, verbalized understanding of plan, and is agreeable.    Lily ALMAGUER RN

## 2023-09-08 NOTE — PROGRESS NOTES
Assessment & Plan     Nasal congestion  Symptoms have been on going for 7 weeks now. She has been using her usual allergy treatment routine (nasal spray, lavage and oral Claritin switching off with Allegra). Symptoms have not improved.  No signs of sinusitis on exam today. Follow up with ENT for further evaluation. Consider follow up with allergist based on ENT evaluation.  - Adult ENT  Referral; Future    Subacute cough  On going cough that has been quite bothersome. Treated with antibiotics twice and has not had improvement. X-ray of the chest negative for infection. Can stop doxycycline as it seems to be causing side effects. Will attempt an inhaled steroid to see if if helps with symptoms. Follow up with pulmonology given on going symptoms.  - XR Chest 2 Views  - Adult Pulmonary Medicine  Referral; Future  - mometasone (ASMANEX TWISTHALER) 110 MCG/ACT inhaler; Inhale 1 puff into the lungs every evening    Impacted cerumen of left ear  Lavage completed. Did have some blood and irritation of the left canal. Given topical cortisporin to help calm irritation and prevent infection.  - MN REMOVAL IMPACTED CERUMEN IRRIGATION/LVG UNILAT  - neomycin-polymyxin-hydrocortisone (CORTISPORIN) 3.5-87620-0 otic suspension; Place 3 drops Into the left ear daily For 5-7 days    Review of external notes as documented elsewhere in note  Review of the result(s) of each unique test - x-ray chest  Ordering of each unique test  Prescription drug management      Gretta Parmar PA-C  [facility]    Marely Manuel is a 68 year old, presenting for the following health issues:  URI (C/o sx since July not getting better)      9/8/2023    10:48 AM   Additional Questions   Roomed by Ce   Accompanied by self       URI    History of Present Illness       Reason for visit:  Sick for 6weeks    headache cough congestion ache spitting up mucus nose full etc  Symptom onset:  More than a  "month  Symptom intensity:  Severe  Symptom progression:  Staying the same  Had these symptoms before:  No    She eats 2-3 servings of fruits and vegetables daily.She consumes 0 sweetened beverage(s) daily.She exercises with enough effort to increase her heart rate 30 to 60 minutes per day.  She exercises with enough effort to increase her heart rate 5 days per week.   She is taking medications regularly.       Acute Illness  Acute illness concerns: URI Sx since July  Onset/Duration: 7 weeks  Symptoms:  Fever: No  Chills/Sweats: No  Headache (location?): YES- sides  Sinus Pressure: YES  Conjunctivitis:  No  Ear Pain: YES: both  Rhinorrhea: YES  Congestion: YES  Sore Throat: No  Cough: YES-productive of yellow sputum  Wheeze: SOB  Decreased Appetite: No  Nausea: YES- related to antibiotic  Vomiting: No  Diarrhea: YES- with antibiotic  Dysuria/Freq.: No  Dysuria or Hematuria: No  Fatigue/Achiness: YES  Sick/Strep Exposure: No  Therapies tried and outcome: on recent medication prednisone and doxycycline started 9/3/23 (also previously treated with Augmentin). Benzonatate, Albuterol inhaler.    Patient has been doing nasal lavage and nasal spray with fluticasone and azelastine as well as alternating Allegra and Claritin daily. She has been doing this at the recommendation of the allergist (who she last saw last Fall).    Symptoms are not improving.    Review of Systems   GENERAL:  As noted in HPI        Objective    /63 (BP Location: Right arm, Patient Position: Sitting, Cuff Size: Adult Regular)   Pulse 96   Temp 98.1  F (36.7  C) (Oral)   Ht 1.676 m (5' 6\")   Wt 54.4 kg (120 lb)   LMP  (LMP Unknown)   SpO2 97%   BMI 19.37 kg/m    Body mass index is 19.37 kg/m .  Physical Exam   GENERAL: No acute distress  HEENT: Normocephalic, PERRL, Canal patent on the right. Right TM non-erythematous, clear effusion noted on the right side, partial occlusion of the left canal, visible TM appears non-erythematous. " Following lavage: left canal with some blood, small paper like cerumen still present. Turbinates normal in appearance bilaterally. Posterior oropharynx non-erythematous and without exudate.  CARDIAC: Regular rate and rhythm. No murmurs.  PULMONARY: Rhonchi noted in the right lower and middle lung. Otherwise lung sounds are clear.  NEURO: Alert and non-focal      Results for orders placed or performed in visit on 09/08/23 (from the past 24 hour(s))   XR Chest 2 Views    Narrative    XR CHEST 2 VIEWS   9/8/2023 11:36 AM     HISTORY: COugh for 7 weeks, currently on doxycycline and prednisone;  Subacute cough    COMPARISON: None.      Impression    IMPRESSION: No acute cardiopulmonary disease.    FADI PINA MD         SYSTEM ID:  [id number]

## 2023-09-08 NOTE — TELEPHONE ENCOUNTER
----- Message from Gretta Parmar PA-C sent at 9/8/2023  2:52 PM CDT -----  Please call patient and let her know that her x-ray shows no pneumonia. I think she can stop the doxycyline at this point (since it is likely causing symptoms). Continue the prednisone.

## 2023-09-12 ENCOUNTER — ANCILLARY PROCEDURE (OUTPATIENT)
Dept: MAMMOGRAPHY | Facility: CLINIC | Age: 68
End: 2023-09-12
Attending: NURSE PRACTITIONER
Payer: MEDICARE

## 2023-09-12 DIAGNOSIS — Z12.31 VISIT FOR SCREENING MAMMOGRAM: ICD-10-CM

## 2023-09-12 PROCEDURE — 77063 BREAST TOMOSYNTHESIS BI: CPT | Mod: TC | Performed by: RADIOLOGY

## 2023-09-12 PROCEDURE — 77067 SCR MAMMO BI INCL CAD: CPT | Mod: TC | Performed by: RADIOLOGY

## 2023-09-14 DIAGNOSIS — R05.9 COUGH: Primary | ICD-10-CM

## 2023-09-21 DIAGNOSIS — J32.8 OTHER CHRONIC SINUSITIS: Primary | ICD-10-CM

## 2023-09-26 ENCOUNTER — LAB (OUTPATIENT)
Dept: LAB | Facility: CLINIC | Age: 68
End: 2023-09-26
Payer: MEDICARE

## 2023-09-26 DIAGNOSIS — J45.20 MILD INTERMITTENT ASTHMA, UNSPECIFIED WHETHER COMPLICATED: ICD-10-CM

## 2023-09-26 DIAGNOSIS — J32.8 OTHER CHRONIC SINUSITIS: ICD-10-CM

## 2023-09-26 LAB
BASOPHILS # BLD AUTO: 0 10E3/UL (ref 0–0.2)
BASOPHILS NFR BLD AUTO: 0 %
CRP SERPL HS-MCNC: 0.86 MG/L
EOSINOPHIL # BLD AUTO: 0.1 10E3/UL (ref 0–0.7)
EOSINOPHIL NFR BLD AUTO: 1 %
ERYTHROCYTE [DISTWIDTH] IN BLOOD BY AUTOMATED COUNT: 13 % (ref 10–15)
ERYTHROCYTE [SEDIMENTATION RATE] IN BLOOD BY WESTERGREN METHOD: 14 MM/HR (ref 0–30)
HCT VFR BLD AUTO: 37.4 % (ref 35–47)
HGB BLD-MCNC: 11.9 G/DL (ref 11.7–15.7)
IMM GRANULOCYTES # BLD: 0 10E3/UL
IMM GRANULOCYTES NFR BLD: 0 %
LYMPHOCYTES # BLD AUTO: 2 10E3/UL (ref 0.8–5.3)
LYMPHOCYTES NFR BLD AUTO: 28 %
MCH RBC QN AUTO: 29.7 PG (ref 26.5–33)
MCHC RBC AUTO-ENTMCNC: 31.8 G/DL (ref 31.5–36.5)
MCV RBC AUTO: 93 FL (ref 78–100)
MONOCYTES # BLD AUTO: 0.7 10E3/UL (ref 0–1.3)
MONOCYTES NFR BLD AUTO: 10 %
NEUTROPHILS # BLD AUTO: 4.2 10E3/UL (ref 1.6–8.3)
NEUTROPHILS NFR BLD AUTO: 60 %
PLATELET # BLD AUTO: 263 10E3/UL (ref 150–450)
RBC # BLD AUTO: 4.01 10E6/UL (ref 3.8–5.2)
TSH SERPL DL<=0.005 MIU/L-ACNC: 0.74 UIU/ML (ref 0.3–4.2)
WBC # BLD AUTO: 6.9 10E3/UL (ref 4–11)

## 2023-09-26 PROCEDURE — 82785 ASSAY OF IGE: CPT

## 2023-09-26 PROCEDURE — 85652 RBC SED RATE AUTOMATED: CPT

## 2023-09-26 PROCEDURE — 86036 ANCA SCREEN EACH ANTIBODY: CPT

## 2023-09-26 PROCEDURE — 84443 ASSAY THYROID STIM HORMONE: CPT

## 2023-09-26 PROCEDURE — 86431 RHEUMATOID FACTOR QUANT: CPT

## 2023-09-26 PROCEDURE — 86141 C-REACTIVE PROTEIN HS: CPT

## 2023-09-26 PROCEDURE — 85025 COMPLETE CBC W/AUTO DIFF WBC: CPT

## 2023-09-26 PROCEDURE — 86038 ANTINUCLEAR ANTIBODIES: CPT

## 2023-09-26 PROCEDURE — 36415 COLL VENOUS BLD VENIPUNCTURE: CPT

## 2023-09-26 PROCEDURE — 86037 ANCA TITER EACH ANTIBODY: CPT

## 2023-09-27 LAB
ANA SER QL IF: NEGATIVE
ANCA AB PATTERN SER IF-IMP: NORMAL
C-ANCA TITR SER IF: NORMAL {TITER}
RHEUMATOID FACT SER NEPH-ACNC: 12 IU/ML

## 2023-09-29 ENCOUNTER — OFFICE VISIT (OUTPATIENT)
Dept: PULMONOLOGY | Facility: CLINIC | Age: 68
End: 2023-09-29
Attending: INTERNAL MEDICINE
Payer: MEDICARE

## 2023-09-29 ENCOUNTER — OFFICE VISIT (OUTPATIENT)
Dept: PULMONOLOGY | Facility: CLINIC | Age: 68
End: 2023-09-29
Payer: MEDICARE

## 2023-09-29 ENCOUNTER — PRE VISIT (OUTPATIENT)
Dept: PULMONOLOGY | Facility: CLINIC | Age: 68
End: 2023-09-29

## 2023-09-29 ENCOUNTER — TELEPHONE (OUTPATIENT)
Dept: PULMONOLOGY | Facility: CLINIC | Age: 68
End: 2023-09-29

## 2023-09-29 VITALS
HEART RATE: 89 BPM | OXYGEN SATURATION: 96 % | WEIGHT: 118 LBS | DIASTOLIC BLOOD PRESSURE: 82 MMHG | SYSTOLIC BLOOD PRESSURE: 149 MMHG | BODY MASS INDEX: 18.96 KG/M2 | HEIGHT: 66 IN

## 2023-09-29 DIAGNOSIS — J01.90 SUBACUTE SINUSITIS, UNSPECIFIED LOCATION: ICD-10-CM

## 2023-09-29 DIAGNOSIS — R05.9 COUGH: ICD-10-CM

## 2023-09-29 DIAGNOSIS — R05.2 SUBACUTE COUGH: ICD-10-CM

## 2023-09-29 DIAGNOSIS — J45.20 MILD INTERMITTENT ASTHMA, UNSPECIFIED WHETHER COMPLICATED: Primary | ICD-10-CM

## 2023-09-29 DIAGNOSIS — R06.09 DYSPNEA ON EXERTION: ICD-10-CM

## 2023-09-29 LAB
DLCOCOR-%PRED-PRE: 129 %
DLCOCOR-PRE: 26 ML/MIN/MMHG
DLCOUNC-%PRED-PRE: 123 %
DLCOUNC-PRE: 24.72 ML/MIN/MMHG
DLCOUNC-PRED: 20.03 ML/MIN/MMHG
ERV-%PRED-PRE: 96 %
ERV-PRE: 1.04 L
ERV-PRED: 1.08 L
EXPTIME-PRE: 6.63 SEC
FEF2575-%PRED-PRE: 100 %
FEF2575-PRE: 1.89 L/SEC
FEF2575-PRED: 1.89 L/SEC
FEFMAX-%PRED-PRE: 96 %
FEFMAX-PRE: 5.79 L/SEC
FEFMAX-PRED: 6.03 L/SEC
FEV1-%PRED-PRE: 103 %
FEV1-PRE: 2.31 L
FEV1FEV6-PRE: 77 %
FEV1FEV6-PRED: 79 %
FEV1FVC-PRE: 76 %
FEV1FVC-PRED: 79 %
FEV1SVC-PRE: 75 %
FEV1SVC-PRED: 67 %
FIFMAX-PRE: 3.84 L/SEC
FRCPLETH-%PRED-PRE: 97 %
FRCPLETH-PRE: 2.74 L
FRCPLETH-PRED: 2.82 L
FVC-%PRED-PRE: 106 %
FVC-PRE: 3.03 L
FVC-PRED: 2.84 L
IC-%PRED-PRE: 93 %
IC-PRE: 2.01 L
IC-PRED: 2.16 L
RVPLETH-%PRED-PRE: 79 %
RVPLETH-PRE: 1.7 L
RVPLETH-PRED: 2.14 L
TLCPLETH-%PRED-PRE: 90 %
TLCPLETH-PRE: 4.76 L
TLCPLETH-PRED: 5.27 L
VA-%PRED-PRE: 86 %
VA-PRE: 4.21 L
VC-%PRED-PRE: 91 %
VC-PRE: 3.06 L
VC-PRED: 3.33 L

## 2023-09-29 PROCEDURE — G0463 HOSPITAL OUTPT CLINIC VISIT: HCPCS | Performed by: INTERNAL MEDICINE

## 2023-09-29 PROCEDURE — 99204 OFFICE O/P NEW MOD 45 MIN: CPT | Mod: 25 | Performed by: INTERNAL MEDICINE

## 2023-09-29 PROCEDURE — 94375 RESPIRATORY FLOW VOLUME LOOP: CPT | Performed by: INTERNAL MEDICINE

## 2023-09-29 PROCEDURE — 94726 PLETHYSMOGRAPHY LUNG VOLUMES: CPT | Performed by: INTERNAL MEDICINE

## 2023-09-29 PROCEDURE — 94729 DIFFUSING CAPACITY: CPT | Performed by: INTERNAL MEDICINE

## 2023-09-29 RX ORDER — BUDESONIDE AND FORMOTEROL FUMARATE DIHYDRATE 160; 4.5 UG/1; UG/1
2 AEROSOL RESPIRATORY (INHALATION) 2 TIMES DAILY
Qty: 10.2 G | Refills: 2 | Status: SHIPPED | OUTPATIENT
Start: 2023-09-29 | End: 2024-08-14

## 2023-09-29 NOTE — PATIENT INSTRUCTIONS
1) Continue Mometasone inhaler 1 puff TWICE daily (rinse mouth after each use) until you have received your new inhaler for asthma. Your new inhaler will either be Symbicort, Advair, Dulera, Wixella or a formulary equivalent depending on your insurance. Take as instructed on the package and rinse mouth after each use.   2) You will get repeat breathing tests prior to our next appointment (will only do the blowing portion). The day of the breathing test hold off on taking your morning inhaler dose for most optimal result.   3) Follow up with ENT   4) Follow up with pulmonary (Dr. Lorenzo) in 4 weeks

## 2023-09-29 NOTE — CONFIDENTIAL NOTE
RECORDS RECEIVED FROM: internal    DATE RECEIVED: 9.29.23    NOTES STATUS DETAILS   OFFICE NOTE from referring provider internal  Gretta Parmar PA-C   OFFICE NOTE from other specialist     DISCHARGE SUMMARY from hospital     DISCHARGE REPORT from the ER     MEDICATION LIST internal     IMAGING  (NEED IMAGES AND REPORTS)     CT SCAN     CHEST XRAY (CXR) internal  9/8/23    TESTS     PULMONARY FUNCTION TESTING (PFT) internal  Scheduled 9.29.23

## 2023-09-29 NOTE — NURSING NOTE
Chief Complaint   Patient presents with    New Patient     New pulm appt      Vitals were taken and medications were reconciled.     Malina Powell RMA  12:57 PM

## 2023-09-29 NOTE — PROGRESS NOTES
[facility]   Pulmonary Clinic New Patient     Mar Zeng MRN: [id number]  Date: 09/29/2023    CC: Subacute cough, OLSON    HPI:  Mar Zeng is a 68F with year-round allergies, chronic sinusitis, and ADHD here for evaluation of subacute cough and OLSON. Symptoms started in 7/2023 with sinus congestion/pressure and HA. Has had several acute care visits since detailed below:   - 8/10 virtual visit: URI. Cough, congestion, chills, HA, myalgia, OLSON. No sore throat or wheezing. Dark phlegm. Tx with Augmentin, medrol dose pack, albuterol, tessalon. Chills went away but all other symptoms remained/no improvement.   - 9/3 urgent care: Persistent URI with chest congestion, slight SOB, b/l ear pain, and sinus pain. Tx with Doxycycline and Prednisone 40 x10 days. No improvement in symptoms.    - 9/8 urgent care: For persistent sx. CXR normal. ENT referral for sinus congestion. Pulmonary referral for cough and OLSON. Prescribed trial of Mometasone x5 days which helped breathing.   - Saw ENT (Dr. Staley) in [place] last week: In office scope unrevealing. CT sinus +sinusitis. Autoimmune w/unit(s) negative (r/o EGPA, etc). Diagnosed with subacute sinusitis. Prescribed with 5 day course of steroids and antibiotics (pt not sure which one). Patient currently on day 3 of tx and no difference.     Today, her main sx are sinus congestion, HA, semi-productive cough, chest tightness, OLSON, fatigue and myalgia all persistent since July. Wakes up every night SOB (unclear if it's sinus or lung). Daytime has semi productive cough ranging from clear to yellow to green. Some OLSON with stairs or riding bike. Prior to all this she was very healthy and active. Walking on flat surface is fine. No fevers or chills recently. Chronic night sweats.     Has yearly allergies since adulthood. Previously saw an allergist via [facility] and received allergy shots which didn't help. Never had respiratory symptoms until now.    Exposures: None   Mometasone helped, albuterol didn't  Had some wheezing initially but now gone   Mom and brother has asthma   Very compliant with allergy medications consisting of azelastine-fluticasone, allergy pill, and sinus rinses   Had surgery for septal deviation in her 20's. Last sinusitis episode was 5 years ago     ROS: As stated in HPI. No joint swelling, photosensitivity or rashes    PMHx/PSHx:  Past Medical History:   Diagnosis Date    Mumps     NO ACTIVE PROBLEMS      Past Surgical History:   Procedure Laterality Date    COLONOSCOPY  8/23/2016    Dr. Shaw [facility]    COLONOSCOPY N/A 8/23/2016    Procedure: COLONOSCOPY;  Surgeon: Peter Shaw MD;  Location:  GI    COLONOSCOPY N/A 10/19/2022    Procedure: COLONOSCOPY with polypectomy using cold exacto snare;  Surgeon: Peter Shaw MD;  Location:  GI    FINGER SURGERY      right little finger joint replacement    HYSTERECTOMY VAGINAL  age 30    retained ovaries. no cervix     HYSTERECTOMY, PAP NO LONGER INDICATED      ORTHOPEDIC SURGERY      SURGICAL HISTORY OF -   10/25/13    left index cyst removal    SURGICAL HISTORY OF -       Bilat knee miniscus repair       Social Hx:   - Tobacco: Socially when younger but <1 year total  - EtOH: 2-3 beers weekly   - Illicits: Denies    Outpatient Medications:   aluminum chloride (DRYSOL) 20 % external solution, Apply topically At Bedtime  amphetamine-dextroamphetamine (ADDERALL XR) 25 MG 24 hr capsule, Take 1 capsule (25 mg) by mouth every morning  azelastine-fluticasone (DYMISTA) 137-50 MCG/ACT nasal spray, Spray 1 spray into both nostrils 2 times daily  buPROPion (WELLBUTRIN XL) 150 MG 24 hr tablet, TAKE 1 TABLET(150 MG) BY MOUTH EVERY MORNING  doxycycline hyclate (VIBRAMYCIN) 100 MG capsule, Take 1 capsule (100 mg) by mouth 2 times daily  fexofenadine (ALLEGRA) 180 MG tablet, Take 180 mg by mouth every other day  Fexofenadine HCl (ALLEGRA PO), Take by mouth daily as needed for allergies  "  fluconazole (DIFLUCAN) 150 MG tablet, Take 1 tablet (150 mg) by mouth every 3 days  fluorouracil (EFUDEX) 5 % external cream, APPLY SPARINGLY EXTERNALLY TO FACE EVERY SUNDAY AT NIGHT. AVOID EYES AND LIPS  loratadine (CLARITIN) 10 MG tablet, Take 10 mg by mouth every other day  LORazepam (ATIVAN) 0.5 MG tablet, Take 1 tablet (0.5 mg) by mouth nightly as needed for sleep  mometasone (ASMANEX TWISTHALER) 110 MCG/ACT inhaler, Inhale 1 puff into the lungs every evening  neomycin-polymyxin-hydrocortisone (CORTISPORIN) 3.5-03146-6 otic suspension, Place 3 drops Into the left ear daily For 5-7 days  predniSONE (DELTASONE) 20 MG tablet, Take 2 tablets (40 mg) by mouth daily  timolol maleate (TIMOPTIC) 0.5 % ophthalmic solution, APPLY 1 DROP TO LESION EVERY NIGHT AT BEDTIME. DO NOT USE ON NORMAL SKIN.  amoxicillin (AMOXIL) 500 MG capsule, Patient takes prior to dentist appointments. (Patient not taking: Reported on 9/3/2023)    No current facility-administered medications on file prior to visit.      Allergies:   Allergies   Allergen Reactions    Codeine Itching    Concerta [Methylphenidate] Itching    Hay Fever & [A.R.M.]      Family Hx:   Family History   Problem Relation Age of Onset    No Known Problems Mother     Hypertension Father     Cancer Father     Cancer Maternal Grandmother     Cancer Maternal Grandfather     Colon Cancer Paternal Grandmother     Cancer Paternal Grandfather     Asthma Brother     Hypertension Brother     No Known Problems Sister     No Known Problems Son     No Known Problems Son     Thyroid Disease Daughter     Colon Cancer Paternal Aunt     Colon Cancer Cousin          Physical Exam:   BP (!) 149/82   Pulse 89   Ht 1.676 m (5' 6\")   Wt 53.5 kg (118 lb)   LMP  (LMP Unknown)   SpO2 96%   BMI 19.05 kg/m    - Gen: Aox3, NAD   - HEENT: No frontal or maxillary tenderness. Slightly erythematous and edematous nasal passage  - CV: RRR, no m/r/g  - Lung: CTAB  - Ext: No BLE swelling  - Skin: No " major rashes or lesions  - Neuro: CN grossly intact     Labs: Reviewed. 9/26 absolute eos 100 despite recent course of steroids 3 weeks ago     Images/Studies:   9/29 PFT  Normal PFT's and flow volume loop     9/8 CXR  Normal       ASSESSMENT/PLAN:   # Moderate Persistent Allergic Asthma   # Subacute Cough and OLSON   # Subacute Sinusitis     68F with year-round allergies, chronic sinusitis, and ADHD here for evaluation of subacute cough and OLSON. Most likely, patient developed acute on chronic sinusitis in 7/2023 that's persisted until now despite multiple rounds of antibiotics and steroids. Throughout this, she has developed symptoms clinically c/w asthma (OLSON, nocturnal wakening, chest tightness, improvement with ICS). Cough is likely a combination of post nasal drip and asthma. Other ddx to consider are EGPA although peripheral eosinophils are <10% (but she was on steroids 2 weeks prior which may have lowered the counts), pulmonary limited GPA although ANCA is negative making it less likely. She also does not have nasal polyps. Will start with treating her asthma and having her re-evaluated by ENT for sinusitis. If by next appointment sx are unimproved, will consider HRCT and repeat CBC diff.      PLAN:    - Resume Mometasone 110 1 puff BID (was taking daily) for now. Will switch to DOTTIE/ICS pending insurance approval. All of the following inhalers are acceptable: Symbicort 160 2 puffs BID (preferred), Dulera 200 2 puffs BID (preferred), Advair, Wixella, or Breo Ellipta  - IgE added on to 9/26 labs  - Return to ENT for further evaluation for persistent sinusitis   - Spirometry with bronchodilators prior to next appointment     RTC 1 month     Patient seen and discussed with Dr. Bill Swain MD   Pulmonary/Critical Care Fellow  Pager: [phone]

## 2023-09-29 NOTE — TELEPHONE ENCOUNTER
[facility] Call Center    Phone Message    May a detailed message be left on voicemail: yes     Reason for Call: Other: same day appt     Action Taken: Other: pulm    Travel Screening: Not Applicable

## 2023-09-29 NOTE — LETTER
9/29/2023         RE: Mar Zeng  [address]  [place] [address]        Dear Colleague,    Thank you for referring your patient, Mar Zeng, to the [facility]. Please see a copy of my visit note below.    [facility]   Pulmonary Clinic New Patient     Mar Zeng MRN: [id number]  Date: 09/29/2023    CC: Subacute cough, OLSON    HPI:  Mar Zeng is a 68F with year-round allergies, chronic sinusitis, and ADHD here for evaluation of subacute cough and OLSON. Symptoms started in 7/2023 with sinus congestion/pressure and HA. Has had several acute care visits since detailed below:   - 8/10 virtual visit: URI. Cough, congestion, chills, HA, myalgia, OLSON. No sore throat or wheezing. Dark phlegm. Tx with Augmentin, medrol dose pack, albuterol, tessalon. Chills went away but all other symptoms remained/no improvement.   - 9/3 urgent care: Persistent URI with chest congestion, slight SOB, b/l ear pain, and sinus pain. Tx with Doxycycline and Prednisone 40 x10 days. No improvement in symptoms.    - 9/8 urgent care: For persistent sx. CXR normal. ENT referral for sinus congestion. Pulmonary referral for cough and OLSON. Prescribed trial of Mometasone x5 days which helped breathing.   - Saw ENT (Dr. Staley) in [place] last week: In office scope unrevealing. CT sinus +sinusitis. Autoimmune w/unit(s) negative (r/o EGPA, etc). Diagnosed with subacute sinusitis. Prescribed with 5 day course of steroids and antibiotics (pt not sure which one). Patient currently on day 3 of tx and no difference.     Today, her main sx are sinus congestion, HA, semi-productive cough, chest tightness, OLSON, fatigue and myalgia all persistent since July. Wakes up every night SOB (unclear if it's sinus or lung). Daytime has semi productive cough ranging from clear to yellow to green. Some OLSON with stairs or riding bike. Prior to all  this she was very healthy and active. Walking on flat surface is fine. No fevers or chills recently. Chronic night sweats.     Has yearly allergies since adulthood. Previously saw an allergist via [facility] and received allergy shots which didn't help. Never had respiratory symptoms until now.   Exposures: None   Mometasone helped, albuterol didn't  Had some wheezing initially but now gone   Mom and brother has asthma   Very compliant with allergy medications consisting of azelastine-fluticasone, allergy pill, and sinus rinses   Had surgery for septal deviation in her 20's. Last sinusitis episode was 5 years ago     ROS: As stated in HPI. No joint swelling, photosensitivity or rashes    PMHx/PSHx:  Past Medical History:   Diagnosis Date    Mumps     NO ACTIVE PROBLEMS      Past Surgical History:   Procedure Laterality Date    COLONOSCOPY  8/23/2016    Dr. Shaw [facility]    COLONOSCOPY N/A 8/23/2016    Procedure: COLONOSCOPY;  Surgeon: Peter Shaw MD;  Location:  GI    COLONOSCOPY N/A 10/19/2022    Procedure: COLONOSCOPY with polypectomy using cold exacto snare;  Surgeon: Peter Shaw MD;  Location:  GI    FINGER SURGERY      right little finger joint replacement    HYSTERECTOMY VAGINAL  age 30    retained ovaries. no cervix     HYSTERECTOMY, PAP NO LONGER INDICATED      ORTHOPEDIC SURGERY      SURGICAL HISTORY OF -   10/25/13    left index cyst removal    SURGICAL HISTORY OF -       Bilat knee miniscus repair       Social Hx:   - Tobacco: Socially when younger but <1 year total  - EtOH: 2-3 beers weekly   - Illicits: Denies    Outpatient Medications:   aluminum chloride (DRYSOL) 20 % external solution, Apply topically At Bedtime  amphetamine-dextroamphetamine (ADDERALL XR) 25 MG 24 hr capsule, Take 1 capsule (25 mg) by mouth every morning  azelastine-fluticasone (DYMISTA) 137-50 MCG/ACT nasal spray, Spray 1 spray into both nostrils 2 times daily  buPROPion (WELLBUTRIN XL) 150 MG 24 hr tablet,  TAKE 1 TABLET(150 MG) BY MOUTH EVERY MORNING  doxycycline hyclate (VIBRAMYCIN) 100 MG capsule, Take 1 capsule (100 mg) by mouth 2 times daily  fexofenadine (ALLEGRA) 180 MG tablet, Take 180 mg by mouth every other day  Fexofenadine HCl (ALLEGRA PO), Take by mouth daily as needed for allergies   fluconazole (DIFLUCAN) 150 MG tablet, Take 1 tablet (150 mg) by mouth every 3 days  fluorouracil (EFUDEX) 5 % external cream, APPLY SPARINGLY EXTERNALLY TO FACE EVERY SUNDAY AT NIGHT. AVOID EYES AND LIPS  loratadine (CLARITIN) 10 MG tablet, Take 10 mg by mouth every other day  LORazepam (ATIVAN) 0.5 MG tablet, Take 1 tablet (0.5 mg) by mouth nightly as needed for sleep  mometasone (ASMANEX TWISTHALER) 110 MCG/ACT inhaler, Inhale 1 puff into the lungs every evening  neomycin-polymyxin-hydrocortisone (CORTISPORIN) 3.5-58404-8 otic suspension, Place 3 drops Into the left ear daily For 5-7 days  predniSONE (DELTASONE) 20 MG tablet, Take 2 tablets (40 mg) by mouth daily  timolol maleate (TIMOPTIC) 0.5 % ophthalmic solution, APPLY 1 DROP TO LESION EVERY NIGHT AT BEDTIME. DO NOT USE ON NORMAL SKIN.  amoxicillin (AMOXIL) 500 MG capsule, Patient takes prior to dentist appointments. (Patient not taking: Reported on 9/3/2023)    No current facility-administered medications on file prior to visit.      Allergies:   Allergies   Allergen Reactions    Codeine Itching    Concerta [Methylphenidate] Itching    Hay Fever & [A.R.M.]      Family Hx:   Family History   Problem Relation Age of Onset    No Known Problems Mother     Hypertension Father     Cancer Father     Cancer Maternal Grandmother     Cancer Maternal Grandfather     Colon Cancer Paternal Grandmother     Cancer Paternal Grandfather     Asthma Brother     Hypertension Brother     No Known Problems Sister     No Known Problems Son     No Known Problems Son     Thyroid Disease Daughter     Colon Cancer Paternal Aunt     Colon Cancer Cousin          Physical Exam:   BP (!) 149/82    "Pulse 89   Ht 1.676 m (5' 6\")   Wt 53.5 kg (118 lb)   LMP  (LMP Unknown)   SpO2 96%   BMI 19.05 kg/m    - Gen: Aox3, NAD   - HEENT: No frontal or maxillary tenderness. Slightly erythematous and edematous nasal passage  - CV: RRR, no m/r/g  - Lung: CTAB  - Ext: No BLE swelling  - Skin: No major rashes or lesions  - Neuro: CN grossly intact     Labs: Reviewed. 9/26 absolute eos 100 despite recent course of steroids 3 weeks ago     Images/Studies:   9/29 PFT  Normal PFT's and flow volume loop     9/8 CXR  Normal       ASSESSMENT/PLAN:   # Moderate Persistent Allergic Asthma   # Subacute Cough and OLSON   # Subacute Sinusitis     68F with year-round allergies, chronic sinusitis, and ADHD here for evaluation of subacute cough and OLSON. Most likely, patient developed acute on chronic sinusitis in 7/2023 that's persisted until now despite multiple rounds of antibiotics and steroids. Throughout this, she has developed symptoms clinically c/w asthma (OLSON, nocturnal wakening, chest tightness, improvement with ICS). Cough is likely a combination of post nasal drip and asthma. Other ddx to consider are EGPA although peripheral eosinophils are <10% (but she was on steroids 2 weeks prior which may have lowered the counts), pulmonary limited GPA although ANCA is negative making it less likely. She also does not have nasal polyps. Will start with treating her asthma and having her re-evaluated by ENT for sinusitis. If by next appointment sx are unimproved, will consider HRCT and repeat CBC diff.      PLAN:    - Resume Mometasone 110 1 puff BID (was taking daily) for now. Will switch to DOTTIE/ICS pending insurance approval. All of the following inhalers are acceptable: Symbicort 160 2 puffs BID (preferred), Dulera 200 2 puffs BID (preferred), Advair, Wixella, or Breo Ellipta  - IgE added on to 9/26 labs  - Return to ENT for further evaluation for persistent sinusitis   - Spirometry with bronchodilators prior to next appointment "     RTC 1 month     Patient seen and discussed with Dr. Bill Swain MD   Pulmonary/Critical Care Fellow  Pager: [phone]      Attestation signed by Glory Khan MD at 10/2/2023  2:53 PM:  Pulmonary Staff:  I have discussed Ms. Zeng's case with Dr. Maura Swain-Pulmonary/Critical Care Fellow; personally reviewed the patient's available radiographic imaging and PFT data and met with her.  I agree with the findings, assessment and recommendations as outlined below by Dr. Maura Swain.      Glory Khan MD  #[id number]  9/29/2023        Again, thank you for allowing me to participate in the care of your patient.      Sincerely,    Britni Swain MD

## 2023-09-29 NOTE — PROGRESS NOTES
Mar Zeng comes into clinic today at the request of Dr. Loving Ordering Provider for PFT      This service provided today was under the supervising provider of the day Dr. Betancur, who was available if needed.    Terrie Roberts

## 2023-09-29 NOTE — TELEPHONE ENCOUNTER
RN attempted to call patient 2 times, could not hear patient.     RN sent patient message via amandeep Lim RN

## 2023-10-01 LAB — IGE SERPL-ACNC: 12 KU/L (ref 0–114)

## 2023-10-04 DIAGNOSIS — J45.20 MILD INTERMITTENT ASTHMA, UNSPECIFIED WHETHER COMPLICATED: ICD-10-CM

## 2023-10-04 RX ORDER — BUDESONIDE AND FORMOTEROL FUMARATE DIHYDRATE 160; 4.5 UG/1; UG/1
2 AEROSOL RESPIRATORY (INHALATION) 2 TIMES DAILY
Qty: 10.2 G | Refills: 2 | OUTPATIENT
Start: 2023-10-04

## 2023-10-23 ENCOUNTER — TELEPHONE (OUTPATIENT)
Dept: PULMONOLOGY | Facility: CLINIC | Age: 68
End: 2023-10-23
Payer: MEDICARE

## 2023-10-23 DIAGNOSIS — R06.01 ORTHOPNEA: ICD-10-CM

## 2023-10-23 DIAGNOSIS — R06.09 DYSPNEA ON EXERTION: Primary | ICD-10-CM

## 2023-10-23 RX ORDER — ALBUTEROL SULFATE 90 UG/1
2 AEROSOL, METERED RESPIRATORY (INHALATION) EVERY 4 HOURS PRN
Qty: 18 G | Refills: 3 | Status: SHIPPED | OUTPATIENT
Start: 2023-10-23 | End: 2024-06-12

## 2023-10-23 NOTE — TELEPHONE ENCOUNTER
[facility] Call Center    Phone Message    May a detailed message be left on voicemail: yes     Reason for Call: Symptoms or Concerns     If patient has red-flag symptoms, warm transfer to triage line    Current symptom or concern: Even with inhaler she was prescribed she is still experiencing difficulty breathing , gasping for breath through out the day and up all night because when she lays down it gets worse.     Symptoms have been present for:  3 week(s)    Has patient previously been seen for this? Yes    By : Dr. Lorenzo      Are there any new or worsening symptoms? Yes: Can't catch her breath     Action Taken: Other: PULM     Travel Screening: Not Applicable

## 2023-10-23 NOTE — TELEPHONE ENCOUNTER
Routing to [place] pulmonary team for follow up as patient is not seen in [place].    Pauline Sales LPN  Pulmonary Medicine:  [facility] [place]  Phone: [phone] Fax: [phone]

## 2023-10-23 NOTE — TELEPHONE ENCOUNTER
Writer returned patient call - patient stated since this past weekend she has been having episodes of SOB with minimal exertion. Patient feels like she cannot lie down to sleep because she gets so out of breath. Patient states when she is having an episode sometimes she has pain under her left breast and has some coughing. Denies any other respiratory symptoms. Denies fever.   Patient is currently in [place] trying to come home tonight but she is flying stand by and is not positive she will get home tonight or tomorrow.     Patient is compliant with Symbicort, no rescue inhaler on hand or on prescription list.    Writer told patient she should be seen by urgent care before she travels back to [place]. Writer emphasized the need to be evaluated. When seen by urgent care, writer suggested asking for an albuterol inhaler. Patient agreed and verbalized understanding.     Writer forwarded message to Dr. Maura Chen.

## 2023-10-23 NOTE — TELEPHONE ENCOUNTER
Called patient regarding her new worsening orthopnea and OLSON. Visiting family in [place] for the past 1.5 weeks. 2 days ago started having 4 pillow orthopnea - SOB/chest tightness. OLSON a bit worse as well. No overt BLE edema other than her sandles are a bit tight on her foot. No swelling of ankles or calves. No long periods of sitting around in cars. Flight to [place] about 3 hours.     Using Symbicort scheduled as instructed but she is unsure if it's helping with her breathing. Sinus congestion and cough sounds like it's better.     She did not go to urgent care in [place] as she stated her breathing became a bit better. Is waiting to fly back to [place].     Plan:   - Has appointment scheduled with me on 12/8. In the meantime patient will get a TTE and CTA chest   - Instructed patient she can use the Symbicort as a rescue inhaler temporarily. Will send Albuterol to her pharmacy   - Patient counseled that if her sx worsens she needs to go to ED to get evaluated

## 2023-10-24 ENCOUNTER — TELEPHONE (OUTPATIENT)
Dept: PULMONOLOGY | Facility: CLINIC | Age: 68
End: 2023-10-24
Payer: MEDICARE

## 2023-10-24 ENCOUNTER — MYC MEDICAL ADVICE (OUTPATIENT)
Dept: FAMILY MEDICINE | Facility: CLINIC | Age: 68
End: 2023-10-24
Payer: MEDICARE

## 2023-10-24 NOTE — TELEPHONE ENCOUNTER
Patient Contacted for the patient to call back and schedule the following:    Appointment type:CTA  Provider: BEATRIZ  Return date: 10/29/23  Specialty phone number: [phone]  Additional appointment(s) needed: ECHOCARDIOGRAM  Additonal Notes: N/A

## 2023-10-24 NOTE — TELEPHONE ENCOUNTER
She could see cardiology from the [facility].  I typically send there.  I am not sure what symptoms she is having related to cardiology- can you check and then place the referral for her please.  LITO

## 2023-10-25 ENCOUNTER — HOSPITAL ENCOUNTER (INPATIENT)
Facility: CLINIC | Age: 68
LOS: 4 days | Discharge: HOME | DRG: 281 | End: 2023-10-29
Attending: EMERGENCY MEDICINE | Admitting: STUDENT IN AN ORGANIZED HEALTH CARE EDUCATION/TRAINING PROGRAM
Payer: MEDICARE

## 2023-10-25 ENCOUNTER — APPOINTMENT (OUTPATIENT)
Dept: CT IMAGING | Facility: CLINIC | Age: 68
DRG: 281 | End: 2023-10-25
Attending: EMERGENCY MEDICINE
Payer: MEDICARE

## 2023-10-25 ENCOUNTER — NURSE TRIAGE (OUTPATIENT)
Dept: CARDIOLOGY | Facility: CLINIC | Age: 68
End: 2023-10-25
Payer: MEDICARE

## 2023-10-25 DIAGNOSIS — I34.0 MITRAL VALVE INSUFFICIENCY, UNSPECIFIED ETIOLOGY: ICD-10-CM

## 2023-10-25 DIAGNOSIS — I42.9 CARDIOMYOPATHY, UNSPECIFIED TYPE (H): ICD-10-CM

## 2023-10-25 DIAGNOSIS — I50.9 CONGESTIVE HEART FAILURE, UNSPECIFIED HF CHRONICITY, UNSPECIFIED HEART FAILURE TYPE (H): ICD-10-CM

## 2023-10-25 DIAGNOSIS — I21.4 NSTEMI (NON-ST ELEVATED MYOCARDIAL INFARCTION) (H): Primary | ICD-10-CM

## 2023-10-25 DIAGNOSIS — J45.909 UNCOMPLICATED ASTHMA, UNSPECIFIED ASTHMA SEVERITY, UNSPECIFIED WHETHER PERSISTENT: ICD-10-CM

## 2023-10-25 LAB
ANION GAP SERPL CALCULATED.3IONS-SCNC: 12 MMOL/L (ref 7–15)
BASOPHILS # BLD AUTO: 0.1 10E3/UL (ref 0–0.2)
BASOPHILS NFR BLD AUTO: 1 %
BUN SERPL-MCNC: 21.4 MG/DL (ref 8–23)
CALCIUM SERPL-MCNC: 9.3 MG/DL (ref 8.8–10.2)
CHLORIDE SERPL-SCNC: 106 MMOL/L (ref 98–107)
CREAT SERPL-MCNC: 1.33 MG/DL (ref 0.51–0.95)
DEPRECATED HCO3 PLAS-SCNC: 21 MMOL/L (ref 22–29)
EGFRCR SERPLBLD CKD-EPI 2021: 43 ML/MIN/1.73M2
EOSINOPHIL # BLD AUTO: 0.2 10E3/UL (ref 0–0.7)
EOSINOPHIL NFR BLD AUTO: 2 %
ERYTHROCYTE [DISTWIDTH] IN BLOOD BY AUTOMATED COUNT: 13.3 % (ref 10–15)
GLUCOSE SERPL-MCNC: 111 MG/DL (ref 70–99)
HCT VFR BLD AUTO: 36.2 % (ref 35–47)
HGB BLD-MCNC: 11.6 G/DL (ref 11.7–15.7)
IMM GRANULOCYTES # BLD: 0 10E3/UL
IMM GRANULOCYTES NFR BLD: 0 %
LYMPHOCYTES # BLD AUTO: 2 10E3/UL (ref 0.8–5.3)
LYMPHOCYTES NFR BLD AUTO: 25 %
MCH RBC QN AUTO: 29.5 PG (ref 26.5–33)
MCHC RBC AUTO-ENTMCNC: 32 G/DL (ref 31.5–36.5)
MCV RBC AUTO: 92 FL (ref 78–100)
MONOCYTES # BLD AUTO: 1 10E3/UL (ref 0–1.3)
MONOCYTES NFR BLD AUTO: 12 %
NEUTROPHILS # BLD AUTO: 4.9 10E3/UL (ref 1.6–8.3)
NEUTROPHILS NFR BLD AUTO: 60 %
NRBC # BLD AUTO: 0 10E3/UL
NRBC BLD AUTO-RTO: 0 /100
NT-PROBNP SERPL-MCNC: 5176 PG/ML (ref 0–900)
PLATELET # BLD AUTO: 383 10E3/UL (ref 150–450)
POTASSIUM SERPL-SCNC: 4.3 MMOL/L (ref 3.4–5.3)
PROCALCITONIN SERPL IA-MCNC: 0.05 NG/ML
RBC # BLD AUTO: 3.93 10E6/UL (ref 3.8–5.2)
SODIUM SERPL-SCNC: 139 MMOL/L (ref 135–145)
TROPONIN T SERPL HS-MCNC: 17 NG/L
TROPONIN T SERPL HS-MCNC: 19 NG/L
WBC # BLD AUTO: 8.2 10E3/UL (ref 4–11)

## 2023-10-25 PROCEDURE — 250N000011 HC RX IP 250 OP 636: Performed by: EMERGENCY MEDICINE

## 2023-10-25 PROCEDURE — 84484 ASSAY OF TROPONIN QUANT: CPT | Performed by: EMERGENCY MEDICINE

## 2023-10-25 PROCEDURE — 94640 AIRWAY INHALATION TREATMENT: CPT

## 2023-10-25 PROCEDURE — 96374 THER/PROPH/DIAG INJ IV PUSH: CPT | Mod: 59

## 2023-10-25 PROCEDURE — 36415 COLL VENOUS BLD VENIPUNCTURE: CPT | Performed by: EMERGENCY MEDICINE

## 2023-10-25 PROCEDURE — 120N000001 HC R&B MED SURG/OB

## 2023-10-25 PROCEDURE — 71275 CT ANGIOGRAPHY CHEST: CPT | Mod: MF

## 2023-10-25 PROCEDURE — 84145 PROCALCITONIN (PCT): CPT | Performed by: EMERGENCY MEDICINE

## 2023-10-25 PROCEDURE — 250N000009 HC RX 250: Performed by: EMERGENCY MEDICINE

## 2023-10-25 PROCEDURE — 85025 COMPLETE CBC W/AUTO DIFF WBC: CPT | Performed by: EMERGENCY MEDICINE

## 2023-10-25 PROCEDURE — 250N000011 HC RX IP 250 OP 636: Mod: JZ | Performed by: EMERGENCY MEDICINE

## 2023-10-25 PROCEDURE — 80048 BASIC METABOLIC PNL TOTAL CA: CPT | Performed by: EMERGENCY MEDICINE

## 2023-10-25 PROCEDURE — 83880 ASSAY OF NATRIURETIC PEPTIDE: CPT | Performed by: EMERGENCY MEDICINE

## 2023-10-25 PROCEDURE — 99285 EMERGENCY DEPT VISIT HI MDM: CPT | Mod: 25

## 2023-10-25 PROCEDURE — 93005 ELECTROCARDIOGRAM TRACING: CPT

## 2023-10-25 RX ORDER — IPRATROPIUM BROMIDE AND ALBUTEROL SULFATE 2.5; .5 MG/3ML; MG/3ML
6 SOLUTION RESPIRATORY (INHALATION) ONCE
Status: COMPLETED | OUTPATIENT
Start: 2023-10-25 | End: 2023-10-25

## 2023-10-25 RX ORDER — IOPAMIDOL 755 MG/ML
500 INJECTION, SOLUTION INTRAVASCULAR ONCE
Status: COMPLETED | OUTPATIENT
Start: 2023-10-25 | End: 2023-10-25

## 2023-10-25 RX ORDER — FUROSEMIDE 10 MG/ML
40 INJECTION INTRAMUSCULAR; INTRAVENOUS ONCE
Status: COMPLETED | OUTPATIENT
Start: 2023-10-25 | End: 2023-10-25

## 2023-10-25 RX ADMIN — IOPAMIDOL 52 ML: 755 INJECTION, SOLUTION INTRAVENOUS at 21:21

## 2023-10-25 RX ADMIN — FUROSEMIDE 40 MG: 10 INJECTION, SOLUTION INTRAMUSCULAR; INTRAVENOUS at 22:44

## 2023-10-25 RX ADMIN — IPRATROPIUM BROMIDE AND ALBUTEROL SULFATE 6 ML: .5; 3 SOLUTION RESPIRATORY (INHALATION) at 20:59

## 2023-10-25 RX ADMIN — SODIUM CHLORIDE 73 ML: 9 INJECTION, SOLUTION INTRAVENOUS at 21:21

## 2023-10-25 ASSESSMENT — ACTIVITIES OF DAILY LIVING (ADL)
ADLS_ACUITY_SCORE: 35
ADLS_ACUITY_SCORE: 35

## 2023-10-25 NOTE — Clinical Note
The DP pulses are 2+ bilaterally. The PT pulses are 2+ bilaterally. The right radial pulse is 2+. The right ulnar pulse is 1+.

## 2023-10-25 NOTE — TELEPHONE ENCOUNTER
"Received a call from the call center to discuss shortness of breath.  Patient is not an established cardiology patient. She has an appointment in place 11/1/23 with Dr. Griggs to establish care.  Spoke to Mar, who says she is wondering about getting a sooner appointment for shortness of breath. She says it has been getting worse. She says she is short of breath at rest, and cannot lay flat. She says her pulmonologist does not feel it is lung related, and to see cardiology.  Advised it is recommended to go to ED for worsening shortness of breath symptoms. She verbalized agreement and understanding.     1. RESPIRATORY STATUS: \"Describe your breathing?\" (e.g., wheezing, shortness of breath, unable to speak, severe coughing) shortness of breath  2. ONSET: \"When did this breathing problem begin?\"  3. PATTERN \"Does the difficult breathing come and go, or has it been constant since it started?\"  4. SEVERITY: \"How bad is your breathing?\" (e.g., mild, moderate, severe) Moderate  - MILD: No SOB at rest, mild SOB with walking, speaks normally in sentences, can lie down, no retractions, pulse < 100.  - MODERATE: SOB at rest, SOB with minimal exertion and prefers to sit, cannot lie down flat, speaks in phrases, mild retractions, audible wheezing, pulse 100-120.  - SEVERE: Very SOB at rest, speaks in single words, struggling to breathe, sitting hunched forward, retractions, pulse > 120  5. RECURRENT SYMPTOM: \"Have you had difficulty breathing before?\" If Yes, ask: \"When was the last time?\" and \"What happened that time?\"  6. CARDIAC HISTORY: \"Do you have any history of heart disease?\" (e.g., heart attack, angina, bypass surgery, angioplasty)  7. LUNG HISTORY: \"Do you have any history of lung disease?\" (e.g., pulmonary embolus, asthma, emphysema)  8. CAUSE: \"What do you think is causing the breathing problem?\"  9. OTHER SYMPTOMS: \"Do you have any other symptoms? (e.g., dizziness, runny nose, cough, chest pain, fever)  10. O2 " "SATURATION MONITOR: \"Do you use an oxygen saturation monitor (pulse oximeter) at home?\" If Yes, ask: \"What is your reading (oxygen level) today?\" \"What is your usual oxygen saturation reading?\" (e.g., 95%)  11. PREGNANCY: \"Is there any chance you are pregnant?\" \"When was your last menstrual period?\"  12. TRAVEL: \"Have you traveled out of the country in the last month?\" (e.g., travel history, exposures)  Reason for Disposition    [1] MODERATE difficulty breathing (e.g., speaks in phrases, SOB even at rest, pulse 100-120) AND [2] NEW-onset or WORSE than normal    Protocols used: Breathing Difficulty-A-AH    "

## 2023-10-26 ENCOUNTER — APPOINTMENT (OUTPATIENT)
Dept: CARDIOLOGY | Facility: CLINIC | Age: 68
DRG: 281 | End: 2023-10-26
Attending: STUDENT IN AN ORGANIZED HEALTH CARE EDUCATION/TRAINING PROGRAM
Payer: MEDICARE

## 2023-10-26 ENCOUNTER — PATIENT OUTREACH (OUTPATIENT)
Dept: GASTROENTEROLOGY | Facility: CLINIC | Age: 68
End: 2023-10-26
Payer: MEDICARE

## 2023-10-26 LAB
ANION GAP SERPL CALCULATED.3IONS-SCNC: 11 MMOL/L (ref 7–15)
ATRIAL RATE - MUSE: 104 BPM
BUN SERPL-MCNC: 16.6 MG/DL (ref 8–23)
CALCIUM SERPL-MCNC: 9.4 MG/DL (ref 8.8–10.2)
CHLORIDE SERPL-SCNC: 100 MMOL/L (ref 98–107)
CHOLEST SERPL-MCNC: 201 MG/DL
CREAT SERPL-MCNC: 1.05 MG/DL (ref 0.51–0.95)
DEPRECATED HCO3 PLAS-SCNC: 26 MMOL/L (ref 22–29)
DIASTOLIC BLOOD PRESSURE - MUSE: NORMAL MMHG
EGFRCR SERPLBLD CKD-EPI 2021: 57 ML/MIN/1.73M2
ERYTHROCYTE [DISTWIDTH] IN BLOOD BY AUTOMATED COUNT: 13.3 % (ref 10–15)
GLUCOSE SERPL-MCNC: 135 MG/DL (ref 70–99)
HCT VFR BLD AUTO: 36.5 % (ref 35–47)
HDLC SERPL-MCNC: 76 MG/DL
HGB BLD-MCNC: 11.7 G/DL (ref 11.7–15.7)
INTERPRETATION ECG - MUSE: NORMAL
LDLC SERPL CALC-MCNC: 111 MG/DL
LVEF ECHO: NORMAL
MCH RBC QN AUTO: 29.8 PG (ref 26.5–33)
MCHC RBC AUTO-ENTMCNC: 32.1 G/DL (ref 31.5–36.5)
MCV RBC AUTO: 93 FL (ref 78–100)
NONHDLC SERPL-MCNC: 125 MG/DL
P AXIS - MUSE: 68 DEGREES
PLATELET # BLD AUTO: 352 10E3/UL (ref 150–450)
POTASSIUM SERPL-SCNC: 3.8 MMOL/L (ref 3.4–5.3)
POTASSIUM SERPL-SCNC: 3.8 MMOL/L (ref 3.4–5.3)
PR INTERVAL - MUSE: 136 MS
QRS DURATION - MUSE: 132 MS
QT - MUSE: 394 MS
QTC - MUSE: 518 MS
R AXIS - MUSE: -44 DEGREES
RBC # BLD AUTO: 3.93 10E6/UL (ref 3.8–5.2)
SODIUM SERPL-SCNC: 137 MMOL/L (ref 135–145)
SYSTOLIC BLOOD PRESSURE - MUSE: NORMAL MMHG
T AXIS - MUSE: 98 DEGREES
TRIGL SERPL-MCNC: 70 MG/DL
VENTRICULAR RATE- MUSE: 104 BPM
WBC # BLD AUTO: 6.4 10E3/UL (ref 4–11)

## 2023-10-26 PROCEDURE — 80048 BASIC METABOLIC PNL TOTAL CA: CPT | Performed by: INTERNAL MEDICINE

## 2023-10-26 PROCEDURE — 120N000001 HC R&B MED SURG/OB

## 2023-10-26 PROCEDURE — 99222 1ST HOSP IP/OBS MODERATE 55: CPT | Mod: AI | Performed by: STUDENT IN AN ORGANIZED HEALTH CARE EDUCATION/TRAINING PROGRAM

## 2023-10-26 PROCEDURE — 99207 PR APP CREDIT; MD BILLING SHARED VISIT: CPT | Performed by: INTERNAL MEDICINE

## 2023-10-26 PROCEDURE — 250N000013 HC RX MED GY IP 250 OP 250 PS 637: Performed by: INTERNAL MEDICINE

## 2023-10-26 PROCEDURE — 93306 TTE W/DOPPLER COMPLETE: CPT

## 2023-10-26 PROCEDURE — 36415 COLL VENOUS BLD VENIPUNCTURE: CPT | Performed by: INTERNAL MEDICINE

## 2023-10-26 PROCEDURE — 99223 1ST HOSP IP/OBS HIGH 75: CPT | Mod: 25 | Performed by: INTERNAL MEDICINE

## 2023-10-26 PROCEDURE — 85027 COMPLETE CBC AUTOMATED: CPT | Performed by: INTERNAL MEDICINE

## 2023-10-26 PROCEDURE — 93306 TTE W/DOPPLER COMPLETE: CPT | Mod: 26 | Performed by: INTERNAL MEDICINE

## 2023-10-26 PROCEDURE — 250N000011 HC RX IP 250 OP 636: Mod: JZ | Performed by: STUDENT IN AN ORGANIZED HEALTH CARE EDUCATION/TRAINING PROGRAM

## 2023-10-26 PROCEDURE — 258N000003 HC RX IP 258 OP 636: Performed by: INTERNAL MEDICINE

## 2023-10-26 PROCEDURE — 80061 LIPID PANEL: CPT | Performed by: INTERNAL MEDICINE

## 2023-10-26 PROCEDURE — 250N000011 HC RX IP 250 OP 636: Mod: JZ | Performed by: INTERNAL MEDICINE

## 2023-10-26 RX ORDER — LIDOCAINE HYDROCHLORIDE 10 MG/ML
INJECTION, SOLUTION EPIDURAL; INFILTRATION; INTRACAUDAL; PERINEURAL
Status: DISCONTINUED
Start: 2023-10-26 | End: 2023-10-26 | Stop reason: WASHOUT

## 2023-10-26 RX ORDER — BUPROPION HYDROCHLORIDE 150 MG/1
150 TABLET ORAL DAILY
Status: DISCONTINUED | OUTPATIENT
Start: 2023-10-26 | End: 2023-10-29 | Stop reason: HOSPADM

## 2023-10-26 RX ORDER — FLUTICASONE FUROATE AND VILANTEROL 200; 25 UG/1; UG/1
1 POWDER RESPIRATORY (INHALATION) DAILY
Status: DISCONTINUED | OUTPATIENT
Start: 2023-10-26 | End: 2023-10-28

## 2023-10-26 RX ORDER — FENTANYL CITRATE 50 UG/ML
INJECTION, SOLUTION INTRAMUSCULAR; INTRAVENOUS
Status: DISCONTINUED
Start: 2023-10-26 | End: 2023-10-26 | Stop reason: WASHOUT

## 2023-10-26 RX ORDER — POTASSIUM CHLORIDE 1500 MG/1
20 TABLET, EXTENDED RELEASE ORAL
Status: COMPLETED | OUTPATIENT
Start: 2023-10-26 | End: 2023-10-26

## 2023-10-26 RX ORDER — FUROSEMIDE 10 MG/ML
40 INJECTION INTRAMUSCULAR; INTRAVENOUS 2 TIMES DAILY
Status: DISCONTINUED | OUTPATIENT
Start: 2023-10-26 | End: 2023-10-26

## 2023-10-26 RX ORDER — AZELASTINE HYDROCHLORIDE, FLUTICASONE PROPIONATE 137; 50 UG/1; UG/1
1 SPRAY, METERED NASAL 2 TIMES DAILY
Status: DISCONTINUED | OUTPATIENT
Start: 2023-10-26 | End: 2023-10-26

## 2023-10-26 RX ORDER — ASPIRIN 81 MG/1
243 TABLET, CHEWABLE ORAL ONCE
Status: COMPLETED | OUTPATIENT
Start: 2023-10-26 | End: 2023-10-26

## 2023-10-26 RX ORDER — SODIUM CHLORIDE 9 MG/ML
INJECTION, SOLUTION INTRAVENOUS CONTINUOUS
Status: DISCONTINUED | OUTPATIENT
Start: 2023-10-26 | End: 2023-10-27 | Stop reason: HOSPADM

## 2023-10-26 RX ORDER — ALBUTEROL SULFATE 90 UG/1
2 AEROSOL, METERED RESPIRATORY (INHALATION) EVERY 4 HOURS PRN
Status: DISCONTINUED | OUTPATIENT
Start: 2023-10-26 | End: 2023-10-29 | Stop reason: HOSPADM

## 2023-10-26 RX ORDER — FUROSEMIDE 10 MG/ML
20 INJECTION INTRAMUSCULAR; INTRAVENOUS ONCE
Status: COMPLETED | OUTPATIENT
Start: 2023-10-26 | End: 2023-10-27

## 2023-10-26 RX ORDER — BUDESONIDE 0.5 MG/2ML
0.5 INHALANT ORAL 2 TIMES DAILY
COMMUNITY
End: 2024-02-16

## 2023-10-26 RX ORDER — ASPIRIN 325 MG
325 TABLET ORAL ONCE
Status: COMPLETED | OUTPATIENT
Start: 2023-10-26 | End: 2023-10-26

## 2023-10-26 RX ORDER — CARVEDILOL 3.12 MG/1
3.12 TABLET ORAL 2 TIMES DAILY WITH MEALS
Status: DISCONTINUED | OUTPATIENT
Start: 2023-10-26 | End: 2023-10-28

## 2023-10-26 RX ORDER — VERAPAMIL HYDROCHLORIDE 2.5 MG/ML
INJECTION, SOLUTION INTRAVENOUS
Status: DISCONTINUED
Start: 2023-10-26 | End: 2023-10-26 | Stop reason: WASHOUT

## 2023-10-26 RX ORDER — FUROSEMIDE 10 MG/ML
40 INJECTION INTRAMUSCULAR; INTRAVENOUS DAILY
Status: DISCONTINUED | OUTPATIENT
Start: 2023-10-26 | End: 2023-10-28

## 2023-10-26 RX ORDER — FUROSEMIDE 10 MG/ML
40 INJECTION INTRAMUSCULAR; INTRAVENOUS DAILY
Status: DISCONTINUED | OUTPATIENT
Start: 2023-10-26 | End: 2023-10-26

## 2023-10-26 RX ORDER — NITROGLYCERIN 5 MG/ML
VIAL (ML) INTRAVENOUS
Status: DISCONTINUED
Start: 2023-10-26 | End: 2023-10-27 | Stop reason: HOSPADM

## 2023-10-26 RX ORDER — LORAZEPAM 0.5 MG/1
0.5 TABLET ORAL
Status: DISCONTINUED | OUTPATIENT
Start: 2023-10-26 | End: 2023-10-27 | Stop reason: HOSPADM

## 2023-10-26 RX ORDER — LORAZEPAM 2 MG/ML
0.5 INJECTION INTRAMUSCULAR
Status: DISCONTINUED | OUTPATIENT
Start: 2023-10-26 | End: 2023-10-27 | Stop reason: HOSPADM

## 2023-10-26 RX ORDER — ENOXAPARIN SODIUM 100 MG/ML
40 INJECTION SUBCUTANEOUS EVERY 24 HOURS
Status: DISCONTINUED | OUTPATIENT
Start: 2023-10-26 | End: 2023-10-29 | Stop reason: HOSPADM

## 2023-10-26 RX ORDER — LIDOCAINE 40 MG/G
CREAM TOPICAL
Status: DISCONTINUED | OUTPATIENT
Start: 2023-10-26 | End: 2023-10-27 | Stop reason: HOSPADM

## 2023-10-26 RX ORDER — HEPARIN SODIUM 1000 [USP'U]/ML
INJECTION, SOLUTION INTRAVENOUS; SUBCUTANEOUS
Status: DISCONTINUED
Start: 2023-10-26 | End: 2023-10-27 | Stop reason: HOSPADM

## 2023-10-26 RX ADMIN — ASPIRIN 325 MG ORAL TABLET 325 MG: 325 PILL ORAL at 12:30

## 2023-10-26 RX ADMIN — SODIUM CHLORIDE: 9 INJECTION, SOLUTION INTRAVENOUS at 12:23

## 2023-10-26 RX ADMIN — ENOXAPARIN SODIUM 40 MG: 40 INJECTION SUBCUTANEOUS at 09:17

## 2023-10-26 RX ADMIN — FUROSEMIDE 40 MG: 10 INJECTION, SOLUTION INTRAMUSCULAR; INTRAVENOUS at 09:17

## 2023-10-26 RX ADMIN — CARVEDILOL 3.12 MG: 3.12 TABLET, FILM COATED ORAL at 18:46

## 2023-10-26 RX ADMIN — CARVEDILOL 3.12 MG: 3.12 TABLET, FILM COATED ORAL at 12:30

## 2023-10-26 RX ADMIN — BUPROPION HYDROCHLORIDE 150 MG: 150 TABLET, EXTENDED RELEASE ORAL at 11:11

## 2023-10-26 RX ADMIN — POTASSIUM CHLORIDE 20 MEQ: 1500 TABLET, EXTENDED RELEASE ORAL at 12:30

## 2023-10-26 ASSESSMENT — ACTIVITIES OF DAILY LIVING (ADL)
ADLS_ACUITY_SCORE: 37
WALKING_OR_CLIMBING_STAIRS_DIFFICULTY: NO
WEAR_GLASSES_OR_BLIND: YES
ADLS_ACUITY_SCORE: 22
DRESSING/BATHING_DIFFICULTY: NO
ADLS_ACUITY_SCORE: 26
ADLS_ACUITY_SCORE: 22
VISION_MANAGEMENT: GLASSES
ADLS_ACUITY_SCORE: 22
ADLS_ACUITY_SCORE: 22
DOING_ERRANDS_INDEPENDENTLY_DIFFICULTY: NO
TOILETING_ISSUES: NO
CONCENTRATING,_REMEMBERING_OR_MAKING_DECISIONS_DIFFICULTY: NO
DIFFICULTY_COMMUNICATING: NO
ADLS_ACUITY_SCORE: 22
ADLS_ACUITY_SCORE: 35
ADLS_ACUITY_SCORE: 22
FALL_HISTORY_WITHIN_LAST_SIX_MONTHS: NO
ADLS_ACUITY_SCORE: 26
ADLS_ACUITY_SCORE: 35
HEARING_DIFFICULTY_OR_DEAF: NO
DIFFICULTY_EATING/SWALLOWING: NO
ADLS_ACUITY_SCORE: 26
CHANGE_IN_FUNCTIONAL_STATUS_SINCE_ONSET_OF_CURRENT_ILLNESS/INJURY: YES

## 2023-10-26 NOTE — PLAN OF CARE
Patient Transfer Information  Patient connected to monitoring equipment on arrival: N/A     Patient connected to wall oxygen on arrival: N/A    Belongings: Transferred with patient    Safety check completed: Yes

## 2023-10-26 NOTE — UTILIZATION REVIEW
Inpatient appropriate    Admission Status; Secondary Review Determination      Under the authority of the Utilization Management Committee, the utilization review process indicated a secondary review on the above patient. The review outcome is based on review of the medical records, discussions with staff, and applying clinical experience noted on the date of the review.    (x) Inpatient Status Appropriate - This patient's medical care is consistent with medical management for inpatient care and reasonable inpatient medical practice.    RATIONALE FOR DETERMINATION  68-year-old female with history of asthma was admitted to [facility] on 10/25/2023 with chest pain and dyspnea on exertion.  She was found to have elevated BNP and CT showing moderate bilateral pleural effusions suggesting congestive heart failure.  Echocardiogram shows moderate to severely reduced systolic function with EF of 30-35%, with wall motion abnormality and moderately severe mitral regurgitation.  Patient is being treated with IV Lasix and plan for coronary angiogram tomorrow morning.  Given the significant change in EF and wall motion abnormality there is concern for acute coronary syndrome.  Inpatient care is appropriate at this time.    At the time of admission with the information available to the attending physician more than 2 nights Hospital complex care was anticipated, based on patient risk of adverse outcome if treated as outpatient and complex care required. Inpatient admission is appropriate based on the Medicare guidelines.    This document was produced using voice recognition software      The information on this document is developed by the utilization review team in order for the business office to ensure compliance. This only denotes the appropriateness of proper admission status and does not reflect the quality of care rendered.  The definitions of Inpatient Status and Observation Status used in making the  determination above are those provided in the CMS Coverage Manual, Chapter 1 and Chapter 6, section 70.4.    Sincerely,    Utilization Review  Physician Advisor  [facility].

## 2023-10-26 NOTE — PLAN OF CARE
Neuro: A&Ox4.   Cardiac: SR. VSS.   Respiratory: Sating >92% on RA.  GI/: Adequate urine output. No BM.  Diet/appetite: Tolerated 2G Na diet.    Activity:  Independent in room.   Pain: Denies  Skin: No new deficits noted.  LDA's:R PIV-SL. L PIV-SL    Plan: ECHO completed. Lasix IV 40mg order changed to daily. Cardiology consult. Angiogram ordered, plan for 10/27. Pt to be NPO at midnight. Strict I/O. Updated care plan.

## 2023-10-26 NOTE — PROGRESS NOTES
Patient was given enoxaparin this am.  Per IV cards, cannot proceed with angiogram today.  Switch to UFH and plan for cath in am.

## 2023-10-26 NOTE — ED TRIAGE NOTES
Pt presents for evaluation of chest pain with difficulty breathing. Started on Friday, recently dx with asthma, was started on an albuterol and budesonide inhaler. Pain is under left breast. Has been intermittent, currently rated 3/10. Has had to sleep sitting upright due to shortness of breath. Called pulmonologist, was advised to get a CT scan and EKG. Scheduled for later this week, but couldn't wait. Noted BLE edema yesterday. Flew to [place] recently.

## 2023-10-26 NOTE — PLAN OF CARE
[facility]    ED Boarding Nurse Handoff Addendum Report:    Date/time: 10/26/2023, 4:36 AM    Activity Level: standby    Fall Risk: Yes:  nonskid shoes/slippers when out of bed, patient and family education, and activity supervised    Active Infusions: none    Current Meds Due: none    Current care needs: cardiac monitoring, strict I/O's     Oxygen requirements (liters/min and/or FiO2): n/a    Respiratory status: Room air    Vital signs (within last 30 minutes):    Vitals:    10/25/23 2312 10/25/23 2322 10/25/23 2332 10/26/23 0106   BP:    136/81   BP Location:    Left arm   Pulse: 94 99 97 86   Resp: 25 28 18 20   Temp:    97.4  F (36.3  C)   TempSrc:    Oral   SpO2:    93%   Weight:       Height:           Focused assessment within last 30 minutes:    A/O x4. VSS on RA. On tele, SR w/ PVCs and ST depression. Denies pain, nausea, SOB, and CP. Able to make needs known.    ED Boarding Nurse name: Divina Terrazas RN

## 2023-10-26 NOTE — PHARMACY-ADMISSION MEDICATION HISTORY
Pharmacist Admission Medication History    Admission medication history is complete. The information provided in this note is only as accurate as the sources available at the time of the update.    Information Source(s): Patient via in-person    Pertinent Information:      Changes made to PTA medication list:  Added: budesonide 0.5 mg/2ml as nasal rinse.  Deleted: Cortisporin Otic (completed), fluconazole 150 mg (completed), Asmanex twisthaler (now using Symbicort).  Changed: Dymista from bid to bid prn    Medication Affordability:  Not including over the counter (OTC) medications, was there a time in the past 3 months when you did not take your medications as prescribed because of cost?: No    Allergies reviewed with patient and updates made in EHR: yes    Medication History Completed By: Jeremy Nguyen [facility] 10/26/2023 11:20 AM    PTA Med List   Medication Sig Last Dose    albuterol (PROAIR HFA/PROVENTIL HFA/VENTOLIN HFA) 108 (90 Base) MCG/ACT inhaler Inhale 2 puffs into the lungs every 4 hours as needed for shortness of breath, wheezing or cough 10/25/2023 at 5PM    aluminum chloride (DRYSOL) 20 % external solution Apply topically At Bedtime USES PRN at HS    amphetamine-dextroamphetamine (ADDERALL XR) 25 MG 24 hr capsule Take 1 capsule (25 mg) by mouth every morning 2 weeks ago. at AM    azelastine-fluticasone (DYMISTA) 137-50 MCG/ACT nasal spray Spray 1 spray into both nostrils 2 times daily as needed 10/24/2023    budesonide (PULMICORT) 0.5 MG/2ML neb solution Spray 0.5 mg in nostril 2 times daily Uses as nasal rinse twice daily. 10/25/2023    budesonide-formoterol (SYMBICORT) 160-4.5 MCG/ACT Inhaler Inhale 2 puffs into the lungs 2 times daily 10/25/2023    buPROPion (WELLBUTRIN XL) 150 MG 24 hr tablet TAKE 1 TABLET(150 MG) BY MOUTH EVERY MORNING 10/25/2023 at AM    fexofenadine (ALLEGRA) 180 MG tablet Take 180 mg by mouth every other day 10/24/2023 at AM    fluorouracil (EFUDEX) 5 % external cream APPLY  SPARINGLY EXTERNALLY TO FACE EVERY SUNDAY AT NIGHT. AVOID EYES AND LIPS 10/22/2023 at PM    loratadine (CLARITIN) 10 MG tablet Take 10 mg by mouth every other day 10/25/2023 at AM    LORazepam (ATIVAN) 0.5 MG tablet Take 1 tablet (0.5 mg) by mouth nightly as needed for sleep More than a month

## 2023-10-26 NOTE — ED PROVIDER NOTES
History     Chief Complaint:  Chest Pain and Shortness of Breath     The history is provided by the patient.      Mar Zeng is a 68 year old female with history of anxiety and asthma who presents to the ED with her  for evaluation of chest pain and shortness of breath. The patient reports that since Friday, she has had occasional achy chest pain , swelling in her legs, feet, and ankles, and a recent weight gain. Patient states that she also has some shortness of breath when she is sitting up and laying down, but it is worse when laying down which makes it difficult for her to sleep. Endorses wheezing. Patient does not have nebulizer's at home, but she does have inhalers. Of note, patient saw pulmonology last month and these symptoms that she presents with today are different. Denies fever or cough.    Independent Historian:    None     Review of External Notes:  I reviewed pulmonology clinic note from . She was there for dyspnea on exertion.      Medications:    Albuterol   Adderall   Symbicort   Wellbutrin  Allegra   Diflucan   Claritin  Ativan  Asmanex Twisthaler   Cortisporin  Celebrex     Past Medical History:    Mumps  Multiple pigmented nevi  Seasonal allergic rhinitis   Hematoma of rectal sheath   Anxiety  ADHD  Benign neoplasm of colon   Fibromyositis   Kati toe  Insomnia   Osteoarthritis   Macrodactylia of toes  Migraines  Plantar fascial fibromatosis   Talipes cavus   Fibromyalgia   Lesion of plantar nerve   Panic disorder without agoraphobia   Varicella  Pterygium   Asthma     Past Surgical History:    Colonoscopy x3  Right little finger joint replacement   Hysterectomy  Orthopedic surgery   Left index cyst removal  Bilateral knee meniscus repair  Bilateral exostectomy   Shock wave for plantar fasciitis   Hammertoe correction    delivery   Left breast biopsy x2  Left first toe sesamoidectomy     Physical Exam   Patient Vitals for the past 24 hrs:   BP Temp Temp src Pulse  "Resp SpO2 Height Weight   10/25/23 2045 (!) 148/100 -- -- 102 25 93 % -- --   10/25/23 2012 (!) 145/107 98.3  F (36.8  C) Oral 109 20 96 % 1.676 m (5' 6\") 56.7 kg (125 lb)      Physical Exam  VS: Reviewed per above  HENT: Mucous membranes moist  EYES: sclera anicteric  CV: Rate as noted,  regular rhythm.   RESP: Effort normal. Breath sounds are normal bilaterally.  GI: no tenderness/rebound/guarding, not distended.  NEURO: Alert, moving all extremities  MSK: No deformity of the extremities, mild symmetric peripheral edema.   SKIN: Warm and dry    Emergency Department Course   ECG  ECG taken at 2040, ECG read at 2042  Sinus tachycardia  Left axis deviation  Left bundle branch block   When compared with prior ECG, dated 11/10/22, LBBB is now present.  Rate 104 bpm. NV interval 136 ms. QRS duration 132 ms. QT/QTc 394/518 ms. P-R-T axes 68 -44 98.    Imaging:  CT Chest Pulmonary Embolism w Contrast   Final Result   IMPRESSION:   1.  No CT evidence for pulmonary embolism.      2.  Moderate bilateral pleural effusions with associated compressive atelectasis in the posterior lung bases. Patchy bilateral anterior mid lung infiltrates or atelectasis. Clinical correlation for pneumonia.           Report per radiology    Laboratory:  Labs Ordered and Resulted from Time of ED Arrival to Time of ED Departure   BASIC METABOLIC PANEL - Abnormal       Result Value    Sodium 139      Potassium 4.3      Chloride 106      Carbon Dioxide (CO2) 21 (*)     Anion Gap 12      Urea Nitrogen 21.4      Creatinine 1.33 (*)     GFR Estimate 43 (*)     Calcium 9.3      Glucose 111 (*)    TROPONIN T, HIGH SENSITIVITY - Abnormal    Troponin T, High Sensitivity 19 (*)    CBC WITH PLATELETS AND DIFFERENTIAL - Abnormal    WBC Count 8.2      RBC Count 3.93      Hemoglobin 11.6 (*)     Hematocrit 36.2      MCV 92      MCH 29.5      MCHC 32.0      RDW 13.3      Platelet Count 383      % Neutrophils 60      % Lymphocytes 25      % Monocytes 12      % " Eosinophils 2      % Basophils 1      % Immature Granulocytes 0      NRBCs per 100 WBC 0      Absolute Neutrophils 4.9      Absolute Lymphocytes 2.0      Absolute Monocytes 1.0      Absolute Eosinophils 0.2      Absolute Basophils 0.1      Absolute Immature Granulocytes 0.0      Absolute NRBCs 0.0     NT PROBNP INPATIENT - Abnormal    N terminal Pro BNP Inpatient 5,176 (*)    PROCALCITONIN - Abnormal    Procalcitonin 0.05 (*)    TROPONIN T, HIGH SENSITIVITY - Abnormal    Troponin T, High Sensitivity 17 (*)       Emergency Department Course & Assessments:     Interventions:  Medications   ipratropium - albuterol 0.5 mg/2.5 mg/3 mL (DUONEB) neb solution 6 mL (6 mLs Nebulization $Given 10/25/23 2059)   iopamidol (ISOVUE-370) solution 500 mL (52 mLs Intravenous $Given 10/25/23 2121)   CT scan flush (73 mLs Intravenous $Given 10/25/23 2121)   furosemide (LASIX) injection 40 mg (40 mg Intravenous $Given 10/25/23 2244)      Assessments/Consultations/Discussion of Management or Tests:  ED Course as of 10/25/23 2323   Wed Oct 25, 2023   2040 I obtained history and examined the patient as noted above.    2220 I rechecked and updated the patient.    2304 Procalcitonin(!)   2304 Procalcitonin(!): 0.05   2314 I spoke with hospitalist Dr. Omer regarding admission.     Social Determinants of Health affecting care:  None      Disposition:  The patient was admitted to the hospital under the care of Dr. Omer.     Impression & Plan    CMS Diagnoses: None    Medical Decision Making:  Patient presents to the ER for evaluation of 1 week of shortness of breath and chest discomfort and orthopnea and lower extremity edema.  Vital signs reassuring.  Exam is unremarkable.  ECG reveals new left bundle branch block without specific ischemic change.  Serial troponin testing is stable and intermediate range.  Overall lower suspicion for ACS.  BNP was elevated and CT chest reveals pleural effusions and focal opacities.  History not  supportive of occult pneumonia.  Patient was given IV Lasix with concern for CHF.  She was admitted to hospitalist team for further cardiac evaluation.    Critical Care time:  was 0 minutes for this patient excluding procedures.    Diagnosis:    ICD-10-CM    1. Congestive heart failure, unspecified HF chronicity, unspecified heart failure type (H)  I50.9          Scribe Disclosure:  SHILOH GILL, am serving as a scribe at 8:39 PM on 10/25/2023 to document services personally performed by Elie Barcenas MD based on my observations and the provider's statements to me.    10/25/2023   Elie Barcenas MD Lindenbaum, Elan, MD  10/26/23 0135

## 2023-10-26 NOTE — PROVIDER NOTIFICATION
Dr. Manning notified: Telemetry tech informed writer that ST depression is larger than prior strips. Pt going to Cath lab at 1330. MD verbalized understanding, no new orders.

## 2023-10-26 NOTE — CONSULTS
[facility]    Cardiology Consultation     Date of Admission:  10/25/2023    Assessment & Plan   Mar Zeng is a 68 year old female who was admitted on 10/25/2023.    NSTEMI-  Troponin are mildly elevated and flat suggesting type 2 MI, however there is RWMA and possible new LBBB, severe LV dysfunction, so I recommend coronary angiogram today.Risks and benefits of left heart catheterization, aka coronary angiogram were discussed with the patient in detail. 0.1-0.3% (for diagnostic angio) and 1-2% (for PCI)  risk of stroke, MI, death, emergent bypass grafting, risk of contrast induced allergic reaction, renal dysfunction, vascular complications were discussed. Patient understands and wishes to proceed.    HFrEF-  Received IV lasix with partial symptomatic improvement  Start low dose coreg, hold on ACEI right now due to elevated Cr and going for cath. Will need titration of GDMT for suspected ischemic CMP  RENAL INSUFFICIENCY  Improving, likely cardiorenal  BILATERAL PLEURAL EFFUSIONS-  Consider therapeutic thoracentesis, lasix may not decrease very well.  High complexity     Yanira Anastasiya Tinoco DO, MD    Primary Care Physician   Gail Crespo    Reason for Consult   Reason for consult: I was asked by Kan to evaluate this patient for heart failure.    History of Present Illness   Mar Zeng is a 68 year old female who presents with history of exertional chest pain and shortness of breath over the past week.  She actually has been experiencing shortness of breath for several months and has been seen by ENT and pulmonology and was diagnosed with asthma.  She was down in [place] helping her son move when she had sudden worsening of symptoms last Friday.  She began to have left-sided chest pain with minimal exertion that would go away with rest.  She had shortness of breath with exertion and laying flat.  Her primary care provider had prescribed her an inhaler to  use but she did not find this helpful.  She decided to return to [place] before seeking evaluation.  She has no prior history of heart disease, no family history of premature coronary artery disease, no personal history of hypertension, hyperlipidemia, type 2 diabetes or known kidney disease.  ECG on admission demonstrates a sinus tachycardia with a left bundle branch block, no previous available for comparison.  Troponin level was mildly elevated, initial 19 and subsequent 17.  NT proBNP is elevated at 5, 176.  A chest CT was performed to evaluate for pulmonary embolism which was negative however there was evidence of moderate bilateral pleural effusions.  Past Medical History   Past Medical History:   Diagnosis Date    Mumps     NO ACTIVE PROBLEMS          Past Surgical History   Past Surgical History:   Procedure Laterality Date    COLONOSCOPY  8/23/2016    Dr. Shaw [facility]    COLONOSCOPY N/A 8/23/2016    Procedure: COLONOSCOPY;  Surgeon: Peter Shaw MD;  Location:  GI    COLONOSCOPY N/A 10/19/2022    Procedure: COLONOSCOPY with polypectomy using cold exacto snare;  Surgeon: Peter Shaw MD;  Location:  GI    FINGER SURGERY      right little finger joint replacement    HYSTERECTOMY VAGINAL  age 30    retained ovaries. no cervix     HYSTERECTOMY, PAP NO LONGER INDICATED      ORTHOPEDIC SURGERY      SURGICAL HISTORY OF -   10/25/13    left index cyst removal    SURGICAL HISTORY OF -       Bilat knee miniscus repair         Prior to Admission Medications   Prior to Admission Medications   Prescriptions Last Dose Informant Patient Reported? Taking?   LORazepam (ATIVAN) 0.5 MG tablet More than a month  No Yes   Sig: Take 1 tablet (0.5 mg) by mouth nightly as needed for sleep   albuterol (PROAIR HFA/PROVENTIL HFA/VENTOLIN HFA) 108 (90 Base) MCG/ACT inhaler 10/25/2023 at 5PM  No Yes   Sig: Inhale 2 puffs into the lungs every 4 hours as needed for shortness of breath, wheezing or cough   aluminum  chloride (DRYSOL) 20 % external solution USES PRN  No Yes   Sig: Apply topically At Bedtime   amphetamine-dextroamphetamine (ADDERALL XR) 25 MG 24 hr capsule 2 weeks ago.  No Yes   Sig: Take 1 capsule (25 mg) by mouth every morning   azelastine-fluticasone (DYMISTA) 137-50 MCG/ACT nasal spray 10/24/2023  Yes Yes   Sig: Spray 1 spray into both nostrils 2 times daily as needed   buPROPion (WELLBUTRIN XL) 150 MG 24 hr tablet 10/25/2023 at AM  No Yes   Sig: TAKE 1 TABLET(150 MG) BY MOUTH EVERY MORNING   budesonide (PULMICORT) 0.5 MG/2ML neb solution 10/25/2023  Yes Yes   Sig: Spray 0.5 mg in nostril 2 times daily Uses as nasal rinse twice daily.   budesonide-formoterol (SYMBICORT) 160-4.5 MCG/ACT Inhaler 10/25/2023  No Yes   Sig: Inhale 2 puffs into the lungs 2 times daily   fexofenadine (ALLEGRA) 180 MG tablet 10/24/2023 at AM  Yes Yes   Sig: Take 180 mg by mouth every other day   fluorouracil (EFUDEX) 5 % external cream 10/22/2023 at PM  Yes Yes   Sig: APPLY SPARINGLY EXTERNALLY TO FACE EVERY SUNDAY AT NIGHT. AVOID EYES AND LIPS   loratadine (CLARITIN) 10 MG tablet 10/25/2023 at AM  Yes Yes   Sig: Take 10 mg by mouth every other day   mometasone (ASMANEX TWISTHALER) 110 MCG/ACT inhaler   No No   Sig: Inhale 1 puff into the lungs every evening   timolol maleate (TIMOPTIC) 0.5 % ophthalmic solution   Yes No   Sig: Apply 1 drop topically nightly as needed (dry skin cracks) Apply 1 drop to lesion every night at bedtime. Do Not use on normal skin.      Facility-Administered Medications: None     Current Facility-Administered Medications   Medication Dose Route Frequency    buPROPion  150 mg Oral Daily    enoxaparin ANTICOAGULANT  40 mg Subcutaneous Q24H    fluticasone-vilanterol  1 puff Inhalation Daily    furosemide  40 mg Intravenous Daily     Current Facility-Administered Medications   Medication Last Rate     Allergies   Allergies   Allergen Reactions    Codeine Itching    Concerta [Methylphenidate] Itching    Hay  "Fever & [A.R.M.]        Social History    reports that she has quit smoking. Her smoking use included cigarettes. She has never been exposed to tobacco smoke. She has never used smokeless tobacco. She reports current alcohol use. She reports that she does not use drugs.      Family History   I have reviewed this patient's family history and updated it with pertinent information if needed.  Family History   Problem Relation Age of Onset    No Known Problems Mother     Hypertension Father     Cancer Father     Cancer Maternal Grandmother     Cancer Maternal Grandfather     Colon Cancer Paternal Grandmother     Cancer Paternal Grandfather     Asthma Brother     Hypertension Brother     No Known Problems Sister     No Known Problems Son     No Known Problems Son     Thyroid Disease Daughter     Colon Cancer Paternal Aunt     Colon Cancer Cousin           Review of Systems   A comprehensive review of system was performed and is negative other than that noted in the HPI or here.     Physical Exam   Vital Signs with Ranges  Temp:  [97.4  F (36.3  C)-98.3  F (36.8  C)] 98  F (36.7  C)  Pulse:  [] 97  Resp:  [17-30] 18  BP: (135-158)/() 144/88  SpO2:  [92 %-98 %] 95 %  Wt Readings from Last 4 Encounters:   10/26/23 54.2 kg (119 lb 6.4 oz)   09/29/23 53.5 kg (118 lb)   09/08/23 54.4 kg (120 lb)   06/05/23 52.8 kg (116 lb 4.8 oz)     I/O last 3 completed shifts:  In: -   Out: 300 [Urine:300]      Vitals: BP (!) 144/88 (BP Location: Left arm)   Pulse 97   Temp 98  F (36.7  C) (Oral)   Resp 18   Ht 1.676 m (5' 6\")   Wt 54.2 kg (119 lb 6.4 oz)   LMP  (LMP Unknown)   SpO2 95%   Breastfeeding No   BMI 19.27 kg/m      Physical Exam:   General - Alert and oriented to time place and person in no acute distress  Eyes - No scleral icterus  HEENT - Neck supple, moist mucous membranes  Cardiovascular - Regular, no murmur, +gallop  Extremities - There is no peripheral edema  Respiratory - decreased BS both bases  Skin " "- No pallor or cyanosis  Gastrointestinal - soft  Psych - Appropriate affect   Neurological - No gross motor neurological focal deficits    No lab results found in last 7 days.    Invalid input(s): \"TROPONINIES\"    Recent Labs   Lab 10/26/23  0806 10/25/23  2033   WBC 6.4 8.2   HGB 11.7 11.6*   MCV 93 92    383    139   POTASSIUM 3.8  3.8 4.3   CHLORIDE 100 106   CO2 26 21*   BUN 16.6 21.4   CR 1.05* 1.33*   GFRESTIMATED 57* 43*   ANIONGAP 11 12   MERRILL 9.4 9.3   * 111*     Recent Labs   Lab Test 08/18/22  1145 08/18/20  0725   CHOL 214* 231*   HDL 95 72   * 137*   TRIG 70 110     Recent Labs   Lab 10/26/23  0806 10/25/23  2033   WBC 6.4 8.2   HGB 11.7 11.6*   HCT 36.5 36.2   MCV 93 92    383     No results for input(s): \"PH\", \"PHV\", \"PO2\", \"PO2V\", \"SAT\", \"PCO2\", \"PCO2V\", \"HCO3\", \"HCO3V\" in the last 168 hours.  Recent Labs   Lab 10/25/23  2033   NTBNPI 5,176*     No results for input(s): \"DD\" in the last 168 hours.  No results for input(s): \"SED\", \"CRP\" in the last 168 hours.  Recent Labs   Lab 10/26/23  0806 10/25/23  2033    383     No results for input(s): \"TSH\" in the last 168 hours.  No results for input(s): \"COLOR\", \"APPEARANCE\", \"URINEGLC\", \"URINEBILI\", \"URINEKETONE\", \"SG\", \"UBLD\", \"URINEPH\", \"PROTEIN\", \"UROBILINOGEN\", \"NITRITE\", \"LEUKEST\", \"RBCU\", \"WBCU\" in the last 168 hours.    Imaging:  Recent Results (from the past 48 hour(s))   CT Chest Pulmonary Embolism w Contrast    Narrative    EXAM: CT CHEST PULMONARY EMBOLISM W CONTRAST  LOCATION: [facility]  DATE: 10/25/2023    INDICATION: SOB chest pain.  COMPARISON: None.  TECHNIQUE: CT chest pulmonary angiogram during arterial phase injection of IV contrast. Multiplanar reformats and MIP reconstructions were performed. Dose reduction techniques were used.   CONTRAST: 52mL Isovue 370    FINDINGS:  ANGIOGRAM CHEST: Pulmonary arteries are normal caliber and negative for pulmonary emboli. Thoracic " aorta is not well opacified and is  indeterminate for dissection. No CT evidence of right heart strain.    LUNGS AND PLEURA: Moderate bilateral pleural effusions with associated compressive atelectasis in the posterior lung bases. Patchy bilateral anterior mid lung infiltrates or atelectasis.    MEDIASTINUM/AXILLAE: No lymphadenopathy. Normal esophagus. No significant pericardial effusion. No evidence for thoracic aortic aneurysm.    CORONARY ARTERY CALCIFICATION: Mild.    UPPER ABDOMEN: Unremarkable    MUSCULOSKELETAL: Mild to moderate thoracic spondylosis. No concerning osseous abnormalities      Impression    IMPRESSION:  1.  No CT evidence for pulmonary embolism.    2.  Moderate bilateral pleural effusions with associated compressive atelectasis in the posterior lung bases. Patchy bilateral anterior mid lung infiltrates or atelectasis. Clinical correlation for pneumonia.     Echocardiogram Complete   Result Value    LVEF  30-35%    Narrative    [id number]  [id number]  [id number]  [id number]^[id number]     [facility]  Echocardiography Laboratory  [address] [place] [address]     Name: MARCO SHIN  MRN: [id number]  : 1955  Study Date: 10/26/2023 09:21 AM  Age: 68 yrs  Gender: Female  Patient Location: [facility]  Reason For Study: Syncope  Ordering Physician: SHUKRI ROSA  Performed By: Ellen Latham     BSA: 1.6 m2  Height: 66 in  Weight: 119 lb  BP: 144/88 mmHg  ______________________________________________________________________________  Procedure  Complete Portable Echo Adult.  ______________________________________________________________________________  Interpretation Summary     Left ventricular systolic function is moderate to severely reduced.  The visual ejection fraction is 30-35%.  There is moderately severe (3+) mitral regurgitation.  There is no comparison study  available.  ______________________________________________________________________________  Left Ventricle  The left ventricle is normal in size. Diastolic Doppler findings (E/E' ratio  and/or other parameters) suggest left ventricular filling pressures are  increased. The visual ejection fraction is 30-35%. Left ventricular systolic  function is moderate to severely reduced. There is severe inferior wall  hypokinesis. There is mod-severe global hypokinesia of the left ventricle.  Septal motion is consistent with conduction abnormality.     Right Ventricle  The right ventricle is normal in structure, function and size.     Atria  The left atrium is moderately dilated. Right atrial size is normal.     Mitral Valve  The mitral valve is normal in structure and function. There is moderately  severe (3+) mitral regurgitation. There is no mitral valve stenosis.     Tricuspid Valve  The tricuspid valve is not well visualized, but is grossly normal. Right  ventricular systolic pressure could not be approximated due to inadequate  tricuspid regurgitation.     Aortic Valve  The aortic valve is trileaflet. There is trace aortic regurgitation. No aortic  stenosis is present.     Pulmonic Valve  The pulmonic valve is not well visualized. Normal pulmonic valve velocity.     Vessels  The aortic root is normal size. The inferior vena cava is normal.     Pericardium  Small pericardial effusion. Small left pleural effusion.     ______________________________________________________________________________  MMode/2D Measurements & Calculations  IVSd: 0.84 cm  LVIDd: 5.1 cm  LVIDs: 4.3 cm  LVPWd: 1.1 cm     FS: 14.1 %  LV mass(C)d: 178.1 grams  LV mass(C)dI: 111.0 grams/m2  Ao root diam: 3.2 cm  asc Aorta Diam: 3.2 cm  LVOT diam: 2.2 cm  LVOT area: 3.8 cm2  Ao root diam index Ht(cm/m): 1.9  Ao root diam index BSA (cm/m2): 2.0  Asc Ao diam index BSA (cm/m2): 2.0  Asc Ao diam index Ht(cm/m): 1.9  LA Volume (BP): 62.8 ml     LA Volume  Index (BP): 39.2 ml/m2  RV Base: 2.9 cm  RWT: 0.44  TAPSE: 2.5 cm     Doppler Measurements & Calculations  MV E max candice: 92.8 cm/sec  MV A max candice: 81.2 cm/sec  MV E/A: 1.1  MV dec slope: 642.5 cm/sec2  MV dec time: 0.14 sec  LV V1 max P.4 mmHg  LV V1 max: 77.5 cm/sec  LV V1 VTI: 14.8 cm  MR PISA: 3.6 cm2  MR ERO: 0.28 cm2  MR volume: 44.9 ml  SV(LVOT): 55.8 ml  SI(LVOT): 34.8 ml/m2  PA acc time: 0.09 sec  E/E' avg: 15.7  Lateral E/e': 15.9  Medial E/e': 15.5     ______________________________________________________________________________  Report approved by: Samantha Waters 10/26/2023 10:30 AM             Echo:  No results found for this or any previous visit (from the past 4320 hour(s)).    Clinically Significant Risk Factors Present on Admission                   # Acute heart failure with reduced ejection fraction: last echo with EF <40% and receiving IV diuretics               Cardiomyopathy                    Acute kidney failure, unspecified

## 2023-10-26 NOTE — H&P
[facility]    History and Physical - Hospitalist Service       Date of Admission:  10/25/2023    Assessment & Plan      Mar Zeng is a 68 year old female with PMHx of recent diagnosis of asthma, who was admitted on 10/25/2023 with chest pain, OLSON, and orthopnea.    Chest pain   OLSON  Orthopnea  Hx of rheumatic fever as a child  Presenting with 5 days of dyspnea on exertion and orthopnea.  Also with intermittent chest pain/pressure with activity, resolves with rest.  Not currently having chest pressure.  Work-up notable for BNP of 5000, CT PE with bilateral pleural effusions but no PE, and troponins of 19 and 17 with new LBBB on EKG.  Received DuoNeb in the ER with minimal improvement in symptoms.  Given current symptoms, elevated BNP, pleural effusions, suspect heart failure.  Low suspicion for ACS as currently chest pain free, low and downtrending troponins, and mild coronary calcification on CT. Has history of rheumatic fever as a child and remembers being told she had a murmur that resolved.  No other personal or family history of cardiac disease.  Received 40 IV Lasix in ER, patient reported some response.  -TTE  - Telemetry  - IV Lasix 40  - Strict I/O  - Daily weight  - Cardiology consult    LILIANE  Cr 1.33 on arrival.  Last known 0.8 in 2022.  Suspect related to volume overload.  -AM BMP    Asthma - PTA albuterol    Awaiting med rec to order other PTA medications.         Diet:  regular  DVT Prophylaxis: Enoxaparin (Lovenox) SQ  Panda Catheter: Not present  Lines: None     Cardiac Monitoring: None  Code Status:  Full    Clinically Significant Risk Factors Present on Admission                                  Disposition Plan      Expected Discharge Date: 10/27/2023                  Ayanna Omer DO  Hospitalist Service  [facility]  Securely message with [employer] (more info)  Text page via [employer] Paging/Directory      ______________________________________________________________________    Chief Complaint   Shortness of breath    History is obtained from the patient    History of Present Illness   Mar Zeng is a 68 year old female who presents with worsening shortness of breath with exertion since Friday.  Had been in [place] helping her son move and drinking more water.  Started noticing she was getting more short of breath with activity.  Also developed intermittent chest pressure on the left side with activity and resolving with rest.  Also cannot lay flat, has been sleeping in a chair at night.  Was recently diagnosed with asthma but this shortness of breath feels different than the asthma symptoms.  Denies any recent fever/chills, cough, nausea, abdominal pain, diarrhea, dysuria, sick contacts.  This is never happened to her before.      Past Medical History    Past Medical History:   Diagnosis Date    Mumps     NO ACTIVE PROBLEMS        Past Surgical History   Past Surgical History:   Procedure Laterality Date    COLONOSCOPY  8/23/2016    Dr. Shaw [facility]    COLONOSCOPY N/A 8/23/2016    Procedure: COLONOSCOPY;  Surgeon: Peter Shaw MD;  Location:  GI    COLONOSCOPY N/A 10/19/2022    Procedure: COLONOSCOPY with polypectomy using cold exacto snare;  Surgeon: Peter Shaw MD;  Location:  GI    FINGER SURGERY      right little finger joint replacement    HYSTERECTOMY VAGINAL  age 30    retained ovaries. no cervix     HYSTERECTOMY, PAP NO LONGER INDICATED      ORTHOPEDIC SURGERY      SURGICAL HISTORY OF -   10/25/13    left index cyst removal    SURGICAL HISTORY OF -       Bilat knee miniscus repair       Prior to Admission Medications   Prior to Admission Medications   Prescriptions Last Dose Informant Patient Reported? Taking?   Fexofenadine HCl (ALLEGRA PO)   Yes No   Sig: Take by mouth daily as needed for allergies    LORazepam (ATIVAN) 0.5 MG tablet   No No   Sig: Take 1 tablet (0.5 mg) by  mouth nightly as needed for sleep   albuterol (PROAIR HFA/PROVENTIL HFA/VENTOLIN HFA) 108 (90 Base) MCG/ACT inhaler   No No   Sig: Inhale 2 puffs into the lungs every 4 hours as needed for shortness of breath, wheezing or cough   aluminum chloride (DRYSOL) 20 % external solution   No No   Sig: Apply topically At Bedtime   amphetamine-dextroamphetamine (ADDERALL XR) 25 MG 24 hr capsule   No No   Sig: Take 1 capsule (25 mg) by mouth every morning   azelastine-fluticasone (DYMISTA) 137-50 MCG/ACT nasal spray   Yes No   Sig: Spray 1 spray into both nostrils 2 times daily   buPROPion (WELLBUTRIN XL) 150 MG 24 hr tablet   No No   Sig: TAKE 1 TABLET(150 MG) BY MOUTH EVERY MORNING   budesonide-formoterol (SYMBICORT) 160-4.5 MCG/ACT Inhaler   No No   Sig: Inhale 2 puffs into the lungs 2 times daily   fexofenadine (ALLEGRA) 180 MG tablet   Yes No   Sig: Take 180 mg by mouth every other day   fluconazole (DIFLUCAN) 150 MG tablet   No No   Sig: Take 1 tablet (150 mg) by mouth every 3 days   fluorouracil (EFUDEX) 5 % external cream   Yes No   Sig: APPLY SPARINGLY EXTERNALLY TO FACE EVERY SUNDAY AT NIGHT. AVOID EYES AND LIPS   loratadine (CLARITIN) 10 MG tablet   Yes No   Sig: Take 10 mg by mouth every other day   mometasone (ASMANEX TWISTHALER) 110 MCG/ACT inhaler   No No   Sig: Inhale 1 puff into the lungs every evening   neomycin-polymyxin-hydrocortisone (CORTISPORIN) 3.5-24769-8 otic suspension   No No   Sig: Place 3 drops Into the left ear daily For 5-7 days   timolol maleate (TIMOPTIC) 0.5 % ophthalmic solution   Yes No   Sig: APPLY 1 DROP TO LESION EVERY NIGHT AT BEDTIME. DO NOT USE ON NORMAL SKIN.      Facility-Administered Medications: None           Physical Exam   Vital Signs: Temp: 98.3  F (36.8  C) Temp src: Oral BP: (!) 148/100 Pulse: 102   Resp: 25 SpO2: 93 % O2 Device: None (Room air)    Weight: 125 lbs 0 oz    Constitutional: Awake, alert, no distress, and cooperative  Cardiovascular: Regular rate and rhythm,  normal S1 and S2, no S3 or S4, and no murmur noted. No LE edema.  Respiratory: No increased work of breathing, good air exchange, bibasilar crackles  Gastrointestinal: Abdomen soft, non-tender, non-distended. BS normal. No masses, organomegaly      Medical Decision Making       60 MINUTES SPENT BY ME on the date of service doing chart review, history, exam, documentation & further activities per the note.      Data     I have personally reviewed the following data over the past 24 hrs:    8.2  \   11.6 (L)   / 383     139 106 21.4 /  111 (H)   4.3 21 (L) 1.33 (H) \     Trop: 17 (H) BNP: 5,176 (H)     Procal: 0.05 (H) CRP: N/A Lactic Acid: N/A

## 2023-10-26 NOTE — PROGRESS NOTES
[facility]    Medicine Progress Note - Hospitalist Service    Date of Admission:  10/25/2023    Primary Care Physician   Gail Crespo  CONSULTANTS: cardiology    Assessment & Plan     Mar Zeng is a 68 year old female with PMHx of recent diagnosis of asthma, who was admitted on 10/25/2023 with chest pain, OLSON, and orthopnea , CT with effusions and infiltrate likely fluid, and BNP of 5,100 consistent with congstive heart failure.      Chest pain (possible ongoing angina)/acute systolic CHF/history of rheumatic fever as a child/ mitral regurgitation  Presenting with 5 days of dyspnea on exertion, orthopnea, left-sided substernal chest pain relieved by rest.  Her symptoms are very consistent with CHF and this goes along with her work-up with a CT scan and BNP as well.   Work-up notable for BNP of 5000, CT PE with bilateral pleural effusions but no PE, and troponins of 19 and 17 with new LBBB on EKG.  Received DuoNeb in the ER with minimal improvement in symptoms.  Given current symptoms, elevated BNP, pleural effusions, suspect heart failure.  Patient most certainly could have underlying ischemic disease.  Was recently traveling to [place] and was having symptoms but did not want to go to the hospital down there.  We will be getting an echocardiogram.  Was placed on Lasix and her symptoms are already improved.  She has a history of a murmur as a child following a history of rheumatic fever as a child.  No other personal or family history of cardiac disease.  Follow on telemetry.,  Follow ins and outs, daily weight.  Patient is getting an echocardiogram to evaluate her ejection fraction.  Given her story I do think she has a high likelihood of underlying heart disease which is contributing to her symptoms.  Cardiology to see the patient.  Given the time a week, her lab work-up, new lbbb and her symptoms I do not think I would believe a negative stress test so I would opt for doing  an angiogram to make sure she does not have any underlying ischemic heart disease. Await cardiology input. CTA negative for pulmonary embolus. No current chest pain so no need for heparin.  Will need to consider asa/statin/bb/ace inhibitor.  Patient was told to stay away from a salty diet.   Addendum: echo showing, Left ventricular systolic function is moderate to severely reduced.The visual ejection fraction is 30-35%. There is moderately severe (3+) mitral regurgitation. There is no comparison study available. Cardiology planning angiogram 10/26. Starting beta blocker, holding off on ace inhibitor for the short term.  Check lipids, will likely need asa, possible duel antiplatelets if stenting.  Will likely need statin to start too.         Acute renal failure   Patient presented with a creatinine of 1.33 on arrival, Last known 0.8 in 2022. Repeat creatinine down to 1.05 with lasix for congstive heart failure.  Patient also had a CT angiogram so need to be aware for dye nephropathy.       Asthma - PTA previously albuterol, symbicort,  dymista    Insomnia/ anxiety/ depression  Has taken ativan, wellbutrin    Awaiting med rec by pharm D         Discussed plan of care with nursing, case management, social work      Diet: Combination Diet Regular Diet Adult    DVT Prophylaxis: Enoxaparin (Lovenox) SQ  Panda Catheter: Not present  Lines: None     Cardiac Monitoring: ACTIVE order. Indication: Acute decompensated heart failure (48 hours)    RESTRAINTS: not needed  Code Status: Full Code        Follow up plan: await cardiac work up     This document was created using voice recognition technology.  Please excuse any typographical errors that may have occurred.  Please call with any questions.           Clinically Significant Risk Factors Present on Admission                                  Disposition Plan      Expected Discharge Date: 10/27/2023                  Barrier to discharge: congstive heart failure, ischemic work  up    Calin Manning MD  Hospitalist Service  [facility]  Securely message with [employer] (more info)  Text page via [facility] Paging/Directory   ______________________________________________________________________    Interval History   Patient is new to me.  She states that she recently has traveled to [place] and was having symptoms of chest pain that was relieved with rest.  She was helping her son move and was having symptoms with this.  She also describes having orthopnea and needing to sleep in a chair upright.  She also had increasing ankle edema.  She did not want to go to the hospital in [place] so waited until she came back to [place].  She was given Lasix and is already feeling much better.  Denies any current cough.  Denies any current chest pain.  Is awaiting cardiology to see her, shortness of breath is improved    ROS: A comprehensive review of systems was negative except for items noted in the HPI.  Patient currently denies any fever, chills, sweats, nausea, vomiting, diarrhea, shortness of breath, or chest pain.       Physical Exam   Vital Signs: Temp: 98.1  F (36.7  C) Temp src: Oral BP: (!) 140/80 Pulse: 84   Resp: 18 SpO2: 94 % O2 Device: None (Room air)    Weight: 119 lbs 6.4 oz      General appearance: Patient is alert and oriented x3, no apparent distress, pleasant and conversing normally, speaking in full sentences, appears stated age  HEENT:  Atraumatic/normal cephalic, EOMI, Pupils equally round and reactive to light, sclera non-icteric, Mucous membranes are moist  NECK:  supple without bruit or lymphadenopathy  RESPIRATORY: Clear to auscultation bilateral, good air movement currently  CARDIOVASCULAR: Regular rate and rhythm, normal S1/S2, no murmurs, rubs, or gallops present, peripheral pulses intact  GASTROINTESTINAL: Non-distended, non-tender, soft, bowel sounds present throughout  NEUROLOGIC:  Cranial nerves II-XII intact, without any focal deficits, strength 5/5  throughout  EXTREMITIES:  Moves all extremities, no clubbing, cyanosis, nor edema         Data     I have personally reviewed the following data over the past 24 hrs:    6.4  \   11.7   / 352     137 100 16.6 /  135 (H)   3.8; 3.8 26 1.05 (H) \     Trop: 17 (H) BNP: 5,176 (H)     Procal: 0.05 (H) CRP: N/A Lactic Acid: N/A         Imaging:   Results for orders placed or performed during the hospital encounter of 10/25/23   CT Chest Pulmonary Embolism w Contrast    Narrative    EXAM: CT CHEST PULMONARY EMBOLISM W CONTRAST  LOCATION: [facility]  DATE: 10/25/2023    INDICATION: SOB chest pain.  COMPARISON: None.  TECHNIQUE: CT chest pulmonary angiogram during arterial phase injection of IV contrast. Multiplanar reformats and MIP reconstructions were performed. Dose reduction techniques were used.   CONTRAST: 52mL Isovue 370    FINDINGS:  ANGIOGRAM CHEST: Pulmonary arteries are normal caliber and negative for pulmonary emboli. Thoracic aorta is not well opacified and is  indeterminate for dissection. No CT evidence of right heart strain.    LUNGS AND PLEURA: Moderate bilateral pleural effusions with associated compressive atelectasis in the posterior lung bases. Patchy bilateral anterior mid lung infiltrates or atelectasis.    MEDIASTINUM/AXILLAE: No lymphadenopathy. Normal esophagus. No significant pericardial effusion. No evidence for thoracic aortic aneurysm.    CORONARY ARTERY CALCIFICATION: Mild.    UPPER ABDOMEN: Unremarkable    MUSCULOSKELETAL: Mild to moderate thoracic spondylosis. No concerning osseous abnormalities      Impression    IMPRESSION:  1.  No CT evidence for pulmonary embolism.    2.  Moderate bilateral pleural effusions with associated compressive atelectasis in the posterior lung bases. Patchy bilateral anterior mid lung infiltrates or atelectasis. Clinical correlation for pneumonia.       Procedures: echo possible stress test/angiogram pending    I have personally have  reviewed the patient's most up to date radiologic exams, EKG, labs, orders, and medications myself

## 2023-10-26 NOTE — ED NOTES
[facility]  ED Nurse Handoff Report    ED Chief complaint: Chest Pain and Shortness of Breath  . ED Diagnosis:   Final diagnoses:   Congestive heart failure, unspecified HF chronicity, unspecified heart failure type (H)       Allergies:   Allergies   Allergen Reactions    Codeine Itching    Concerta [Methylphenidate] Itching    Hay Fever & [A.R.M.]        Code Status: Full Code    Activity level - Baseline/Home:  independent.  Activity Level - Current:   independent.   Lift room needed: No.   Bariatric: No   Needed: No   Isolation: No.   Infection: Not Applicable.     Respiratory status: Room air    Vital Signs (within 30 minutes):   Vitals:    10/25/23 2302 10/25/23 2312 10/25/23 2322 10/25/23 2332   BP:       Pulse:  94 99 97   Resp: 21 25 28 18   Temp:       TempSrc:       SpO2: 93%      Weight:       Height:           Cardiac Rhythm:  ,   Cardiac  Cardiac Rhythm: Sinus tachycardia  Pain level:    Patient confused: No.   Patient Falls Risk: activity supervised and mobility aid in reach.   Elimination Status:  Has not needed to void.      Patient Report -Per provider note: Mar Zeng is a 68 year old female with history of anxiety and asthma who presents to the ED with her  for evaluation of chest pain and shortness of breath. The patient reports that since Friday, she has had occasional achy chest pain , swelling in her legs, feet, and ankles, and a recent weight gain. Patient states that she also has some shortness of breath when she is sitting up and laying down, but it is worse when laying down which makes it difficult for her to sleep. Endorses wheezing. Patient does not have nebulizer's at home, but she does have inhalers. Of note, patient saw pulmonology last month and these symptoms that she presents with today are different. Denies fever or cough. .   Focused Assessment:      Abnormal Results:   Labs Ordered and Resulted from Time of ED Arrival to Time of  ED Departure   BASIC METABOLIC PANEL - Abnormal       Result Value    Sodium 139      Potassium 4.3      Chloride 106      Carbon Dioxide (CO2) 21 (*)     Anion Gap 12      Urea Nitrogen 21.4      Creatinine 1.33 (*)     GFR Estimate 43 (*)     Calcium 9.3      Glucose 111 (*)    TROPONIN T, HIGH SENSITIVITY - Abnormal    Troponin T, High Sensitivity 19 (*)    CBC WITH PLATELETS AND DIFFERENTIAL - Abnormal    WBC Count 8.2      RBC Count 3.93      Hemoglobin 11.6 (*)     Hematocrit 36.2      MCV 92      MCH 29.5      MCHC 32.0      RDW 13.3      Platelet Count 383      % Neutrophils 60      % Lymphocytes 25      % Monocytes 12      % Eosinophils 2      % Basophils 1      % Immature Granulocytes 0      NRBCs per 100 WBC 0      Absolute Neutrophils 4.9      Absolute Lymphocytes 2.0      Absolute Monocytes 1.0      Absolute Eosinophils 0.2      Absolute Basophils 0.1      Absolute Immature Granulocytes 0.0      Absolute NRBCs 0.0     NT PROBNP INPATIENT - Abnormal    N terminal Pro BNP Inpatient 5,176 (*)    PROCALCITONIN - Abnormal    Procalcitonin 0.05 (*)    TROPONIN T, HIGH SENSITIVITY - Abnormal    Troponin T, High Sensitivity 17 (*)         CT Chest Pulmonary Embolism w Contrast   Final Result   IMPRESSION:   1.  No CT evidence for pulmonary embolism.      2.  Moderate bilateral pleural effusions with associated compressive atelectasis in the posterior lung bases. Patchy bilateral anterior mid lung infiltrates or atelectasis. Clinical correlation for pneumonia.         Echocardiogram Complete    (Results Pending)       Treatments provided:    Family Comments: family at bedside.   OBS brochure/video discussed/provided to patient:  N/A  ED Medications:   Medications   melatonin tablet 1 mg (has no administration in time range)   enoxaparin ANTICOAGULANT (LOVENOX) injection 40 mg (has no administration in time range)   furosemide (LASIX) injection 40 mg (has no administration in time range)   albuterol (PROVENTIL  HFA/VENTOLIN HFA) inhaler (has no administration in time range)   ipratropium - albuterol 0.5 mg/2.5 mg/3 mL (DUONEB) neb solution 6 mL (6 mLs Nebulization $Given 10/25/23 2059)   iopamidol (ISOVUE-370) solution 500 mL (52 mLs Intravenous $Given 10/25/23 2121)   CT scan flush (73 mLs Intravenous $Given 10/25/23 2121)   furosemide (LASIX) injection 40 mg (40 mg Intravenous $Given 10/25/23 2244)       Drips infusing:  No  For the majority of the shift this patient was Green.   Interventions performed were .    Sepsis treatment initiated: No    Cares/treatment/interventions/medications to be completed following ED care:     ED Nurse Name: Julio Larsen RN  12:47 AM     RECEIVING UNIT ED HANDOFF REVIEW    Above ED Nurse Handoff Report was reviewed: Yes  Reviewed by: Wayne Garcia RN on October 26, 2023 at 4:14 AM

## 2023-10-27 ENCOUNTER — APPOINTMENT (OUTPATIENT)
Dept: GENERAL RADIOLOGY | Facility: CLINIC | Age: 68
DRG: 281 | End: 2023-10-27
Attending: INTERNAL MEDICINE
Payer: MEDICARE

## 2023-10-27 LAB
ANION GAP SERPL CALCULATED.3IONS-SCNC: 9 MMOL/L (ref 7–15)
BUN SERPL-MCNC: 24.4 MG/DL (ref 8–23)
CALCIUM SERPL-MCNC: 9.5 MG/DL (ref 8.8–10.2)
CHLORIDE SERPL-SCNC: 100 MMOL/L (ref 98–107)
CREAT SERPL-MCNC: 1.04 MG/DL (ref 0.51–0.95)
DEPRECATED HCO3 PLAS-SCNC: 27 MMOL/L (ref 22–29)
EGFRCR SERPLBLD CKD-EPI 2021: 58 ML/MIN/1.73M2
GLUCOSE SERPL-MCNC: 106 MG/DL (ref 70–99)
POTASSIUM SERPL-SCNC: 4.5 MMOL/L (ref 3.4–5.3)
SODIUM SERPL-SCNC: 136 MMOL/L (ref 135–145)

## 2023-10-27 PROCEDURE — 250N000009 HC RX 250: Performed by: INTERNAL MEDICINE

## 2023-10-27 PROCEDURE — 250N000013 HC RX MED GY IP 250 OP 250 PS 637: Performed by: STUDENT IN AN ORGANIZED HEALTH CARE EDUCATION/TRAINING PROGRAM

## 2023-10-27 PROCEDURE — 250N000011 HC RX IP 250 OP 636: Performed by: INTERNAL MEDICINE

## 2023-10-27 PROCEDURE — 999N000157 HC STATISTIC RCP TIME EA 10 MIN

## 2023-10-27 PROCEDURE — 999N000099 HC STATISTIC MODERATE SEDATION < 10 MIN: Performed by: INTERNAL MEDICINE

## 2023-10-27 PROCEDURE — C1894 INTRO/SHEATH, NON-LASER: HCPCS | Performed by: INTERNAL MEDICINE

## 2023-10-27 PROCEDURE — 94640 AIRWAY INHALATION TREATMENT: CPT

## 2023-10-27 PROCEDURE — 250N000013 HC RX MED GY IP 250 OP 250 PS 637: Performed by: INTERNAL MEDICINE

## 2023-10-27 PROCEDURE — 99233 SBSQ HOSP IP/OBS HIGH 50: CPT | Mod: 25 | Performed by: INTERNAL MEDICINE

## 2023-10-27 PROCEDURE — C1887 CATHETER, GUIDING: HCPCS | Performed by: INTERNAL MEDICINE

## 2023-10-27 PROCEDURE — C1769 GUIDE WIRE: HCPCS | Performed by: INTERNAL MEDICINE

## 2023-10-27 PROCEDURE — 272N000001 HC OR GENERAL SUPPLY STERILE: Performed by: INTERNAL MEDICINE

## 2023-10-27 PROCEDURE — 120N000001 HC R&B MED SURG/OB

## 2023-10-27 PROCEDURE — 36415 COLL VENOUS BLD VENIPUNCTURE: CPT | Performed by: INTERNAL MEDICINE

## 2023-10-27 PROCEDURE — 250N000011 HC RX IP 250 OP 636: Mod: JZ | Performed by: INTERNAL MEDICINE

## 2023-10-27 PROCEDURE — B2111ZZ FLUOROSCOPY OF MULTIPLE CORONARY ARTERIES USING LOW OSMOLAR CONTRAST: ICD-10-PCS | Performed by: INTERNAL MEDICINE

## 2023-10-27 PROCEDURE — 71045 X-RAY EXAM CHEST 1 VIEW: CPT

## 2023-10-27 PROCEDURE — 99232 SBSQ HOSP IP/OBS MODERATE 35: CPT | Performed by: INTERNAL MEDICINE

## 2023-10-27 PROCEDURE — 258N000003 HC RX IP 258 OP 636: Performed by: INTERNAL MEDICINE

## 2023-10-27 PROCEDURE — 93454 CORONARY ARTERY ANGIO S&I: CPT | Performed by: INTERNAL MEDICINE

## 2023-10-27 PROCEDURE — 250N000013 HC RX MED GY IP 250 OP 250 PS 637: Performed by: PHYSICIAN ASSISTANT

## 2023-10-27 PROCEDURE — 80048 BASIC METABOLIC PNL TOTAL CA: CPT | Performed by: INTERNAL MEDICINE

## 2023-10-27 PROCEDURE — 93454 CORONARY ARTERY ANGIO S&I: CPT | Mod: 26 | Performed by: INTERNAL MEDICINE

## 2023-10-27 RX ORDER — FLUMAZENIL 0.1 MG/ML
0.2 INJECTION, SOLUTION INTRAVENOUS
Status: ACTIVE | OUTPATIENT
Start: 2023-10-27 | End: 2023-10-27

## 2023-10-27 RX ORDER — ATORVASTATIN CALCIUM 40 MG/1
40 TABLET, FILM COATED ORAL EVERY EVENING
Status: DISCONTINUED | OUTPATIENT
Start: 2023-10-27 | End: 2023-10-29 | Stop reason: HOSPADM

## 2023-10-27 RX ORDER — ASPIRIN 81 MG/1
243 TABLET, CHEWABLE ORAL ONCE
Status: COMPLETED | OUTPATIENT
Start: 2023-10-27 | End: 2023-10-27

## 2023-10-27 RX ORDER — ACETAMINOPHEN 325 MG/1
650 TABLET ORAL EVERY 4 HOURS PRN
Status: DISCONTINUED | OUTPATIENT
Start: 2023-10-27 | End: 2023-10-29 | Stop reason: HOSPADM

## 2023-10-27 RX ORDER — BUDESONIDE 0.5 MG/2ML
0.5 INHALANT ORAL 2 TIMES DAILY
Status: DISCONTINUED | OUTPATIENT
Start: 2023-10-27 | End: 2023-10-28

## 2023-10-27 RX ORDER — VERAPAMIL HYDROCHLORIDE 2.5 MG/ML
INJECTION, SOLUTION INTRAVENOUS
Status: DISCONTINUED
Start: 2023-10-27 | End: 2023-10-27 | Stop reason: HOSPADM

## 2023-10-27 RX ORDER — HEPARIN SODIUM 1000 [USP'U]/ML
INJECTION, SOLUTION INTRAVENOUS; SUBCUTANEOUS
Status: DISCONTINUED | OUTPATIENT
Start: 2023-10-27 | End: 2023-10-27 | Stop reason: HOSPADM

## 2023-10-27 RX ORDER — NITROGLYCERIN 5 MG/ML
VIAL (ML) INTRAVENOUS
Status: DISCONTINUED | OUTPATIENT
Start: 2023-10-27 | End: 2023-10-27 | Stop reason: HOSPADM

## 2023-10-27 RX ORDER — FENTANYL CITRATE 50 UG/ML
INJECTION, SOLUTION INTRAMUSCULAR; INTRAVENOUS
Status: DISCONTINUED
Start: 2023-10-27 | End: 2023-10-27 | Stop reason: HOSPADM

## 2023-10-27 RX ORDER — LIDOCAINE HYDROCHLORIDE 10 MG/ML
INJECTION, SOLUTION EPIDURAL; INFILTRATION; INTRACAUDAL; PERINEURAL
Status: DISCONTINUED
Start: 2023-10-27 | End: 2023-10-27 | Stop reason: HOSPADM

## 2023-10-27 RX ORDER — FENTANYL CITRATE 50 UG/ML
25 INJECTION, SOLUTION INTRAMUSCULAR; INTRAVENOUS
Status: DISCONTINUED | OUTPATIENT
Start: 2023-10-27 | End: 2023-10-29 | Stop reason: HOSPADM

## 2023-10-27 RX ORDER — ASPIRIN 325 MG
325 TABLET ORAL ONCE
Status: COMPLETED | OUTPATIENT
Start: 2023-10-27 | End: 2023-10-27

## 2023-10-27 RX ORDER — ATROPINE SULFATE 0.1 MG/ML
0.5 INJECTION INTRAVENOUS
Status: ACTIVE | OUTPATIENT
Start: 2023-10-27 | End: 2023-10-27

## 2023-10-27 RX ORDER — NALOXONE HYDROCHLORIDE 0.4 MG/ML
0.4 INJECTION, SOLUTION INTRAMUSCULAR; INTRAVENOUS; SUBCUTANEOUS
Status: ACTIVE | OUTPATIENT
Start: 2023-10-27 | End: 2023-10-27

## 2023-10-27 RX ORDER — FENTANYL CITRATE 50 UG/ML
INJECTION, SOLUTION INTRAMUSCULAR; INTRAVENOUS
Status: DISCONTINUED | OUTPATIENT
Start: 2023-10-27 | End: 2023-10-27 | Stop reason: HOSPADM

## 2023-10-27 RX ORDER — IOPAMIDOL 755 MG/ML
INJECTION, SOLUTION INTRAVASCULAR
Status: DISCONTINUED | OUTPATIENT
Start: 2023-10-27 | End: 2023-10-27 | Stop reason: HOSPADM

## 2023-10-27 RX ORDER — OXYCODONE HYDROCHLORIDE 5 MG/1
5 TABLET ORAL EVERY 4 HOURS PRN
Status: DISCONTINUED | OUTPATIENT
Start: 2023-10-27 | End: 2023-10-29 | Stop reason: HOSPADM

## 2023-10-27 RX ORDER — VERAPAMIL HYDROCHLORIDE 2.5 MG/ML
INJECTION, SOLUTION INTRAVENOUS
Status: DISCONTINUED | OUTPATIENT
Start: 2023-10-27 | End: 2023-10-27 | Stop reason: HOSPADM

## 2023-10-27 RX ORDER — SODIUM CHLORIDE 9 MG/ML
75 INJECTION, SOLUTION INTRAVENOUS CONTINUOUS
Status: ACTIVE | OUTPATIENT
Start: 2023-10-27 | End: 2023-10-27

## 2023-10-27 RX ORDER — IPRATROPIUM BROMIDE AND ALBUTEROL SULFATE 2.5; .5 MG/3ML; MG/3ML
3 SOLUTION RESPIRATORY (INHALATION) EVERY 4 HOURS PRN
Status: DISCONTINUED | OUTPATIENT
Start: 2023-10-27 | End: 2023-10-28

## 2023-10-27 RX ORDER — ASPIRIN 81 MG/1
81 TABLET ORAL DAILY
Status: DISCONTINUED | OUTPATIENT
Start: 2023-10-27 | End: 2023-10-29 | Stop reason: HOSPADM

## 2023-10-27 RX ORDER — NITROGLYCERIN 5 MG/ML
VIAL (ML) INTRAVENOUS
Status: DISCONTINUED
Start: 2023-10-27 | End: 2023-10-27 | Stop reason: HOSPADM

## 2023-10-27 RX ORDER — OXYCODONE HYDROCHLORIDE 5 MG/1
10 TABLET ORAL EVERY 4 HOURS PRN
Status: DISCONTINUED | OUTPATIENT
Start: 2023-10-27 | End: 2023-10-29 | Stop reason: HOSPADM

## 2023-10-27 RX ORDER — LORAZEPAM 0.5 MG/1
0.5 TABLET ORAL EVERY 6 HOURS PRN
Status: DISCONTINUED | OUTPATIENT
Start: 2023-10-27 | End: 2023-10-29 | Stop reason: HOSPADM

## 2023-10-27 RX ORDER — NALOXONE HYDROCHLORIDE 0.4 MG/ML
0.2 INJECTION, SOLUTION INTRAMUSCULAR; INTRAVENOUS; SUBCUTANEOUS
Status: ACTIVE | OUTPATIENT
Start: 2023-10-27 | End: 2023-10-27

## 2023-10-27 RX ORDER — HEPARIN SODIUM 1000 [USP'U]/ML
INJECTION, SOLUTION INTRAVENOUS; SUBCUTANEOUS
Status: DISCONTINUED
Start: 2023-10-27 | End: 2023-10-27 | Stop reason: WASHOUT

## 2023-10-27 RX ORDER — LISINOPRIL 2.5 MG/1
2.5 TABLET ORAL DAILY
Status: DISCONTINUED | OUTPATIENT
Start: 2023-10-27 | End: 2023-10-29

## 2023-10-27 RX ADMIN — BUPROPION HYDROCHLORIDE 150 MG: 150 TABLET, EXTENDED RELEASE ORAL at 09:24

## 2023-10-27 RX ADMIN — SODIUM CHLORIDE 75 ML/HR: 9 INJECTION, SOLUTION INTRAVENOUS at 14:27

## 2023-10-27 RX ADMIN — ASPIRIN 81 MG: 81 TABLET, COATED ORAL at 09:24

## 2023-10-27 RX ADMIN — LISINOPRIL 2.5 MG: 2.5 TABLET ORAL at 16:11

## 2023-10-27 RX ADMIN — BUDESONIDE 0.5 MG: 0.5 INHALANT RESPIRATORY (INHALATION) at 19:18

## 2023-10-27 RX ADMIN — ALBUTEROL SULFATE 2 PUFF: 90 AEROSOL, METERED RESPIRATORY (INHALATION) at 00:57

## 2023-10-27 RX ADMIN — FUROSEMIDE 20 MG: 10 INJECTION, SOLUTION INTRAMUSCULAR; INTRAVENOUS at 00:06

## 2023-10-27 RX ADMIN — ASPIRIN 81 MG CHEWABLE TABLET 243 MG: 81 TABLET CHEWABLE at 11:51

## 2023-10-27 RX ADMIN — FUROSEMIDE 40 MG: 10 INJECTION, SOLUTION INTRAMUSCULAR; INTRAVENOUS at 09:24

## 2023-10-27 RX ADMIN — SODIUM CHLORIDE: 9 INJECTION, SOLUTION INTRAVENOUS at 12:14

## 2023-10-27 RX ADMIN — CARVEDILOL 3.12 MG: 3.12 TABLET, FILM COATED ORAL at 09:24

## 2023-10-27 RX ADMIN — CARVEDILOL 3.12 MG: 3.12 TABLET, FILM COATED ORAL at 18:08

## 2023-10-27 RX ADMIN — ATORVASTATIN CALCIUM 40 MG: 40 TABLET, FILM COATED ORAL at 20:02

## 2023-10-27 ASSESSMENT — ACTIVITIES OF DAILY LIVING (ADL)
ADLS_ACUITY_SCORE: 22

## 2023-10-27 NOTE — PLAN OF CARE
Goal Outcome Evaluation:    Overall Patient Progress: no changeOverall Patient Progress: no change    Outcome Evaluation: .      Orientation: x4  VS: Temp: 97.8  F (36.6  C) Temp src: Oral BP: 118/77 Pulse: 80   Resp: 16 SpO2: 92 % O2 Device: None (Room air)     Resp: RA  Pain: Denies pain overnight  Tele: SR BBB  Activity: Independent  Diet: NPO, Low Fat/Na.  : WDL  GI: WDL  Skin: WDL  LDAs: Bilateral PIV x2. Saline locked.  Labs/Protocols: K+    Plan: Angio today, pending results could go home today or tomorrow.

## 2023-10-27 NOTE — PROGRESS NOTES
"Resumed cares at 15:    A/O x 4, vital signs stable, afebrile, on room air. Denies pain, no c/o chest pain, chest pressure or chest palpitation. CHF teaching initiated. Strict intake and output. On IV lasix. Cards following, plan for angio on 10/27.     Vital signs:  Temp: 97.8  F (36.6  C) Temp src: Oral BP: 123/85 Pulse: 87   Resp: 16 SpO2: 95 % O2 Device: None (Room air)   Height: 167.6 cm (5' 6\") Weight: 54.2 kg (119 lb 6.4 oz)  Estimated body mass index is 19.27 kg/m  as calculated from the following:    Height as of this encounter: 1.676 m (5' 6\").    Weight as of this encounter: 54.2 kg (119 lb 6.4 oz).      Oumou Arreaga RN on 10/26/2023 at 8:05 PM     "

## 2023-10-27 NOTE — PROGRESS NOTES
Patient Transfer Information  Patient connected to monitoring equipment on arrival: yes Vital signs monitor     Patient connected to wall oxygen on arrival: No    Belongings: No belongings present    Safety check completed: Yes

## 2023-10-27 NOTE — PROGRESS NOTES
Cardiology Progress Note          Assessment and Plan:     67 yo female admitted with new onset HFrEF, 3+ MR, NSTEMI, moderate bilateral pleural effusions  Angiogram shows nonobstructive CAD, non intervention performed  Continue ASA no DAPT,  will perform outpt cardiac MRI to look for evidence of infarct vs other etiology for CMP  KADEEM as outpt to eval for MV pathology due to 3+MR, cause or effect of LV dysfunction  Repeat CXR as still orthopneic, if significant B/L pleural effusions still present, recommend symptomatic thoracentesis in am  Still SOB, patient received benefit from nebulized treatment, ?cardiac asthma?  She can have continued neb Rx  Hold IV diuretics due to renal insufficiency and dye load today, consider restarting in am oral.  ADDENDUM:  Cxr shows resolution of pleural effusions, so no need for thoracentesis      Interval History:     C/o orthopnea still                Medications:   I have reviewed this patient's current medications         Physical Exam:         Vital Sign Ranges  Temperature Temp  Av  F (36.7  C)  Min: 97.8  F (36.6  C)  Max: 98.5  F (36.9  C)   Blood pressure Systolic (24hrs), Av , Min:96 , Max:125        Diastolic (24hrs), Av, Min:60, Max:85      Pulse Pulse  Av.9  Min: 73  Max: 92   Respirations Resp  Av.2  Min: 10  Max: 20   Pulse oximetry SpO2  Av.1 %  Min: 92 %  Max: 96 %         Intake/Output Summary (Last 24 hours) at 10/27/2023 1446  Last data filed at 10/27/2023 1213  Gross per 24 hour   Intake 130 ml   Output 3670 ml   Net -3540 ml       Constitutional:   in no apparent distress     Skin:   normal     Neck:   supple, symmetrical, trachea midline     Chest:   Normal Symmetry and no tenderness     Lungs:   Good air movement without rales     Cardiovascular:   normal S1 and S2     Extremities and Back:   no cyanosis or clubbing     Neurological:   No gross or focal neurologic abnormalities              Data:     Results for orders placed or  "performed during the hospital encounter of 10/25/23 (from the past 24 hour(s))   Basic metabolic panel   Result Value Ref Range    Sodium 136 135 - 145 mmol/L    Potassium 4.5 3.4 - 5.3 mmol/L    Chloride 100 98 - 107 mmol/L    Carbon Dioxide (CO2) 27 22 - 29 mmol/L    Anion Gap 9 7 - 15 mmol/L    Urea Nitrogen 24.4 (H) 8.0 - 23.0 mg/dL    Creatinine 1.04 (H) 0.51 - 0.95 mg/dL    GFR Estimate 58 (L) >60 mL/min/1.73m2    Calcium 9.5 8.8 - 10.2 mg/dL    Glucose 106 (H) 70 - 99 mg/dL   coronary angiogram    Narrative    Elmer Andre MD     10/27/2023  1:06 PM  [facility]    Procedure: Coronary angiogram    Date/Time: 10/27/2023 1:02 PM    Performed by: Elmer Andre MD  Authorized by: Elmer Andre MD      UNIVERSAL PROTOCOL   Site Marked: Yes  Prior Images Obtained and Reviewed:  Yes  Required items: Required blood products, implants, devices and special   equipment available    Patient identity confirmed:  Verbally with patient  Patient was reevaluated immediately before administering moderate or deep   sedation or anesthesia  Confirmation Checklist:  Patient's identity using two indicators  Time out: Immediately prior to the procedure a time out was called    Universal Protocol: the Joint Commission Universal Protocol was followed    Preparation: Patient was prepped and draped in usual sterile fashion       ANESTHESIA    Local Anesthetic:  Lidocaine 1% without epinephrine      PROCEDURE  Describe Procedure: Procedure  1) Coronary angiogram  Approach RTR  Complications none  Indication heart failure LBBB    Findings  LMCA  short normal  RCA tortuous smooth normal'  LAD 20% proximal  CX distal 30 to 40% smooth    Assess : \"Nonobstructive CAD\"    Recommendations : statin therapy no indication for mechanical   revascularization medical treatment of heart failure /? KADEEM to evaluate MR  Patient Tolerance:  Patient tolerated the procedure well with no immediate " "  complications  Length of time physician/provider present for 1:1 monitoring during   sedation: 10   Cardiac Catheterization    Narrative    1) Acute systolic heart failure  LVEF 30 to 35%  2) mitral regurgitation  3) LBBB  4) \" nonobstructive CAD\"    Findings  LMCA normal  LAD 20% proximal  CX 30 to 40% distal  RCA dominant normal               "

## 2023-10-27 NOTE — PROGRESS NOTES
Vitals: Temp: 97.8  F (36.6  C) Temp src: Oral BP: 126/77 Pulse: 84   Resp: 18 SpO2: 97 % O2 Device: None (Room air)   Pain: Denies  Neuro: AO4, able to make needs known  Respiratory: LS clr, reports intermittent SOB  Cardiac/tele: Tele SR w/ST depression and prolonged QT  GI/: WDL  Skin: WDL  LDAs: PIV LS  Labs: Creat 1.04, K 4.5  Plan: Cards following    Pertinent events this shift:  Angio completed today; no interventions. Plans to medically manage.

## 2023-10-27 NOTE — PROCEDURES
"[facility]    Procedure: Coronary angiogram    Date/Time: 10/27/2023 1:02 PM    Performed by: Elmer Andre MD  Authorized by: Elmer Andre MD      UNIVERSAL PROTOCOL   Site Marked: Yes  Prior Images Obtained and Reviewed:  Yes  Required items: Required blood products, implants, devices and special equipment available    Patient identity confirmed:  Verbally with patient  Patient was reevaluated immediately before administering moderate or deep sedation or anesthesia  Confirmation Checklist:  Patient's identity using two indicators  Time out: Immediately prior to the procedure a time out was called    Universal Protocol: the Joint Commission Universal Protocol was followed    Preparation: Patient was prepped and draped in usual sterile fashion       ANESTHESIA    Local Anesthetic:  Lidocaine 1% without epinephrine      PROCEDURE  Describe Procedure: Procedure  1) Coronary angiogram  Approach RTR  Complications none  Indication heart failure LBBB    Findings  LMCA  short normal  RCA tortuous smooth normal'  LAD 20% proximal  CX distal 30 to 40% smooth    Assess : \"Nonobstructive CAD\"    Recommendations : statin therapy no indication for mechanical revascularization medical treatment of heart failure /? KADEEM to evaluate MR  Patient Tolerance:  Patient tolerated the procedure well with no immediate complications  Length of time physician/provider present for 1:1 monitoring during sedation: 10      "

## 2023-10-27 NOTE — PROGRESS NOTES
[facility]    Medicine Progress Note - Hospitalist Service    Date of Admission:  10/25/2023    Primary Care Physician   Gail Crespo  CONSULTANTS: cardiology    Assessment & Plan     Mar Zeng is a 68 year old female with PMHx of recent diagnosis of asthma, who was admitted on 10/25/2023 with chest pain, OLSON, and orthopnea , CT with effusions and infiltrate likely fluid, and BNP of 5,100 consistent with congstive heart failure.      Chest pain (possible ongoing angina)/acute systolic CHF/history of rheumatic fever as a child/ mitral regurgitation  Presenting with 5 days of dyspnea on exertion, orthopnea, left-sided substernal chest pain relieved by rest.  Her symptoms are very consistent with CHF and this goes along with her work-up with a CT scan and BNP as well.   Work-up notable for BNP of 5000, CT PE with bilateral pleural effusions but no PE, and troponins of 19 and 17 with new LBBB on EKG.  Received DuoNeb in the ER with minimal improvement in symptoms.  Given current symptoms, elevated BNP, pleural effusions, suspect heart failure.  Patient most certainly could have underlying ischemic disease.  Was recently traveling to [place] and was having symptoms but did not want to go to the hospital down there.  We will be getting an echocardiogram.  Was placed on Lasix and her symptoms are already improved.  She has a history of a murmur as a child following a history of rheumatic fever as a child.  No other personal or family history of cardiac disease.  Follow on telemetry.,  Follow ins and outs, daily weight.  Echo done showing an EF of only 30-35% and 3+ MR.  Given her story I do think she has a high likelihood of underlying heart disease which is contributing to her symptoms.  Cardiology saw the patient and will be getting a cardiac angiogram 10/27, PENDING.   She was started on a betablocker, will add apirin and statin.  Will need an ace inhibitor once she is no longer at  risk for renal failure.  Depending on findings will need a duel platelet if gets a stent.   Blood pressure stable 120s/70-80s.   Addendum:  angiogram with nonobstructive coronary artery disease.  Does not explain her EF.  ? Needs cardiac MRI.  Await cardiology recommendations.          Acute renal failure   Patient presented with a creatinine of 1.33 on arrival, Last known 0.8 in 2022. Repeat creatinine down to 1.04 with lasix for congstive heart failure.  Patient also had a CT angiogram so need to be aware for dye nephropathy. She also is getting a cardiac angiogram. Repeat bmp daily.        Asthma - PTA previously albuterol, symbicort, on pulmicort too    Insomnia/ anxiety/ depression  Has taken ativan, wellbutrin and reordered        Discussed plan of care with nursing, case management, social work      Diet: NPO for Medical/Clinical Reasons Except for: Meds    DVT Prophylaxis: Enoxaparin (Lovenox) SQ  Panda Catheter: Not present  Lines: None     Cardiac Monitoring: ACTIVE order. Indication: Acute decompensated heart failure (48 hours)    RESTRAINTS: not needed  Code Status: Full Code        Follow up plan: await cardiac work up     This document was created using voice recognition technology.  Please excuse any typographical errors that may have occurred.  Please call with any questions.           Clinically Significant Risk Factors                   # Acute heart failure with reduced ejection fraction: last echo with EF <40% and receiving IV diuretics                  Disposition Plan      Expected Discharge Date: 10/28/2023                  Barrier to discharge: congstive heart failure, ischemic work up with angiogram.     Calin Manning MD  Hospitalist Service  [facility]  Securely message with [employer] (more info)  Text page via [employer] Paging/Directory   ______________________________________________________________________    Interval History   Patient states that she still having  shortness of breath with orthopnea but is better than when she came in.  She did not get her angiogram yesterday as she got Lovenox.  Cardiac angiogram pending for today.  She will need to be walking, off oxygen, and having her symptoms be better we will go home.  Denies any current chest pain.    ROS: A comprehensive review of systems was negative except for items noted in the HPI.  Patient currently denies any fever, chills, sweats, nausea, vomiting, diarrhea, shortness of breath, or chest pain.       Physical Exam   Vital Signs: Temp: 98  F (36.7  C) Temp src: Oral BP: 125/82 Pulse: 92   Resp: 18 SpO2: 95 % O2 Device: None (Room air)    Weight: 115 lbs 0 oz    Exam is stable from yesterday  General appearance: Patient is alert and oriented x3, no apparent distress, pleasant and conversing normally, speaking in full sentences, appears stated age  HEENT:  Mucous membranes are moist  RESPIRATORY: Clear to auscultation bilateral, good air movement currently  CARDIOVASCULAR: Regular rate and rhythm, normal S1/S2, no murmurs  GASTROINTESTINAL: Non-distended, non-tender, soft, bowel sounds present throughout  NEUROLOGIC:  Cranial nerves II-XII intact, without any focal deficits, strength 5/5 throughout  EXTREMITIES:  Moves all extremities, no clubbing, cyanosis, nor edema         Data     I have personally reviewed the following data over the past 24 hrs:    N/A  \   N/A   / N/A     136 100 24.4 (H) /  106 (H)   4.5 27 1.04 (H) \       Imaging:   Results for orders placed or performed during the hospital encounter of 10/25/23   CT Chest Pulmonary Embolism w Contrast    Narrative    EXAM: CT CHEST PULMONARY EMBOLISM W CONTRAST  LOCATION: [facility]  DATE: 10/25/2023    INDICATION: SOB chest pain.  COMPARISON: None.  TECHNIQUE: CT chest pulmonary angiogram during arterial phase injection of IV contrast. Multiplanar reformats and MIP reconstructions were performed. Dose reduction techniques were  used.   CONTRAST: 52mL Isovue 370    FINDINGS:  ANGIOGRAM CHEST: Pulmonary arteries are normal caliber and negative for pulmonary emboli. Thoracic aorta is not well opacified and is  indeterminate for dissection. No CT evidence of right heart strain.    LUNGS AND PLEURA: Moderate bilateral pleural effusions with associated compressive atelectasis in the posterior lung bases. Patchy bilateral anterior mid lung infiltrates or atelectasis.    MEDIASTINUM/AXILLAE: No lymphadenopathy. Normal esophagus. No significant pericardial effusion. No evidence for thoracic aortic aneurysm.    CORONARY ARTERY CALCIFICATION: Mild.    UPPER ABDOMEN: Unremarkable    MUSCULOSKELETAL: Mild to moderate thoracic spondylosis. No concerning osseous abnormalities      Impression    IMPRESSION:  1.  No CT evidence for pulmonary embolism.    2.  Moderate bilateral pleural effusions with associated compressive atelectasis in the posterior lung bases. Patchy bilateral anterior mid lung infiltrates or atelectasis. Clinical correlation for pneumonia.       Procedures: echo possible stress test/angiogram pending    I have personally have reviewed the patient's most up to date radiologic exams, EKG, labs, orders, and medications myself

## 2023-10-27 NOTE — PROGRESS NOTES
RN giving report: Jayde Qureshi RN    RN receiving report: ____ ____ RN at bedside on arrival to room 335.  Pt alert & oriented.  Denies pain or SOB.  Right radial site c/d/I with Vasc Band in place - Both RN assessed site upon arrival.  See MAR for meds given intraprocedure.    S:  Procedure performed: angiogram ONLY; NO INTERVENTION    B:  Why procedure needed: chest pain, OLSON, low EF    A:  Assessment of area: Right radial puncture site.  Vasc band on at 1304 with 10mL air    R:  Recommendations: See post procedure orders.  Anticipate bedrest x 1hour then imitate removal of Vasc Band.    Family updated: Dr Andre updated family.  Jayde Qureshi, RN

## 2023-10-28 ENCOUNTER — APPOINTMENT (OUTPATIENT)
Dept: CT IMAGING | Facility: CLINIC | Age: 68
DRG: 281 | End: 2023-10-28
Attending: INTERNAL MEDICINE
Payer: MEDICARE

## 2023-10-28 ENCOUNTER — HEALTH MAINTENANCE LETTER (OUTPATIENT)
Age: 68
End: 2023-10-28

## 2023-10-28 LAB
ANION GAP SERPL CALCULATED.3IONS-SCNC: 8 MMOL/L (ref 7–15)
BUN SERPL-MCNC: 25.8 MG/DL (ref 8–23)
CALCIUM SERPL-MCNC: 9.2 MG/DL (ref 8.8–10.2)
CHLORIDE SERPL-SCNC: 96 MMOL/L (ref 98–107)
CREAT SERPL-MCNC: 1.12 MG/DL (ref 0.51–0.95)
DEPRECATED HCO3 PLAS-SCNC: 29 MMOL/L (ref 22–29)
EGFRCR SERPLBLD CKD-EPI 2021: 53 ML/MIN/1.73M2
GLUCOSE SERPL-MCNC: 93 MG/DL (ref 70–99)
POTASSIUM SERPL-SCNC: 4.3 MMOL/L (ref 3.4–5.3)
SODIUM SERPL-SCNC: 133 MMOL/L (ref 135–145)

## 2023-10-28 PROCEDURE — 99233 SBSQ HOSP IP/OBS HIGH 50: CPT | Performed by: INTERNAL MEDICINE

## 2023-10-28 PROCEDURE — 250N000013 HC RX MED GY IP 250 OP 250 PS 637: Performed by: INTERNAL MEDICINE

## 2023-10-28 PROCEDURE — G1010 CDSM STANSON: HCPCS

## 2023-10-28 PROCEDURE — 71250 CT THORAX DX C-: CPT | Mod: MG

## 2023-10-28 PROCEDURE — 250N000011 HC RX IP 250 OP 636: Mod: JZ | Performed by: INTERNAL MEDICINE

## 2023-10-28 PROCEDURE — 80048 BASIC METABOLIC PNL TOTAL CA: CPT | Performed by: INTERNAL MEDICINE

## 2023-10-28 PROCEDURE — 99232 SBSQ HOSP IP/OBS MODERATE 35: CPT | Performed by: INTERNAL MEDICINE

## 2023-10-28 PROCEDURE — 250N000009 HC RX 250: Performed by: INTERNAL MEDICINE

## 2023-10-28 PROCEDURE — 120N000001 HC R&B MED SURG/OB

## 2023-10-28 PROCEDURE — 94640 AIRWAY INHALATION TREATMENT: CPT

## 2023-10-28 PROCEDURE — 258N000003 HC RX IP 258 OP 636: Performed by: INTERNAL MEDICINE

## 2023-10-28 PROCEDURE — 999N000157 HC STATISTIC RCP TIME EA 10 MIN

## 2023-10-28 PROCEDURE — 36415 COLL VENOUS BLD VENIPUNCTURE: CPT | Performed by: INTERNAL MEDICINE

## 2023-10-28 RX ORDER — NALOXONE HYDROCHLORIDE 0.4 MG/ML
0.4 INJECTION, SOLUTION INTRAMUSCULAR; INTRAVENOUS; SUBCUTANEOUS
Status: DISCONTINUED | OUTPATIENT
Start: 2023-10-28 | End: 2023-10-29 | Stop reason: HOSPADM

## 2023-10-28 RX ORDER — NALOXONE HYDROCHLORIDE 0.4 MG/ML
0.2 INJECTION, SOLUTION INTRAMUSCULAR; INTRAVENOUS; SUBCUTANEOUS
Status: DISCONTINUED | OUTPATIENT
Start: 2023-10-28 | End: 2023-10-29 | Stop reason: HOSPADM

## 2023-10-28 RX ORDER — IPRATROPIUM BROMIDE AND ALBUTEROL SULFATE 2.5; .5 MG/3ML; MG/3ML
3 SOLUTION RESPIRATORY (INHALATION) EVERY 4 HOURS PRN
Status: DISCONTINUED | OUTPATIENT
Start: 2023-10-28 | End: 2023-10-29 | Stop reason: HOSPADM

## 2023-10-28 RX ORDER — FUROSEMIDE 20 MG
20 TABLET ORAL DAILY
Status: DISCONTINUED | OUTPATIENT
Start: 2023-10-28 | End: 2023-10-29

## 2023-10-28 RX ADMIN — ALBUTEROL SULFATE 2 PUFF: 90 AEROSOL, METERED RESPIRATORY (INHALATION) at 08:06

## 2023-10-28 RX ADMIN — IPRATROPIUM BROMIDE AND ALBUTEROL SULFATE 3 ML: .5; 3 SOLUTION RESPIRATORY (INHALATION) at 19:12

## 2023-10-28 RX ADMIN — SODIUM CHLORIDE 250 ML: 9 INJECTION, SOLUTION INTRAVENOUS at 11:33

## 2023-10-28 RX ADMIN — FLUTICASONE FUROATE AND VILANTEROL TRIFENATATE 1 PUFF: 200; 25 POWDER RESPIRATORY (INHALATION) at 08:01

## 2023-10-28 RX ADMIN — LISINOPRIL 2.5 MG: 2.5 TABLET ORAL at 10:26

## 2023-10-28 RX ADMIN — CARVEDILOL 3.12 MG: 3.12 TABLET, FILM COATED ORAL at 10:26

## 2023-10-28 RX ADMIN — METOPROLOL SUCCINATE 12.5 MG: 25 TABLET, EXTENDED RELEASE ORAL at 20:47

## 2023-10-28 RX ADMIN — ATORVASTATIN CALCIUM 40 MG: 40 TABLET, FILM COATED ORAL at 20:48

## 2023-10-28 RX ADMIN — LORAZEPAM 0.5 MG: 0.5 TABLET ORAL at 21:27

## 2023-10-28 RX ADMIN — ENOXAPARIN SODIUM 40 MG: 40 INJECTION SUBCUTANEOUS at 10:26

## 2023-10-28 RX ADMIN — ASPIRIN 81 MG: 81 TABLET, COATED ORAL at 10:26

## 2023-10-28 RX ADMIN — BUPROPION HYDROCHLORIDE 150 MG: 150 TABLET, EXTENDED RELEASE ORAL at 10:26

## 2023-10-28 ASSESSMENT — ACTIVITIES OF DAILY LIVING (ADL)
ADLS_ACUITY_SCORE: 24
ADLS_ACUITY_SCORE: 24
ADLS_ACUITY_SCORE: 22
ADLS_ACUITY_SCORE: 22
ADLS_ACUITY_SCORE: 24
ADLS_ACUITY_SCORE: 22

## 2023-10-28 NOTE — PLAN OF CARE
VSS, afeb., LS are clear, 95% on RA. Pt had Angio today w/ no intervention needed. Cardiology following for  EF of 30-35% Pt will see them Outpt. Repeat CXR shows resolution of pl effusions.  Pt up indep in room. Appetite is good. Pt denies sob, denies pain. Tele is SR

## 2023-10-28 NOTE — PLAN OF CARE
Goal Outcome Evaluation:      Vitals: Temp: 97.5  F (36.4  C) Temp src: Oral BP: 107/71 Pulse: 85   Resp: 24 SpO2: 96 % O2 Device: None (Room air)    Pain: Denies  Neuro: AO4, able to make needs known  Respiratory: LS clr. IS given to patient and instructed on use  Cardiac/tele: Tele SR w/BBB. Denies CP. Pulses intact  GI/: WDL  Skin: WDL; radial site WDL  LDAs: PIC x2; SL  Labs: Creat 1.12, Na 133  Plan: Cards following, CT ordered    Pertinent events this shift:  Fluid bolus given per cards; stopped early d/t increased WOB. O2 stable on ra. Cardiology paged.

## 2023-10-28 NOTE — PROGRESS NOTES
Inpatient Cardiology Consultation Progress Note:    Mar Zeng MRN#: [id number]   YOB: 1955 Age: 68 year old     Date of Admission: 10/25/2023  Consult indication: CHF         Assessment and Plan:     # Acute decompensated HFrEF, LVEF 30-35%, worse in the inferior wall segments, non-ischemic etiology, coronary angiogram 10/27/2023 mild non-obstructive CAD.    Etiology unclear, patient had been seen by PCP 9/8/2023 with approximately 2 months of URI symptoms, possible she may have had myocarditis, and now presents with sequelae with decompensated heart failure.  Clinical presentation currently not consistent with acute myocarditis/myopericarditis, inflammation markers normal, troponin only borderline minimally elevated and flat in trajectory out of proportion to the degree of LV dysfunction, small pericardial effusion on initial echocardiogram.  CT PE study 10/25/2023 negative.  # Mod-severe MR  # LILIANE, mild, could be from contrast versus prerenal  # Asthma    -Renal function slightly worse than yesterday, angiogram done yesterday 10/27, also received furosemide 40 mg IV yesterday, net -6.9 L over admission.  Will hold further diuresis for now administer 250 cc normal saline slowly given underlying cardiomyopathy.  Agree with restarting furosemide 20 mg daily tomorrow  - Continue aspirin, statin, lisinopril  - Still requiring inhaler treatment for asthma, will change carvedilol to metoprolol  - Borderline low blood pressures, limiting up titration of guideline directed medical therapy for heart failure  - CORE consultation, Pharmacy liaison consultation for SGLT2 inhibitor therapy, Entresto  - Cardiac telemetry.  Monitor electrolytes daily, maintain potassium greater than 4, magnesium greater than 2  - Cardiology will follow, hopefully can discharge tomorrow, recommend cardiac event monitor on discharge to assess for occult arrhythmia  - Agree with plan for outpatient cardiac MRI, will need  close follow up with cardiology FABI and her primary cardiologist Dr. Tinoco    ====ADDENDUM:  Prelim reviewed CT, no significant effusions, if having respiratory issues overnight would consider diuresis, otherwise recommend continuing plan for PO furosemide tomorrow, hopefully appropriate for discharge.       Thank you for allowing our team to participate in the care of Mra Zeng.  Please do not hesitate to page me with any questions or concerns.     Prasad Villanueva MD, [facility]  Cardiology  October 28, 2023    Voice recognition software utilized.     Total time spent on this encounter today: greater than 50 minutes, providing care as above including, but not limited to, reviewing medical records, laboratory data, imaging studies, diagnostic studies, procedure notes, formulating an assessment and plan, recommendations, charting, and documentation.           Interval Events:     - no acute events overnight  - no dizziness/lightheadedness  - no bleeding events  - telemetry reviewed  - interval labs and studies reviewed   - interval progress notes reviewed  - Pt updated on clinical course, plan for the day         Medications reviewed:     Current medications:  Current Facility-Administered Medications Ordered in Epic   Medication Dose Route Frequency Last Rate Last Admin    acetaminophen (TYLENOL) tablet 650 mg  650 mg Oral Q4H PRN        albuterol (PROVENTIL HFA/VENTOLIN HFA) inhaler  2 puff Inhalation Q4H PRN   2 puff at 10/27/23 0057    aspirin EC tablet 81 mg  81 mg Oral Daily   81 mg at 10/27/23 0924    atorvastatin (LIPITOR) tablet 40 mg  40 mg Oral QPM   40 mg at 10/27/23 2002    budesonide (PULMICORT) neb solution 0.5 mg  0.5 mg Nasal BID   0.5 mg at 10/27/23 1918    buPROPion (WELLBUTRIN XL) 24 hr tablet 150 mg  150 mg Oral Daily   150 mg at 10/27/23 0924    carvedilol (COREG) tablet 3.125 mg  3.125 mg Oral BID w/meals   3.125 mg at 10/27/23 1808    enoxaparin ANTICOAGULANT (LOVENOX)  injection 40 mg  40 mg Subcutaneous Q24H   40 mg at 10/26/23 0917    fentaNYL (PF) (SUBLIMAZE) injection 25 mcg  25 mcg Intravenous Q15 Min PRN        fluticasone-vilanterol (BREO ELLIPTA) 200-25 MCG/ACT inhaler 1 puff  1 puff Inhalation Daily        furosemide (LASIX) injection 40 mg  40 mg Intravenous Daily   40 mg at 10/27/23 0924    HOLD:  Metformin and metformin containing medications if patient received IV contrast with acute kidney injury or severe chronic kidney disease (stage IV or stage V; i.e., eGFR less than 30)   Does not apply HOLD        ipratropium - albuterol 0.5 mg/2.5 mg/3 mL (DUONEB) neb solution 3 mL  3 mL Nebulization Q4H PRN        lisinopril (ZESTRIL) tablet 2.5 mg  2.5 mg Oral Daily   2.5 mg at 10/27/23 1611    LORazepam (ATIVAN) tablet 0.5 mg  0.5 mg Oral Q6H PRN        melatonin tablet 1 mg  1 mg Oral At Bedtime PRN        midazolam (VERSED) injection 0.5 mg  0.5 mg Intravenous Q5 Min PRN        oxyCODONE (ROXICODONE) tablet 5 mg  5 mg Oral Q4H PRN        Or    oxyCODONE IR (ROXICODONE) tablet 10 mg  10 mg Oral Q4H PRN         No current [facility]-ordered outpatient medications on file.             Physical Exam:   Vital signs were reviewed:  Temperatures:  Current - Temp: 97.7  F (36.5  C); Max - Temp  Av  F (36.7  C)  Min: 97.7  F (36.5  C)  Max: 98.5  F (36.9  C)  Respiration range: Resp  Av  Min: 10  Max: 20  Pulse range: Pulse  Av.8  Min: 71  Max: 92  Blood pressure range: Systolic (24hrs), Av , Min:96 , Max:126   ; Diastolic (24hrs), Av, Min:62, Max:82    Pulse oximetry range: SpO2  Av.5 %  Min: 93 %  Max: 98 %    Intake/Output Summary (Last 24 hours) at 10/28/2023 0807  Last data filed at 10/28/2023 0121  Gross per 24 hour   Intake 10 ml   Output 800 ml   Net -790 ml     115 lbs 0 oz  Body mass index is 18.56 kg/m .   Body surface area is 1.56 meters squared.    General/Constitutional: appears stated age, in no apparent distress, appears to be well  "nourished  Head: normocephalic, atraumatic  Eyes - No scleral icterus  Neck: supple, trachea midline  Respiratory: clear to auscultation bilaterally, no wheezes, no rales, no increased work of breathing  Cardiovascular: JVP normal, regular rate, regular rhythm, normal S1 and S2, no S3, S4, no murmur appreciated, no lower extremity edema  Gastrointestinal: no guarding, non-rigid   Neurologic: awake, alert, moves all extremities  Skin: no jaundice, warm on limited exam  Psychiatric: affect is normal, answers questions appropriately, oriented to self and place         Selected laboratory tests:   Laboratory test results personally reviewed:   CMP  Recent Labs   Lab 10/28/23  0723 10/27/23  0626 10/26/23  0806 10/25/23  2033   * 136 137 139   POTASSIUM 4.3 4.5 3.8  3.8 4.3   CHLORIDE 96* 100 100 106   CO2 29 27 26 21*   ANIONGAP 8 9 11 12   GLC 93 106* 135* 111*   BUN 25.8* 24.4* 16.6 21.4   CR 1.12* 1.04* 1.05* 1.33*   GFRESTIMATED 53* 58* 57* 43*   MERRILL 9.2 9.5 9.4 9.3     CBC  Recent Labs   Lab 10/26/23  0806 10/25/23  2033   WBC 6.4 8.2   RBC 3.93 3.93   HGB 11.7 11.6*   HCT 36.5 36.2   MCV 93 92   MCH 29.8 29.5   MCHC 32.1 32.0   RDW 13.3 13.3    383     INRNo lab results found in last 7 days.  No results found for: \"TROPI\", \"TROPONIN\", \"TROPR\", \"TROPN\"  Recent Labs   Lab Test 10/26/23  0806 08/18/22  1145   CHOL 201* 214*   HDL 76 95   * 105*   TRIG 70 70     Lab Results   Component Value Date    A1C 5.6 12/04/2017     TSH   Date Value Ref Range Status   09/26/2023 0.74 0.30 - 4.20 uIU/mL Final   09/27/2018 0.59 0.40 - 4.00 mU/L Final       "

## 2023-10-28 NOTE — PROGRESS NOTES
[facility]    Medicine Progress Note - Hospitalist Service    Date of Admission:  10/25/2023    Primary Care Physician   Gail Crespo  CONSULTANTS: cardiology    Assessment & Plan     Mar Zeng is a 68 year old female with PMHx of recent diagnosis of asthma, who was admitted on 10/25/2023 with chest pain, OLSON, and orthopnea , CT with effusions and infiltrate likely fluid, and BNP of 5,100 consistent with congstive heart failure.      Chest pain (possible ongoing angina)/acute systolic CHF like due to mitral valve disease/history of rheumatic fever as a child/ 3+ mitral regurgitation  Presenting with 5 days of dyspnea on exertion, orthopnea, left-sided substernal chest pain relieved by rest.  Her symptoms are very consistent with CHF and this goes along with her work-up with a CT scan and BNP as well.   Work-up notable for BNP of 5000, CT PE with bilateral pleural effusions but no PE, and troponins of 19 and 17 with new LBBB on EKG.  Received DuoNeb in the ER with minimal improvement in symptoms.  Given current symptoms, elevated BNP, pleural effusions, suspect heart failure.  Patient most certainly could have underlying ischemic disease.  Was recently traveling to [place] and was having symptoms but did not want to go to the hospital down there.  We will be getting an echocardiogram.  Was placed on Lasix and her symptoms are already improved.  She has a history of a murmur as a child following a history of rheumatic fever as a child.  No other personal or family history of cardiac disease.  Follow on telemetry.,  Follow ins and outs, daily weight.  Echo done showing an EF of only 30-35% and 3+ MR.  Given her story I do think she has a high likelihood of underlying heart disease which is contributing to her symptoms.  Cardiology saw the patient and will be getting a cardiac angiogram 10/27, angiogram with nonobstructive coronary artery disease.  Does not explain her EF. .   She  was started on a betablocker, ace inhibitor, apirin, and statin.   I think her symptoms are due to her mitral valve disease.  Needs a KADEEM and cardiac MRI.  Will resume on oral lasix.  Still having some orthopnea and likely will continue to have until her heart valve is treat.  Patient states she was unable to schedule the KADEEM and will need cardiology to help with that, states the  told her she already had an echo and did not need another. Based on results likely needs cardiothoracic to get involved.  Cardiac Mri scheduled for 10/31, has cardiology outpatient follow up set up for 11/8/23.  Patient also had a repeat chest x-ray on 10/27/2023 showing resolution of her pleural effusions.   ADDENDUM: talked to Dr Villanueva, worried that she still could have ongoing pleural effusions even though her chest x-ray looked okay yesterday given her findings on her previous chest CT.  He will be ordering a chest CT to evaluate for the need for possible thoracentesis that could help with her symptom control.  CT does not show significant residual effusions so no thoracentesis is necessary.  Work up will be ongoing with cardiology as an outpatient.          Acute renal failure (cardiorenal but at risk for dye nephropathy)  Patient presented with a creatinine of 1.33 on arrival, Last known 0.8 in 2022. Repeat creatinine down to 1.04 with lasix for congstive heart failure.  Patient also had a CT angiogram of the chest and cardiac angiogram so need to be aware for dye nephropathy.  Repeat bmp daily. Repeat creatinine 1.12.  was started on lasix and ace inhibitor too.  Needs close outpatient follow up with labs.  Has a primary care provider appointment for Monday 10/30       Asthma - Was diagnosed three weeks ago.  Never had before.  I suspect all her symptoms are due to congstive heart failure and not asthma so will stop her inhalers and her now keep her nebs.  The patient thinks the nebs helped her breathing my goal is to not have  her on this at home and this was explained to the patient.    Insomnia/ anxiety/ depression  Has taken ativan, wellbutrin and reordered        Discussed plan of care with nursing, also with son bedside      Diet: Low Saturated Fat Na <2400 mg    DVT Prophylaxis: Enoxaparin (Lovenox) SQ  Panda Catheter: Not present  Lines: None     Cardiac Monitoring: ACTIVE order. Indication: Electrolyte Imbalance (24 hours)- Magnesium <1.3 mg/ml; Potassium < =2.8 or > 5.5 mg/ml    RESTRAINTS: not needed  Code Status: Full Code        Follow up plan: Patient will follow-up with cardiology as an outpatient getting a KADEEM and cardiac MRI and likely will need thoracic surgery to be involved in the future for possible mitral valve surgery.     This document was created using voice recognition technology.  Please excuse any typographical errors that may have occurred.  Please call with any questions.           Clinically Significant Risk Factors                   # Acute heart failure with reduced ejection fraction: last echo with EF <40% and receiving IV diuretics                  Disposition Plan      Expected Discharge Date: 10/29/2023                  Barrier to discharge: congstive heart failure, orthopnea.  Patient's symptoms are still not good enough for her to go home.  This was discussed with the patient and her son who is worried about her.  She also needs to be set up with an outpatient KADEEM which she has had trouble with because the schedulers would not let her order it as she just had a recent thoracic echocardiogram.  Cardiology will need to help with this.  I am hopeful the patient will build to discharge on 11/29 home as long as her symptoms are improved and her creatinine is stable and follow-up with her primary care provider 10/30 with labs and cardiology 11/8/23    Calin Manning MD  Hospitalist Service  [facility]  Securely message with [employer] (more info)  Text page via [facility] Paging/Directory    ______________________________________________________________________    Interval History   Patient seen bedside with her son there as well.  She continues to have some orthopnea but is much better than when she came in.  He is worried about her going home too soon with symptoms.  She did have her angiogram done yesterday which did not show any obstructive coronary artery disease and did not require any intervention.  Her symptoms are likely due to her mitral valve disease.  She is currently on room air.  Her peripheral swelling seems to be better.  Did get Lasix yesterday will need to be started on oral today.  Need to follow her creatinine very closely.  Has not had any further chest pain.  Repeat chest x-ray also showed improvement of her pleural effusions yesterday.      ROS: A comprehensive review of systems was negative except for items noted in the HPI.  Patient currently denies any fever, chills, sweats, nausea, vomiting, diarrhea, shortness of breath, or chest pain.       Physical Exam   Vital Signs: Temp: 97.5  F (36.4  C) Temp src: Oral BP: 110/72 Pulse: 79   Resp: 24 SpO2: 96 % O2 Device: None (Room air) Oxygen Delivery: 3 LPM  Weight: 115 lbs 0 oz    Exam is stable from yesterday  General appearance: Patient is alert and oriented x3, no apparent distress, pleasant and conversing normally, speaking in full sentences, appears stated age  HEENT:  Mucous membranes are moist  RESPIRATORY: Clear to auscultation bilateral, good air movement currently  CARDIOVASCULAR: Regular rate and rhythm, normal S1/S2, no murmurs  GASTROINTESTINAL: Non-distended, non-tender, soft, bowel sounds present throughout  NEUROLOGIC:  Cranial nerves II-XII intact, without any focal deficits, strength 5/5 throughout  EXTREMITIES:  Moves all extremities, no clubbing, cyanosis, nor edema         Data     I have personally reviewed the following data over the past 24 hrs:    N/A  \   N/A   / N/A     133 (L) 96 (L) 25.8 (H) /  93    4.3 29 1.12 (H) \       Imaging:   Results for orders placed or performed during the hospital encounter of 10/25/23   CT Chest Pulmonary Embolism w Contrast    Narrative    EXAM: CT CHEST PULMONARY EMBOLISM W CONTRAST  LOCATION: [facility]  DATE: 10/25/2023    INDICATION: SOB chest pain.  COMPARISON: None.  TECHNIQUE: CT chest pulmonary angiogram during arterial phase injection of IV contrast. Multiplanar reformats and MIP reconstructions were performed. Dose reduction techniques were used.   CONTRAST: 52mL Isovue 370    FINDINGS:  ANGIOGRAM CHEST: Pulmonary arteries are normal caliber and negative for pulmonary emboli. Thoracic aorta is not well opacified and is  indeterminate for dissection. No CT evidence of right heart strain.    LUNGS AND PLEURA: Moderate bilateral pleural effusions with associated compressive atelectasis in the posterior lung bases. Patchy bilateral anterior mid lung infiltrates or atelectasis.    MEDIASTINUM/AXILLAE: No lymphadenopathy. Normal esophagus. No significant pericardial effusion. No evidence for thoracic aortic aneurysm.    CORONARY ARTERY CALCIFICATION: Mild.    UPPER ABDOMEN: Unremarkable    MUSCULOSKELETAL: Mild to moderate thoracic spondylosis. No concerning osseous abnormalities      Impression    IMPRESSION:  1.  No CT evidence for pulmonary embolism.    2.  Moderate bilateral pleural effusions with associated compressive atelectasis in the posterior lung bases. Patchy bilateral anterior mid lung infiltrates or atelectasis. Clinical correlation for pneumonia.       Procedures:  angiogram with nonobstructive coronary artery disease.  10/27/23    I have personally have reviewed the patient's most up to date radiologic exams, labs, orders, and medications myself

## 2023-10-28 NOTE — PLAN OF CARE
Pt is alert and alert and oriented x4. Pt denies pain or discomfort. VSS. No acute distress noted. Pt is on Tele monitoring. SR. pt is on potassium protocol. Recheck in the morning. Pt is post Angiogram. No bleeding noted on the surgical site. Pt denies shortness of breath or chest pain. Pt is independent in transfer and cares. Pt is sleeping in bed and will continue to monitor and assess pt.

## 2023-10-29 VITALS
BODY MASS INDEX: 18.19 KG/M2 | WEIGHT: 113.2 LBS | RESPIRATION RATE: 16 BRPM | HEART RATE: 78 BPM | HEIGHT: 66 IN | TEMPERATURE: 99.4 F | OXYGEN SATURATION: 100 % | DIASTOLIC BLOOD PRESSURE: 82 MMHG | SYSTOLIC BLOOD PRESSURE: 120 MMHG

## 2023-10-29 LAB
CREAT SERPL-MCNC: 0.96 MG/DL (ref 0.51–0.95)
EGFRCR SERPLBLD CKD-EPI 2021: 64 ML/MIN/1.73M2
PLATELET # BLD AUTO: 373 10E3/UL (ref 150–450)
POTASSIUM SERPL-SCNC: 4.6 MMOL/L (ref 3.4–5.3)

## 2023-10-29 PROCEDURE — 99239 HOSP IP/OBS DSCHRG MGMT >30: CPT | Performed by: HOSPITALIST

## 2023-10-29 PROCEDURE — 250N000009 HC RX 250: Performed by: INTERNAL MEDICINE

## 2023-10-29 PROCEDURE — 82565 ASSAY OF CREATININE: CPT | Performed by: STUDENT IN AN ORGANIZED HEALTH CARE EDUCATION/TRAINING PROGRAM

## 2023-10-29 PROCEDURE — 84132 ASSAY OF SERUM POTASSIUM: CPT | Performed by: INTERNAL MEDICINE

## 2023-10-29 PROCEDURE — 99232 SBSQ HOSP IP/OBS MODERATE 35: CPT | Performed by: INTERNAL MEDICINE

## 2023-10-29 PROCEDURE — 250N000013 HC RX MED GY IP 250 OP 250 PS 637: Performed by: INTERNAL MEDICINE

## 2023-10-29 PROCEDURE — 36415 COLL VENOUS BLD VENIPUNCTURE: CPT | Performed by: STUDENT IN AN ORGANIZED HEALTH CARE EDUCATION/TRAINING PROGRAM

## 2023-10-29 PROCEDURE — 250N000011 HC RX IP 250 OP 636: Mod: JZ | Performed by: INTERNAL MEDICINE

## 2023-10-29 PROCEDURE — 85049 AUTOMATED PLATELET COUNT: CPT | Performed by: STUDENT IN AN ORGANIZED HEALTH CARE EDUCATION/TRAINING PROGRAM

## 2023-10-29 RX ORDER — FUROSEMIDE 20 MG/1
10 TABLET ORAL DAILY
Status: DISCONTINUED | OUTPATIENT
Start: 2023-10-29 | End: 2023-10-29 | Stop reason: HOSPADM

## 2023-10-29 RX ORDER — LISINOPRIL 2.5 MG/1
2.5 TABLET ORAL 2 TIMES DAILY
Qty: 60 TABLET | Refills: 0 | Status: SHIPPED | OUTPATIENT
Start: 2023-10-29 | End: 2023-11-02

## 2023-10-29 RX ORDER — ATORVASTATIN CALCIUM 40 MG/1
40 TABLET, FILM COATED ORAL EVERY EVENING
Qty: 30 TABLET | Refills: 0 | Status: SHIPPED | OUTPATIENT
Start: 2023-10-29 | End: 2023-11-24

## 2023-10-29 RX ORDER — ALBUTEROL SULFATE 0.63 MG/3ML
1 SOLUTION RESPIRATORY (INHALATION) EVERY 6 HOURS PRN
Qty: 90 ML | Refills: 0 | Status: SHIPPED | OUTPATIENT
Start: 2023-10-29

## 2023-10-29 RX ORDER — METOPROLOL SUCCINATE 25 MG/1
12.5 TABLET, EXTENDED RELEASE ORAL 2 TIMES DAILY
Qty: 30 TABLET | Refills: 0 | Status: SHIPPED | OUTPATIENT
Start: 2023-10-29 | End: 2023-11-02

## 2023-10-29 RX ORDER — LISINOPRIL 2.5 MG/1
2.5 TABLET ORAL 2 TIMES DAILY
Status: DISCONTINUED | OUTPATIENT
Start: 2023-10-29 | End: 2023-10-29 | Stop reason: HOSPADM

## 2023-10-29 RX ORDER — ASPIRIN 81 MG/1
81 TABLET ORAL DAILY
Qty: 30 TABLET | Refills: 0 | Status: SHIPPED | OUTPATIENT
Start: 2023-10-30 | End: 2023-11-20

## 2023-10-29 RX ORDER — FUROSEMIDE 20 MG
10 TABLET ORAL DAILY
Qty: 10 TABLET | Refills: 0 | Status: SHIPPED | OUTPATIENT
Start: 2023-10-30 | End: 2023-11-24

## 2023-10-29 RX ADMIN — ENOXAPARIN SODIUM 40 MG: 40 INJECTION SUBCUTANEOUS at 08:08

## 2023-10-29 RX ADMIN — IPRATROPIUM BROMIDE AND ALBUTEROL SULFATE 3 ML: .5; 3 SOLUTION RESPIRATORY (INHALATION) at 08:09

## 2023-10-29 RX ADMIN — ASPIRIN 81 MG: 81 TABLET, COATED ORAL at 08:09

## 2023-10-29 RX ADMIN — BUPROPION HYDROCHLORIDE 150 MG: 150 TABLET, EXTENDED RELEASE ORAL at 08:09

## 2023-10-29 RX ADMIN — LISINOPRIL 2.5 MG: 2.5 TABLET ORAL at 08:08

## 2023-10-29 RX ADMIN — FUROSEMIDE 10 MG: 20 TABLET ORAL at 08:08

## 2023-10-29 RX ADMIN — METOPROLOL SUCCINATE 12.5 MG: 25 TABLET, EXTENDED RELEASE ORAL at 08:08

## 2023-10-29 ASSESSMENT — ACTIVITIES OF DAILY LIVING (ADL)
ADLS_ACUITY_SCORE: 22

## 2023-10-29 NOTE — PLAN OF CARE
"Goal Outcome Evaluation:      Plan of Care Reviewed With: patient    Overall Patient Progress: no change     Temp: 97.7  F (36.5  C) Temp src: Oral BP: 109/72 Pulse: 80   Resp: 18 SpO2: 98 % O2 Device: None (Room air)       A/O x4, VSS on RA. No c/o pain, dizziness, n/v. Left and right PIVs patent and SL. On tele, SR w/ BBB. K protocol WNL, recheck in the AM. Up independently in room. CT done this PM. Cr 1.12. Plan for discharge home tomorrow if symptoms and Cr improved. Will continue POC.       Problem: Adult Inpatient Plan of Care  Goal: Plan of Care Review  Description: The Plan of Care Review/Shift note should be completed every shift.  The Outcome Evaluation is a brief statement about your assessment that the patient is improving, declining, or no change.  This information will be displayed automatically on your shift  note.  10/28/2023 2333 by Kiana Siegel RN  Outcome: Progressing  Flowsheets (Taken 10/28/2023 2333)  Plan of Care Reviewed With: patient  Overall Patient Progress: no change  10/28/2023 2308 by Kiana Siegel RN  Outcome: Progressing  Flowsheets (Taken 10/28/2023 2308)  Plan of Care Reviewed With: patient  Overall Patient Progress: no change  Goal: Patient-Specific Goal (Individualized)  Description: You can add care plan individualizations to a care plan. Examples of Individualization might be:  \"Parent requests to be called daily at 9am for status\", \"I have a hard time hearing out of my right ear\", or \"Do not touch me to wake me up as it startles  me\".  10/28/2023 2333 by Kiana Siegel RN  Outcome: Progressing  10/28/2023 2308 by Kiana Siegel RN  Outcome: Progressing  Goal: Absence of Hospital-Acquired Illness or Injury  10/28/2023 2333 by Kiana Siegel RN  Outcome: Progressing  10/28/2023 2308 by Kiana Siegel RN  Outcome: Progressing  Intervention: Identify and Manage Fall Risk  Recent Flowsheet Documentation  Taken 10/28/2023 1700 by Kiana Siegel RN  Safety Promotion/Fall Prevention:   safety " round/check completed   room organization consistent   clutter free environment maintained   nonskid shoes/slippers when out of bed  Intervention: Prevent Skin Injury  Recent Flowsheet Documentation  Taken 10/28/2023 1700 by Kiana Siegel RN  Body Position: position changed independently  Intervention: Prevent and Manage VTE (Venous Thromboembolism) Risk  Recent Flowsheet Documentation  Taken 10/28/2023 1700 by Kiana Siegel RN  VTE Prevention/Management: (pt is independent)   SCDs (sequential compression devices) off   other (see comments)  Intervention: Prevent Infection  Recent Flowsheet Documentation  Taken 10/28/2023 1700 by Kiana Siegel RN  Infection Prevention:   rest/sleep promoted   single patient room provided   hand hygiene promoted  Goal: Optimal Comfort and Wellbeing  10/28/2023 2333 by Kiana Siegel RN  Outcome: Progressing  10/28/2023 2308 by Kiana Siegel RN  Outcome: Progressing  Intervention: Provide Person-Centered Care  Recent Flowsheet Documentation  Taken 10/28/2023 1700 by Kiana Siegel RN  Trust Relationship/Rapport:   care explained   questions answered   thoughts/feelings acknowledged  Goal: Readiness for Transition of Care  10/28/2023 2333 by Kiana Siegel RN  Outcome: Progressing  10/28/2023 2308 by Kiana Siegel RN  Outcome: Progressing     Problem: Heart Failure  Goal: Optimal Coping  10/28/2023 2333 by Kiana Siegel RN  Outcome: Progressing  10/28/2023 2308 by Kiana Siegel RN  Outcome: Progressing  Intervention: Support Psychosocial Response  Recent Flowsheet Documentation  Taken 10/28/2023 1700 by Kiana Siegel RN  Supportive Measures: active listening utilized  Goal: Optimal Cardiac Output  10/28/2023 2333 by Kiana Siegel RN  Outcome: Progressing  10/28/2023 2308 by Kiana Siegel RN  Outcome: Progressing  Intervention: Optimize Cardiac Output  Recent Flowsheet Documentation  Taken 10/28/2023 1700 by Kiana Siegel RN  Environmental Support: calm environment promoted  Goal: Stable Heart Rate and  Rhythm  10/28/2023 2333 by Kiana Siegel RN  Outcome: Progressing  10/28/2023 2308 by Kiana Siegel RN  Outcome: Progressing  Goal: Optimal Functional Ability  10/28/2023 2333 by Kiana Siegel RN  Outcome: Progressing  10/28/2023 2308 by Kiana Siegel RN  Outcome: Progressing  Intervention: Optimize Functional Ability  Recent Flowsheet Documentation  Taken 10/28/2023 1700 by Kiana Siegel RN  Activity Management: up ad juventino  Goal: Fluid and Electrolyte Balance  10/28/2023 2333 by Kiana Siegel RN  Outcome: Progressing  10/28/2023 2308 by Kiana Siegel RN  Outcome: Progressing  Goal: Improved Oral Intake  10/28/2023 2333 by Kiana Siegel RN  Outcome: Progressing  10/28/2023 2308 by Kiana Siegel RN  Outcome: Progressing  Goal: Effective Oxygenation and Ventilation  10/28/2023 2333 by Kiana Siegel RN  Outcome: Progressing  10/28/2023 2308 by Kiana Siegel RN  Outcome: Progressing  Intervention: Promote Airway Secretion Clearance  Recent Flowsheet Documentation  Taken 10/28/2023 1700 by Kiana Siegel RN  Cough And Deep Breathing: done independently per patient  Activity Management: up ad juventino  Intervention: Optimize Oxygenation and Ventilation  Recent Flowsheet Documentation  Taken 10/28/2023 1700 by Kiana Siegel RN  Head of Bed (HOB) Positioning: HOB at 60 degrees  Goal: Effective Breathing Pattern During Sleep  10/28/2023 2333 by Kiana Siegel RN  Outcome: Progressing  10/28/2023 2308 by Kiana Siegel RN  Outcome: Progressing  Intervention: Monitor and Manage Obstructive Sleep Apnea  Recent Flowsheet Documentation  Taken 10/28/2023 1700 by Kiana Siegel RN  Medication Review/Management: medications reviewed

## 2023-10-29 NOTE — PROGRESS NOTES
A&OX4 ,VSS  on room air . Denied chest pain , palpation or headache. Pt reported having dry , annoying and intermittent cough but denied wheezing .  Chest is clear to ausculation and heart sounds are normal. Declined prn neb treatment.   Tele: SR

## 2023-10-29 NOTE — PROGRESS NOTES
DC instructions given to pt and family, verbalized understanding.  All belongings with pt, IV DC'd and documented.     Pt left the unit via wheelchair with family.

## 2023-10-29 NOTE — PROGRESS NOTES
Inpatient Cardiology Consultation Progress Note:    Mar Zeng MRN#: [id number]   YOB: 1955 Age: 68 year old     Date of Admission: 10/25/2023  Consult indication: CHF         Assessment and Plan:     # Acute decompensated HFrEF, LVEF 30-35%, worse in the inferior wall segments, non-ischemic etiology, coronary angiogram 10/27/2023 mild non-obstructive CAD.    Etiology unclear, patient had been seen by PCP 9/8/2023 with approximately 2 months of URI symptoms, possible she may have had myocarditis, and now presents with sequelae with decompensated heart failure.  Clinical presentation currently not consistent with acute myocarditis/myopericarditis, inflammation markers normal, troponin only borderline minimally elevated and flat in trajectory out of proportion to the degree of LV dysfunction, small pericardial effusion on initial echocardiogram.  CT PE study 10/25/2023 negative.  # Mod-severe MR  # Asthma    - Still with some mild chest discomfort in left breast, suspect related to CHF vs MSK since she does seem to massage the area, no back pain/severe chest pain to suggest aortic pathology and no aneurysm on CT PE scan nor TTE, no evidence of PNA on imaging or labs  - Renal function has improved from yesterday, BPs slightly higher today, will increase lisinopril 2.5 mg daily to 2.5 mg twice daily  - Net negative over 1.5 L yesterday despite holding diuretics, over net -8 L for admission with 12 pound weight loss, will need to go home on a scheduled diuretic however given robust urine output yesterday will start low-dose furosemide 10 mg daily.    Patient has close follow-up scheduled so this may need to be adjusted, will hold off on empiric potassium supplementation for now since we are increasing lisinopril, BMP in 1 week  - Continue aspirin, statin  - Continue metoprolol succinate, change from carvedilol due to asthma  - Borderline low blood pressures, limiting up titration of guideline  directed medical therapy for heart failure  - CORE consultation, Pharmacy liaison consultation for SGLT2 inhibitor therapy, Entresto  - Cardiac telemetry.  Monitor electrolytes daily, maintain potassium greater than 4, magnesium greater than 2  - Primary cardiology team already made arrangements for follow-up with cardiology FABI, cardiac MRI, KADEEM, scheduled appointment with Dr. Tinoco      Thank you for allowing our team to participate in the care of Mar Zeng.  Please do not hesitate to page me with any questions or concerns.     Prasad Villanueva MD, [facility]  Cardiology  October 29, 2023    Voice recognition software utilized.     Total time spent on this encounter today: greater than 35 minutes, providing care as above including, but not limited to, reviewing medical records, laboratory data, imaging studies, diagnostic studies, procedure notes, formulating an assessment and plan, recommendations, charting, and documentation.           Interval Events:     - no acute events overnight  - no dizziness/lightheadedness  - no bleeding events  - interval labs and studies reviewed   - interval progress notes reviewed  - Pt updated on clinical course, plan for the day         Medications reviewed:     Current medications:  Current Facility-Administered Medications Ordered in Epic   Medication Dose Route Frequency Last Rate Last Admin    acetaminophen (TYLENOL) tablet 650 mg  650 mg Oral Q4H PRN        albuterol (PROVENTIL HFA/VENTOLIN HFA) inhaler  2 puff Inhalation Q4H PRN   2 puff at 10/28/23 0806    aspirin EC tablet 81 mg  81 mg Oral Daily   81 mg at 10/28/23 1026    atorvastatin (LIPITOR) tablet 40 mg  40 mg Oral QPM   40 mg at 10/28/23 2048    buPROPion (WELLBUTRIN XL) 24 hr tablet 150 mg  150 mg Oral Daily   150 mg at 10/28/23 1026    enoxaparin ANTICOAGULANT (LOVENOX) injection 40 mg  40 mg Subcutaneous Q24H   40 mg at 10/28/23 1026    fentaNYL (PF) (SUBLIMAZE) injection 25 mcg  25 mcg  "Intravenous Q15 Min PRN        furosemide (LASIX) tablet 20 mg  20 mg Oral Daily        HOLD:  Metformin and metformin containing medications if patient received IV contrast with acute kidney injury or severe chronic kidney disease (stage IV or stage V; i.e., eGFR less than 30)   Does not apply HOLD        ipratropium - albuterol 0.5 mg/2.5 mg/3 mL (DUONEB) neb solution 3 mL  3 mL Nebulization Q4H PRN   3 mL at 10/28/23 1912    lisinopril (ZESTRIL) tablet 2.5 mg  2.5 mg Oral Daily   2.5 mg at 10/28/23 1026    LORazepam (ATIVAN) tablet 0.5 mg  0.5 mg Oral Q6H PRN   0.5 mg at 10/28/23 2127    melatonin tablet 1 mg  1 mg Oral At Bedtime PRN        metoprolol succinate ER (TOPROL-XL) 24 hr half-tab 12.5 mg  12.5 mg Oral BID   12.5 mg at 10/28/23 2047    midazolam (VERSED) injection 0.5 mg  0.5 mg Intravenous Q5 Min PRN        naloxone (NARCAN) injection 0.2 mg  0.2 mg Intravenous Q2 Min PRN        Or    naloxone (NARCAN) injection 0.4 mg  0.4 mg Intravenous Q2 Min PRN        Or    naloxone (NARCAN) injection 0.2 mg  0.2 mg Intramuscular Q2 Min PRN        Or    naloxone (NARCAN) injection 0.4 mg  0.4 mg Intramuscular Q2 Min PRN        oxyCODONE (ROXICODONE) tablet 5 mg  5 mg Oral Q4H PRN        Or    oxyCODONE (ROXICODONE) tablet 10 mg  10 mg Oral Q4H PRN         No current [facility]-ordered outpatient medications on file.             Physical Exam:   Vital signs:  Temp: 97.7  F (36.5  C) Temp src: Oral BP: 111/77 Pulse: 73   Resp: 16 SpO2: 99 % O2 Device: None (Room air) Oxygen Delivery: 3 LPM Height: 167.6 cm (5' 6\") Weight: 51.3 kg (113 lb 3.2 oz)  Estimated body mass index is 18.27 kg/m  as calculated from the following:    Height as of this encounter: 1.676 m (5' 6\").    Weight as of this encounter: 51.3 kg (113 lb 3.2 oz).    Intake/Output Summary (Last 24 hours) at 10/29/2023 0701  Last data filed at 10/29/2023 0200  Gross per 24 hour   Intake 500 ml   Output 2250 ml   Net -1750 ml     General/Constitutional: appears " "stated age, in no apparent distress, appears to be well nourished  Head: normocephalic, atraumatic  Eyes - No scleral icterus  Neck: supple, trachea midline  Respiratory: mildly coarse left lower lobe  Cardiovascular: JVP normal, regular rate, regular rhythm, normal S1 and S2, no S3, S4, no murmur appreciated, no lower extremity edema  Gastrointestinal: no guarding, non-rigid   Neurologic: awake, alert, moves all extremities  Skin: no jaundice, warm on limited exam  Psychiatric: affect is normal, answers questions appropriately, oriented to self and place         Selected laboratory tests:   Laboratory test results personally reviewed:   CMP  Recent Labs   Lab 10/28/23  0723 10/27/23  0626 10/26/23  0806 10/25/23  2033   * 136 137 139   POTASSIUM 4.3 4.5 3.8  3.8 4.3   CHLORIDE 96* 100 100 106   CO2 29 27 26 21*   ANIONGAP 8 9 11 12   GLC 93 106* 135* 111*   BUN 25.8* 24.4* 16.6 21.4   CR 1.12* 1.04* 1.05* 1.33*   GFRESTIMATED 53* 58* 57* 43*   MERRILL 9.2 9.5 9.4 9.3     CBC  Recent Labs   Lab 10/29/23  0640 10/26/23  0806 10/25/23  2033   WBC  --  6.4 8.2   RBC  --  3.93 3.93   HGB  --  11.7 11.6*   HCT  --  36.5 36.2   MCV  --  93 92   MCH  --  29.8 29.5   MCHC  --  32.1 32.0   RDW  --  13.3 13.3    352 383     INRNo lab results found in last 7 days.  No results found for: \"TROPI\", \"TROPONIN\", \"TROPR\", \"TROPN\"  Recent Labs   Lab Test 10/26/23  0806 08/18/22  1145   CHOL 201* 214*   HDL 76 95   * 105*   TRIG 70 70     Lab Results   Component Value Date    A1C 5.6 12/04/2017     TSH   Date Value Ref Range Status   09/26/2023 0.74 0.30 - 4.20 uIU/mL Final   09/27/2018 0.59 0.40 - 4.00 mU/L Final       "

## 2023-10-29 NOTE — PLAN OF CARE
Cared for [id number]    Pt A/O x 4, VSS; Pt denies pain, headache, dizziness, N/V & SOB. Pt independent in room. Lung sounds clear, RA. Tele: SR. Potassium within parameters - recheck in AM. Cards, PT, OT & Social Work following. Possible discharge later today. Will continue with plan of care.    Heart Failure Care Map  GOALS TO BE MET BEFORE DISCHARGE:    1. Decrease congestion and/or edema with diuretic therapy to achieve near optimal volume status.     Dyspnea improved: Yes, satisfactory for discharge.   Edema improved: Yes, satisfactory for discharge.        Last 24 hour I/O:   Intake/Output Summary (Last 24 hours) at 10/29/2023 1243  Last data filed at 10/29/2023 0940  Gross per 24 hour   Intake 480 ml   Output 1850 ml   Net -1370 ml           Net I/O and Weights since admission:   09/29 1500 - 10/29 1459  In: 1095 [P.O.:1085; I.V.:10]  Out: 9870 [Urine:9870]  Net: -8775     Vitals:    10/25/23 2012 10/26/23 0400 10/27/23 0615 10/29/23 0655   Weight: 56.7 kg (125 lb) 54.2 kg (119 lb 6.4 oz) 52.2 kg (115 lb) 51.3 kg (113 lb 3.2 oz)       2.  O2 sats > 90% on room air, or at prior home O2 therapy level.      Able to wean O2 this shift to keep sats above 90%?: Yes, satisfactory for discharge.   Does patient use Home O2? No          Current oxygenation status:   SpO2: 100 %     O2 Device: None (Room air),      3.  Tolerates ambulation and mobility near baseline.     Ambulation: Yes, satisfactory for discharge.   Times patient ambulated with staff this shift: 2    Please review the Heart Failure Care Map for additional HF goal outcomes.    Tati Patel RN  10/29/2023

## 2023-10-30 ENCOUNTER — PATIENT OUTREACH (OUTPATIENT)
Dept: CARE COORDINATION | Facility: CLINIC | Age: 68
End: 2023-10-30

## 2023-10-30 ENCOUNTER — OFFICE VISIT (OUTPATIENT)
Dept: FAMILY MEDICINE | Facility: CLINIC | Age: 68
End: 2023-10-30
Payer: MEDICARE

## 2023-10-30 ENCOUNTER — TELEPHONE (OUTPATIENT)
Dept: FAMILY MEDICINE | Facility: CLINIC | Age: 68
End: 2023-10-30

## 2023-10-30 ENCOUNTER — TELEPHONE (OUTPATIENT)
Dept: CARDIOLOGY | Facility: CLINIC | Age: 68
End: 2023-10-30

## 2023-10-30 ENCOUNTER — HOSPITAL ENCOUNTER (OUTPATIENT)
Facility: CLINIC | Age: 68
End: 2023-10-30
Admitting: INTERNAL MEDICINE
Payer: MEDICARE

## 2023-10-30 VITALS
TEMPERATURE: 97.5 F | OXYGEN SATURATION: 98 % | RESPIRATION RATE: 15 BRPM | HEIGHT: 66 IN | BODY MASS INDEX: 18.47 KG/M2 | HEART RATE: 72 BPM | SYSTOLIC BLOOD PRESSURE: 108 MMHG | WEIGHT: 114.9 LBS | DIASTOLIC BLOOD PRESSURE: 60 MMHG

## 2023-10-30 DIAGNOSIS — I42.9 CARDIOMYOPATHY, UNSPECIFIED TYPE (H): ICD-10-CM

## 2023-10-30 DIAGNOSIS — F51.01 PRIMARY INSOMNIA: ICD-10-CM

## 2023-10-30 DIAGNOSIS — R05.2 SUBACUTE COUGH: Primary | ICD-10-CM

## 2023-10-30 DIAGNOSIS — I50.9 CONGESTIVE HEART FAILURE, UNSPECIFIED HF CHRONICITY, UNSPECIFIED HEART FAILURE TYPE (H): Primary | ICD-10-CM

## 2023-10-30 LAB
ANION GAP SERPL CALCULATED.3IONS-SCNC: 8 MMOL/L (ref 7–15)
BUN SERPL-MCNC: 22.5 MG/DL (ref 8–23)
CALCIUM SERPL-MCNC: 9.6 MG/DL (ref 8.8–10.2)
CHLORIDE SERPL-SCNC: 100 MMOL/L (ref 98–107)
CREAT SERPL-MCNC: 1.1 MG/DL (ref 0.51–0.95)
DEPRECATED HCO3 PLAS-SCNC: 27 MMOL/L (ref 22–29)
EGFRCR SERPLBLD CKD-EPI 2021: 54 ML/MIN/1.73M2
GLUCOSE SERPL-MCNC: 99 MG/DL (ref 70–99)
NT-PROBNP SERPL-MCNC: 2381 PG/ML (ref 0–900)
POTASSIUM SERPL-SCNC: 4.9 MMOL/L (ref 3.4–5.3)
SODIUM SERPL-SCNC: 135 MMOL/L (ref 135–145)

## 2023-10-30 PROCEDURE — 80048 BASIC METABOLIC PNL TOTAL CA: CPT | Performed by: NURSE PRACTITIONER

## 2023-10-30 PROCEDURE — 99214 OFFICE O/P EST MOD 30 MIN: CPT | Performed by: NURSE PRACTITIONER

## 2023-10-30 PROCEDURE — 83880 ASSAY OF NATRIURETIC PEPTIDE: CPT | Performed by: NURSE PRACTITIONER

## 2023-10-30 PROCEDURE — 36415 COLL VENOUS BLD VENIPUNCTURE: CPT | Performed by: NURSE PRACTITIONER

## 2023-10-30 RX ORDER — LORAZEPAM 0.5 MG/1
0.5 TABLET ORAL
Qty: 15 TABLET | Refills: 0 | Status: SHIPPED | OUTPATIENT
Start: 2023-10-30 | End: 2023-11-19

## 2023-10-30 ASSESSMENT — ASTHMA QUESTIONNAIRES
QUESTION_3 LAST FOUR WEEKS HOW OFTEN DID YOUR ASTHMA SYMPTOMS (WHEEZING, COUGHING, SHORTNESS OF BREATH, CHEST TIGHTNESS OR PAIN) WAKE YOU UP AT NIGHT OR EARLIER THAN USUAL IN THE MORNING: NOT AT ALL
QUESTION_5 LAST FOUR WEEKS HOW WOULD YOU RATE YOUR ASTHMA CONTROL: COMPLETELY CONTROLLED
QUESTION_4 LAST FOUR WEEKS HOW OFTEN HAVE YOU USED YOUR RESCUE INHALER OR NEBULIZER MEDICATION (SUCH AS ALBUTEROL): ONCE A WEEK OR LESS
ACT_TOTALSCORE: 22
QUESTION_2 LAST FOUR WEEKS HOW OFTEN HAVE YOU HAD SHORTNESS OF BREATH: ONCE OR TWICE A WEEK
ACT_TOTALSCORE: 22
QUESTION_1 LAST FOUR WEEKS HOW MUCH OF THE TIME DID YOUR ASTHMA KEEP YOU FROM GETTING AS MUCH DONE AT WORK, SCHOOL OR AT HOME: A LITTLE OF THE TIME

## 2023-10-30 ASSESSMENT — PAIN SCALES - GENERAL: PAINLEVEL: NO PAIN (0)

## 2023-10-30 NOTE — PROGRESS NOTES
[facility]  [address] [address] [address]  Phone: [phone]  Primary Provider: Meena Costa Ra  Pre-op Performing Provider: MEENA COSTA RA    {Provider  Link to PREOP SmartSet  Use this to apply standard patient instructions to AVS; includes medication directions, common orders, guidelines for anemia, warfarin, additional testing   :500399}  PREOPERATIVE EVALUATION:  Today's date: 10/30/2023    Mar is a 68 year old female who presents for a preoperative evaluation.      10/30/2023     8:13 AM   Additional Questions   Roomed by Gianna PAYTON LPN       Surgical Information:  Surgery/Procedure: cataract removal   Surgery Location: [facility]   Surgeon: Donny De La Torre   Surgery Date: Right eye 11/6/2023      Left eye 11/21/23   Time of Surgery: 12:15 right eye          12:30 left eye   Where patient plans to recover: At home with family  Fax number for surgical facility: [phone]    {2021 Provider Charting Preference for Preop :264932}    Subjective       HPI related to upcoming procedure: ***        10/30/2023     8:10 AM   Preop Questions   1. Have you ever had a heart attack or stroke? UNKNOWN - ***   2. Have you ever had surgery on your heart or blood vessels, such as a stent placement, a coronary artery bypass, or surgery on an artery in your head, neck, heart, or legs? No   3. Do you have chest pain with activity? No   4. Do you have a history of  heart failure? YES - ***   5. Do you currently have a cold, bronchitis or symptoms of other infection? No   6. Do you have a cough, shortness of breath, or wheezing? No   7. Do you or anyone in your family have previous history of blood clots? No   8. Do you or does anyone in your family have a serious bleeding problem such as prolonged bleeding following surgeries or cuts? No   9. Have you ever had problems with anemia or been told to take iron pills? No   10. Have you had any abnormal blood loss  such as black, tarry or bloody stools, or abnormal vaginal bleeding? No   11. Have you ever had a blood transfusion? No   12. Are you willing to have a blood transfusion if it is medically needed before, during, or after your surgery? Yes   13. Have you or any of your relatives ever had problems with anesthesia? No   14. Do you have sleep apnea, excessive snoring or daytime drowsiness? No   15. Do you have any artifical heart valves or other implanted medical devices like a pacemaker, defibrillator, or continuous glucose monitor? No   16. Do you have artificial joints? YES - bilateral partial knee      17. Are you allergic to latex? No       Health Care Directive:  Patient does not have a Health Care Directive or Living Will: Discussed advance care planning with patient; however, patient declined at this time.    Preoperative Review of :  {Mnpmpreport:547834}  {Review MNPMP for all patients per [employer] MNPMP Profile:419724}    {Chronic problem details (Optional) :892991}    Review of Systems  {ROS Preop Choices:407545}    Patient Active Problem List    Diagnosis Date Noted    Congestive heart failure, unspecified HF chronicity, unspecified heart failure type (H) 10/25/2023     Priority: Medium    Splinter hemorrhage of toenail 06/05/2023     Priority: Medium    Attention deficit hyperactivity disorder (ADHD), combined type 12/09/2021     Priority: Medium    Fibromyositis 10/09/2021     Priority: Medium    Loose body in knee 05/10/2019     Priority: Medium    Localized, primary osteoarthritis 12/20/2017     Priority: Medium    Generalized anxiety disorder 06/08/2015     Priority: Medium     Diagnosis updated by automated process. Provider to review and confirm.      Hematoma of rectus sheath 01/23/2014     Priority: Medium    Seasonal allergic rhinitis 08/30/2013     Priority: Medium    Advanced directives, counseling/discussion 01/22/2013     Priority: Medium     6/9/15 Advance Care Planning invitation sent. T.  Solomon, RN      Discussed advance care planning with patient; however, patient declined at this time. 1/22/2013         Multiple pigmented nevi 12/20/2012     Priority: Medium    Postmenopausal HRT (hormone replacement therapy) 12/06/2011     Priority: Medium     Formatting of this note might be different from the original.  Started on Efrem dot 11/10      Insomnia 02/11/2009     Priority: Medium    Macrodactylia of toes 09/20/2007     Priority: Medium     Formatting of this note might be different from the original.  ELONGATED METATARSALS      Fibromyalgia 07/10/2007     Priority: Medium    Other disorders of bone and cartilage(733.99) 06/29/2007     Priority: Medium     Formatting of this note might be different from the original.  LATERAL LEFT FOOT  ; SESAMOIDITIS      Ingrowing nail 03/06/2007     Priority: Medium     Formatting of this note might be different from the original.  MEDIAL LEFT HALLUX      Hammer toe, acquired 12/22/2006     Priority: Medium     Formatting of this note might be different from the original.  Other hammer toe (acquired)      Plantar fascial fibromatosis 12/22/2006     Priority: Medium    Talipes cavus 12/22/2006     Priority: Medium    Benign neoplasm of colon 05/03/2006     Priority: Medium    Migraine 05/03/2006     Priority: Medium     Formatting of this note might be different from the original.  Epic        Past Medical History:   Diagnosis Date    Mumps     NO ACTIVE PROBLEMS      Past Surgical History:   Procedure Laterality Date    COLONOSCOPY  [facility]/23/2016    [facility]    COLONOSCOPY N/A 8/23/2016    Procedure: COLONOSCOPY;  Surgeon: Peter Shaw MD;  Location:  GI    COLONOSCOPY N/A 10/19/2022    Procedure: COLONOSCOPY with polypectomy using cold exacto snare;  Surgeon: Peter Shaw MD;  Location:  GI    CV CORONARY ANGIOGRAM N/A 10/27/2023    Procedure: Coronary Angiogram;  Surgeon: Elmer Andre MD;  Location:  HEART CARDIAC CATH LAB     FINGER SURGERY      right little finger joint replacement    HYSTERECTOMY VAGINAL  age 30    retained ovaries. no cervix     HYSTERECTOMY, PAP NO LONGER INDICATED      ORTHOPEDIC SURGERY      SURGICAL HISTORY OF -   10/25/13    left index cyst removal    SURGICAL HISTORY OF -       Bilat knee miniscus repair     Current Outpatient Medications   Medication Sig Dispense Refill    albuterol (ACCUNEB) 0.63 MG/3ML neb solution Take 3 mLs (0.63 mg) by nebulization every 6 hours as needed for shortness of breath, wheezing or cough 90 mL 0    albuterol (PROAIR HFA/PROVENTIL HFA/VENTOLIN HFA) 108 (90 Base) MCG/ACT inhaler Inhale 2 puffs into the lungs every 4 hours as needed for shortness of breath, wheezing or cough 18 g 3    aluminum chloride (DRYSOL) 20 % external solution Apply topically At Bedtime 60 mL 0    amphetamine-dextroamphetamine (ADDERALL XR) 25 MG 24 hr capsule Take 1 capsule (25 mg) by mouth every morning 90 capsule 0    aspirin 81 MG EC tablet Take 1 tablet (81 mg) by mouth daily for 30 days 30 tablet 0    atorvastatin (LIPITOR) 40 MG tablet Take 1 tablet (40 mg) by mouth every evening for 30 days 30 tablet 0    azelastine-fluticasone (DYMISTA) 137-50 MCG/ACT nasal spray Spray 1 spray into both nostrils 2 times daily as needed      budesonide (PULMICORT) 0.5 MG/2ML neb solution Spray 0.5 mg in nostril 2 times daily Uses as nasal rinse twice daily.      budesonide-formoterol (SYMBICORT) 160-4.5 MCG/ACT Inhaler Inhale 2 puffs into the lungs 2 times daily 10.2 g 2    buPROPion (WELLBUTRIN XL) 150 MG 24 hr tablet TAKE 1 TABLET(150 MG) BY MOUTH EVERY MORNING 90 tablet 2    fexofenadine (ALLEGRA) 180 MG tablet Take 180 mg by mouth every other day      fluorouracil (EFUDEX) 5 % external cream APPLY SPARINGLY EXTERNALLY TO FACE EVERY SUNDAY AT NIGHT. AVOID EYES AND LIPS      furosemide (LASIX) 20 MG tablet Take 0.5 tablets (10 mg) by mouth daily for 20 days 10 tablet 0    lisinopril (ZESTRIL) 2.5 MG tablet Take 1  "tablet (2.5 mg) by mouth 2 times daily for 30 days 60 tablet 0    loratadine (CLARITIN) 10 MG tablet Take 10 mg by mouth every other day      LORazepam (ATIVAN) 0.5 MG tablet Take 1 tablet (0.5 mg) by mouth nightly as needed for sleep 15 tablet 0    metoprolol succinate ER (TOPROL XL) 25 MG 24 hr tablet Take 0.5 tablets (12.5 mg) by mouth 2 times daily for 30 days 30 tablet 0    timolol maleate (TIMOPTIC) 0.5 % ophthalmic solution Apply 1 drop topically nightly as needed (dry skin cracks) Apply 1 drop to lesion every night at bedtime. Do Not use on normal skin.         Allergies   Allergen Reactions    Codeine Itching    Concerta [Methylphenidate] Itching    Hay Fever & [A.R.M.]         Social History     Tobacco Use    Smoking status: Former     Packs/day: 0     Types: Cigarettes     Passive exposure: Never    Smokeless tobacco: Never   Substance Use Topics    Alcohol use: Yes     Alcohol/week: 0.0 standard drinks of alcohol     Comment: avg:3-4 drink/wk     {FAMILY HISTORY (Optional):308902145}  History   Drug Use No         Objective     /60   Pulse 72   Temp 97.5  F (36.4  C) (Oral)   Resp 15   Ht 1.676 m (5' 6\")   Wt 52.1 kg (114 lb 14.4 oz)   LMP  (LMP Unknown)   SpO2 98%   BMI 18.55 kg/m      Physical Exam  {EXAM Preop Choices:680916}    Recent Labs   Lab Test 10/29/23  0640 10/28/23  0723 10/27/23  0626 10/26/23  0806 10/25/23  2033   HGB  --   --   --  11.7 11.6*     --   --  352 383   NA  --  133* 136 137 139   POTASSIUM 4.6 4.3 4.5 3.8  3.8 4.3   CR 0.96* 1.12* 1.04* 1.05* 1.33*        Diagnostics:  {LABS:571010}   {EK}    Revised Cardiac Risk Index (RCRI):  The patient has the following serious cardiovascular risks for perioperative complications:  {PREOP REVISED CARDIAC RISK INDEX (RCRI) :910635}     RCRI Interpretation: {REVISED CARDIAC RISK INTERPRETATION :885347}         Signed Electronically by: DUKE Esquivel Ra, CNP  Copy of this evaluation report is provided to " requesting physician.    {Provider Resources  Preop [facility] Preop Guidelines  Revised Cardiac Risk Index :896086}

## 2023-10-30 NOTE — PROGRESS NOTES
[facility]    Background: Transitional Care Management program identified per system criteria and reviewed by [facility] team for possible outreach.    Assessment: Upon chart review, [facility] Team member will not proceed with patient outreach related to this episode of Transitional Care Management program due to reason below:    Patient has a follow up appointment with an appropriate provider today for hospital discharge    Plan: Transitional Care Management episode addressed appropriately per reason noted above.      Tonia Benson  Community Health Worker  [facility]  [phone]      *[facility] Care Resource Team does NOT follow patient ongoing. Referrals are identified based on internal discharge reports and the outreach is to ensure patient has an understanding of their discharge instructions.

## 2023-10-30 NOTE — PROGRESS NOTES
Assessment & Plan     Congestive heart failure, unspecified HF chronicity, unspecified heart failure type (H)  Improved.  Follow up planned.  Taking lasix as planned.  Monitoring weight.  Discharge summary mentions daily BMPs.  Will start today.  - Basic metabolic panel  (Ca, Cl, CO2, Creat, Gluc, K, Na, BUN); Future  - Basic metabolic panel  (Ca, Cl, CO2, Creat, Gluc, K, Na, BUN)    Primary insomnia  Issues in the past, now increased with recent stressors and hospitalization.  Short term lorazepam prn.  - LORazepam (ATIVAN) 0.5 MG tablet; Take 1 tablet (0.5 mg) by mouth nightly as needed for sleep    Cardiomyopathy, unspecified type (H)  - N terminal pro BNP outpatient    Reviewed chart for 15 minutes.       MED REC REQUIRED  Post Medication Reconciliation Status:       DUKE Esquivel Ra, CNP  [facility] ALEX Manuel is a 68 year old, presenting for the following health issues:  Hospital F/U        10/30/2023     8:13 AM   Additional Questions   Roomed by Gianna PAYTON LPN       [facility]       Hospital Follow-up Visit:    Hospital/Nursing Home/IP Rehab Facility: [facility]  Date of Admission: 10/25/23  Date of Discharge: 10/29/23  Reason(s) for Admission: CHF    Was your hospitalization related to COVID-19? No   Problems taking medications regularly:  None  Medication changes since discharge: None  Problems adhering to non-medication therapy:  None    Summary of hospitalization:  [facility] discharge summary reviewed  Diagnostic Tests/Treatments reviewed.  Follow up needed: daily bmp was recommended.  KADEEM, MRI  Other Healthcare Providers Involved in Patient s Care:         Specialist appointment - MRI and cardiology follow up planned.  Update since discharge: stable.         Plan of care communicated with patient           Pt is doing well.  Still shaken up from recent hospitalization.  She is breathing better.  Able to lay down without  "problem.  She is still having issues sleeping.  Has been an issue in the past and now increased due to anxiety.  She denies any chest pain or palpitations.  No longer has any LE edema.  She is eating and drinking without problem.  She is having a hard time staying sedentary as she is typically very active.      Review of Systems   Constitutional, HEENT, cardiovascular, pulmonary, gi and gu systems are negative, except as otherwise noted.      Objective    /60   Pulse 72   Temp 97.5  F (36.4  C) (Oral)   Resp 15   Ht 1.676 m (5' 6\")   Wt 52.1 kg (114 lb 14.4 oz)   LMP  (LMP Unknown)   SpO2 98%   BMI 18.55 kg/m    Body mass index is 18.55 kg/m .  Physical Exam   GENERAL: healthy, alert and no distress  NECK: no adenopathy, no asymmetry, masses, or scars and thyroid normal to palpation  RESP: lungs clear to auscultation - no rales, rhonchi or wheezes  CV: regular rate and rhythm, normal S1 S2, no S3 or S4, no murmur, click or rub, no peripheral edema and peripheral pulses strong  ABDOMEN: soft, nontender, no hepatosplenomegaly, no masses and bowel sounds normal  PSYCH: mentation appears normal, affect normal/bright    Results for orders placed or performed in visit on 10/30/23   N terminal pro BNP outpatient     Status: Abnormal   Result Value Ref Range    N Terminal Pro BNP Outpatient 2,381 (H) 0 - 900 pg/mL   Basic metabolic panel  (Ca, Cl, CO2, Creat, Gluc, K, Na, BUN)     Status: Abnormal   Result Value Ref Range    Sodium 135 135 - 145 mmol/L    Potassium 4.9 3.4 - 5.3 mmol/L    Chloride 100 98 - 107 mmol/L    Carbon Dioxide (CO2) 27 22 - 29 mmol/L    Anion Gap 8 7 - 15 mmol/L    Urea Nitrogen 22.5 8.0 - 23.0 mg/dL    Creatinine 1.10 (H) 0.51 - 0.95 mg/dL    GFR Estimate 54 (L) >60 mL/min/1.73m2    Calcium 9.6 8.8 - 10.2 mg/dL    Glucose 99 70 - 99 mg/dL                     "

## 2023-10-31 ENCOUNTER — NURSE TRIAGE (OUTPATIENT)
Dept: FAMILY MEDICINE | Facility: CLINIC | Age: 68
End: 2023-10-31

## 2023-10-31 ENCOUNTER — ALLIED HEALTH/NURSE VISIT (OUTPATIENT)
Dept: FAMILY MEDICINE | Facility: CLINIC | Age: 68
End: 2023-10-31
Payer: MEDICARE

## 2023-10-31 ENCOUNTER — HOSPITAL ENCOUNTER (OUTPATIENT)
Dept: CARDIOLOGY | Facility: CLINIC | Age: 68
Discharge: HOME | End: 2023-10-31
Attending: PHYSICIAN ASSISTANT | Admitting: PHYSICIAN ASSISTANT
Payer: MEDICARE

## 2023-10-31 VITALS — HEART RATE: 68 BPM | SYSTOLIC BLOOD PRESSURE: 100 MMHG | DIASTOLIC BLOOD PRESSURE: 56 MMHG

## 2023-10-31 VITALS — DIASTOLIC BLOOD PRESSURE: 57 MMHG | HEART RATE: 65 BPM | SYSTOLIC BLOOD PRESSURE: 104 MMHG

## 2023-10-31 DIAGNOSIS — Z01.30 BP CHECK: Primary | ICD-10-CM

## 2023-10-31 DIAGNOSIS — I42.9 CARDIOMYOPATHY, UNSPECIFIED TYPE (H): ICD-10-CM

## 2023-10-31 PROCEDURE — 75557 CARDIAC MRI FOR MORPH: CPT | Mod: MG

## 2023-10-31 PROCEDURE — G1010 CDSM STANSON: HCPCS | Performed by: INTERNAL MEDICINE

## 2023-10-31 PROCEDURE — 75557 CARDIAC MRI FOR MORPH: CPT | Mod: 26 | Performed by: INTERNAL MEDICINE

## 2023-10-31 PROCEDURE — 99207 PR NO CHARGE NURSE ONLY: CPT

## 2023-10-31 RX ORDER — DIPHENHYDRAMINE HCL 25 MG
25 CAPSULE ORAL
Status: DISCONTINUED | OUTPATIENT
Start: 2023-10-31 | End: 2023-11-01 | Stop reason: HOSPADM

## 2023-10-31 RX ORDER — GADOBUTROL 604.72 MG/ML
6 INJECTION INTRAVENOUS ONCE
Status: DISCONTINUED | OUTPATIENT
Start: 2023-10-31 | End: 2023-11-01 | Stop reason: HOSPADM

## 2023-10-31 RX ORDER — DIPHENHYDRAMINE HYDROCHLORIDE 50 MG/ML
25-50 INJECTION INTRAMUSCULAR; INTRAVENOUS
Status: DISCONTINUED | OUTPATIENT
Start: 2023-10-31 | End: 2023-11-01 | Stop reason: HOSPADM

## 2023-10-31 RX ORDER — METHYLPREDNISOLONE SODIUM SUCCINATE 125 MG/2ML
125 INJECTION, POWDER, LYOPHILIZED, FOR SOLUTION INTRAMUSCULAR; INTRAVENOUS
Status: DISCONTINUED | OUTPATIENT
Start: 2023-10-31 | End: 2023-11-01 | Stop reason: HOSPADM

## 2023-10-31 RX ORDER — DIAZEPAM 5 MG
5 TABLET ORAL EVERY 30 MIN PRN
Status: DISCONTINUED | OUTPATIENT
Start: 2023-10-31 | End: 2023-11-01 | Stop reason: HOSPADM

## 2023-10-31 RX ORDER — ACYCLOVIR 200 MG/1
0-1 CAPSULE ORAL
Status: DISCONTINUED | OUTPATIENT
Start: 2023-10-31 | End: 2023-11-01 | Stop reason: HOSPADM

## 2023-10-31 RX ORDER — ONDANSETRON 2 MG/ML
4 INJECTION INTRAMUSCULAR; INTRAVENOUS
Status: DISCONTINUED | OUTPATIENT
Start: 2023-10-31 | End: 2023-11-01 | Stop reason: HOSPADM

## 2023-10-31 NOTE — PROGRESS NOTES
Mar Zeng is a 68 year old patient who comes in today for a Blood Pressure check.  Initial BP:  /56 (BP Location: Right arm, Patient Position: Sitting, Cuff Size: Adult Regular)   Pulse 68   LMP  (LMP Unknown)      68  Disposition: results routed to provider    Patsy Rueda MA

## 2023-10-31 NOTE — TELEPHONE ENCOUNTER
Gail Crespo advised - let's have her decrease her lisinopril to 2.5mg once daily instead of BID.      Called the pt to advise of above.

## 2023-10-31 NOTE — TELEPHONE ENCOUNTER
Nurse Triage SBAR    Is this a 2nd Level Triage? YES, LICENSED PRACTITIONER REVIEW IS REQUIRED    Situation: fatigue and weakness    Background: pt started new BP med: lisinopril & metoprolol on 10/29/23. Had RN BP check today. Reading 100/56.  Pt was hospitalized from 10/25 to 10/29. Med were prescribed inpatient.     Assessment: pt reports she is fatigued since this morning. Denies SOB, dizziness or blurred vision. Pt reports fatigue is related to med and would like provider to decrease dose of the BP med.     Protocol Recommended Disposition:   Go To Office Now    Recommendation: Reviewed care advice under care tab with pt.  Instructed pt to call back if new or worsening symptoms.   Patient was given an opportunity to ask questions, verbalized understanding of plan, and is agreeable.      Routing to PCP to review and advise.       Leah Link RN      Does the patient meet one of the following criteria for ADS visit consideration? 16+ years old, with an [facility] PCP     TIP  Providers, please consider if this condition is appropriate for management at one of our Acute and Diagnostic Services sites.     If patient is a good candidate, please use dotphrase <dot>triageresponse and select Refer to ADS to document.            Reason for Disposition   MODERATE weakness (i.e., interferes with work, school, normal activities) and cause unknown (Exceptions: Weakness from acute minor illness or from poor fluid intake; weakness is chronic and not worse.)    Additional Information   Negative: SEVERE difficulty breathing (e.g., struggling for each breath, speaks in single words)   Negative: Shock suspected (e.g., cold/pale/clammy skin, too weak to stand, low BP, rapid pulse)   Negative: Difficult to awaken or acting confused (e.g., disoriented, slurred speech)   Negative: Fainted > 15 minutes ago and still feels too weak or dizzy to stand   Negative: SEVERE weakness (i.e., unable to walk or barely able to walk, requires  "support) and new-onset or worsening   Negative: Sounds like a life-threatening emergency to the triager   Negative: Weakness of the face, arm or leg on one side of the body   Negative: Has diabetes mellitus and weakness from low blood sugar (i.e., < 60 mg/dL or 3.5 mmol/L)   Negative: Recent heat exposure, suspected cause of weakness   Negative: Vomiting is main symptom   Negative: Diarrhea is main symptom   Negative: Difficulty breathing   Negative: Heart beating < 50 beats per minute OR > 140 beats per minute   Negative: Extra heartbeats, irregular heart beating, or heart is beating very fast (i.e., 'palpitations')   Negative: Follows large amount of bleeding (e.g., from vomiting, rectum, vagina) (Exception: Small transient weakness from sight of a small amount blood.)   Negative: Black or tarry bowel movements   Negative: MODERATE weakness or fatigue from poor fluid intake with no improvement after 2 hours of rest and fluids   Negative: Drinking very little and dehydration suspected (e.g., no urine > 12 hours, very dry mouth, very lightheaded)   Negative: Patient sounds very sick or weak to the triager    Answer Assessment - Initial Assessment Questions  1. DESCRIPTION: \"Describe how you are feeling.\"      Feeling tired  2. SEVERITY: \"How bad is it?\"  \"Can you stand and walk?\"     - MILD (0-3): Feels weak or tired, but does not interfere with work, school or normal activities.    - MODERATE (4-7): Able to stand and walk; weakness interferes with work, school, or normal activities. Moderate     - SEVERE (8-10): Unable to stand or walk; unable to do usual activities.        3. ONSET: \"When did these symptoms begin?\" (e.g., hours, days, weeks, months)      Today this AM  4. CAUSE: \"What do you think is causing the weakness or fatigue?\" (e.g., not drinking enough fluids, medical problem, trouble sleeping)      Starting new BP meds  5. NEW MEDICINES:  \"Have you started on any new medicines recently?\" (e.g., opioid " "pain medicines, benzodiazepines, muscle relaxants, antidepressants, antihistamines, neuroleptics, beta blockers)      Lisinopril, metoprolol   6. OTHER SYMPTOMS: \"Do you have any other symptoms?\" (e.g., chest pain, fever, cough, SOB, vomiting, diarrhea, bleeding, other areas of pain)      no  7. PREGNANCY: \"Is there any chance you are pregnant?\" \"When was your last menstrual period?\"      N/A    Protocols used: Weakness (Generalized) and Fatigue-A-OH    "

## 2023-10-31 NOTE — TELEPHONE ENCOUNTER
Discussed with patient recommendation to hold off on doing KADEEM.  Patient will have MRI today and follow up with Dr. Tinoco next week.  Patient has verbalized understanding and KADEEM has been canceled.  Love Dunlap RN on 10/31/2023 at 11:43 AM    
Patient seen in hospital by Dr. Tinoco and Dr. Villanueva.  Herbert ordered and scheduled for 11/2/23 with Dr. Griggs.  No documentation in hospital charting that risk/benefits were discussed with patient.  Will route to Dr. Griggs to advise if he is ok to do H&P at bedside pre-procedure as Dr. Tinoco is out of the office.  Love Dunlap RN on 10/30/2023 at 12:27 PM    
Personally, I would hold off KADEEM as MR may improve if EF improves with GDMT. Lets first finish with MRI and then repeat echo in 1m after adequate meds. Has pt seen core or have FABI appt to titrate meds?  
KLEBER JONES MD

## 2023-11-01 ENCOUNTER — TELEPHONE (OUTPATIENT)
Dept: FAMILY MEDICINE | Facility: CLINIC | Age: 68
End: 2023-11-01

## 2023-11-01 ENCOUNTER — TELEPHONE (OUTPATIENT)
Dept: CARDIOLOGY | Facility: CLINIC | Age: 68
End: 2023-11-01

## 2023-11-01 NOTE — TELEPHONE ENCOUNTER
Mar Zeng is a 68 year old patient who comes in today for a Blood Pressure check.  Initial BP:  /56 (BP Location: Right arm, Patient Position: Sitting, Cuff Size: Adult Regular)   Pulse 68   LMP  (LMP Unknown)      68  Disposition: results routed to provider    Patsy Rueda MA    BP Readings from Last 6 Encounters:   10/31/23 104/57   10/31/23 100/56   10/30/23 108/60   10/29/23 120/82   09/29/23 (!) 149/82   09/08/23 132/63

## 2023-11-01 NOTE — TELEPHONE ENCOUNTER
Patient was admitted to [facility] on 10/25/23 with history of exertional chest pain, orthopnea and shortness of breath over the past week. She began to have left-sided chest pain with minimal exertion that would go away with rest as well.    PMH: recent diagnosis of asthma.    10/26/23: Echo showed EF of 30-35%. There is moderately severe (3+) mitral regurgitation.    10/27/23: Coronary angiogram via RRA showed nonobstructive CAD.    10/27/23: cMRI study was terminated due to claustrophobia.     IV Lasix diuresed 8 L and 12 lbs.    Pt was started on ASA, Lipitor, Lasix, Lisinopril, Metoprolol at time of discharged.    Called patient to discuss any post hospital d/c questions, review discharge medication, and confirm f/u appts. Patient denied any questions regarding new or changes to PTA medications.     Patient denied any SOB, chest pain, edema, or light headedness. Pt does c/o fatigue. States she saw her PMD on Monday and Lisinopril was decreased to 2.5 mg po BID for SBP less than 100. Scheduled for a follow up with PMD tomorrow as well. Encouraged pt to purchase an upper arm automatic BP cuff and check home BP prior to and 1-2 hours after taking AM and PM BP meds. Instructed to call our clinic if SBP less than 100 prior to taking medications or with any questions. Dr. Tinoco's Team RN phone number provided.    RRA access site is healing without signs of infection, swelling or bleeding.    RN confirmed with patient that she is scheduled for cMRI on 11/2/23 at 1430 in [place], and an OV on 11/8/23 at 1505 with Dr. Tinoco at our [place] Office    Instructed patient to weigh self every AM, after waking and using the restroom, but before breakfast and medications. Call clinic for a weight gain of 2 lbs overnight or 5 lbs in a week. Low Na+ diet encouraged. Pt instructed to bring daily wt/BP diary and medications with to f/u OV.     Patient advised to call clinic with any cardiac related questions or concerns prior to his appt.  Patient verbalized understanding and agreed with plan. FAHAD Marcelino RN.

## 2023-11-02 ENCOUNTER — HOSPITAL ENCOUNTER (OUTPATIENT)
Dept: CARDIOLOGY | Facility: CLINIC | Age: 68
Discharge: HOME | End: 2023-11-02
Attending: PHYSICIAN ASSISTANT | Admitting: PHYSICIAN ASSISTANT
Payer: MEDICARE

## 2023-11-02 ENCOUNTER — OFFICE VISIT (OUTPATIENT)
Dept: FAMILY MEDICINE | Facility: CLINIC | Age: 68
End: 2023-11-02
Payer: MEDICARE

## 2023-11-02 VITALS
HEART RATE: 61 BPM | RESPIRATION RATE: 13 BRPM | TEMPERATURE: 97.8 F | OXYGEN SATURATION: 99 % | HEIGHT: 66 IN | WEIGHT: 114 LBS | BODY MASS INDEX: 18.32 KG/M2 | SYSTOLIC BLOOD PRESSURE: 113 MMHG | DIASTOLIC BLOOD PRESSURE: 73 MMHG

## 2023-11-02 DIAGNOSIS — H26.9 CATARACT OF BOTH EYES, UNSPECIFIED CATARACT TYPE: ICD-10-CM

## 2023-11-02 DIAGNOSIS — I42.9 CARDIOMYOPATHY, UNSPECIFIED TYPE (H): ICD-10-CM

## 2023-11-02 DIAGNOSIS — Z01.818 PREOP GENERAL PHYSICAL EXAM: ICD-10-CM

## 2023-11-02 DIAGNOSIS — I50.9 CONGESTIVE HEART FAILURE, UNSPECIFIED HF CHRONICITY, UNSPECIFIED HEART FAILURE TYPE (H): ICD-10-CM

## 2023-11-02 DIAGNOSIS — Z00.00 ENCOUNTER FOR MEDICARE ANNUAL WELLNESS EXAM: Primary | ICD-10-CM

## 2023-11-02 DIAGNOSIS — R79.89 ELEVATED SERUM CREATININE: ICD-10-CM

## 2023-11-02 LAB
ANION GAP SERPL CALCULATED.3IONS-SCNC: 6 MMOL/L (ref 7–15)
BUN SERPL-MCNC: 22.3 MG/DL (ref 8–23)
CALCIUM SERPL-MCNC: 9.4 MG/DL (ref 8.8–10.2)
CHLORIDE SERPL-SCNC: 101 MMOL/L (ref 98–107)
CREAT SERPL-MCNC: 1.17 MG/DL (ref 0.51–0.95)
DEPRECATED HCO3 PLAS-SCNC: 28 MMOL/L (ref 22–29)
EGFRCR SERPLBLD CKD-EPI 2021: 50 ML/MIN/1.73M2
GLUCOSE SERPL-MCNC: 95 MG/DL (ref 70–99)
POTASSIUM SERPL-SCNC: 4.6 MMOL/L (ref 3.4–5.3)
SODIUM SERPL-SCNC: 135 MMOL/L (ref 135–145)

## 2023-11-02 PROCEDURE — 99214 OFFICE O/P EST MOD 30 MIN: CPT | Performed by: NURSE PRACTITIONER

## 2023-11-02 PROCEDURE — 255N000002 HC RX 255 OP 636: Performed by: PHYSICIAN ASSISTANT

## 2023-11-02 PROCEDURE — 75565 CARD MRI VELOC FLOW MAPPING: CPT | Mod: 26 | Performed by: INTERNAL MEDICINE

## 2023-11-02 PROCEDURE — A9585 GADOBUTROL INJECTION: HCPCS | Performed by: PHYSICIAN ASSISTANT

## 2023-11-02 PROCEDURE — 80048 BASIC METABOLIC PNL TOTAL CA: CPT | Performed by: NURSE PRACTITIONER

## 2023-11-02 PROCEDURE — 250N000013 HC RX MED GY IP 250 OP 250 PS 637: Performed by: INTERNAL MEDICINE

## 2023-11-02 PROCEDURE — 75561 CARDIAC MRI FOR MORPH W/DYE: CPT

## 2023-11-02 PROCEDURE — 36415 COLL VENOUS BLD VENIPUNCTURE: CPT | Performed by: NURSE PRACTITIONER

## 2023-11-02 PROCEDURE — 75561 CARDIAC MRI FOR MORPH W/DYE: CPT | Mod: 26 | Performed by: INTERNAL MEDICINE

## 2023-11-02 RX ORDER — DIPHENHYDRAMINE HCL 25 MG
25 CAPSULE ORAL
Status: DISCONTINUED | OUTPATIENT
Start: 2023-11-02 | End: 2023-11-03 | Stop reason: HOSPADM

## 2023-11-02 RX ORDER — METOPROLOL SUCCINATE 25 MG/1
12.5 TABLET, EXTENDED RELEASE ORAL DAILY
Qty: 90 TABLET | Refills: 0 | Status: SHIPPED | OUTPATIENT
Start: 2023-11-02 | End: 2023-11-19

## 2023-11-02 RX ORDER — ACYCLOVIR 200 MG/1
0-1 CAPSULE ORAL
Status: DISCONTINUED | OUTPATIENT
Start: 2023-11-02 | End: 2023-11-03 | Stop reason: HOSPADM

## 2023-11-02 RX ORDER — GADOBUTROL 604.72 MG/ML
7.5 INJECTION INTRAVENOUS ONCE
Status: COMPLETED | OUTPATIENT
Start: 2023-11-02 | End: 2023-11-02

## 2023-11-02 RX ORDER — DIPHENHYDRAMINE HYDROCHLORIDE 50 MG/ML
25-50 INJECTION INTRAMUSCULAR; INTRAVENOUS
Status: DISCONTINUED | OUTPATIENT
Start: 2023-11-02 | End: 2023-11-03 | Stop reason: HOSPADM

## 2023-11-02 RX ORDER — ONDANSETRON 2 MG/ML
4 INJECTION INTRAMUSCULAR; INTRAVENOUS
Status: DISCONTINUED | OUTPATIENT
Start: 2023-11-02 | End: 2023-11-03 | Stop reason: HOSPADM

## 2023-11-02 RX ORDER — DIAZEPAM 5 MG
5 TABLET ORAL EVERY 30 MIN PRN
Status: DISCONTINUED | OUTPATIENT
Start: 2023-11-02 | End: 2023-11-03 | Stop reason: HOSPADM

## 2023-11-02 RX ORDER — LISINOPRIL 2.5 MG/1
2.5 TABLET ORAL DAILY
Qty: 90 TABLET | Refills: 0 | Status: SHIPPED | OUTPATIENT
Start: 2023-11-02 | End: 2023-11-08

## 2023-11-02 RX ORDER — METHYLPREDNISOLONE SODIUM SUCCINATE 125 MG/2ML
125 INJECTION, POWDER, LYOPHILIZED, FOR SOLUTION INTRAMUSCULAR; INTRAVENOUS
Status: DISCONTINUED | OUTPATIENT
Start: 2023-11-02 | End: 2023-11-03 | Stop reason: HOSPADM

## 2023-11-02 RX ADMIN — GADOBUTROL 7.5 ML: 604.72 INJECTION INTRAVENOUS at 16:34

## 2023-11-02 RX ADMIN — DIAZEPAM 5 MG: 5 TABLET ORAL at 14:13

## 2023-11-02 ASSESSMENT — ENCOUNTER SYMPTOMS
DYSURIA: 0
JOINT SWELLING: 0
PALPITATIONS: 0
SORE THROAT: 0
MYALGIAS: 0
DIARRHEA: 0
WEAKNESS: 0
COUGH: 0
HEARTBURN: 0
NAUSEA: 0
EYE PAIN: 0
HEADACHES: 0
FREQUENCY: 0
CONSTIPATION: 0
HEMATOCHEZIA: 0
NERVOUS/ANXIOUS: 1
ARTHRALGIAS: 0
DIZZINESS: 0
FEVER: 0
HEMATURIA: 0
CHILLS: 0
PARESTHESIAS: 0
ABDOMINAL PAIN: 0
SHORTNESS OF BREATH: 1
BREAST MASS: 0

## 2023-11-02 ASSESSMENT — PAIN SCALES - GENERAL: PAINLEVEL: NO PAIN (0)

## 2023-11-02 ASSESSMENT — ACTIVITIES OF DAILY LIVING (ADL): CURRENT_FUNCTION: NO ASSISTANCE NEEDED

## 2023-11-02 NOTE — PROGRESS NOTES
[facility]  [address] [address] [address]  Phone: [phone]  Primary Provider: Meena Costa Ra  Pre-op Performing Provider: MEENA COSTA RA      PREOPERATIVE EVALUATION:  Today's date: 11/2/2023    Mar is a 68 year old female who presents for a preoperative evaluation.      11/2/2023     9:12 AM   Additional Questions   Roomed by MR   Accompanied by NA         11/2/2023     9:12 AM   Patient Reported Additional Medications   Patient reports taking the following new medications NA       Surgical Information:  Surgery/Procedure: Cataract Removal   Surgery Location: [facility]  Surgeon: Donny De La Torre  Surgery Date: RT eye: 11/6/23      LT eye: 11/21/23  Time of Surgery: RT eye: 12:15 PM     LT eye: 12:30 PM  Where patient plans to recover: At home with family  Fax number for surgical facility: Note does not need to be faxed, will be available electronically in Epic.    Assessment & Plan     The proposed surgical procedure is considered LOW risk.    Encounter for Medicare annual wellness exam      Cardiomyopathy, unspecified type (H)  Stable.  Continue with labs.  Decrease metoprolol due to low bp and symptoms.  - Basic metabolic panel  (Ca, Cl, CO2, Creat, Gluc, K, Na, BUN); Future  - lisinopril (ZESTRIL) 2.5 MG tablet; Take 1 tablet (2.5 mg) by mouth daily  - metoprolol succinate ER (TOPROL XL) 25 MG 24 hr tablet; Take 0.5 tablets (12.5 mg) by mouth daily    Congestive heart failure, unspecified HF chronicity, unspecified heart failure type (H)  Stable, following cardiology.    Elevated serum creatinine  Monitor closely.  Will need checked again after contrast dye.  - Basic metabolic panel  (Ca, Cl, CO2, Creat, Gluc, K, Na, BUN); Future  - Basic metabolic panel  (Ca, Cl, CO2, Creat, Gluc, K, Na, BUN)  - Basic metabolic panel  (Ca, Cl, CO2, Creat, Gluc, K, Na, BUN); Future  - Basic metabolic panel  (Ca, Cl, CO2, Creat, Gluc, K, Na, BUN)    Cataract of  both eyes, unspecified cataract type  Procedure planned.    MED REC REQUIRED  Post Medication Reconciliation Status:        - No identified additional risk factors other than previously addressed        RECOMMENDATION:  APPROVAL GIVEN to proceed with proposed procedure, without further diagnostic evaluation.      Subjective       HPI related to upcoming procedure: bilateral cataract.  Procedure planned.          10/30/2023     8:10 AM   Preop Questions   1. Have you ever had a heart attack or stroke? UNKNOWN    2. Have you ever had surgery on your heart or blood vessels, such as a stent placement, a coronary artery bypass, or surgery on an artery in your head, neck, heart, or legs? No   3. Do you have chest pain with activity? No   4. Do you have a history of  heart failure? YES - see chart   5. Do you currently have a cold, bronchitis or symptoms of other infection? No   6. Do you have a cough, shortness of breath, or wheezing? No   7. Do you or anyone in your family have previous history of blood clots? No   8. Do you or does anyone in your family have a serious bleeding problem such as prolonged bleeding following surgeries or cuts? No   9. Have you ever had problems with anemia or been told to take iron pills? No   10. Have you had any abnormal blood loss such as black, tarry or bloody stools, or abnormal vaginal bleeding? No   11. Have you ever had a blood transfusion? No   12. Are you willing to have a blood transfusion if it is medically needed before, during, or after your surgery? Yes   13. Have you or any of your relatives ever had problems with anesthesia? No   14. Do you have sleep apnea, excessive snoring or daytime drowsiness? No   15. Do you have any artifical heart valves or other implanted medical devices like a pacemaker, defibrillator, or continuous glucose monitor? No   16. Do you have artificial joints? YES - Bilat Partial knee   17. Are you allergic to latex? No     Health Care  Directive:  Patient does not have a Health Care Directive or Living Will: Discussed advance care planning with patient; however, patient declined at this time.    Preoperative Review of :   reviewed - controlled substances reflected in medication list.      Status of Chronic Conditions:  Recent dx of heart failure.  Diuresed.  Continues with cardiology.  Clinically improved.  Labs stable.  No need to postpone low risk procedure.    Review of Systems  CONSTITUTIONAL: NEGATIVE for fever, chills, change in weight  ENT/MOUTH: NEGATIVE for ear, mouth and throat problems  RESP: NEGATIVE for significant cough or SOB  CV: NEGATIVE for chest pain, palpitations or peripheral edema    Patient Active Problem List    Diagnosis Date Noted    Congestive heart failure, unspecified HF chronicity, unspecified heart failure type (H) 10/25/2023     Priority: Medium    Splinter hemorrhage of toenail 06/05/2023     Priority: Medium    Attention deficit hyperactivity disorder (ADHD), combined type 12/09/2021     Priority: Medium    Fibromyositis 10/09/2021     Priority: Medium    Loose body in knee 05/10/2019     Priority: Medium    Localized, primary osteoarthritis 12/20/2017     Priority: Medium    Generalized anxiety disorder 06/08/2015     Priority: Medium     Diagnosis updated by automated process. Provider to review and confirm.      Hematoma of rectus sheath 01/23/2014     Priority: Medium    Seasonal allergic rhinitis 08/30/2013     Priority: Medium    Advanced directives, counseling/discussion 01/22/2013     Priority: Medium     6/9/15 Advance Care Planning invitation sent. SABRINA Carbajal RN      Discussed advance care planning with patient; however, patient declined at this time. 1/22/2013         Multiple pigmented nevi 12/20/2012     Priority: Medium    Postmenopausal HRT (hormone replacement therapy) 12/06/2011     Priority: Medium     Formatting of this note might be different from the original.  Started on Efrem denny  11/10      Insomnia 02/11/2009     Priority: Medium    Macrodactylia of toes 09/20/2007     Priority: Medium     Formatting of this note might be different from the original.  ELONGATED METATARSALS      Fibromyalgia 07/10/2007     Priority: Medium    Other disorders of bone and cartilage(733.99) 06/29/2007     Priority: Medium     Formatting of this note might be different from the original.  LATERAL LEFT FOOT  ; SESAMOIDITIS      Ingrowing nail 03/06/2007     Priority: Medium     Formatting of this note might be different from the original.  MEDIAL LEFT HALLUX      Hammer toe, acquired 12/22/2006     Priority: Medium     Formatting of this note might be different from the original.  Other hammer toe (acquired)      Plantar fascial fibromatosis 12/22/2006     Priority: Medium    Talipes cavus 12/22/2006     Priority: Medium    Benign neoplasm of colon 05/03/2006     Priority: Medium    Migraine 05/03/2006     Priority: Medium     Formatting of this note might be different from the original.  Epic        Past Medical History:   Diagnosis Date    Mumps     NO ACTIVE PROBLEMS      Past Surgical History:   Procedure Laterality Date    COLONOSCOPY  8/23/2016    Dr. Shaw formerly [facility]    COLONOSCOPY N/A 8/23/2016    Procedure: COLONOSCOPY;  Surgeon: Peter Shaw MD;  Location:  GI    COLONOSCOPY N/A 10/19/2022    Procedure: COLONOSCOPY with polypectomy using cold exacto snare;  Surgeon: Peter Shaw MD;  Location:  GI    CV CORONARY ANGIOGRAM N/A 10/27/2023    Procedure: Coronary Angiogram;  Surgeon: Elmer Andre MD;  Location:  HEART CARDIAC CATH LAB    FINGER SURGERY      right little finger joint replacement    HYSTERECTOMY VAGINAL  age 30    retained ovaries. no cervix     HYSTERECTOMY, PAP NO LONGER INDICATED      ORTHOPEDIC SURGERY      SURGICAL HISTORY OF -   10/25/13    left index cyst removal    SURGICAL HISTORY OF -       Bilat knee miniscus repair     Current Outpatient Medications    Medication Sig Dispense Refill    albuterol (ACCUNEB) 0.63 MG/3ML neb solution Take 3 mLs (0.63 mg) by nebulization every 6 hours as needed for shortness of breath, wheezing or cough 90 mL 0    albuterol (PROAIR HFA/PROVENTIL HFA/VENTOLIN HFA) 108 (90 Base) MCG/ACT inhaler Inhale 2 puffs into the lungs every 4 hours as needed for shortness of breath, wheezing or cough 18 g 3    aluminum chloride (DRYSOL) 20 % external solution Apply topically At Bedtime 60 mL 0    amphetamine-dextroamphetamine (ADDERALL XR) 25 MG 24 hr capsule Take 1 capsule (25 mg) by mouth every morning 90 capsule 0    aspirin 81 MG EC tablet Take 1 tablet (81 mg) by mouth daily for 30 days 30 tablet 0    atorvastatin (LIPITOR) 40 MG tablet Take 1 tablet (40 mg) by mouth every evening for 30 days 30 tablet 0    azelastine-fluticasone (DYMISTA) 137-50 MCG/ACT nasal spray Spray 1 spray into both nostrils 2 times daily as needed      budesonide (PULMICORT) 0.5 MG/2ML neb solution Spray 0.5 mg in nostril 2 times daily Uses as nasal rinse twice daily.      budesonide-formoterol (SYMBICORT) 160-4.5 MCG/ACT Inhaler Inhale 2 puffs into the lungs 2 times daily 10.2 g 2    buPROPion (WELLBUTRIN XL) 150 MG 24 hr tablet TAKE 1 TABLET(150 MG) BY MOUTH EVERY MORNING 90 tablet 2    fexofenadine (ALLEGRA) 180 MG tablet Take 180 mg by mouth every other day      fluorouracil (EFUDEX) 5 % external cream APPLY SPARINGLY EXTERNALLY TO FACE EVERY SUNDAY AT NIGHT. AVOID EYES AND LIPS      furosemide (LASIX) 20 MG tablet Take 0.5 tablets (10 mg) by mouth daily for 20 days 10 tablet 0    lisinopril (ZESTRIL) 2.5 MG tablet Take 1 tablet (2.5 mg) by mouth daily 90 tablet 0    loratadine (CLARITIN) 10 MG tablet Take 10 mg by mouth every other day      LORazepam (ATIVAN) 0.5 MG tablet Take 1 tablet (0.5 mg) by mouth nightly as needed for sleep 15 tablet 0    metoprolol succinate ER (TOPROL XL) 25 MG 24 hr tablet Take 0.5 tablets (12.5 mg) by mouth daily 90 tablet 0     "timolol maleate (TIMOPTIC) 0.5 % ophthalmic solution Apply 1 drop topically nightly as needed (dry skin cracks) Apply 1 drop to lesion every night at bedtime. Do Not use on normal skin.         Allergies   Allergen Reactions    Codeine Itching    Concerta [Methylphenidate] Itching    Hay Fever & [A.R.M.]         Social History     Tobacco Use    Smoking status: Former     Packs/day: 0     Types: Cigarettes     Passive exposure: Never    Smokeless tobacco: Never   Substance Use Topics    Alcohol use: Yes     Alcohol/week: 0.0 standard drinks of alcohol     Comment: avg:3-4 drink/wk     Family History   Problem Relation Age of Onset    No Known Problems Mother     Hypertension Father     Cancer Father     Cancer Maternal Grandmother     Cancer Maternal Grandfather     Colon Cancer Paternal Grandmother     Cancer Paternal Grandfather     Asthma Brother     Hypertension Brother     No Known Problems Sister     No Known Problems Son     No Known Problems Son     Thyroid Disease Daughter     Colon Cancer Paternal Aunt     Colon Cancer Cousin      History   Drug Use No         Objective     /73 (BP Location: Right arm, Patient Position: Sitting, Cuff Size: Adult Regular)   Pulse 61   Temp 97.8  F (36.6  C) (Oral)   Resp 13   Ht 1.676 m (5' 6\")   Wt 51.7 kg (114 lb)   LMP  (LMP Unknown)   SpO2 99%   BMI 18.40 kg/m      Physical Exam  GENERAL APPEARANCE: healthy, alert and no distress  HENT: ear canals and TM's normal and nose and mouth without ulcers or lesions  RESP: lungs clear to auscultation - no rales, rhonchi or wheezes  CV: regular rate and rhythm, normal S1 S2, no S3 or S4 and no murmur, click or rub   ABDOMEN: soft, nontender, no HSM or masses and bowel sounds normal  NEURO: Normal strength and tone, sensory exam grossly normal, mentation intact and speech normal    Recent Labs   Lab Test 10/30/23  0913 10/29/23  0640 10/28/23  0723 10/27/23  0626 10/26/23  0806 10/25/23  2033   HGB  --   --   --   -- "  11.7 11.6*   PLT  --  373  --   --  352 383     --  133*   < > 137 139   POTASSIUM 4.9 4.6 4.3   < > 3.8  3.8 4.3   CR 1.10* 0.96* 1.12*   < > 1.05* 1.33*    < > = values in this interval not displayed.        Diagnostics:  Recent Results (from the past 168 hour(s))   Basic metabolic panel  (Ca, Cl, CO2, Creat, Gluc, K, Na, BUN)    Collection Time: 11/02/23  9:53 AM   Result Value Ref Range    Sodium 135 135 - 145 mmol/L    Potassium 4.6 3.4 - 5.3 mmol/L    Chloride 101 98 - 107 mmol/L    Carbon Dioxide (CO2) 28 22 - 29 mmol/L    Anion Gap 6 (L) 7 - 15 mmol/L    Urea Nitrogen 22.3 8.0 - 23.0 mg/dL    Creatinine 1.17 (H) 0.51 - 0.95 mg/dL    GFR Estimate 50 (L) >60 mL/min/1.73m2    Calcium 9.4 8.8 - 10.2 mg/dL    Glucose 95 70 - 99 mg/dL   Basic metabolic panel  (Ca, Cl, CO2, Creat, Gluc, K, Na, BUN)    Collection Time: 11/03/23 10:11 AM   Result Value Ref Range    Sodium 135 135 - 145 mmol/L    Potassium 4.5 3.4 - 5.3 mmol/L    Chloride 101 98 - 107 mmol/L    Carbon Dioxide (CO2) 26 22 - 29 mmol/L    Anion Gap 8 7 - 15 mmol/L    Urea Nitrogen 20.8 8.0 - 23.0 mg/dL    Creatinine 1.06 (H) 0.51 - 0.95 mg/dL    GFR Estimate 57 (L) >60 mL/min/1.73m2    Calcium 9.5 8.8 - 10.2 mg/dL    Glucose 94 70 - 99 mg/dL          Revised Cardiac Risk Index (RCRI):  The patient has the following serious cardiovascular risks for perioperative complications:   - Congestive Heart Failure (pulmonary edema, PND, s3 melissa, CXR with pulmonary congestion, basilar rales) = 1 point     RCRI Interpretation: 1 point: Class II (low risk - 0.9% complication rate)         Signed Electronically by: DUKE Esquivel Ra CNP  Copy of this evaluation report is provided to requesting physician.

## 2023-11-02 NOTE — PROGRESS NOTES
"SUBJECTIVE:   Mar is a 68 year old who presents for Preventive Visit.      11/2/2023     9:12 AM   Additional Questions   Roomed by MR   Accompanied by LUTHER         11/2/2023     9:12 AM   Patient Reported Additional Medications   Patient reports taking the following new medications NA       Are you in the first 12 months of your Medicare coverage?  No    Healthy Habits:     In general, how would you rate your overall health?  Fair    Frequency of exercise:  None    Do you usually eat at least 4 servings of fruit and vegetables a day, include whole grains    & fiber and avoid regularly eating high fat or \"junk\" foods?  Yes    Taking medications regularly:  Yes    Barriers to taking medications:  Not applicable    Medication side effects:  Not applicable    Ability to successfully perform activities of daily living:  No assistance needed    Home Safety:  No safety concerns identified    Hearing Impairment:  No hearing concerns    In the past 6 months, have you been bothered by leaking of urine?  No    In general, how would you rate your overall mental or emotional health?  Good    Additional concerns today:  No      JUST HAD MAMMO 9/2023          Have you ever done Advance Care Planning? (For example, a Health Directive, POLST, or a discussion with a medical provider or your loved ones about your wishes): No, advance care planning information given to patient to review.  Patient declined advance care planning discussion at this time.    {Hearing Test Done (Optional):269032}       Fall risk  Fallen 2 or more times in the past year?: No  Any fall with injury in the past year?: No    Cognitive Screening   1) Repeat 3 items (Leader, Season, Table)    2) Clock draw: NORMAL  3) 3 item recall: Recalls 3 objects  Results: 3 items recalled: COGNITIVE IMPAIRMENT LESS LIKELY    Mini-CogTM Copyright S Jeron[employer]. Licensed by the author for use in [facility]; reprinted with permission (denice@.[facility]). All rights " reserved.      Do you have sleep apnea, excessive snoring or daytime drowsiness? : Doesn't sleep well, excessive tiredness from meds    Reviewed and updated as needed this visit by clinical staff   Tobacco  Allergies  Meds              Reviewed and updated as needed this visit by Provider                 Social History     Tobacco Use    Smoking status: Former     Packs/day: 0     Types: Cigarettes     Passive exposure: Never    Smokeless tobacco: Never   Substance Use Topics    Alcohol use: Yes     Alcohol/week: 0.0 standard drinks of alcohol     Comment: avg:3-4 drink/wk     {Rooming staff  Click this link to complete the Prescreen if response below is not for today's visit  Alcohol Use Prescreen >3 drinks/day or > 7 drinks/week.  If the prescreen question answer is YES, complete the full AUDIT  :786611}        11/2/2023     9:13 AM   Alcohol Use   Prescreen: >3 drinks/day or >7 drinks/week? No     Do you have a current opioid prescription? No  Do you use any other controlled substances or medications that are not prescribed by a provider? None  {Provider  If there are gaps in the social history shown above, please follow the link and refresh the note Link to Social and Substance History :847086}    {Outside tests to abstract? (Optional):258297}    {additional problems to add (Optional):665423}    Current providers sharing in care for this patient include: {Rooming staff:  Please update Care Team from storyboard, refresh this note and then delete this statement}  Patient Care Team:  Gail Crespo Ra, APRN CNP as PCP - General (Family Practice)  Gail Crespo Ra, APRN CNP as Assigned PCP  Mackenzei Fung NP as Nurse Practitioner (Pulmonary Disease)  Britni Escobar MD as Fellow (Critical Care)  Gail Crespo Ra, APRN CNP as Assigned Pain Medication Provider    The following health maintenance items are reviewed in Epic and correct as of today:  Health Maintenance   Topic Date Due    HF  ACTION PLAN  Never done    ASTHMA ACTION PLAN  Never done    COVID-19 Vaccine (1) Never done    RSV VACCINE 60+ (1 - 1-dose 60+ series) Never done    Pneumococcal Vaccine: 65+ Years (2 - PCV) 08/14/2021    MEDICARE ANNUAL WELLNESS VISIT  08/18/2023    ALT  08/18/2023    ANNUAL REVIEW OF HM ORDERS  08/18/2023    INFLUENZA VACCINE (1) 09/01/2023    TARIQ ASSESSMENT  12/05/2023    BMP  04/30/2024    ASTHMA CONTROL TEST  04/30/2024    MAMMO SCREENING  09/12/2024    LIPID  10/26/2024    CBC  10/26/2024    FALL RISK ASSESSMENT  11/02/2024    DTAP/TDAP/TD IMMUNIZATION (4 - Td or Tdap) 07/18/2026    COLORECTAL CANCER SCREENING  10/19/2027    ADVANCE CARE PLANNING  11/02/2028    DEXA  12/07/2035    TSH W/FREE T4 REFLEX  Completed    PHQ-2 (once per calendar year)  Completed    ZOSTER IMMUNIZATION  Completed    IPV IMMUNIZATION  Aged Out    HPV IMMUNIZATION  Aged Out    MENINGITIS IMMUNIZATION  Aged Out    RSV MONOCLONAL ANTIBODY  Aged Out    HEPATITIS C SCREENING  Discontinued    LUNG CANCER SCREENING  Discontinued     {Chronicprobdata (optional):630611}  {Decision Support (Optional):167435}        8/18/2022    11:10 AM 9/9/2022    10:39 AM 9/9/2022    10:40 AM 11/10/2022     3:53 PM 10/30/2023     8:11 AM 11/2/2023     9:15 AM   Breast CA Risk Assessment (FHS-7)   Do you have a family history of breast, colon, or ovarian cancer? Yes Yes Yes Yes No / Unknown    No / Unknown Yes       {If any of the questions to the BCRA (FHS-7) are answered yes, consider ordering referral for genetic counseling (Optional) :966645}  {AMB Mammogram Decision Support (Optional) :603652}  Pertinent mammograms are reviewed under the imaging tab.    Review of Systems   Constitutional:  Negative for chills and fever.   HENT:  Negative for congestion, ear pain, hearing loss and sore throat.    Eyes:  Negative for pain and visual disturbance.   Respiratory:  Positive for shortness of breath. Negative for cough.    Cardiovascular:  Negative for chest  "pain, palpitations and peripheral edema.   Gastrointestinal:  Negative for abdominal pain, constipation, diarrhea, heartburn, hematochezia and nausea.   Breasts:  Negative for tenderness, breast mass and discharge.   Genitourinary:  Negative for dysuria, frequency, genital sores, hematuria, pelvic pain, urgency, vaginal bleeding and vaginal discharge.   Musculoskeletal:  Negative for arthralgias, joint swelling and myalgias.   Skin:  Negative for rash.   Neurological:  Negative for dizziness, weakness, headaches and paresthesias.   Psychiatric/Behavioral:  Negative for mood changes. The patient is nervous/anxious.      {ROS COMP (Optional):492374}    OBJECTIVE:   /73 (BP Location: Right arm, Patient Position: Sitting, Cuff Size: Adult Regular)   Pulse 61   Temp 97.8  F (36.6  C) (Oral)   Resp 13   Ht 1.676 m (5' 6\")   Wt 51.7 kg (114 lb)   LMP  (LMP Unknown)   SpO2 99%   BMI 18.40 kg/m   Estimated body mass index is 18.4 kg/m  as calculated from the following:    Height as of this encounter: 1.676 m (5' 6\").    Weight as of this encounter: 51.7 kg (114 lb).  Physical Exam  {Exam (Optional) :831333}    {Diagnostic Test Results (Optional):744546}    ASSESSMENT / PLAN:   {Diag Picklist:717699}    {Patient advised of split billing (Optional):500942}  MED REC REQUIRED{TIP  Click the link below to document or use med rec list, use list to pull in response :469037}  Post Medication Reconciliation Status: {MED REC LIST:271270}    COUNSELING:  {Medicare Counselin}        She reports that she has quit smoking. Her smoking use included cigarettes. She has never been exposed to tobacco smoke. She has never used smokeless tobacco.      Appropriate preventive services were discussed with this patient, including applicable screening as appropriate for fall prevention, nutrition, physical activity, Tobacco-use cessation, weight loss and cognition.  Checklist reviewing preventive services available has been " given to the patient.    Reviewed patients plan of care and provided an AVS. The {CarePlan:799941} for Mar meets the Care Plan requirement. This Care Plan has been established and reviewed with the {PATIENT, FAMILY MEMBER, CAREGIVER:717683}.    {Counseling Resources  [place] Preventive Services Task Force  Cholesterol Screening  Health diet/nutrition  Pooled Cohorts Equation Calculator  WHOOP's MyPlate  ASA Prophylaxis  Lung CA Screening  Osteoporosis prevention/bone health :612704}  {Breast Cancer Risk Calculator  BRCA-Related Cancer Risk Assessment FHS-7 Tool :085694}    [facility]    Identified Health Risks:  {Medicare required documentation of substance and opioid use disorders screening :636727}

## 2023-11-02 NOTE — PATIENT INSTRUCTIONS
Patient Education   Personalized Prevention Plan  You are due for the preventive services outlined below.  Your care team is available to assist you in scheduling these services.  If you have already completed any of these items, please share that information with your care team to update in your medical record.  Health Maintenance Due   Topic Date Due     Heart Failure Action Plan  Never done     Asthma Action Plan - yearly  Never done     COVID-19 Vaccine (1) Never done     RSV VACCINE 60+ (1 - 1-dose 60+ series) Never done     Pneumococcal Vaccine (2 - PCV) 08/14/2021     Annual Wellness Visit  08/18/2023     Liver Monitoring Lab  08/18/2023     ANNUAL REVIEW OF HM ORDERS  08/18/2023     Flu Vaccine (1) 09/01/2023     FALL RISK ASSESSMENT  11/10/2023

## 2023-11-03 ENCOUNTER — NURSE TRIAGE (OUTPATIENT)
Dept: NURSING | Facility: CLINIC | Age: 68
End: 2023-11-03

## 2023-11-03 ENCOUNTER — LAB (OUTPATIENT)
Dept: LAB | Facility: CLINIC | Age: 68
End: 2023-11-03
Payer: MEDICARE

## 2023-11-03 DIAGNOSIS — I42.9 CARDIOMYOPATHY, UNSPECIFIED TYPE (H): ICD-10-CM

## 2023-11-03 LAB
ANION GAP SERPL CALCULATED.3IONS-SCNC: 8 MMOL/L (ref 7–15)
BUN SERPL-MCNC: 20.8 MG/DL (ref 8–23)
CALCIUM SERPL-MCNC: 9.5 MG/DL (ref 8.8–10.2)
CHLORIDE SERPL-SCNC: 101 MMOL/L (ref 98–107)
CREAT SERPL-MCNC: 1.06 MG/DL (ref 0.51–0.95)
DEPRECATED HCO3 PLAS-SCNC: 26 MMOL/L (ref 22–29)
EGFRCR SERPLBLD CKD-EPI 2021: 57 ML/MIN/1.73M2
GLUCOSE SERPL-MCNC: 94 MG/DL (ref 70–99)
POTASSIUM SERPL-SCNC: 4.5 MMOL/L (ref 3.4–5.3)
SODIUM SERPL-SCNC: 135 MMOL/L (ref 135–145)

## 2023-11-03 PROCEDURE — 36415 COLL VENOUS BLD VENIPUNCTURE: CPT

## 2023-11-03 PROCEDURE — 80048 BASIC METABOLIC PNL TOTAL CA: CPT

## 2023-11-03 NOTE — TELEPHONE ENCOUNTER
Nurse Triage SBAR    Situation:   -Results    Background:   -Patient calling, It is okay to leave a detailed message at this number.     Assessment:   -was told she would get a call back r/t results  -we reviewed the following:      Recommendation:   -Warm transferred to central scheduling to make an lab appointment  -Call back with and questions, concerns, or any change in symptoms    MUSA PATEL RN on 11/3/2023 at 5:20 PM    Reason for Disposition   Caller requesting lab results  (Exception: Routine or non-urgent lab result.)    Protocols used: PCP Call - No Triage-A-

## 2023-11-06 ENCOUNTER — DOCUMENTATION ONLY (OUTPATIENT)
Dept: FAMILY MEDICINE | Facility: CLINIC | Age: 68
End: 2023-11-06
Payer: MEDICARE

## 2023-11-06 DIAGNOSIS — I50.9 CONGESTIVE HEART FAILURE, UNSPECIFIED HF CHRONICITY, UNSPECIFIED HEART FAILURE TYPE (H): Primary | ICD-10-CM

## 2023-11-06 DIAGNOSIS — R79.89 ELEVATED SERUM CREATININE: ICD-10-CM

## 2023-11-06 LAB
ANION GAP SERPL CALCULATED.3IONS-SCNC: 10 MMOL/L (ref 7–15)
BUN SERPL-MCNC: 14 MG/DL (ref 8–23)
CALCIUM SERPL-MCNC: 9.5 MG/DL (ref 8.8–10.2)
CHLORIDE SERPL-SCNC: 102 MMOL/L (ref 98–107)
CREAT SERPL-MCNC: 0.96 MG/DL (ref 0.51–0.95)
DEPRECATED HCO3 PLAS-SCNC: 27 MMOL/L (ref 22–29)
EGFRCR SERPLBLD CKD-EPI 2021: 64 ML/MIN/1.73M2
GLUCOSE SERPL-MCNC: 83 MG/DL (ref 70–99)
POTASSIUM SERPL-SCNC: 4.6 MMOL/L (ref 3.4–5.3)
SODIUM SERPL-SCNC: 139 MMOL/L (ref 135–145)

## 2023-11-06 PROCEDURE — 36415 COLL VENOUS BLD VENIPUNCTURE: CPT | Performed by: NURSE PRACTITIONER

## 2023-11-06 PROCEDURE — 80048 BASIC METABOLIC PNL TOTAL CA: CPT | Performed by: NURSE PRACTITIONER

## 2023-11-06 NOTE — PROGRESS NOTES
Patient has lab only appt 11/7/23, no orders in chart.  Please place FUTURE orders in Epic if appropriate.    Thanks,   lab

## 2023-11-08 ENCOUNTER — HOSPITAL ENCOUNTER (OUTPATIENT)
Dept: GENERAL RADIOLOGY | Facility: CLINIC | Age: 68
Discharge: HOME | End: 2023-11-08
Attending: INTERNAL MEDICINE | Admitting: INTERNAL MEDICINE
Payer: MEDICARE

## 2023-11-08 ENCOUNTER — OFFICE VISIT (OUTPATIENT)
Dept: CARDIOLOGY | Facility: CLINIC | Age: 68
End: 2023-11-08
Payer: MEDICARE

## 2023-11-08 ENCOUNTER — DOCUMENTATION ONLY (OUTPATIENT)
Dept: FAMILY MEDICINE | Facility: CLINIC | Age: 68
End: 2023-11-08

## 2023-11-08 VITALS
BODY MASS INDEX: 18.77 KG/M2 | SYSTOLIC BLOOD PRESSURE: 119 MMHG | WEIGHT: 116.8 LBS | HEIGHT: 66 IN | DIASTOLIC BLOOD PRESSURE: 77 MMHG | HEART RATE: 74 BPM | OXYGEN SATURATION: 96 %

## 2023-11-08 DIAGNOSIS — I42.8 NONISCHEMIC CARDIOMYOPATHY (H): ICD-10-CM

## 2023-11-08 DIAGNOSIS — I42.9 CARDIOMYOPATHY, UNSPECIFIED TYPE (H): Primary | ICD-10-CM

## 2023-11-08 DIAGNOSIS — R06.02 SOB (SHORTNESS OF BREATH): ICD-10-CM

## 2023-11-08 DIAGNOSIS — I50.20 HEART FAILURE WITH REDUCED EJECTION FRACTION, NYHA CLASS II (H): ICD-10-CM

## 2023-11-08 DIAGNOSIS — R07.2 PRECORDIAL PAIN: ICD-10-CM

## 2023-11-08 DIAGNOSIS — R06.01 ORTHOPNEA: ICD-10-CM

## 2023-11-08 DIAGNOSIS — I42.9 CARDIOMYOPATHY, UNSPECIFIED TYPE (H): ICD-10-CM

## 2023-11-08 PROCEDURE — 71046 X-RAY EXAM CHEST 2 VIEWS: CPT

## 2023-11-08 PROCEDURE — 99214 OFFICE O/P EST MOD 30 MIN: CPT | Performed by: INTERNAL MEDICINE

## 2023-11-08 RX ORDER — NITROGLYCERIN 0.4 MG/1
TABLET SUBLINGUAL
Qty: 30 TABLET | Refills: 11 | Status: SHIPPED | OUTPATIENT
Start: 2023-11-08

## 2023-11-08 RX ORDER — SPIRONOLACTONE 25 MG/1
12.5 TABLET ORAL DAILY
Qty: 30 TABLET | Refills: 11 | Status: SHIPPED | OUTPATIENT
Start: 2023-11-08 | End: 2023-11-09

## 2023-11-08 RX ORDER — LISINOPRIL 2.5 MG/1
2.5 TABLET ORAL 2 TIMES DAILY
Qty: 60 TABLET | Refills: 11 | Status: SHIPPED | OUTPATIENT
Start: 2023-11-08 | End: 2023-12-08

## 2023-11-08 NOTE — PROGRESS NOTES
HPI and Plan:   Mar Zeng is a 68 year old female who presents with recent hospital admission for heart failure with ejection fraction estimated around 30 to 35%.  On echo she was also noted plus mitral insufficiency.  She underwent coronary angiography which revealed nonobstructive coronary artery disease maximal lesion estimated between 30 to 40% in the distal left circumflex and trivial disease noted in the LAD with 20% proximal lesion right coronary artery did not demonstrate any evidence of stenosis.  She had evidence of a new left bundle branch block on ECG but on telemetry during the hospitalization there were no arrhythmias noted.  She had evidence of bilateral pleural effusions which resolved with IV diuresis.  At discharge I had recommended a cardiac MRI and also KADEEM to evaluate the mitral insufficiency.  She underwent the cardiac MRI which demonstrated a mildly dilated LV cavity with severely reduced function EF was estimated at 32% with severe global hypokinesis and paradoxical septal motion consistent with a left bundle branch block.  Her RV function was normal with RVEF at 67%, moderate left atrial enlargement was noted but only mild mitral regurgitation by qualitative and quantitative assessment.  Late gadolinium enhancement imaging showed no evidence of myocardial infarction, fibrosis or infiltrative disease.  Findings were consistent with nonischemic cardiomyopathy.  Since discharge she has been experiencing increased shortness of breath and some symptoms of recurrent orthopnea.  She was discharged home on 20 mg of furosemide and low doses of metoprolol and lisinopril due to borderline low blood pressures.  She also has been continuing to have episodes of chest pain in the left chest area that are nonexertional and can last up to 20 minutes.  She does not note any triggers or relieving factors.  A basic metabolic panel was performed 2 days ago and shows normal electrolytes with sodium 139  potassium 4.6 and her creatinine was 0.96 improved from previous 1.06.  On exam she has clear but diminished lung fields and no peripheral edema.    Summary    1.HFrEF-recurrent decompensation of systolic congestive heart failure by clinical history and exam.  I ordered a chest x-ray to reevaluate for reaccumulation of pleural effusions which were noted on her previous hospitalization.  I also added an NT proBNP to her labs drawn 2 days ago.  I will recommend titration of her lisinopril to 2.5 mg twice daily and the addition of spironolactone 12.5 mg daily.  She will continue to take Lasix at 20 mg daily.  I would like her seen back within 1 week for repeat basic metabolic panel and reassessment of her symptoms.  I am also going to enroll her in core clinic.    2.  Continued chest pains-coronary angiogram did not show obstructive coronary artery disease with only 2 areas of mild stenosis noted.  I suspect possible vasospastic angina.  I have given her a prescription for sublingual nitroglycerin and gone over the potential side effects with her.  My biggest concern is a sudden blood pressure drop with this medication and so I talked to her about this and wanted to stressed the importance that she is sitting or laying down should she need or choose to take sublingual nitroglycerin.    3.  Mitral insufficiency-echocardiogram had suggested more severe mitral regurgitation at 3+ however cardiac MRI both by qualitative and quantitative analysis suggests only mild mitral insufficiency.    Enrollment in core clinic will be important to reduce the risk of rehospitalization with evidence of decompensated heart failure.  Please feel free to contact me with any questions you have regards to her       Today's clinic visit entailed:  Review of external notes as documented elsewhere in note  Review of the result(s) of each unique test - BMP, Cardiac MRI  Ordering of each unique test  Prescription drug management    Provider  Link  to MDM Help Grid     The level of medical decision making during this visit was of high complexity.    Orders Placed This Encounter   Procedures    X-ray Chest 2 vws*    C.O.R.E. Enrollment - [facility]    Leadless EKG Monitor 3 to 7 Days     Orders Placed This Encounter   Medications    spironolactone (ALDACTONE) 25 MG tablet     Sig: Take 0.5 tablets (12.5 mg) by mouth daily     Dispense:  30 tablet     Refill:  11    lisinopril (ZESTRIL) 2.5 MG tablet     Sig: Take 1 tablet (2.5 mg) by mouth 2 times daily     Dispense:  60 tablet     Refill:  11    nitroGLYcerin (NITROSTAT) 0.4 MG sublingual tablet     Sig: For chest pain place 1 tablet under the tongue every 5 minutes for 3 doses. If symptoms persist 5 minutes after 1st dose call 911.     Dispense:  30 tablet     Refill:  11     Medications Discontinued During This Encounter   Medication Reason    lisinopril (ZESTRIL) 2.5 MG tablet Reorder (No AVS)         Encounter Diagnoses   Name Primary?    Cardiomyopathy, unspecified type (H) Yes    Nonischemic cardiomyopathy (H)     Heart failure with reduced ejection fraction, NYHA class II (H)     SOB (shortness of breath)     Orthopnea     Precordial pain        CURRENT MEDICATIONS:  Current Outpatient Medications   Medication Sig Dispense Refill    albuterol (ACCUNEB) 0.63 MG/3ML neb solution Take 3 mLs (0.63 mg) by nebulization every 6 hours as needed for shortness of breath, wheezing or cough 90 mL 0    albuterol (PROAIR HFA/PROVENTIL HFA/VENTOLIN HFA) 108 (90 Base) MCG/ACT inhaler Inhale 2 puffs into the lungs every 4 hours as needed for shortness of breath, wheezing or cough 18 g 3    aluminum chloride (DRYSOL) 20 % external solution Apply topically At Bedtime 60 mL 0    amphetamine-dextroamphetamine (ADDERALL XR) 25 MG 24 hr capsule Take 1 capsule (25 mg) by mouth every morning 90 capsule 0    aspirin 81 MG EC tablet Take 1 tablet (81 mg) by mouth daily for 30 days 30 tablet 0    atorvastatin (LIPITOR) 40 MG tablet Take  1 tablet (40 mg) by mouth every evening for 30 days 30 tablet 0    azelastine-fluticasone (DYMISTA) 137-50 MCG/ACT nasal spray Spray 1 spray into both nostrils 2 times daily as needed      budesonide-formoterol (SYMBICORT) 160-4.5 MCG/ACT Inhaler Inhale 2 puffs into the lungs 2 times daily 10.2 g 2    buPROPion (WELLBUTRIN XL) 150 MG 24 hr tablet TAKE 1 TABLET(150 MG) BY MOUTH EVERY MORNING 90 tablet 2    fexofenadine (ALLEGRA) 180 MG tablet Take 180 mg by mouth every other day      fluorouracil (EFUDEX) 5 % external cream APPLY SPARINGLY EXTERNALLY TO FACE EVERY SUNDAY AT NIGHT. AVOID EYES AND LIPS      furosemide (LASIX) 20 MG tablet Take 0.5 tablets (10 mg) by mouth daily for 20 days 10 tablet 0    lisinopril (ZESTRIL) 2.5 MG tablet Take 1 tablet (2.5 mg) by mouth 2 times daily 60 tablet 11    loratadine (CLARITIN) 10 MG tablet Take 10 mg by mouth every other day      LORazepam (ATIVAN) 0.5 MG tablet Take 1 tablet (0.5 mg) by mouth nightly as needed for sleep 15 tablet 0    metoprolol succinate ER (TOPROL XL) 25 MG 24 hr tablet Take 0.5 tablets (12.5 mg) by mouth daily 90 tablet 0    nitroGLYcerin (NITROSTAT) 0.4 MG sublingual tablet For chest pain place 1 tablet under the tongue every 5 minutes for 3 doses. If symptoms persist 5 minutes after 1st dose call 911. 30 tablet 11    spironolactone (ALDACTONE) 25 MG tablet Take 0.5 tablets (12.5 mg) by mouth daily 30 tablet 11    timolol maleate (TIMOPTIC) 0.5 % ophthalmic solution Apply 1 drop topically nightly as needed (dry skin cracks) Apply 1 drop to lesion every night at bedtime. Do Not use on normal skin.      budesonide (PULMICORT) 0.5 MG/2ML neb solution Spray 0.5 mg in nostril 2 times daily Uses as nasal rinse twice daily. (Patient not taking: Reported on 11/8/2023)         ALLERGIES     Allergies   Allergen Reactions    Codeine Itching    Concerta [Methylphenidate] Itching    Hay Fever & [A.R.M.]        PAST MEDICAL HISTORY:  Past Medical History:    Diagnosis Date    Mumps     NO ACTIVE PROBLEMS        PAST SURGICAL HISTORY:  Past Surgical History:   Procedure Laterality Date    COLONOSCOPY  8/23/2016    Dr. Shaw [facility]    COLONOSCOPY N/A 8/23/2016    Procedure: COLONOSCOPY;  Surgeon: Peter Shaw MD;  Location:  GI    COLONOSCOPY N/A 10/19/2022    Procedure: COLONOSCOPY with polypectomy using cold exacto snare;  Surgeon: Peter Shaw MD;  Location:  GI    CV CORONARY ANGIOGRAM N/A 10/27/2023    Procedure: Coronary Angiogram;  Surgeon: Elmer Andre MD;  Location:  [facility]    FINGER SURGERY      right little finger joint replacement    HYSTERECTOMY VAGINAL  age 30    retained ovaries. no cervix     HYSTERECTOMY, PAP NO LONGER INDICATED      ORTHOPEDIC SURGERY      SURGICAL HISTORY OF -   10/25/13    left index cyst removal    SURGICAL HISTORY OF -       Bilat knee miniscus repair       FAMILY HISTORY:  Family History   Problem Relation Age of Onset    No Known Problems Mother     Hypertension Father     Cancer Father     CABG Father     No Known Problems Sister     Asthma Brother     Hypertension Brother     Cancer Maternal Grandmother     Cancer Maternal Grandfather     Colon Cancer Paternal Grandmother     Cancer Paternal Grandfather     Thyroid Disease Daughter     No Known Problems Son     No Known Problems Son     Colon Cancer Paternal Aunt     Colon Cancer Cousin        SOCIAL HISTORY:  Social History     Socioeconomic History    Marital status:      Spouse name: None    Number of children: None    Years of education: None    Highest education level: None   Tobacco Use    Smoking status: Former     Packs/day: 0     Types: Cigarettes     Passive exposure: Never    Smokeless tobacco: Never   Vaping Use    Vaping Use: Never used   Substance and Sexual Activity    Alcohol use: Yes     Alcohol/week: 0.0 standard drinks of alcohol     Comment: avg:3-4 drink/wk    Drug use: No    Sexual activity: Yes      Partners: Male     Birth control/protection: Female Surgical   Other Topics Concern    Parent/sibling w/ CABG, MI or angioplasty before 65F 55M? No   Social History Narrative    [place]     Social Determinants of Health     Financial Resource Strain: Low Risk  (10/30/2023)    Financial Resource Strain     Within the past 12 months, have you or your family members you live with been unable to get utilities (heat, electricity) when it was really needed?: No   Food Insecurity: Low Risk  (10/30/2023)    Food Insecurity     Within the past 12 months, did you worry that your food would run out before you got money to buy more?: No     Within the past 12 months, did the food you bought just not last and you didn t have money to get more?: No   Transportation Needs: Low Risk  (10/30/2023)    Transportation Needs     Within the past 12 months, has lack of transportation kept you from medical appointments, getting your medicines, non-medical meetings or appointments, work, or from getting things that you need?: No   Interpersonal Safety: Low Risk  (11/2/2023)    Interpersonal Safety     Do you feel physically and emotionally safe where you currently live?: Yes     Within the past 12 months, have you been hit, slapped, kicked or otherwise physically hurt by someone?: No     Within the past 12 months, have you been humiliated or emotionally abused in other ways by your partner or ex-partner?: No   Housing Stability: Low Risk  (10/30/2023)    Housing Stability     Do you have housing? : Yes     Are you worried about losing your housing?: No       Review of Systems:  Skin:        Eyes:       ENT:       Respiratory:  Positive for shortness of breath;dyspnea on exertion  Cardiovascular:  Negative;palpitations;syncope or near-syncope;cyanosis;dizziness;lightheadedness;edema chest pain;Positive for;fatigue  Gastroenterology:      Genitourinary:       Musculoskeletal:       Neurologic:       Psychiatric:       Heme/Lymph/Imm:  "      Endocrine:  Negative      Physical Exam:  Vitals: /77   Pulse 74   Ht 1.676 m (5' 6\")   Wt 53 kg (116 lb 12.8 oz)   LMP  (LMP Unknown)   SpO2 96%   BMI 18.85 kg/m      Constitutional:           Skin:             Head:           Eyes:           Lymph:      ENT:           Neck:           Respiratory:            Cardiac:                                                           GI:           Extremities and Muscular Skeletal:                 Neurological:           Psych:           Recent Lab Results:  LIPID RESULTS:  Lab Results   Component Value Date    CHOL 201 (H) 10/26/2023    CHOL 231 (H) 08/18/2020    HDL 76 10/26/2023    HDL 72 08/18/2020     (H) 10/26/2023     (H) 08/18/2020    TRIG 70 10/26/2023    TRIG 110 08/18/2020    CHOLHDLRATIO 2.3 09/03/2014       LIVER ENZYME RESULTS:  Lab Results   Component Value Date    AST 20 08/18/2022    AST 23 08/18/2020    ALT 20 08/18/2022    ALT 19 08/18/2020       CBC RESULTS:  Lab Results   Component Value Date    WBC 6.4 10/26/2023    WBC 5.8 10/16/2019    RBC 3.93 10/26/2023    RBC 4.17 10/16/2019    HGB 11.7 10/26/2023    HGB 12.5 10/16/2019    HCT 36.5 10/26/2023    HCT 38.2 10/16/2019    MCV 93 10/26/2023    MCV 92 10/16/2019    MCH 29.8 10/26/2023    MCH 30.0 10/16/2019    MCHC 32.1 10/26/2023    MCHC 32.7 10/16/2019    RDW 13.3 10/26/2023    RDW 12.7 10/16/2019     10/29/2023     10/16/2019       BMP RESULTS:  Lab Results   Component Value Date     11/06/2023     08/18/2020    POTASSIUM 4.6 11/06/2023    POTASSIUM 4.5 08/18/2022    POTASSIUM 4.1 08/18/2020    CHLORIDE 102 11/06/2023    CHLORIDE 103 08/18/2022    CHLORIDE 106 08/18/2020    CO2 27 11/06/2023    CO2 28 08/18/2022    CO2 27 08/18/2020    ANIONGAP 10 11/06/2023    ANIONGAP 4 08/18/2022    ANIONGAP 8 08/18/2020    GLC 83 11/06/2023    GLC 91 08/18/2022    GLC 82 08/18/2020    BUN 14.0 11/06/2023    BUN 14 08/18/2022    BUN 11 08/18/2020    CR 0.96 " (H) 11/06/2023    CR 0.80 08/18/2020    GFRESTIMATED 64 11/06/2023    GFRESTIMATED 77 08/18/2020    GFRESTBLACK 89 08/18/2020    MERRILL 9.5 11/06/2023    MERRILL 8.8 08/18/2020        A1C RESULTS:  Lab Results   Component Value Date    A1C 5.6 12/04/2017       INR RESULTS:  Lab Results   Component Value Date    INR 1.05 01/23/2014           CC  Referred Self, MD  No address on file

## 2023-11-08 NOTE — PROGRESS NOTES
Mar Zeng has an upcoming lab appointment (Tomorrow 11/9/23) and is eligible to have due Health Maintenance labs drawn per Health Maintenance Protocol Orders (HMPO) process.    Before it can be drawn, a diagnosis is needed for the following lab: ALT    Please review and enter diagnosis as appropriate.     Liam Youngblood, TY, MLT

## 2023-11-08 NOTE — LETTER
11/8/2023    Gail Crespo, APRN CNP  [id number] [facility] [address]    RE: Mar Zeng       Dear Colleague,     I had the pleasure of seeing Mar Zeng in the [facility].  HPI and Plan:   Mar Zeng is a 68 year old female who presents with recent hospital admission for heart failure with ejection fraction estimated around 30 to 35%.  On echo she was also noted plus mitral insufficiency.  She underwent coronary angiography which revealed nonobstructive coronary artery disease maximal lesion estimated between 30 to 40% in the distal left circumflex and trivial disease noted in the LAD with 20% proximal lesion right coronary artery did not demonstrate any evidence of stenosis.  She had evidence of a new left bundle branch block on ECG but on telemetry during the hospitalization there were no arrhythmias noted.  She had evidence of bilateral pleural effusions which resolved with IV diuresis.  At discharge I had recommended a cardiac MRI and also KADEEM to evaluate the mitral insufficiency.  She underwent the cardiac MRI which demonstrated a mildly dilated LV cavity with severely reduced function EF was estimated at 32% with severe global hypokinesis and paradoxical septal motion consistent with a left bundle branch block.  Her RV function was normal with RVEF at 67%, moderate left atrial enlargement was noted but only mild mitral regurgitation by qualitative and quantitative assessment.  Late gadolinium enhancement imaging showed no evidence of myocardial infarction, fibrosis or infiltrative disease.  Findings were consistent with nonischemic cardiomyopathy.  Since discharge she has been experiencing increased shortness of breath and some symptoms of recurrent orthopnea.  She was discharged home on 20 mg of furosemide and low doses of metoprolol and lisinopril due to borderline low blood pressures.  She also has been continuing to have episodes of chest pain  in the left chest area that are nonexertional and can last up to 20 minutes.  She does not note any triggers or relieving factors.  A basic metabolic panel was performed 2 days ago and shows normal electrolytes with sodium 139 potassium 4.6 and her creatinine was 0.96 improved from previous 1.06.  On exam she has clear but diminished lung fields and no peripheral edema.    Summary    1.HFrEF-recurrent decompensation of systolic congestive heart failure by clinical history and exam.  I ordered a chest x-ray to reevaluate for reaccumulation of pleural effusions which were noted on her previous hospitalization.  I also added an NT proBNP to her labs drawn 2 days ago.  I will recommend titration of her lisinopril to 2.5 mg twice daily and the addition of spironolactone 12.5 mg daily.  She will continue to take Lasix at 20 mg daily.  I would like her seen back within 1 week for repeat basic metabolic panel and reassessment of her symptoms.  I am also going to enroll her in core clinic.    2.  Continued chest pains-coronary angiogram did not show obstructive coronary artery disease with only 2 areas of mild stenosis noted.  I suspect possible vasospastic angina.  I have given her a prescription for sublingual nitroglycerin and gone over the potential side effects with her.  My biggest concern is a sudden blood pressure drop with this medication and so I talked to her about this and wanted to stressed the importance that she is sitting or laying down should she need or choose to take sublingual nitroglycerin.    3.  Mitral insufficiency-echocardiogram had suggested more severe mitral regurgitation at 3+ however cardiac MRI both by qualitative and quantitative analysis suggests only mild mitral insufficiency.    Enrollment in core clinic will be important to reduce the risk of rehospitalization with evidence of decompensated heart failure.  Please feel free to contact me with any questions you have regards to her        Today's clinic visit entailed:  Review of external notes as documented elsewhere in note  Review of the result(s) of each unique test - BMP, Cardiac MRI  Ordering of each unique test  Prescription drug management    Provider  Link to [facility] Help Grid     The level of medical decision making during this visit was of high complexity.    Orders Placed This Encounter   Procedures    X-ray Chest 2 vws*    C.O.R.E. Enrollment - FABI    Leadless EKG Monitor 3 to 7 Days     Orders Placed This Encounter   Medications    spironolactone (ALDACTONE) 25 MG tablet     Sig: Take 0.5 tablets (12.5 mg) by mouth daily     Dispense:  30 tablet     Refill:  11    lisinopril (ZESTRIL) 2.5 MG tablet     Sig: Take 1 tablet (2.5 mg) by mouth 2 times daily     Dispense:  60 tablet     Refill:  11    nitroGLYcerin (NITROSTAT) 0.4 MG sublingual tablet     Sig: For chest pain place 1 tablet under the tongue every 5 minutes for 3 doses. If symptoms persist 5 minutes after 1st dose call 911.     Dispense:  30 tablet     Refill:  11     Medications Discontinued During This Encounter   Medication Reason    lisinopril (ZESTRIL) 2.5 MG tablet Reorder (No AVS)         Encounter Diagnoses   Name Primary?    Cardiomyopathy, unspecified type (H) Yes    Nonischemic cardiomyopathy (H)     Heart failure with reduced ejection fraction, NYHA class II (H)     SOB (shortness of breath)     Orthopnea     Precordial pain        CURRENT MEDICATIONS:  Current Outpatient Medications   Medication Sig Dispense Refill    albuterol (ACCUNEB) 0.63 MG/3ML neb solution Take 3 mLs (0.63 mg) by nebulization every 6 hours as needed for shortness of breath, wheezing or cough 90 mL 0    albuterol (PROAIR HFA/PROVENTIL HFA/VENTOLIN HFA) 108 (90 Base) MCG/ACT inhaler Inhale 2 puffs into the lungs every 4 hours as needed for shortness of breath, wheezing or cough 18 g 3    aluminum chloride (DRYSOL) 20 % external solution Apply topically At Bedtime 60 mL 0     amphetamine-dextroamphetamine (ADDERALL XR) 25 MG 24 hr capsule Take 1 capsule (25 mg) by mouth every morning 90 capsule 0    aspirin 81 MG EC tablet Take 1 tablet (81 mg) by mouth daily for 30 days 30 tablet 0    atorvastatin (LIPITOR) 40 MG tablet Take 1 tablet (40 mg) by mouth every evening for 30 days 30 tablet 0    azelastine-fluticasone (DYMISTA) 137-50 MCG/ACT nasal spray Spray 1 spray into both nostrils 2 times daily as needed      budesonide-formoterol (SYMBICORT) 160-4.5 MCG/ACT Inhaler Inhale 2 puffs into the lungs 2 times daily 10.2 g 2    buPROPion (WELLBUTRIN XL) 150 MG 24 hr tablet TAKE 1 TABLET(150 MG) BY MOUTH EVERY MORNING 90 tablet 2    fexofenadine (ALLEGRA) 180 MG tablet Take 180 mg by mouth every other day      fluorouracil (EFUDEX) 5 % external cream APPLY SPARINGLY EXTERNALLY TO FACE EVERY SUNDAY AT NIGHT. AVOID EYES AND LIPS      furosemide (LASIX) 20 MG tablet Take 0.5 tablets (10 mg) by mouth daily for 20 days 10 tablet 0    lisinopril (ZESTRIL) 2.5 MG tablet Take 1 tablet (2.5 mg) by mouth 2 times daily 60 tablet 11    loratadine (CLARITIN) 10 MG tablet Take 10 mg by mouth every other day      LORazepam (ATIVAN) 0.5 MG tablet Take 1 tablet (0.5 mg) by mouth nightly as needed for sleep 15 tablet 0    metoprolol succinate ER (TOPROL XL) 25 MG 24 hr tablet Take 0.5 tablets (12.5 mg) by mouth daily 90 tablet 0    nitroGLYcerin (NITROSTAT) 0.4 MG sublingual tablet For chest pain place 1 tablet under the tongue every 5 minutes for 3 doses. If symptoms persist 5 minutes after 1st dose call 911. 30 tablet 11    spironolactone (ALDACTONE) 25 MG tablet Take 0.5 tablets (12.5 mg) by mouth daily 30 tablet 11    timolol maleate (TIMOPTIC) 0.5 % ophthalmic solution Apply 1 drop topically nightly as needed (dry skin cracks) Apply 1 drop to lesion every night at bedtime. Do Not use on normal skin.      budesonide (PULMICORT) 0.5 MG/2ML neb solution Spray 0.5 mg in nostril 2 times daily Uses as nasal  rinse twice daily. (Patient not taking: Reported on 11/8/2023)         ALLERGIES     Allergies   Allergen Reactions    Codeine Itching    Concerta [Methylphenidate] Itching    Hay Fever & [A.R.M.]        PAST MEDICAL HISTORY:  Past Medical History:   Diagnosis Date    Mumps     NO ACTIVE PROBLEMS        PAST SURGICAL HISTORY:  Past Surgical History:   Procedure Laterality Date    COLONOSCOPY  8/23/2016    Dr. Shaw [facility]    COLONOSCOPY N/A 8/23/2016    Procedure: COLONOSCOPY;  Surgeon: Peter Shaw MD;  Location:  GI    COLONOSCOPY N/A 10/19/2022    Procedure: COLONOSCOPY with polypectomy using cold exacto snare;  Surgeon: Peter Shaw MD;  Location:  GI    CV CORONARY ANGIOGRAM N/A 10/27/2023    Procedure: Coronary Angiogram;  Surgeon: Elmer Andre MD;  Location:  HEART CARDIAC CATH LAB    FINGER SURGERY      right little finger joint replacement    HYSTERECTOMY VAGINAL  age 30    retained ovaries. no cervix     HYSTERECTOMY, PAP NO LONGER INDICATED      ORTHOPEDIC SURGERY      SURGICAL HISTORY OF -   10/25/13    left index cyst removal    SURGICAL HISTORY OF -       Bilat knee miniscus repair       FAMILY HISTORY:  Family History   Problem Relation Age of Onset    No Known Problems Mother     Hypertension Father     Cancer Father     CABG Father     No Known Problems Sister     Asthma Brother     Hypertension Brother     Cancer Maternal Grandmother     Cancer Maternal Grandfather     Colon Cancer Paternal Grandmother     Cancer Paternal Grandfather     Thyroid Disease Daughter     No Known Problems Son     No Known Problems Son     Colon Cancer Paternal Aunt     Colon Cancer Cousin        SOCIAL HISTORY:  Social History     Socioeconomic History    Marital status:      Spouse name: None    Number of children: None    Years of education: None    Highest education level: None   Tobacco Use    Smoking status: Former     Packs/day: 0     Types: Cigarettes     Passive exposure: Never     Smokeless tobacco: Never   Vaping Use    Vaping Use: Never used   Substance and Sexual Activity    Alcohol use: Yes     Alcohol/week: 0.0 standard drinks of alcohol     Comment: avg:3-4 drink/wk    Drug use: No    Sexual activity: Yes     Partners: Male     Birth control/protection: Female Surgical   Other Topics Concern    Parent/sibling w/ CABG, MI or angioplasty before 65F 55M? No   Social History Narrative    [place] in [place]     Social Determinants of Health     Financial Resource Strain: Low Risk  (10/30/2023)    Financial Resource Strain     Within the past 12 months, have you or your family members you live with been unable to get utilities (heat, electricity) when it was really needed?: No   Food Insecurity: Low Risk  (10/30/2023)    Food Insecurity     Within the past 12 months, did you worry that your food would run out before you got money to buy more?: No     Within the past 12 months, did the food you bought just not last and you didn t have money to get more?: No   Transportation Needs: Low Risk  (10/30/2023)    Transportation Needs     Within the past 12 months, has lack of transportation kept you from medical appointments, getting your medicines, non-medical meetings or appointments, work, or from getting things that you need?: No   Interpersonal Safety: Low Risk  (11/2/2023)    Interpersonal Safety     Do you feel physically and emotionally safe where you currently live?: Yes     Within the past 12 months, have you been hit, slapped, kicked or otherwise physically hurt by someone?: No     Within the past 12 months, have you been humiliated or emotionally abused in other ways by your partner or ex-partner?: No   Housing Stability: Low Risk  (10/30/2023)    Housing Stability     Do you have housing? : Yes     Are you worried about losing your housing?: No       Review of Systems:  Skin:        Eyes:       ENT:       Respiratory:  Positive for shortness of breath;dyspnea on  "exertion  Cardiovascular:  Negative;palpitations;syncope or near-syncope;cyanosis;dizziness;lightheadedness;edema chest pain;Positive for;fatigue  Gastroenterology:      Genitourinary:       Musculoskeletal:       Neurologic:       Psychiatric:       Heme/Lymph/Imm:       Endocrine:  Negative      Physical Exam:  Vitals: /77   Pulse 74   Ht 1.676 m (5' 6\")   Wt 53 kg (116 lb 12.8 oz)   LMP  (LMP Unknown)   SpO2 96%   BMI 18.85 kg/m      Constitutional:           Skin:             Head:           Eyes:           Lymph:      ENT:           Neck:           Respiratory:            Cardiac:                                                           GI:           Extremities and Muscular Skeletal:                 Neurological:           Psych:           Recent Lab Results:  LIPID RESULTS:  Lab Results   Component Value Date    CHOL 201 (H) 10/26/2023    CHOL 231 (H) 08/18/2020    HDL 76 10/26/2023    HDL 72 08/18/2020     (H) 10/26/2023     (H) 08/18/2020    TRIG 70 10/26/2023    TRIG 110 08/18/2020    CHOLHDLRATIO 2.3 09/03/2014       LIVER ENZYME RESULTS:  Lab Results   Component Value Date    AST 20 08/18/2022    AST 23 08/18/2020    ALT 20 08/18/2022    ALT 19 08/18/2020       CBC RESULTS:  Lab Results   Component Value Date    WBC 6.4 10/26/2023    WBC 5.8 10/16/2019    RBC 3.93 10/26/2023    RBC 4.17 10/16/2019    HGB 11.7 10/26/2023    HGB 12.5 10/16/2019    HCT 36.5 10/26/2023    HCT 38.2 10/16/2019    MCV 93 10/26/2023    MCV 92 10/16/2019    MCH 29.8 10/26/2023    MCH 30.0 10/16/2019    MCHC 32.1 10/26/2023    MCHC 32.7 10/16/2019    RDW 13.3 10/26/2023    RDW 12.7 10/16/2019     10/29/2023     10/16/2019       BMP RESULTS:  Lab Results   Component Value Date     11/06/2023     08/18/2020    POTASSIUM 4.6 11/06/2023    POTASSIUM 4.5 08/18/2022    POTASSIUM 4.1 08/18/2020    CHLORIDE 102 11/06/2023    CHLORIDE 103 08/18/2022    CHLORIDE 106 08/18/2020    CO2 27 " 11/06/2023    CO2 28 08/18/2022    CO2 27 08/18/2020    ANIONGAP 10 11/06/2023    ANIONGAP 4 08/18/2022    ANIONGAP 8 08/18/2020    GLC 83 11/06/2023    GLC 91 08/18/2022    GLC 82 08/18/2020    BUN 14.0 11/06/2023    BUN 14 08/18/2022    BUN 11 08/18/2020    CR 0.96 (H) 11/06/2023    CR 0.80 08/18/2020    GFRESTIMATED 64 11/06/2023    GFRESTIMATED 77 08/18/2020    GFRESTBLACK 89 08/18/2020    MERRILL 9.5 11/06/2023    MERRILL 8.8 08/18/2020        A1C RESULTS:  Lab Results   Component Value Date    A1C 5.6 12/04/2017       INR RESULTS:  Lab Results   Component Value Date    INR 1.05 01/23/2014           CC  Referred Self,     Thank you for allowing me to participate in the care of your patient.      Sincerely,     Yanira Tinoco DO     [facility]

## 2023-11-09 DIAGNOSIS — I42.8 NONISCHEMIC CARDIOMYOPATHY (H): ICD-10-CM

## 2023-11-09 DIAGNOSIS — R06.01 ORTHOPNEA: ICD-10-CM

## 2023-11-09 DIAGNOSIS — I42.9 CARDIOMYOPATHY, UNSPECIFIED TYPE (H): ICD-10-CM

## 2023-11-09 DIAGNOSIS — R06.02 SOB (SHORTNESS OF BREATH): ICD-10-CM

## 2023-11-09 DIAGNOSIS — I50.20 HEART FAILURE WITH REDUCED EJECTION FRACTION, NYHA CLASS II (H): ICD-10-CM

## 2023-11-09 RX ORDER — SPIRONOLACTONE 25 MG/1
12.5 TABLET ORAL DAILY
Qty: 45 TABLET | Refills: 4 | Status: SHIPPED | OUTPATIENT
Start: 2023-11-09

## 2023-11-13 ENCOUNTER — HOSPITAL ENCOUNTER (OUTPATIENT)
Dept: CARDIOLOGY | Facility: CLINIC | Age: 68
Discharge: HOME | End: 2023-11-13
Attending: INTERNAL MEDICINE | Admitting: INTERNAL MEDICINE
Payer: MEDICARE

## 2023-11-13 ENCOUNTER — LAB (OUTPATIENT)
Dept: LAB | Facility: CLINIC | Age: 68
End: 2023-11-13
Payer: MEDICARE

## 2023-11-13 DIAGNOSIS — I50.9 CONGESTIVE HEART FAILURE, UNSPECIFIED HF CHRONICITY, UNSPECIFIED HEART FAILURE TYPE (H): ICD-10-CM

## 2023-11-13 DIAGNOSIS — R06.02 SOB (SHORTNESS OF BREATH): ICD-10-CM

## 2023-11-13 DIAGNOSIS — R79.89 ELEVATED SERUM CREATININE: ICD-10-CM

## 2023-11-13 DIAGNOSIS — I50.20 HEART FAILURE WITH REDUCED EJECTION FRACTION, NYHA CLASS II (H): ICD-10-CM

## 2023-11-13 DIAGNOSIS — R06.01 ORTHOPNEA: ICD-10-CM

## 2023-11-13 DIAGNOSIS — I42.9 CARDIOMYOPATHY, UNSPECIFIED TYPE (H): ICD-10-CM

## 2023-11-13 DIAGNOSIS — I42.8 NONISCHEMIC CARDIOMYOPATHY (H): ICD-10-CM

## 2023-11-13 LAB
ANION GAP SERPL CALCULATED.3IONS-SCNC: 11 MMOL/L (ref 7–15)
BUN SERPL-MCNC: 24.6 MG/DL (ref 8–23)
CALCIUM SERPL-MCNC: 9.8 MG/DL (ref 8.8–10.2)
CHLORIDE SERPL-SCNC: 101 MMOL/L (ref 98–107)
CREAT SERPL-MCNC: 1.34 MG/DL (ref 0.51–0.95)
DEPRECATED HCO3 PLAS-SCNC: 27 MMOL/L (ref 22–29)
EGFRCR SERPLBLD CKD-EPI 2021: 43 ML/MIN/1.73M2
GLUCOSE SERPL-MCNC: 102 MG/DL (ref 70–99)
POTASSIUM SERPL-SCNC: 4.4 MMOL/L (ref 3.4–5.3)
SODIUM SERPL-SCNC: 139 MMOL/L (ref 135–145)

## 2023-11-13 PROCEDURE — 93244 EXT ECG>48HR<7D REV&INTERPJ: CPT | Performed by: INTERNAL MEDICINE

## 2023-11-13 PROCEDURE — 36415 COLL VENOUS BLD VENIPUNCTURE: CPT

## 2023-11-13 PROCEDURE — 80048 BASIC METABOLIC PNL TOTAL CA: CPT

## 2023-11-13 PROCEDURE — 93242 EXT ECG>48HR<7D RECORDING: CPT

## 2023-11-16 ENCOUNTER — LAB (OUTPATIENT)
Dept: LAB | Facility: CLINIC | Age: 68
End: 2023-11-16
Payer: MEDICARE

## 2023-11-16 DIAGNOSIS — R79.89 ELEVATED SERUM CREATININE: ICD-10-CM

## 2023-11-16 LAB
ANION GAP SERPL CALCULATED.3IONS-SCNC: 9 MMOL/L (ref 7–15)
BUN SERPL-MCNC: 21.9 MG/DL (ref 8–23)
CALCIUM SERPL-MCNC: 9.5 MG/DL (ref 8.8–10.2)
CHLORIDE SERPL-SCNC: 102 MMOL/L (ref 98–107)
CREAT SERPL-MCNC: 1.01 MG/DL (ref 0.51–0.95)
DEPRECATED HCO3 PLAS-SCNC: 27 MMOL/L (ref 22–29)
EGFRCR SERPLBLD CKD-EPI 2021: 60 ML/MIN/1.73M2
GLUCOSE SERPL-MCNC: 87 MG/DL (ref 70–99)
POTASSIUM SERPL-SCNC: 4.3 MMOL/L (ref 3.4–5.3)
SODIUM SERPL-SCNC: 138 MMOL/L (ref 135–145)

## 2023-11-16 PROCEDURE — 36415 COLL VENOUS BLD VENIPUNCTURE: CPT

## 2023-11-16 PROCEDURE — 80048 BASIC METABOLIC PNL TOTAL CA: CPT

## 2023-11-19 ENCOUNTER — MYC MEDICAL ADVICE (OUTPATIENT)
Dept: FAMILY MEDICINE | Facility: CLINIC | Age: 68
End: 2023-11-19
Payer: MEDICARE

## 2023-11-19 ENCOUNTER — MYC REFILL (OUTPATIENT)
Dept: CARDIOLOGY | Facility: CLINIC | Age: 68
End: 2023-11-19
Payer: MEDICARE

## 2023-11-19 ENCOUNTER — MYC REFILL (OUTPATIENT)
Dept: FAMILY MEDICINE | Facility: CLINIC | Age: 68
End: 2023-11-19
Payer: MEDICARE

## 2023-11-19 DIAGNOSIS — I21.4 NSTEMI (NON-ST ELEVATED MYOCARDIAL INFARCTION) (H): ICD-10-CM

## 2023-11-19 DIAGNOSIS — F41.1 GENERALIZED ANXIETY DISORDER: ICD-10-CM

## 2023-11-19 DIAGNOSIS — I42.9 CARDIOMYOPATHY, UNSPECIFIED TYPE (H): ICD-10-CM

## 2023-11-19 DIAGNOSIS — I42.8 NONISCHEMIC CARDIOMYOPATHY (H): ICD-10-CM

## 2023-11-19 DIAGNOSIS — F51.01 PRIMARY INSOMNIA: ICD-10-CM

## 2023-11-19 DIAGNOSIS — R06.02 SOB (SHORTNESS OF BREATH): ICD-10-CM

## 2023-11-19 DIAGNOSIS — R06.01 ORTHOPNEA: ICD-10-CM

## 2023-11-19 DIAGNOSIS — R61 GENERALIZED HYPERHIDROSIS: ICD-10-CM

## 2023-11-19 DIAGNOSIS — I50.20 HEART FAILURE WITH REDUCED EJECTION FRACTION, NYHA CLASS II (H): ICD-10-CM

## 2023-11-19 RX ORDER — LISINOPRIL 2.5 MG/1
2.5 TABLET ORAL 2 TIMES DAILY
Qty: 60 TABLET | Refills: 11 | Status: CANCELLED | OUTPATIENT
Start: 2023-11-19

## 2023-11-19 RX ORDER — SPIRONOLACTONE 25 MG/1
12.5 TABLET ORAL DAILY
Qty: 45 TABLET | Refills: 4 | Status: CANCELLED | OUTPATIENT
Start: 2023-11-19

## 2023-11-19 RX ORDER — BUDESONIDE 0.5 MG/2ML
0.5 INHALANT ORAL 2 TIMES DAILY
Status: CANCELLED | OUTPATIENT
Start: 2023-11-19

## 2023-11-20 RX ORDER — ALUMINUM CHLORIDE 20 %
SOLUTION, NON-ORAL TOPICAL AT BEDTIME
Qty: 60 ML | Refills: 0 | Status: SHIPPED | OUTPATIENT
Start: 2023-11-20

## 2023-11-20 RX ORDER — LORAZEPAM 0.5 MG/1
0.5 TABLET ORAL
Qty: 10 TABLET | Refills: 0 | Status: SHIPPED | OUTPATIENT
Start: 2023-11-20 | End: 2024-04-02

## 2023-11-20 RX ORDER — BUPROPION HYDROCHLORIDE 150 MG/1
TABLET ORAL
Qty: 90 TABLET | Refills: 2 | Status: SHIPPED | OUTPATIENT
Start: 2023-11-20 | End: 2024-08-02

## 2023-11-20 RX ORDER — METOPROLOL SUCCINATE 25 MG/1
12.5 TABLET, EXTENDED RELEASE ORAL DAILY
Qty: 90 TABLET | Refills: 0 | Status: SHIPPED | OUTPATIENT
Start: 2023-11-20 | End: 2023-12-08

## 2023-11-20 NOTE — TELEPHONE ENCOUNTER
- Awaiting confirmation from patient if there is any other medications that she is requesting.    - Pended Asa below from [employer] message.    Juan M Ziegler RN on 11/20/2023 at 12:44 PM

## 2023-11-20 NOTE — TELEPHONE ENCOUNTER
Medications filled at last OV 11/8/23 for one year supply.  Love Dunlap RN on 11/20/2023 at 12:29 PM

## 2023-11-22 ENCOUNTER — LAB (OUTPATIENT)
Dept: LAB | Facility: CLINIC | Age: 68
End: 2023-11-22
Payer: MEDICARE

## 2023-11-22 ENCOUNTER — NURSE TRIAGE (OUTPATIENT)
Dept: FAMILY MEDICINE | Facility: CLINIC | Age: 68
End: 2023-11-22

## 2023-11-22 DIAGNOSIS — R79.89 ELEVATED SERUM CREATININE: ICD-10-CM

## 2023-11-22 PROCEDURE — 36415 COLL VENOUS BLD VENIPUNCTURE: CPT

## 2023-11-22 PROCEDURE — 80048 BASIC METABOLIC PNL TOTAL CA: CPT

## 2023-11-22 NOTE — TELEPHONE ENCOUNTER
Triaged pt. Pt states that she had difficulty breathing the last 2 nights. She would like PCP to listen to her lungs. Currently, pt denies having any SOB, difficulty breathing, chest pain or any other other symptoms. Pt states it only happens at night when she lie down in her bed. Pt states that she had this happened few weeks ago and was admitted to the hospital. Reports that the SOB came back 2 nights ago since she left the hospital.   Advised pt to go to the  to have her lungs checked.   Reviewed care advice under care tab with pt.  Instructed pt to call back if new or worsening symptoms.   Patient was given an opportunity to ask questions, verbalized understanding of plan, and is agreeable.   Leah Link RN          Reason for Disposition   Patient wants to be seen    Additional Information   Negative: SEVERE difficulty breathing (e.g., struggling for each breath, speaks in single words, pulse > 120)   Negative: Breathing stopped and hasn't returned   Negative: Choking on something   Negative: Bluish (or gray) lips or face   Negative: Difficult to awaken or acting confused (e.g., disoriented, slurred speech)   Negative: Passed out (i.e., fainted, collapsed and was not responding)   Negative: Wheezing started suddenly after medicine, an allergic food, or bee sting   Negative: Stridor (harsh sound while breathing in)   Negative: Slow, shallow and weak breathing   Negative: Sounds like a life-threatening emergency to the triager   Negative: Chest pain   Negative: Wheezing (high pitched whistling sound) and previous asthma attacks or use of asthma medicines   Negative: Difficulty breathing and within 14 days of COVID-19 Exposure   Negative: Difficulty breathing and only present when coughing   Negative: Difficulty breathing and only from stuffy nose   Negative: Difficulty breathing and only from stuffy nose or runny nose from common cold   Negative: MODERATE difficulty breathing (e.g., speaks in phrases, SOB  "even at rest, pulse 100-120) of new-onset or worse than normal   Negative: Oxygen level (e.g., pulse oximetry) 90% or lower   Negative: Wheezing can be heard across the room   Negative: Drooling or spitting out saliva (because can't swallow)   Negative: Any history of prior \"blood clot\" in leg or lungs   Negative: Illness requiring prolonged bedrest in past month (e.g., immobilization, long hospital stay)   Negative: Hip or leg fracture (broken bone) in past month (or had cast on leg or ankle in past month)   Negative: Major surgery in the past month   Negative: Long-distance travel in past month (e.g., car, bus, train, plane; with trip lasting 6 or more hours)   Negative: Cancer treatment in past six months (or has cancer now)   Negative: Extra heartbeats, irregular heart beating, or heart is beating very fast (i.e., 'palpitations')   Negative: Fever > 103 F (39.4 C)   Negative: Fever > 101 F (38.3 C) and over 60 years of age   Negative: Fever > 100.0 F (37.8 C) and bedridden (e.g., nursing home patient, stroke, chronic illness, recovering from surgery)   Negative: Fever > 100.0 F (37.8 C) and diabetes mellitus or weak immune system (e.g., HIV positive, cancer chemo, splenectomy, organ transplant, chronic steroids)   Negative: Periods where breathing stops and then resumes normally and bedridden (e.g., nursing home patient, CVA)   Negative: Pregnant or postpartum (from 0 to 6 weeks after delivery)   Negative: Patient sounds very sick or weak to the triager    Answer Assessment - Initial Assessment Questions  1. RESPIRATORY STATUS: \"Describe your breathing?\" (e.g., wheezing, shortness of breath, unable to speak, severe coughing)       Pt states she has been having difficulty breathing when lying down at night.  2. ONSET: \"When did this breathing problem begin?\"       2 nights ago  3. PATTERN \"Does the difficult breathing come and go, or has it been constant since it started?\"       In bed and sitting in chair.   4. " "SEVERITY: \"How bad is your breathing?\" (e.g., mild, moderate, severe)     - MILD: No SOB at rest, mild SOB with walking, speaks normally in sentences, can lie down, no retractions, pulse < 100.     - MODERATE: SOB at rest, SOB with minimal exertion and prefers to sit, cannot lie down flat, speaks in phrases, mild retractions, audible wheezing, pulse 100-120.     - SEVERE: Very SOB at rest, speaks in single words, struggling to breathe, sitting hunched forward, retractions, pulse > 120       Walked into living room, was able to walk to the living room.   5. RECURRENT SYMPTOM: \"Have you had difficulty breathing before?\" If Yes, ask: \"When was the last time?\" and \"What happened that time?\"       It happened about 3 weeks ago and went to ER.   6. CARDIAC HISTORY: \"Do you have any history of heart disease?\" (e.g., heart attack, angina, bypass surgery, angioplasty)       no  7. LUNG HISTORY: \"Do you have any history of lung disease?\"  (e.g., pulmonary embolus, asthma, emphysema)      no  8. CAUSE: \"What do you think is causing the breathing problem?\"       unsure  9. OTHER SYMPTOMS: \"Do you have any other symptoms? (e.g., dizziness, runny nose, cough, chest pain, fever)      No, just the breathing  10. O2 SATURATION MONITOR:  \"Do you use an oxygen saturation monitor (pulse oximeter) at home?\" If Yes, ask: \"What is your reading (oxygen level) today?\" \"What is your usual oxygen saturation reading?\" (e.g., 95%)        no  11. PREGNANCY: \"Is there any chance you are pregnant?\" \"When was your last menstrual period?\"        N/A  12. TRAVEL: \"Have you traveled out of the country in the last month?\" (e.g., travel history, exposures)        No    Protocols used: Breathing Difficulty-A-[place]    "

## 2023-11-23 LAB
ANION GAP SERPL CALCULATED.3IONS-SCNC: 11 MMOL/L (ref 7–15)
BUN SERPL-MCNC: 22.6 MG/DL (ref 8–23)
CALCIUM SERPL-MCNC: 9.7 MG/DL (ref 8.8–10.2)
CHLORIDE SERPL-SCNC: 102 MMOL/L (ref 98–107)
CREAT SERPL-MCNC: 1.17 MG/DL (ref 0.51–0.95)
DEPRECATED HCO3 PLAS-SCNC: 29 MMOL/L (ref 22–29)
EGFRCR SERPLBLD CKD-EPI 2021: 50 ML/MIN/1.73M2
GLUCOSE SERPL-MCNC: 70 MG/DL (ref 70–99)
POTASSIUM SERPL-SCNC: 4.1 MMOL/L (ref 3.4–5.3)
SODIUM SERPL-SCNC: 142 MMOL/L (ref 135–145)

## 2023-11-24 ENCOUNTER — TELEPHONE (OUTPATIENT)
Dept: FAMILY MEDICINE | Facility: CLINIC | Age: 68
End: 2023-11-24
Payer: MEDICARE

## 2023-11-24 DIAGNOSIS — I42.9 CARDIOMYOPATHY, UNSPECIFIED TYPE (H): ICD-10-CM

## 2023-11-24 RX ORDER — FUROSEMIDE 20 MG
10 TABLET ORAL DAILY
Qty: 45 TABLET | Refills: 0 | Status: SHIPPED | OUTPATIENT
Start: 2023-11-24 | End: 2023-12-08

## 2023-11-24 RX ORDER — ATORVASTATIN CALCIUM 40 MG/1
40 TABLET, FILM COATED ORAL EVERY EVENING
Qty: 90 TABLET | Refills: 0 | Status: SHIPPED | OUTPATIENT
Start: 2023-11-24 | End: 2023-12-08

## 2023-11-24 NOTE — TELEPHONE ENCOUNTER
- Huddled with POD (Alfredo Proctor PAC), advised to call patient to verify the Lasix dose as cardiology note from 11/8 states 20 mg, vs the prescribed 10 mg. Advised RN if patient has been taking 10 mg of Lasix, RN to sign script with the lipitor as well.     - called and spoke with patient, states that she has been on the 10 mg of lasix for some time now. Has appointment coming up with Cards on 12/8/2023, can discuss medications at this appointment for ongoing scripts.     - RN signed scripts per direction from POD.    Juan M Ziegler RN on 11/24/2023 at 4:24 PM

## 2023-11-24 NOTE — TELEPHONE ENCOUNTER
Pt calling to check on status of refill request for furosemide and atorvastatin. Both meds last prescribed by inpatient provider from pt's hospitalization from 10/25/23 to 10/29/23. Pt has had follow up appointments with both PCP and cardiology since then. Pt has 1 dose of furosemide for tomorrow and 2 days left of atorvastatin.    Meds and pharmacy t'd up.    Pt requests a call back to inform.    Routing high priority to triage pool. Please bring to POD as PCP is ooo.    Lily ALMAGUER RN

## 2023-11-24 NOTE — TELEPHONE ENCOUNTER
Patient is out of medications. Prefers  pharmacy, but okay to send to [facility]'s over weekend.

## 2023-11-27 RX ORDER — LISINOPRIL 2.5 MG/1
2.5 TABLET ORAL 2 TIMES DAILY
Qty: 60 TABLET | Refills: 0 | OUTPATIENT
Start: 2023-11-27

## 2023-11-27 RX ORDER — FUROSEMIDE 20 MG
10 TABLET ORAL DAILY
Qty: 15 TABLET | Refills: 1 | Status: SHIPPED | OUTPATIENT
Start: 2023-11-27 | End: 2023-12-08

## 2023-11-27 RX ORDER — ATORVASTATIN CALCIUM 40 MG/1
40 TABLET, FILM COATED ORAL EVERY EVENING
Qty: 90 TABLET | Refills: 0 | Status: SHIPPED | OUTPATIENT
Start: 2023-11-27 | End: 2023-12-08

## 2023-11-27 RX ORDER — ASPIRIN 81 MG/1
81 TABLET ORAL DAILY
Qty: 30 TABLET | Refills: 0 | Status: SHIPPED | OUTPATIENT
Start: 2023-11-27 | End: 2023-12-08

## 2023-11-27 NOTE — TELEPHONE ENCOUNTER
Refilled meds.  Can we reach out to her and see how she is doing.  If she is still having symptoms I could try to see her tomorrow morning.  LITO

## 2023-11-27 NOTE — TELEPHONE ENCOUNTER
Patient should already have refills on file at [facility]. Is patient requesting alternative pharmacy?     Parish FOREMAN RN 11/27/2023 at 10:54 AM

## 2023-11-28 NOTE — TELEPHONE ENCOUNTER
Called the pt and she said she is doing better.  She does not think she needs to be seen today.  She says she is doing ok.  She has been fine since last week when she called.      Advised to continue to monitor then if worse or new symptoms, call to be seen.

## 2023-12-04 ENCOUNTER — TELEPHONE (OUTPATIENT)
Dept: CARDIOLOGY | Facility: CLINIC | Age: 68
End: 2023-12-04
Payer: MEDICARE

## 2023-12-04 DIAGNOSIS — I50.20 HEART FAILURE WITH REDUCED EJECTION FRACTION, NYHA CLASS II (H): Primary | ICD-10-CM

## 2023-12-04 NOTE — TELEPHONE ENCOUNTER
Pt is scheduled for a [facility] Clinic appt on 12/4/23 [facility].      Pt with a history of systolic heart failure..  Medication list reviewed and pt is not currently on Entresto, Jardiance, Farxiga, and Invokana.    Please initiate coverage check for the above medications.  Thank you!  TY Cornejo([facility]) 12/4/23      Corinna Proctor Tonya, CMA  Caller: Unspecified (Today,  9:05 AM)  Hi there,    Entresto is the only medication covered $323 for 30 days supply- claim does not tell me if there is a deductible needing to be met just the cost.    Jardiance, Farxiga and Invokana all require a prior auth for the rest of this year anyway, something may change with the formulary 2024.    Thank you for allowing me to help with your patient  Corinna Proctor [facility]  Pharmacy Discharge Liaison [facility]

## 2023-12-04 NOTE — PROGRESS NOTES
[facility] C.O.R.E [facility]    Orders added for BMP, NT pro BNP, and Hemoglobin prior to CORE Enrollment OV with Kat Huber PA-C on 12/8/23.    Tena Garcia RN, BSN  [place], [place]  C.O.R.E. Clinic Care Coordinator  December 4, 2023 3:38 PM    Future Appointments   Date Time Provider Department Center   12/8/2023  9:00 AM [facility]   2/16/2024 12:00 PM  [facility]   2/16/2024  1:00 PM Britni Escobar MD [facility]

## 2023-12-05 ENCOUNTER — LAB (OUTPATIENT)
Dept: LAB | Facility: CLINIC | Age: 68
End: 2023-12-05
Payer: MEDICARE

## 2023-12-05 DIAGNOSIS — I50.20 HEART FAILURE WITH REDUCED EJECTION FRACTION, NYHA CLASS II (H): ICD-10-CM

## 2023-12-05 LAB
ANION GAP SERPL CALCULATED.3IONS-SCNC: 9 MMOL/L (ref 7–15)
BUN SERPL-MCNC: 22 MG/DL (ref 8–23)
CALCIUM SERPL-MCNC: 9.1 MG/DL (ref 8.8–10.2)
CHLORIDE SERPL-SCNC: 100 MMOL/L (ref 98–107)
CREAT SERPL-MCNC: 1.1 MG/DL (ref 0.51–0.95)
DEPRECATED HCO3 PLAS-SCNC: 29 MMOL/L (ref 22–29)
EGFRCR SERPLBLD CKD-EPI 2021: 54 ML/MIN/1.73M2
GLUCOSE SERPL-MCNC: 92 MG/DL (ref 70–99)
HGB BLD-MCNC: 13 G/DL (ref 11.7–15.7)
NT-PROBNP SERPL-MCNC: 1745 PG/ML (ref 0–900)
POTASSIUM SERPL-SCNC: 4.2 MMOL/L (ref 3.4–5.3)
SODIUM SERPL-SCNC: 138 MMOL/L (ref 135–145)

## 2023-12-05 PROCEDURE — 85018 HEMOGLOBIN: CPT | Performed by: INTERNAL MEDICINE

## 2023-12-05 PROCEDURE — 83880 ASSAY OF NATRIURETIC PEPTIDE: CPT | Performed by: INTERNAL MEDICINE

## 2023-12-05 PROCEDURE — 80048 BASIC METABOLIC PNL TOTAL CA: CPT | Performed by: INTERNAL MEDICINE

## 2023-12-05 PROCEDURE — 36415 COLL VENOUS BLD VENIPUNCTURE: CPT | Performed by: INTERNAL MEDICINE

## 2023-12-08 ENCOUNTER — PATIENT OUTREACH (OUTPATIENT)
Dept: CARDIOLOGY | Facility: CLINIC | Age: 68
End: 2023-12-08

## 2023-12-08 ENCOUNTER — OFFICE VISIT (OUTPATIENT)
Dept: CARDIOLOGY | Facility: CLINIC | Age: 68
End: 2023-12-08
Attending: INTERNAL MEDICINE
Payer: MEDICARE

## 2023-12-08 VITALS
BODY MASS INDEX: 19.04 KG/M2 | HEIGHT: 66 IN | SYSTOLIC BLOOD PRESSURE: 103 MMHG | OXYGEN SATURATION: 99 % | HEART RATE: 73 BPM | WEIGHT: 118.5 LBS | DIASTOLIC BLOOD PRESSURE: 62 MMHG

## 2023-12-08 DIAGNOSIS — I21.4 NSTEMI (NON-ST ELEVATED MYOCARDIAL INFARCTION) (H): ICD-10-CM

## 2023-12-08 DIAGNOSIS — I42.8 NONISCHEMIC CARDIOMYOPATHY (H): ICD-10-CM

## 2023-12-08 DIAGNOSIS — I42.9 CARDIOMYOPATHY, UNSPECIFIED TYPE (H): ICD-10-CM

## 2023-12-08 DIAGNOSIS — I50.20 HEART FAILURE WITH REDUCED EJECTION FRACTION, NYHA CLASS II (H): ICD-10-CM

## 2023-12-08 DIAGNOSIS — R06.02 SOB (SHORTNESS OF BREATH): ICD-10-CM

## 2023-12-08 DIAGNOSIS — R06.01 ORTHOPNEA: ICD-10-CM

## 2023-12-08 PROCEDURE — 99215 OFFICE O/P EST HI 40 MIN: CPT | Performed by: PHYSICIAN ASSISTANT

## 2023-12-08 RX ORDER — CARVEDILOL 3.12 MG/1
3.12 TABLET ORAL 2 TIMES DAILY WITH MEALS
Qty: 180 TABLET | Refills: 3 | Status: SHIPPED | OUTPATIENT
Start: 2023-12-08 | End: 2024-08-22 | Stop reason: SINTOL

## 2023-12-08 RX ORDER — FUROSEMIDE 20 MG
10 TABLET ORAL DAILY
Qty: 45 TABLET | Refills: 3 | Status: SHIPPED | OUTPATIENT
Start: 2023-12-08 | End: 2024-04-02

## 2023-12-08 RX ORDER — METOPROLOL SUCCINATE 25 MG/1
12.5 TABLET, EXTENDED RELEASE ORAL AT BEDTIME
COMMUNITY
Start: 2023-12-08 | End: 2023-12-08

## 2023-12-08 RX ORDER — LISINOPRIL 2.5 MG/1
2.5 TABLET ORAL 2 TIMES DAILY
Qty: 180 TABLET | Refills: 3 | Status: SHIPPED | OUTPATIENT
Start: 2023-12-08 | End: 2024-02-27

## 2023-12-08 RX ORDER — ASPIRIN 81 MG/1
81 TABLET ORAL DAILY
Qty: 90 TABLET | Refills: 3 | Status: SHIPPED | OUTPATIENT
Start: 2023-12-08

## 2023-12-08 RX ORDER — ATORVASTATIN CALCIUM 40 MG/1
40 TABLET, FILM COATED ORAL EVERY EVENING
Qty: 90 TABLET | Refills: 3 | Status: SHIPPED | OUTPATIENT
Start: 2023-12-08 | End: 2024-08-22

## 2023-12-08 NOTE — LETTER
12/8/2023    Gail Toñito ChungZak, APRN CNP  [address] [place] [address]    RE: Mar Zeng       Dear Colleague,     I had the pleasure of seeing Mar Zeng in the [facility].    Cardiology C.O.R.E (heart failure specialty) Clinic Progress Note    Mar Zeng MRN# [id number]   YOB: 1955 Age: 68 year old     Primary cardiology team: Dr. Tinoco         Assessment and Plan     In summary, Mar Zeng presents today for a CORE clinic enrollment    HFrEF  EF: 32% (11/2023 MRI)  ACC/AHA stage C; FC II   Etiology: Nonischemic - ?LBBB induced  Valves: Mild MR  Volume: Appears well-compensated at 118 lbs clinic (114 lbs home)  GDMT: lisinopril 2.5 mg BID, Toprol XL 12.5 mg daily, tanner 12.5 mg daily  Rhythm: SR  QRS: 132 msec - LBBB  Device: None  Code status: FULL  Creatinine: 1.1  CHF admissions: 10/2023.    Generalized fatigue, likely BB-related.     Marginal BP.   Asymptomatic.     Plan:  Stop Toprol XL 12.5 mg daily, start Coreg 3.125 mg BID, to see if this makes any difference in her fatigue.  Encouraged her to get back into routine low level cardiovascular exercise (walking).   Jardiance/farxiga not covered this year, will check again beginning of 2024. I'd like her to see me again in about a month (if she's back from her travels then).   Repeat TTE in ~2 months. If EF remains low, consider CRT-D. See me again after that.    It was a pleasure meting Mar today!    [facility]         History of Presenting Illness     Mar Zeng is a pleasant 68 year old patient with a pertinent history of the following -   Recent diagnosis of HFrEF, EF 30-35% 10/2023.   She underwent coronary angiography which revealed nonobstructive coronary artery disease maximal lesion estimated between 30 to 40% in the distal left circumflex and trivial disease noted in the LAD with 20% proximal lesion right  "coronary artery did not demonstrate any evidence of stenosis.   MRI c/w NICMP.  cLBBB.  MR. Mild per MRI.   ADD, on adderall (currently held).     Pt saw Dr. Tinoco 11/8 after hospital discharge. She reported progressive dyspnea/orthopnea as well as intermittent chest discomfort concerning for vasospasm. She was given SL NTG. CXR benign. Lisinopril was increased to 2.5 mg BID and tanner 12.5 was added. A 7d zio monitor was also placed, showing one 4-beat run of NSVT, otherwise SR with rare ectopics    Today, Mar returns to clinic stating she feels much better, overall. Her orthopnea and dyspnea have resolved. Her chest pains have improved, and she has not needed to use any nitroglycerin, but also admits she's been trying to take it easy. She would like permission to start exercising, though, because she's very active at baseline. Her greatest concern is of significant generalized fatigue since leaving the hospital, which did get a little better when her BB was cut in half. Patient denies edema, palpitations, near syncope or syncope. No significant ETOH. She has two cousins that she reports passed away from heart failure as infants. Her father had a triple bypass in his late 60's.  She's flying to [place] tomorrow as her daughter in law is having a baby, unclear on how long she's staying.    Recent BMP shows creat 1.1, proBNP 1750, hgb stable.         Review of Systems     12-pt ROS is negative except for as noted in the HPI.          Physical Exam     Vitals: /62 (BP Location: Right arm, Patient Position: Sitting, Cuff Size: Adult Regular)   Pulse 73   Ht 1.676 m (5' 6\")   Wt 53.8 kg (118 lb 8 oz)   LMP  (LMP Unknown)   SpO2 99%   BMI 19.13 kg/m    Wt Readings from Last 10 Encounters:   12/08/23 53.8 kg (118 lb 8 oz)   11/08/23 53 kg (116 lb 12.8 oz)   11/02/23 51.7 kg (114 lb)   10/30/23 52.1 kg (114 lb 14.4 oz)   10/29/23 51.3 kg (113 lb 3.2 oz)   09/29/23 53.5 kg (118 lb)   09/08/23 54.4 kg (120 lb) "   06/05/23 52.8 kg (116 lb 4.8 oz)   11/10/22 53.7 kg (118 lb 6.4 oz)   10/19/22 52.6 kg (116 lb)       Constitutional:  Patient is pleasant, alert, cooperative, and in NAD.  HEENT:  NCAT. PERRLA. EOM's intact.   Neck:  CVP appears normal. No carotid bruits.   Pulmonary: Normal respiratory effort. CTAB.   Cardiac: RRR, normal S1/S2, no S3/S4, no murmur or rub.   Abdomen:  Non-tender abdomen, no hepatosplenomegaly appreciated.   Vascular: Pulses in the upper and lower extremities are 2+ and equal bilaterally.  Extremities: No edema, erythema, cyanosis or tenderness appreciated.  Skin:  No rashes or lesions appreciated.   Neurological:  No gross motor or sensory deficits.   Psych: Appropriate affect.          Data   Labs reviewed:  Recent Labs   Lab Test 12/05/23  0841 10/30/23  0913 10/26/23  0806 09/26/23  1146 08/18/22  1145 08/18/20  0725 09/27/18  1445 12/06/17  1045   LDL  --   --  111*  --  105* 137*  --  118*   HDL  --   --  76  --  95 72  --  91   NHDL  --   --  125  --  119 159*  --  133*   CHOL  --   --  201*  --  214* 231*  --  224*   TRIG  --   --  70  --  70 110  --  76   TSH  --   --   --  0.74  --   --  0.59 0.73   NTBNP 1,745* 2,381*  --   --   --   --   --   --        Lab Results   Component Value Date    WBC 6.4 10/26/2023    WBC 5.8 10/16/2019    RBC 3.93 10/26/2023    RBC 4.17 10/16/2019    HGB 13.0 12/05/2023    HGB 12.5 10/16/2019    HCT 36.5 10/26/2023    HCT 38.2 10/16/2019    MCV 93 10/26/2023    MCV 92 10/16/2019    MCH 29.8 10/26/2023    MCH 30.0 10/16/2019    MCHC 32.1 10/26/2023    MCHC 32.7 10/16/2019    RDW 13.3 10/26/2023    RDW 12.7 10/16/2019     10/29/2023     10/16/2019       Lab Results   Component Value Date     12/05/2023     08/18/2020    POTASSIUM 4.2 12/05/2023    POTASSIUM 4.5 08/18/2022    POTASSIUM 4.1 08/18/2020    CHLORIDE 100 12/05/2023    CHLORIDE 103 08/18/2022    CHLORIDE 106 08/18/2020    CO2 29 12/05/2023    CO2 28 08/18/2022    CO2 27  08/18/2020    ANIONGAP 9 12/05/2023    ANIONGAP 4 08/18/2022    ANIONGAP 8 08/18/2020    GLC 92 12/05/2023    GLC 91 08/18/2022    GLC 82 08/18/2020    BUN 22.0 12/05/2023    BUN 14 08/18/2022    BUN 11 08/18/2020    CR 1.10 (H) 12/05/2023    CR 0.80 08/18/2020    GFRESTIMATED 54 (L) 12/05/2023    GFRESTIMATED 77 08/18/2020    GFRESTBLACK 89 08/18/2020    MERRILL 9.1 12/05/2023    MERRILL 8.8 08/18/2020      Lab Results   Component Value Date    AST 20 08/18/2022    AST 23 08/18/2020    ALT 20 08/18/2022    ALT 19 08/18/2020       Lab Results   Component Value Date    A1C 5.6 12/04/2017       Lab Results   Component Value Date    INR 1.05 01/23/2014            Problem List     Patient Active Problem List   Diagnosis    Multiple pigmented nevi    Advanced directives, counseling/discussion    Seasonal allergic rhinitis    Hematoma of rectus sheath    Generalized anxiety disorder    Attention deficit hyperactivity disorder (ADHD), combined type    Benign neoplasm of colon    Fibromyositis    Hammer toe, acquired    Ingrowing nail    Insomnia    Localized, primary osteoarthritis    Loose body in knee    Macrodactylia of toes    Migraine    Other disorders of bone and cartilage(733.99)    Plantar fascial fibromatosis    Postmenopausal HRT (hormone replacement therapy)    Talipes cavus    Fibromyalgia    Splinter hemorrhage of toenail    Congestive heart failure, unspecified HF chronicity, unspecified heart failure type (H)            Medications     Current Outpatient Medications   Medication Sig Dispense Refill    albuterol (ACCUNEB) 0.63 MG/3ML neb solution Take 3 mLs (0.63 mg) by nebulization every 6 hours as needed for shortness of breath, wheezing or cough 90 mL 0    albuterol (PROAIR HFA/PROVENTIL HFA/VENTOLIN HFA) 108 (90 Base) MCG/ACT inhaler Inhale 2 puffs into the lungs every 4 hours as needed for shortness of breath, wheezing or cough 18 g 3    aluminum chloride (DRYSOL) 20 % external solution Apply topically at  bedtime 60 mL 0    aspirin 81 MG EC tablet Take 1 tablet (81 mg) by mouth daily 30 tablet 0    atorvastatin (LIPITOR) 40 MG tablet Take 1 tablet (40 mg) by mouth every evening 90 tablet 0    azelastine-fluticasone (DYMISTA) 137-50 MCG/ACT nasal spray Spray 1 spray into both nostrils 2 times daily as needed      budesonide (PULMICORT) 0.5 MG/2ML neb solution Spray 0.5 mg in nostril 2 times daily Uses as nasal rinse twice daily.      budesonide-formoterol (SYMBICORT) 160-4.5 MCG/ACT Inhaler Inhale 2 puffs into the lungs 2 times daily 10.2 g 2    buPROPion (WELLBUTRIN XL) 150 MG 24 hr tablet TAKE 1 TABLET(150 MG) BY MOUTH EVERY MORNING 90 tablet 2    fexofenadine (ALLEGRA) 180 MG tablet Take 180 mg by mouth every other day      fluorouracil (EFUDEX) 5 % external cream APPLY SPARINGLY EXTERNALLY TO FACE EVERY SUNDAY AT NIGHT. AVOID EYES AND LIPS      furosemide (LASIX) 20 MG tablet Take 0.5 tablets (10 mg) by mouth daily 15 tablet 1    lisinopril (ZESTRIL) 2.5 MG tablet Take 1 tablet (2.5 mg) by mouth 2 times daily 60 tablet 11    loratadine (CLARITIN) 10 MG tablet Take 10 mg by mouth every other day      LORazepam (ATIVAN) 0.5 MG tablet Take 1 tablet (0.5 mg) by mouth nightly as needed for sleep 10 tablet 0    metoprolol succinate ER (TOPROL XL) 25 MG 24 hr tablet Take 0.5 tablets (12.5 mg) by mouth daily 90 tablet 0    nitroGLYcerin (NITROSTAT) 0.4 MG sublingual tablet For chest pain place 1 tablet under the tongue every 5 minutes for 3 doses. If symptoms persist 5 minutes after 1st dose call 911. 30 tablet 11    spironolactone (ALDACTONE) 25 MG tablet Take 0.5 tablets (12.5 mg) by mouth daily 45 tablet 4    timolol maleate (TIMOPTIC) 0.5 % ophthalmic solution Apply 1 drop topically nightly as needed (dry skin cracks) Apply 1 drop to lesion every night at bedtime. Do Not use on normal skin.      amphetamine-dextroamphetamine (ADDERALL XR) 25 MG 24 hr capsule Take 1 capsule (25 mg) by mouth every morning (Patient not  taking: Reported on 12/8/2023) 90 capsule 0            Past Medical History     Past Medical History:   Diagnosis Date    Mumps     NO ACTIVE PROBLEMS      Past Surgical History:   Procedure Laterality Date    COLONOSCOPY  8/23/2016    Dr. Shaw [facility]    COLONOSCOPY N/A 8/23/2016    Procedure: COLONOSCOPY;  Surgeon: Peter Shaw MD;  Location: [facility]    COLONOSCOPY N/A 10/19/2022    Procedure: COLONOSCOPY with polypectomy using cold exacto snare;  Surgeon: Peter Shaw MD;  Location: [facility]    CV CORONARY ANGIOGRAM N/A 10/27/2023    Procedure: Coronary Angiogram;  Surgeon: Elmer Andre MD;  Location:  [facility]    FINGER SURGERY      right little finger joint replacement    HYSTERECTOMY VAGINAL  age 30    retained ovaries. no cervix     HYSTERECTOMY, PAP NO LONGER INDICATED      ORTHOPEDIC SURGERY      SURGICAL HISTORY OF -   10/25/13    left index cyst removal    SURGICAL HISTORY OF -       Bilat knee miniscus repair     Family History   Problem Relation Age of Onset    No Known Problems Mother     Hypertension Father     Cancer Father     CABG Father     No Known Problems Sister     Asthma Brother     Hypertension Brother     Cancer Maternal Grandmother     Cancer Maternal Grandfather     Colon Cancer Paternal Grandmother     Cancer Paternal Grandfather     Thyroid Disease Daughter     No Known Problems Son     No Known Problems Son     Colon Cancer Paternal Aunt     Colon Cancer Cousin      Social History     Socioeconomic History    Marital status:      Spouse name: Not on file    Number of children: Not on file    Years of education: Not on file    Highest education level: Not on file   Occupational History    Not on file   Tobacco Use    Smoking status: Former     Packs/day: 0     Types: Cigarettes     Passive exposure: Never    Smokeless tobacco: Never   Vaping Use    Vaping Use: Never used   Substance and Sexual Activity    Alcohol use: Yes     Alcohol/week: 0.0  standard drinks of alcohol     Comment: avg:3-4 drink/wk    Drug use: No    Sexual activity: Yes     Partners: Male     Birth control/protection: Female Surgical   Other Topics Concern    Parent/sibling w/ CABG, MI or angioplasty before 65F 55M? No   Social History Narrative    [place] in [place]     Social Determinants of Health     Financial Resource Strain: Low Risk  (10/30/2023)    Financial Resource Strain     Within the past 12 months, have you or your family members you live with been unable to get utilities (heat, electricity) when it was really needed?: No   Food Insecurity: Low Risk  (10/30/2023)    Food Insecurity     Within the past 12 months, did you worry that your food would run out before you got money to buy more?: No     Within the past 12 months, did the food you bought just not last and you didn t have money to get more?: No   Transportation Needs: Low Risk  (10/30/2023)    Transportation Needs     Within the past 12 months, has lack of transportation kept you from medical appointments, getting your medicines, non-medical meetings or appointments, work, or from getting things that you need?: No   Physical Activity: Not on file   Stress: Not on file   Social Connections: Not on file   Interpersonal Safety: Low Risk  (11/2/2023)    Interpersonal Safety     Do you feel physically and emotionally safe where you currently live?: Yes     Within the past 12 months, have you been hit, slapped, kicked or otherwise physically hurt by someone?: No     Within the past 12 months, have you been humiliated or emotionally abused in other ways by your partner or ex-partner?: No   Housing Stability: Low Risk  (10/30/2023)    Housing Stability     Do you have housing? : Yes     Are you worried about losing your housing?: No            Allergies   Codeine, Concerta [methylphenidate], and Hay fever & [a.r.m.]    Today's clinic visit entailed:  Review of the result(s) of each unique test - BMP, proBNP, CBC, echo, MRI,  zio monitor, chest xray  Prescription drug management  I spent a total of 40 minutes on the day of the visit.   Time spent by me doing chart review, history and exam, documentation and further activities per the note  Provider  Link to MDM Help Grid     The level of medical decision making during this visit was of high complexity.      Thank you for allowing me to participate in the care of your patient.      Sincerely,     Kat Huber PA-C     [facility]  cc:   Yanira Tinoco DO  [address] [place] [id number]

## 2023-12-08 NOTE — PROGRESS NOTES
Cardiology C.O.R.E (heart failure specialty) Clinic Progress Note    Mar Zeng MRN# [id number]   YOB: 1955 Age: 68 year old     Primary cardiology team: Dr. Tinoco         Assessment and Plan     In summary, Mar Zeng presents today for a CORE clinic enrollment    HFrEF  EF: 32% (11/2023 MRI)  ACC/AHA stage C; FC II   Etiology: Nonischemic - ?LBBB induced  Valves: Mild MR  Volume: Appears well-compensated at 118 lbs clinic (114 lbs home)  GDMT: lisinopril 2.5 mg BID, Toprol XL 12.5 mg daily, tanner 12.5 mg daily  Rhythm: SR  QRS: 132 msec - LBBB  Device: None  Code status: FULL  Creatinine: 1.1  CHF admissions: 10/2023.    Generalized fatigue, likely BB-related.     Marginal BP.   Asymptomatic.     Plan:  Stop Toprol XL 12.5 mg daily, start Coreg 3.125 mg BID, to see if this makes any difference in her fatigue.  Encouraged her to get back into routine low level cardiovascular exercise (walking).   Jardiance/farxiga not covered this year, will check again beginning of 2024. I'd like her to see me again in about a month (if she's back from her travels then).   Repeat TTE in ~2 months. If EF remains low, consider CRT-D. See me again after that.    It was a pleasure meting Mar today!    Kat Huber PA-C  [facility] - [facility] Clinic         History of Presenting Illness     Mar Zeng is a pleasant 68 year old patient with a pertinent history of the following -   Recent diagnosis of HFrEF, EF 30-35% 10/2023.   She underwent coronary angiography which revealed nonobstructive coronary artery disease maximal lesion estimated between 30 to 40% in the distal left circumflex and trivial disease noted in the LAD with 20% proximal lesion right coronary artery did not demonstrate any evidence of stenosis.   MRI c/w NICMP.  cLBBB.  MR. Mild per MRI.   ADD, on adderall (currently held).     Pt saw Dr. Tinoco 11/8 after hospital discharge. She reported progressive  "dyspnea/orthopnea as well as intermittent chest discomfort concerning for vasospasm. She was given SL NTG. CXR benign. Lisinopril was increased to 2.5 mg BID and tanner 12.5 was added. A 7d zio monitor was also placed, showing one 4-beat run of NSVT, otherwise SR with rare ectopics    Today, Mar returns to clinic stating she feels much better, overall. Her orthopnea and dyspnea have resolved. Her chest pains have improved, and she has not needed to use any nitroglycerin, but also admits she's been trying to take it easy. She would like permission to start exercising, though, because she's very active at baseline. Her greatest concern is of significant generalized fatigue since leaving the hospital, which did get a little better when her BB was cut in half. Patient denies edema, palpitations, near syncope or syncope. No significant ETOH. She has two cousins that she reports passed away from heart failure as infants. Her father had a triple bypass in his late 60's.  She's flying to [place] tomorrow as her daughter in law is having a baby, unclear on how long she's staying.    Recent BMP shows creat 1.1, proBNP 1750, hgb stable.         Review of Systems     12-pt ROS is negative except for as noted in the HPI.          Physical Exam     Vitals: /62 (BP Location: Right arm, Patient Position: Sitting, Cuff Size: Adult Regular)   Pulse 73   Ht 1.676 m (5' 6\")   Wt 53.8 kg (118 lb 8 oz)   LMP  (LMP Unknown)   SpO2 99%   BMI 19.13 kg/m    Wt Readings from Last 10 Encounters:   12/08/23 53.8 kg (118 lb 8 oz)   11/08/23 53 kg (116 lb 12.8 oz)   11/02/23 51.7 kg (114 lb)   10/30/23 52.1 kg (114 lb 14.4 oz)   10/29/23 51.3 kg (113 lb 3.2 oz)   09/29/23 53.5 kg (118 lb)   09/08/23 54.4 kg (120 lb)   06/05/23 52.8 kg (116 lb 4.8 oz)   11/10/22 53.7 kg (118 lb 6.4 oz)   10/19/22 52.6 kg (116 lb)       Constitutional:  Patient is pleasant, alert, cooperative, and in NAD.  HEENT:  NCAT. PERRLA. EOM's intact.   Neck:  " CVP appears normal. No carotid bruits.   Pulmonary: Normal respiratory effort. CTAB.   Cardiac: RRR, normal S1/S2, no S3/S4, no murmur or rub.   Abdomen:  Non-tender abdomen, no hepatosplenomegaly appreciated.   Vascular: Pulses in the upper and lower extremities are 2+ and equal bilaterally.  Extremities: No edema, erythema, cyanosis or tenderness appreciated.  Skin:  No rashes or lesions appreciated.   Neurological:  No gross motor or sensory deficits.   Psych: Appropriate affect.          Data   Labs reviewed:  Recent Labs   Lab Test 12/05/23  0841 10/30/23  0913 10/26/23  0806 09/26/23  1146 08/18/22  1145 08/18/20  0725 09/27/18  1445 12/06/17  1045   LDL  --   --  111*  --  105* 137*  --  118*   HDL  --   --  76  --  95 72  --  91   NHDL  --   --  125  --  119 159*  --  133*   CHOL  --   --  201*  --  214* 231*  --  224*   TRIG  --   --  70  --  70 110  --  76   TSH  --   --   --  0.74  --   --  0.59 0.73   NTBNP 1,745* 2,381*  --   --   --   --   --   --        Lab Results   Component Value Date    WBC 6.4 10/26/2023    WBC 5.8 10/16/2019    RBC 3.93 10/26/2023    RBC 4.17 10/16/2019    HGB 13.0 12/05/2023    HGB 12.5 10/16/2019    HCT 36.5 10/26/2023    HCT 38.2 10/16/2019    MCV 93 10/26/2023    MCV 92 10/16/2019    MCH 29.8 10/26/2023    MCH 30.0 10/16/2019    MCHC 32.1 10/26/2023    MCHC 32.7 10/16/2019    RDW 13.3 10/26/2023    RDW 12.7 10/16/2019     10/29/2023     10/16/2019       Lab Results   Component Value Date     12/05/2023     08/18/2020    POTASSIUM 4.2 12/05/2023    POTASSIUM 4.5 08/18/2022    POTASSIUM 4.1 08/18/2020    CHLORIDE 100 12/05/2023    CHLORIDE 103 08/18/2022    CHLORIDE 106 08/18/2020    CO2 29 12/05/2023    CO2 28 08/18/2022    CO2 27 08/18/2020    ANIONGAP 9 12/05/2023    ANIONGAP 4 08/18/2022    ANIONGAP 8 08/18/2020    GLC 92 12/05/2023    GLC 91 08/18/2022    GLC 82 08/18/2020    BUN 22.0 12/05/2023    BUN 14 08/18/2022    BUN 11 08/18/2020    CR  1.10 (H) 12/05/2023    CR 0.80 08/18/2020    GFRESTIMATED 54 (L) 12/05/2023    GFRESTIMATED 77 08/18/2020    GFRESTBLACK 89 08/18/2020    MERRILL 9.1 12/05/2023    MERRILL 8.8 08/18/2020      Lab Results   Component Value Date    AST 20 08/18/2022    AST 23 08/18/2020    ALT 20 08/18/2022    ALT 19 08/18/2020       Lab Results   Component Value Date    A1C 5.6 12/04/2017       Lab Results   Component Value Date    INR 1.05 01/23/2014            Problem List     Patient Active Problem List   Diagnosis    Multiple pigmented nevi    Advanced directives, counseling/discussion    Seasonal allergic rhinitis    Hematoma of rectus sheath    Generalized anxiety disorder    Attention deficit hyperactivity disorder (ADHD), combined type    Benign neoplasm of colon    Fibromyositis    Hammer toe, acquired    Ingrowing nail    Insomnia    Localized, primary osteoarthritis    Loose body in knee    Macrodactylia of toes    Migraine    Other disorders of bone and cartilage(733.99)    Plantar fascial fibromatosis    Postmenopausal HRT (hormone replacement therapy)    Talipes cavus    Fibromyalgia    Splinter hemorrhage of toenail    Congestive heart failure, unspecified HF chronicity, unspecified heart failure type (H)            Medications     Current Outpatient Medications   Medication Sig Dispense Refill    albuterol (ACCUNEB) 0.63 MG/3ML neb solution Take 3 mLs (0.63 mg) by nebulization every 6 hours as needed for shortness of breath, wheezing or cough 90 mL 0    albuterol (PROAIR HFA/PROVENTIL HFA/VENTOLIN HFA) 108 (90 Base) MCG/ACT inhaler Inhale 2 puffs into the lungs every 4 hours as needed for shortness of breath, wheezing or cough 18 g 3    aluminum chloride (DRYSOL) 20 % external solution Apply topically at bedtime 60 mL 0    aspirin 81 MG EC tablet Take 1 tablet (81 mg) by mouth daily 30 tablet 0    atorvastatin (LIPITOR) 40 MG tablet Take 1 tablet (40 mg) by mouth every evening 90 tablet 0    azelastine-fluticasone (DYMISTA)  137-50 MCG/ACT nasal spray Spray 1 spray into both nostrils 2 times daily as needed      budesonide (PULMICORT) 0.5 MG/2ML neb solution Spray 0.5 mg in nostril 2 times daily Uses as nasal rinse twice daily.      budesonide-formoterol (SYMBICORT) 160-4.5 MCG/ACT Inhaler Inhale 2 puffs into the lungs 2 times daily 10.2 g 2    buPROPion (WELLBUTRIN XL) 150 MG 24 hr tablet TAKE 1 TABLET(150 MG) BY MOUTH EVERY MORNING 90 tablet 2    fexofenadine (ALLEGRA) 180 MG tablet Take 180 mg by mouth every other day      fluorouracil (EFUDEX) 5 % external cream APPLY SPARINGLY EXTERNALLY TO FACE EVERY SUNDAY AT NIGHT. AVOID EYES AND LIPS      furosemide (LASIX) 20 MG tablet Take 0.5 tablets (10 mg) by mouth daily 15 tablet 1    lisinopril (ZESTRIL) 2.5 MG tablet Take 1 tablet (2.5 mg) by mouth 2 times daily 60 tablet 11    loratadine (CLARITIN) 10 MG tablet Take 10 mg by mouth every other day      LORazepam (ATIVAN) 0.5 MG tablet Take 1 tablet (0.5 mg) by mouth nightly as needed for sleep 10 tablet 0    metoprolol succinate ER (TOPROL XL) 25 MG 24 hr tablet Take 0.5 tablets (12.5 mg) by mouth daily 90 tablet 0    nitroGLYcerin (NITROSTAT) 0.4 MG sublingual tablet For chest pain place 1 tablet under the tongue every 5 minutes for 3 doses. If symptoms persist 5 minutes after 1st dose call 911. 30 tablet 11    spironolactone (ALDACTONE) 25 MG tablet Take 0.5 tablets (12.5 mg) by mouth daily 45 tablet 4    timolol maleate (TIMOPTIC) 0.5 % ophthalmic solution Apply 1 drop topically nightly as needed (dry skin cracks) Apply 1 drop to lesion every night at bedtime. Do Not use on normal skin.      amphetamine-dextroamphetamine (ADDERALL XR) 25 MG 24 hr capsule Take 1 capsule (25 mg) by mouth every morning (Patient not taking: Reported on 12/8/2023) 90 capsule 0            Past Medical History     Past Medical History:   Diagnosis Date    Mumps     NO ACTIVE PROBLEMS      Past Surgical History:   Procedure Laterality Date    COLONOSCOPY   8/23/2016    Dr. Shaw [facility]    COLONOSCOPY N/A 8/23/2016    Procedure: COLONOSCOPY;  Surgeon: Peter Shaw MD;  Location:  GI    COLONOSCOPY N/A 10/19/2022    Procedure: COLONOSCOPY with polypectomy using cold exacto snare;  Surgeon: Peter Shaw MD;  Location:  GI    CV CORONARY ANGIOGRAM N/A 10/27/2023    Procedure: Coronary Angiogram;  Surgeon: Elmer Andre MD;  Location:  HEART CARDIAC CATH LAB    FINGER SURGERY      right little finger joint replacement    HYSTERECTOMY VAGINAL  age 30    retained ovaries. no cervix     HYSTERECTOMY, PAP NO LONGER INDICATED      ORTHOPEDIC SURGERY      SURGICAL HISTORY OF -   10/25/13    left index cyst removal    SURGICAL HISTORY OF -       Bilat knee miniscus repair     Family History   Problem Relation Age of Onset    No Known Problems Mother     Hypertension Father     Cancer Father     CABG Father     No Known Problems Sister     Asthma Brother     Hypertension Brother     Cancer Maternal Grandmother     Cancer Maternal Grandfather     Colon Cancer Paternal Grandmother     Cancer Paternal Grandfather     Thyroid Disease Daughter     No Known Problems Son     No Known Problems Son     Colon Cancer Paternal Aunt     Colon Cancer Cousin      Social History     Socioeconomic History    Marital status:      Spouse name: Not on file    Number of children: Not on file    Years of education: Not on file    Highest education level: Not on file   Occupational History    Not on file   Tobacco Use    Smoking status: Former     Packs/day: 0     Types: Cigarettes     Passive exposure: Never    Smokeless tobacco: Never   Vaping Use    Vaping Use: Never used   Substance and Sexual Activity    Alcohol use: Yes     Alcohol/week: 0.0 standard drinks of alcohol     Comment: avg:3-4 drink/wk    Drug use: No    Sexual activity: Yes     Partners: Male     Birth control/protection: Female Surgical   Other Topics Concern    Parent/sibling w/ CABG, MI or  angioplasty before 65F 55M? No   Social History Narrative    [place] in [place]     Social Determinants of Health     Financial Resource Strain: Low Risk  (10/30/2023)    Financial Resource Strain     Within the past 12 months, have you or your family members you live with been unable to get utilities (heat, electricity) when it was really needed?: No   Food Insecurity: Low Risk  (10/30/2023)    Food Insecurity     Within the past 12 months, did you worry that your food would run out before you got money to buy more?: No     Within the past 12 months, did the food you bought just not last and you didn t have money to get more?: No   Transportation Needs: Low Risk  (10/30/2023)    Transportation Needs     Within the past 12 months, has lack of transportation kept you from medical appointments, getting your medicines, non-medical meetings or appointments, work, or from getting things that you need?: No   Physical Activity: Not on file   Stress: Not on file   Social Connections: Not on file   Interpersonal Safety: Low Risk  (11/2/2023)    Interpersonal Safety     Do you feel physically and emotionally safe where you currently live?: Yes     Within the past 12 months, have you been hit, slapped, kicked or otherwise physically hurt by someone?: No     Within the past 12 months, have you been humiliated or emotionally abused in other ways by your partner or ex-partner?: No   Housing Stability: Low Risk  (10/30/2023)    Housing Stability     Do you have housing? : Yes     Are you worried about losing your housing?: No            Allergies   Codeine, Concerta [methylphenidate], and Hay fever & [a.r.m.]    Today's clinic visit entailed:  Review of the result(s) of each unique test - BMP, proBNP, CBC, echo, MRI, zio monitor, chest xray  Prescription drug management  I spent a total of 40 minutes on the day of the visit.   Time spent by me doing chart review, history and exam, documentation and further activities per the  note  Provider  Link to MDM Help Grid     The level of medical decision making during this visit was of high complexity.

## 2023-12-08 NOTE — PATIENT INSTRUCTIONS
Today, we discussed the following:  Medication changes:   STOP metoprolol, START carvedilol.   If this WORSENS your fatigue, certainly let me know.  Follow up:   With me in 4 weeks, if able.  ECHO in 2 months.   If you still have a reduced heart function, we may consider a pacemaker implant which could help correct your LBBB (possible contributor to your HF).    Please, remember to continue the followin.  Weigh yourself daily. Call if your weight is up > than 2 pounds in one day, or 5 pounds in one week; if you feel more short of breath or have worsening swelling in your legs or abdomen.  2.  Eat a low sodium diet (less than 2,000mg or 2g daily). If you eat less salt, you will retain less fluid.   3.  Avoid alcohol, as this can worsen heart failure.   4.  Avoid NSAIDs as able (For example, Ibuprofen / aleve / advil / naprosen / diclofenac).    Thank you for your visit with the C.O.R.E. Clinic today.   CORE stands for Cardiomyopathy Optimization Rehabilitation and Education. The CORE clinic will teach and help you to manage your heart failure and keep you out of the hospital.    Call C.O.R.E. nurse for any questions or concerns Mon-Fri 8am-4pm  [phone]: Nurse number   [phone]: After Hours Phone Number

## 2023-12-08 NOTE — TELEPHONE ENCOUNTER
[facility]: Heart Failure Care Coordination   Heart Failure Education    Situation/Background:      RN CC provided heart failure education to Mar during CORE Enrollment clinic visit with WALT Foote    Assessment:      Living situation: home with family    Barriers to Heart Failure follow-up: None     Medication management: independent        Intervention/Plan:      CM/HF education topics reviewed:  Low sodium: 2000 mg or less daily, meal choices and label reading   Daily weight monitoring and logging   Medication review and importance of compliance   Overview of C.O.R.E. clinic   Overview of heart failure appointments and testing   Symptoms of HF to be reported to Core Team      Education materials provided:  She has a folder given to her during admission and has reviewed it. Currently has no questions.      HF resources reviewed:  None at this time       Plan:   Reviewed medication change.   Discussed follow up appts including echocardiogram.  Ensured she has [facility]'s direct number.   Discussed coverage check on Entresto and SGLT2i's in January 2024    Patient expressed understanding of above education/instructions and denied further questions at this time.    Future Appointments   Date Time Provider Department Center   [facility]24 10:15 [facility]   1/17/2024 11:00 AM Kat Huber PA-C RUUMHT [facility] PSA CLIN   2/16/2024 12:00 PM  [facility]   2/16/2024  1:00 PM Britni Escobar MD [facility]   2/26/2024  8:30 AM [email or web address] [facility]CV[facility]CC   2/27/2024  9:00 AM Kat Huber PA-C RUUMHT [facility] PSA CLIN         BELKIS Sin, RN 9:54 AM 12/08/23

## 2023-12-18 ENCOUNTER — TELEPHONE (OUTPATIENT)
Dept: CARDIOLOGY | Facility: CLINIC | Age: 68
End: 2023-12-18
Payer: MEDICARE

## 2023-12-18 NOTE — TELEPHONE ENCOUNTER
[facility] Call Center    Phone Message    May a detailed message be left on voicemail: yes     Reason for Call: Other: Pt would like  a call back as she stated her insurance company said Jardiance can be covered with the prior auth form, pt would like the prior approval line number for [facility] , number is [phone]      Action Taken: Other: Cardio    Travel Screening: Not Applicable

## 2023-12-21 NOTE — TELEPHONE ENCOUNTER
No Rx found in patient's chart, routing back to clinic staff to send a Rx to the patient's pharmacy, PA has been approved for Jardiance 10mg daily

## 2023-12-21 NOTE — TELEPHONE ENCOUNTER
Central Prior Authorization Team   Phone: [phone]    PA Initiation    Medication: Jardiance 10MG tablets    Insurance Company: [employer] - Phone [phone] Fax [phone]  Pharmacy Filling the Rx: [id number] [id number]  Filling Pharmacy Phone: [phone]  Filling Pharmacy Fax:    Start Date: 12/21/2023

## 2023-12-21 NOTE — TELEPHONE ENCOUNTER
Prior Authorization Approval    Authorization Effective Date: 11/21/2023  Authorization Expiration Date: 12/20/2024  Medication: Jardiance 10MG tablets    Approved Dose/Quantity:   Reference #:     Insurance Company: [employer] - Phone [phone] Fax [phone]  Expected CoPay:       CoPay Card Available:      Foundation Assistance Needed:    Which Pharmacy is filling the prescription (Not needed for infusion/clinic administered): [id number] - [place] [id number]  Pharmacy Notified:  no  Patient Notified:  no

## 2023-12-22 NOTE — PROGRESS NOTES
Reviewed chart regarding PA on SGLT2*is:        No Rx found in patient's chart, routing back to clinic staff to send a Rx to the patient's pharmacy, PA has been approved for Jardiance 10mg daily          Will update Kat and verify prescription for Jardiance 10 mg tablets: Take 1 tablet (10 mg) by mouth once daily    Can be sent to pharmacy       BELKIS Sin, RN 12:28 PM 12/22/23

## 2023-12-26 ENCOUNTER — DOCUMENTATION ONLY (OUTPATIENT)
Dept: CARDIOLOGY | Facility: CLINIC | Age: 68
End: 2023-12-26
Payer: MEDICARE

## 2023-12-26 NOTE — TELEPHONE ENCOUNTER
[facility] - C.O.R.E. Clinic    Chart Reviewed:   -- 12/8/23:  LOV w/ Kat Huber in CORE clinic.  Pt c/o generalized fatigue, likely BB related.  Euvolemic, wt 114 lbs.  Plan:  Stop Toprol XL 12.5 mg daily, start Coreg 3.125 mg BID, to see if this makes any difference in her fatigue.  Encouraged her to get back into routine low level cardiovascular exercise (walking).   Jardiance/farxiga not covered this year, will check again beginning of 2024. I'd like her to see me again in about a month (if she's back from her travels then).   Repeat TTE in ~2 months. If EF remains low, consider CRT-D. See me again after that.    Called Mar to update her that PA has been approved for Jardiance with the authorization expiration date is 12/20/2024.  Will discuss Jardiance start with her further at next CORE OV on 1/17/23.  She voices understanding and denies further questions or concerns at this time.      Price check as not been completed yet by pharmacy liaison.    Future Appointments   Date Time Provider Department Center   1/17/2024 10:15 AM RU LAB RHCLB [place] [facility]   1/17/2024 11:00 AM Kat Huber PA-C RUUMHT [facility] PSA CLIN   2/16/2024 12:00 PM UC PFL B [facility]   2/16/2024  1:00 PM Britni Escobar MD [facility]   2/26/2024  8:30 AM [email or web address] RHCVCC RSCC   2/27/2024  9:00 AM Kat Huber PA-C RUUMHT [facility] PSA CLIN       Lotus Johnson RN BSN   [facility] [place] [place]  C.O.R.E. Clinic Care Coordinator  12/26/23, 8:40 AM

## 2023-12-26 NOTE — PROGRESS NOTES
Pt is scheduled for a CORE Clinic appt on 1/17/24.      Pt with a history of systolic heart failure..  Medication list reviewed and pt is not currently on Entresto, Jardiance, Farxiga, and Invokana.    Please initiate coverage check for the above medications.     Future Appointments   Date Time Provider Department Center   1/17/2024 10:15 AM RU LAB RHCLB [place] RID   1/17/2024 11:00 AM Kat Huber PA-C RUUMHT [facility] PSA CLIN   2/16/2024 12:00 PM [facility]   2/16/2024  1:00 PM Britni Escobar MD [facility]   2/26/2024  8:30 AM [email or web address] RHCVCC RSCC   2/27/2024  9:00 AM [facility]     Lotus Johnson RN BSN   [facility] [place] [place]  C.O.R.E. Clinic Care Coordinator  12/26/23, 3:48 PM

## 2023-12-27 NOTE — PROGRESS NOTES
Patient has [employer].    Benefits are the same for 2024 as they were for 2023, assuming patient has renewed for the Basic Option with Medicare D.    Franciscodiance (prior auth will need to be renewed 12/20/24): $30/mo at retail or $50 for 3 mo at [facility].  Patient is eligible to download a copay card from [employer] to reduce this to $10/mo.    Entresto:: $319/mo at retail or $125 at [facility].  Patient is eligible to download a copay card from [employer] to reduce this to $10/mo.    Liliane Berrios  Pharmacy Technician/Liaison, Discharge Pharmacy  [phone] (voice or text)  [email or web address]     Future Appointments   Date Time Provider Department Center   1/17/2024 10:15 AM RU LAB [facility]CLB [place] [facility]   1/17/2024 11:00 AM [facility]   2/16/2024 12:00 PM [facility]   2/16/2024  1:00 PM Britni Escobar MD UCCLS [facility]   2/26/2024  8:30 AM [email or web address] RHCVCC RSCC   2/27/2024  9:00 AM [facility]

## 2023-12-30 DIAGNOSIS — F41.1 GENERALIZED ANXIETY DISORDER: ICD-10-CM

## 2024-01-02 RX ORDER — BUPROPION HYDROCHLORIDE 150 MG/1
TABLET ORAL
Qty: 90 TABLET | Refills: 2 | OUTPATIENT
Start: 2024-01-02

## 2024-01-15 ENCOUNTER — DOCUMENTATION ONLY (OUTPATIENT)
Dept: CARDIOLOGY | Facility: CLINIC | Age: 69
End: 2024-01-15
Payer: MEDICARE

## 2024-01-15 DIAGNOSIS — I50.22 CHRONIC SYSTOLIC HEART FAILURE (H): Primary | ICD-10-CM

## 2024-01-15 DIAGNOSIS — F41.1 GENERALIZED ANXIETY DISORDER: ICD-10-CM

## 2024-01-15 NOTE — PROGRESS NOTES
Update PA sent for 2024 for Jardiance.  Ordered prescription for Jardiance 10 mg tablet: Take 1 tablet (10 mg) once daily.  Per WALT Estes.  Sent to [facility]    Pt aware and will discuss and start at 1/17/24 CORE return with WALT Estes          Future Appointments   Date Time Provider Department Center   1/17/2024 10:15 AM RU LAB [facility]   1/17/2024 11:00 AM [facility] [place] [facility]   2/16/2024 12:00 PM [facility]   2/16/2024  1:00 PM Britni Escobar MD [facility]   2/26/2024  8:30 AM [email or web address] RHCVCC RSCC   2/27/2024  9:00 AM [facility]     BELKIS Sin, RN 3:35 PM 01/15/24

## 2024-01-19 ENCOUNTER — MYC MEDICAL ADVICE (OUTPATIENT)
Dept: FAMILY MEDICINE | Facility: CLINIC | Age: 69
End: 2024-01-19
Payer: MEDICARE

## 2024-01-19 ENCOUNTER — TELEPHONE (OUTPATIENT)
Dept: FAMILY MEDICINE | Facility: CLINIC | Age: 69
End: 2024-01-19
Payer: MEDICARE

## 2024-01-19 NOTE — TELEPHONE ENCOUNTER
Reason for Call:  Appointment Request    Patient requesting this type of appt:  Back Pain    Requested provider: Gail Crespo Ra    Reason patient unable to be scheduled: Not within requested timeframe    When does patient want to be seen/preferred time:  Before the end January    Comments: Pt has been suffering from neck and back pain for 3 weeks and was wondering if she could be fit in to be seen. She is willing to be seen by other providers at the clinic.    Could we send this information to you in [place] or would you prefer to receive a phone call?:   Patient would prefer a phone call   Okay to leave a detailed message?: Yes at Cell number on file:    Telephone Information:   Mobile [phone]       Call taken on 1/19/2024 at 4:13 PM by Stacy Zapata

## 2024-01-29 ENCOUNTER — OFFICE VISIT (OUTPATIENT)
Dept: FAMILY MEDICINE | Facility: CLINIC | Age: 69
End: 2024-01-29
Payer: MEDICARE

## 2024-01-29 ENCOUNTER — MYC MEDICAL ADVICE (OUTPATIENT)
Dept: FAMILY MEDICINE | Facility: CLINIC | Age: 69
End: 2024-01-29

## 2024-01-29 VITALS
WEIGHT: 117 LBS | SYSTOLIC BLOOD PRESSURE: 101 MMHG | HEART RATE: 85 BPM | TEMPERATURE: 99.1 F | BODY MASS INDEX: 18.8 KG/M2 | HEIGHT: 66 IN | RESPIRATION RATE: 18 BRPM | OXYGEN SATURATION: 97 % | DIASTOLIC BLOOD PRESSURE: 65 MMHG

## 2024-01-29 DIAGNOSIS — M54.9 UPPER BACK PAIN ON LEFT SIDE: Primary | ICD-10-CM

## 2024-01-29 PROCEDURE — 99213 OFFICE O/P EST LOW 20 MIN: CPT | Performed by: PHYSICIAN ASSISTANT

## 2024-01-29 RX ORDER — RESPIRATORY SYNCYTIAL VIRUS VACCINE 120MCG/0.5
0.5 KIT INTRAMUSCULAR ONCE
Qty: 1 EACH | Refills: 0 | Status: CANCELLED | OUTPATIENT
Start: 2024-01-29 | End: 2024-01-29

## 2024-01-29 RX ORDER — METHOCARBAMOL 500 MG/1
500 TABLET, FILM COATED ORAL 4 TIMES DAILY PRN
Qty: 40 TABLET | Refills: 1 | Status: SHIPPED | OUTPATIENT
Start: 2024-01-29 | End: 2024-04-02

## 2024-01-29 ASSESSMENT — ANXIETY QUESTIONNAIRES
IF YOU CHECKED OFF ANY PROBLEMS ON THIS QUESTIONNAIRE, HOW DIFFICULT HAVE THESE PROBLEMS MADE IT FOR YOU TO DO YOUR WORK, TAKE CARE OF THINGS AT HOME, OR GET ALONG WITH OTHER PEOPLE: NOT DIFFICULT AT ALL
4. TROUBLE RELAXING: SEVERAL DAYS
7. FEELING AFRAID AS IF SOMETHING AWFUL MIGHT HAPPEN: NOT AT ALL
5. BEING SO RESTLESS THAT IT IS HARD TO SIT STILL: SEVERAL DAYS
2. NOT BEING ABLE TO STOP OR CONTROL WORRYING: NOT AT ALL
3. WORRYING TOO MUCH ABOUT DIFFERENT THINGS: NOT AT ALL
6. BECOMING EASILY ANNOYED OR IRRITABLE: NOT AT ALL
7. FEELING AFRAID AS IF SOMETHING AWFUL MIGHT HAPPEN: NOT AT ALL
GAD7 TOTAL SCORE: 2
8. IF YOU CHECKED OFF ANY PROBLEMS, HOW DIFFICULT HAVE THESE MADE IT FOR YOU TO DO YOUR WORK, TAKE CARE OF THINGS AT HOME, OR GET ALONG WITH OTHER PEOPLE?: NOT DIFFICULT AT ALL
GAD7 TOTAL SCORE: 2
GAD7 TOTAL SCORE: 2
1. FEELING NERVOUS, ANXIOUS, OR ON EDGE: NOT AT ALL

## 2024-01-29 ASSESSMENT — PATIENT HEALTH QUESTIONNAIRE - PHQ9
10. IF YOU CHECKED OFF ANY PROBLEMS, HOW DIFFICULT HAVE THESE PROBLEMS MADE IT FOR YOU TO DO YOUR WORK, TAKE CARE OF THINGS AT HOME, OR GET ALONG WITH OTHER PEOPLE: NOT DIFFICULT AT ALL
SUM OF ALL RESPONSES TO PHQ QUESTIONS 1-9: 6
SUM OF ALL RESPONSES TO PHQ QUESTIONS 1-9: 6

## 2024-01-29 NOTE — PROGRESS NOTES
Assessment & Plan     Upper back pain on left side    Will trial muscle relaxer. Ordered PT as patient would like to try dry needling. Unsure who does this so if [facility] doesn't, recommend she check with [facility] or [facility].    - methocarbamol (ROBAXIN) 500 MG tablet; Take 1 tablet (500 mg) by mouth 4 times daily as needed for muscle spasms  - Physical Therapy Referral; Future                Subjective   Mar is a 68 year old, presenting for the following health issues:  Musculoskeletal Problem    History of Present Illness       Back Pain:  She presents for follow up of back pain. Patient's back pain is a new problem.    Original cause of back pain: not sure  First noticed back pain: 1-4 weeks ago  Patient feels back pain: constantlyLocation of back pain:  Left upper back, left side of neck and left shoulder  Description of back pain: burning, sharp, shooting and stabbing  Back pain spreads: left shoulder and left side of neck    Since patient first noticed back pain, pain is: rapidly worsening  Does back pain interfere with her job:  Not applicable  On a scale of 1-10 (10 being the worst), patient describes pain as:  10  What makes back pain worse: certain positions, sitting and twisting   Acupuncture: not helpful  Acetaminophen: not helpful  Activity or exercise: not helpful  Chiropractor:  Not helpful  Cold: not helpful  Heat: not helpful  Massage: not helpful  Muscle relaxants: not helpful  NSAIDS: not helpful  Opioids: not tried  Physical Therapy: not tried  Rest: not helpful  Steroid Injection: not helpful  Stretching: not helpful  Surgery: not tried  TENS unit: not helpful  Topical pain relievers: not helpful  Other healthcare providers patient is seeing for back pain: Chiropractor    She eats 2-3 servings of fruits and vegetables daily.She consumes 0 sweetened beverage(s) daily.She exercises with enough effort to increase her heart rate 9 or less minutes per day.  She exercises with enough effort to  "increase her heart rate 3 or less days per week.   She is taking medications regularly.       Pain History:  When did you first notice your pain? Sometime in October 2023- worsening for the past couple weeks  Have you seen this provider for your pain in the past? No   Where in your body do your have pain?  Left upper back, left side of neck and left shoulder  Are you seeing anyone else for your pain? Yes - In [place]- (ortho clinic) ordered PT (wasn't able to start), gave cortisone shot- no improvement- had xray  What makes your pain better? Unknown- tennis ball applying pressure when sitting  What makes your pain worse? certain positions, sitting and twisting   How has pain affected your ability to work? Not applicable  Who lives in your household? Patient only     Has tried Tylenol, ibuprofen, muscle relaxers- Flexeril but very old, massage, acupuncture, tens unit, Voltaren cream, biofreeze. No improvement from any of these.         11/16/2015    10:09 AM 8/14/2020     2:07 PM 1/29/2024    12:34 PM   PHQ-9 SCORE   PHQ-9 Total Score MyChart   6 (Mild depression)   PHQ-9 Total Score 3 9 6           8/18/2022    11:05 AM 6/5/2023     1:17 PM 1/29/2024    12:35 PM   TARIQ-7 SCORE   Total Score 2 (minimal anxiety) 2 (minimal anxiety) 2 (minimal anxiety)   Total Score 2 2 2         Review of Systems  Constitutional, HEENT, cardiovascular, pulmonary, gi and gu systems are negative, except as otherwise noted.        Objective    /65 (BP Location: Right arm, Patient Position: Sitting, Cuff Size: Adult Regular)   Pulse 85   Temp 99.1  F (37.3  C) (Oral)   Resp 18   Ht 1.676 m (5' 6\")   Wt 53.1 kg (117 lb)   LMP  (LMP Unknown)   SpO2 97%   BMI 18.88 kg/m    Body mass index is 18.88 kg/m .      Physical Exam   GENERAL: alert and no distress  EYES: Eyes grossly normal to inspection, PERRL and conjunctivae and sclerae normal  MS: no gross musculoskeletal defects noted, no edema  SKIN: no suspicious lesions or " rashes  NEURO: Normal strength and tone, mentation intact and speech normal  PSYCH: mentation appears normal, affect normal/bright  Left upper back is tender to palpation near scapula lower cervical paraspinal muscles. Otherwise non-tender. Neck is non-tender. Shoulder and neck ROM are intact but with some pain.             Signed Electronically by: Leroy Hoyos PA-C

## 2024-01-31 ENCOUNTER — TRANSFERRED RECORDS (OUTPATIENT)
Dept: HEALTH INFORMATION MANAGEMENT | Facility: CLINIC | Age: 69
End: 2024-01-31
Payer: MEDICARE

## 2024-02-01 ENCOUNTER — OFFICE VISIT (OUTPATIENT)
Dept: PODIATRY | Facility: CLINIC | Age: 69
End: 2024-02-01
Payer: MEDICARE

## 2024-02-01 VITALS — SYSTOLIC BLOOD PRESSURE: 109 MMHG | DIASTOLIC BLOOD PRESSURE: 67 MMHG | BODY MASS INDEX: 18.88 KG/M2 | WEIGHT: 117 LBS

## 2024-02-01 DIAGNOSIS — L60.0 INGROWING LEFT GREAT TOENAIL: Primary | ICD-10-CM

## 2024-02-01 DIAGNOSIS — L60.8 NAIL DEFORMITY: ICD-10-CM

## 2024-02-01 DIAGNOSIS — M79.675 GREAT TOE PAIN, LEFT: ICD-10-CM

## 2024-02-01 PROCEDURE — 99203 OFFICE O/P NEW LOW 30 MIN: CPT | Mod: 25 | Performed by: PODIATRIST

## 2024-02-01 PROCEDURE — 11730 AVULSION NAIL PLATE SIMPLE 1: CPT | Mod: TA | Performed by: PODIATRIST

## 2024-02-01 NOTE — LETTER
"    2/1/2024         RE: Mar Zeng  [address] [facility] [place] [address]        Dear Colleague,    Thank you for referring your patient, Mar Zeng, to the [facility]. Please see a copy of my visit note below.    ASSESSMENT:  Encounter Diagnoses   Name Primary?     Ingrowing left great toenail, medial edge Yes     Great toe pain, left      Nail deformity      MEDICAL DECISION MAKING:  The potential causes and nature of an ingrown toenail were discussed with the patient.  We reviewed the natural history/prognosis of the condition and potential risks if no treatment is provided.      Treatment options discussed included conservative management (oral antibiotics when coexisting infection, soaking of the foot, the use of a toe spacer, adequate width shoes) as well as surgical management (partial or total nail removal).  The pros and cons of both forms of treatment were reviewed.      I discussed the option of permanent removal via chemical matrixectomy. This is a reasonable option when there is no co-existing infection.     Mar Zeng elected to a partial nail avulsion, medial edge, left great toe.     Nail Avulsion Procedure  (non permanent removal)    The procedure was discussed with the Mar Zeng, including risk of infection, abnormal nail regrowth, and possible need for an additional future nail procedure.  Post-procedure home cares were reviewed. I explained that these cares are important for preventing infection and aiding in timely healing.   Verbal and written consent was obtained.   The site was marked and the \"Time Out\" called.     The base of the left great toe was injected with 2 cc of  2% Lidocaine plain.  The toe was then prepped with betadine solution.  A tourniquet was applied around the base of the toe for hemostasis.   Next it was checked for adequate anesthesia.      The medial nail was loosened from the nail bed " and marginal soft tissue attachments with a blunt instrument. A nail splitter was then used to make a longitudinal cut 2mm from the medial skin fold.  It was completed, atraumatically, under the eponychium with a [place] blade. Next, the nail edge was firmly grasped with a hemostat and removed. The underlying nail bed was inspected and no abnormalities seen.    The tourniquet was removed. Bacitracin ointment was applied to the nail bed, followed by a compressive dressing.  Mar Zeng tolerated the procedure well.      Mar Zeng is instructed to watch for, and call if,  increasing redness, drainage, and pain after 2-3 days. Post procedure instructions were provided in the After Visit Summary.     I explained that the changes seen in her left second toenail are likely from repetitive trauma, given the contracture of the toe.  Coexisting fungal involvement is possible.  I explained there really is no way to improve the way the nail grows.  She is encouraged to keep it filed and trimmed.      Disclaimer: This note consists of symbols derived from keyboarding, dictation and/or voice recognition software. As a result, there may be errors in the script that have gone undetected. Please consider this when interpreting information found in this chart.    Elmer Ortiz DPM, FACFAS, MS    [facility]/Foot & Ankle Surgery      ____________________________________________________________________    HPI:       Jes presents today reporting pain involving her left great toe, the medial edge.  She has dealt with this for weeks.  Aching pain rated a 5 out of 10  Fairly constant  More pain with walking and when the area is touched  She has tried to trim the nail and soaked her foot.  Previous walking for exercise    She also inquires about her left second toenail  Trip to [place] in March.  *  Past Medical History:   Diagnosis Date     Mumps      NO ACTIVE PROBLEMS    *  *  Past Surgical  History:   Procedure Laterality Date     COLONOSCOPY  8/23/2016    Dr. Shaw [facility]     COLONOSCOPY N/A 8/23/2016    Procedure: COLONOSCOPY;  Surgeon: Peter Shaw MD;  Location:  GI     COLONOSCOPY N/A 10/19/2022    Procedure: COLONOSCOPY with polypectomy using cold exacto snare;  Surgeon: Peter Shaw MD;  Location:  GI     CV CORONARY ANGIOGRAM N/A 10/27/2023    Procedure: Coronary Angiogram;  Surgeon: Elmer Andre MD;  Location:  HEART CARDIAC CATH LAB     FINGER SURGERY      right little finger joint replacement     HYSTERECTOMY VAGINAL  age 30    retained ovaries. no cervix      HYSTERECTOMY, PAP NO LONGER INDICATED       ORTHOPEDIC SURGERY       SURGICAL HISTORY OF -   10/25/13    left index cyst removal     SURGICAL HISTORY OF -       Bilat knee miniscus repair   *  *  Current Outpatient Medications   Medication Sig Dispense Refill     albuterol (ACCUNEB) 0.63 MG/3ML neb solution Take 3 mLs (0.63 mg) by nebulization every 6 hours as needed for shortness of breath, wheezing or cough 90 mL 0     albuterol (PROAIR HFA/PROVENTIL HFA/VENTOLIN HFA) 108 (90 Base) MCG/ACT inhaler Inhale 2 puffs into the lungs every 4 hours as needed for shortness of breath, wheezing or cough 18 g 3     aluminum chloride (DRYSOL) 20 % external solution Apply topically at bedtime 60 mL 0     aspirin 81 MG EC tablet Take 1 tablet (81 mg) by mouth daily 90 tablet 3     atorvastatin (LIPITOR) 40 MG tablet Take 1 tablet (40 mg) by mouth every evening 90 tablet 3     azelastine-fluticasone (DYMISTA) 137-50 MCG/ACT nasal spray Spray 1 spray into both nostrils 2 times daily as needed       budesonide (PULMICORT) 0.5 MG/2ML neb solution Spray 0.5 mg in nostril 2 times daily Uses as nasal rinse twice daily.       budesonide-formoterol (SYMBICORT) 160-4.5 MCG/ACT Inhaler Inhale 2 puffs into the lungs 2 times daily 10.2 g 2     buPROPion (WELLBUTRIN XL) 150 MG 24 hr tablet TAKE 1 TABLET(150 MG) BY MOUTH EVERY MORNING  90 tablet 2     carvedilol (COREG) 3.125 MG tablet Take 1 tablet (3.125 mg) by mouth 2 times daily (with meals) 180 tablet 3     empagliflozin (JARDIANCE) 10 MG TABS tablet Take 1 tablet (10 mg) by mouth daily 90 tablet 1     fexofenadine (ALLEGRA) 180 MG tablet Take 180 mg by mouth every other day       fluorouracil (EFUDEX) 5 % external cream APPLY SPARINGLY EXTERNALLY TO FACE EVERY SUNDAY AT NIGHT. AVOID EYES AND LIPS       furosemide (LASIX) 20 MG tablet Take 0.5 tablets (10 mg) by mouth daily 45 tablet 3     lisinopril (ZESTRIL) 2.5 MG tablet Take 1 tablet (2.5 mg) by mouth 2 times daily 180 tablet 3     loratadine (CLARITIN) 10 MG tablet Take 10 mg by mouth every other day       LORazepam (ATIVAN) 0.5 MG tablet Take 1 tablet (0.5 mg) by mouth nightly as needed for sleep 10 tablet 0     methocarbamol (ROBAXIN) 500 MG tablet Take 1 tablet (500 mg) by mouth 4 times daily as needed for muscle spasms 40 tablet 1     nitroGLYcerin (NITROSTAT) 0.4 MG sublingual tablet For chest pain place 1 tablet under the tongue every 5 minutes for 3 doses. If symptoms persist 5 minutes after 1st dose call 911. 30 tablet 11     spironolactone (ALDACTONE) 25 MG tablet Take 0.5 tablets (12.5 mg) by mouth daily 45 tablet 4     timolol maleate (TIMOPTIC) 0.5 % ophthalmic solution Apply 1 drop topically nightly as needed (dry skin cracks) Apply 1 drop to lesion every night at bedtime. Do Not use on normal skin.       amphetamine-dextroamphetamine (ADDERALL XR) 25 MG 24 hr capsule Take 1 capsule (25 mg) by mouth every morning (Patient not taking: Reported on 12/8/2023) 90 capsule 0         EXAM:    Vitals: /67   Wt 53.1 kg (117 lb)   LMP  (LMP Unknown)   BMI 18.88 kg/m    BMI: Body mass index is 18.88 kg/m .    Constitutional:  Mar Zeng is in no apparent distress, appears well-nourished.  Cooperative with history and physical exam.    Vascular:  Pedal pulses are palpable for both the DP and PT arteries.  CFT < 3 sec.   No edema.      Neuro: Light touch sensation is intact to the L4, L5, S1 distributions  No evidence of weakness, spasticity, or contracture in the lower extremities.     Derm: Light erythema, mild edema and tenderness involving the medial skin fold of the left hallux.    The left second toenail is very thickened and discolored    Musculoskeletal:    Lower extremity muscle strength is normal.  Digital contractures involving the lesser toes.            Again, thank you for allowing me to participate in the care of your patient.        Sincerely,        Elmer Ortiz DPM

## 2024-02-01 NOTE — PROGRESS NOTES
"ASSESSMENT:  Encounter Diagnoses   Name Primary?    Ingrowing left great toenail, medial edge Yes    Great toe pain, left     Nail deformity      MEDICAL DECISION MAKING:  The potential causes and nature of an ingrown toenail were discussed with the patient.  We reviewed the natural history/prognosis of the condition and potential risks if no treatment is provided.      Treatment options discussed included conservative management (oral antibiotics when coexisting infection, soaking of the foot, the use of a toe spacer, adequate width shoes) as well as surgical management (partial or total nail removal).  The pros and cons of both forms of treatment were reviewed.      I discussed the option of permanent removal via chemical matrixectomy. This is a reasonable option when there is no co-existing infection.     Mar Zeng elected to a partial nail avulsion, medial edge, left great toe.     Nail Avulsion Procedure  (non permanent removal)    The procedure was discussed with the Mar Zeng, including risk of infection, abnormal nail regrowth, and possible need for an additional future nail procedure.  Post-procedure home cares were reviewed. I explained that these cares are important for preventing infection and aiding in timely healing.   Verbal and written consent was obtained.   The site was marked and the \"Time Out\" called.     The base of the left great toe was injected with 2 cc of  2% Lidocaine plain.  The toe was then prepped with betadine solution.  A tourniquet was applied around the base of the toe for hemostasis.   Next it was checked for adequate anesthesia.      The medial nail was loosened from the nail bed and marginal soft tissue attachments with a blunt instrument. A nail splitter was then used to make a longitudinal cut 2mm from the medial skin fold.  It was completed, atraumatically, under the eponychium with a [place] blade. Next, the nail edge was firmly grasped with a hemostat " and removed. The underlying nail bed was inspected and no abnormalities seen.    The tourniquet was removed. Bacitracin ointment was applied to the nail bed, followed by a compressive dressing.  Mar Zeng tolerated the procedure well.      Mar Zeng is instructed to watch for, and call if,  increasing redness, drainage, and pain after 2-3 days. Post procedure instructions were provided in the After Visit Summary.     I explained that the changes seen in her left second toenail are likely from repetitive trauma, given the contracture of the toe.  Coexisting fungal involvement is possible.  I explained there really is no way to improve the way the nail grows.  She is encouraged to keep it filed and trimmed.      Disclaimer: This note consists of symbols derived from keyboarding, dictation and/or voice recognition software. As a result, there may be errors in the script that have gone undetected. Please consider this when interpreting information found in this chart.    Elmer Ortiz DPM, FACFAS, MS    [facility] Pod[facility]/Foot & Ankle Surgery      ____________________________________________________________________    HPI:       Jes presents today reporting pain involving her left great toe, the medial edge.  She has dealt with this for weeks.  Aching pain rated a 5 out of 10  Fairly constant  More pain with walking and when the area is touched  She has tried to trim the nail and soaked her foot.  Previous walking for exercise    She also inquires about her left second toenail  Trip to [place] in March.  *  Past Medical History:   Diagnosis Date    Mumps     NO ACTIVE PROBLEMS    *  *  Past Surgical History:   Procedure Laterality Date    COLONOSCOPY  8/23/2016    Dr. Shaw [facility]    COLONOSCOPY N/A 8/23/2016    Procedure: COLONOSCOPY;  Surgeon: Peter Shaw MD;  Location:  GI    COLONOSCOPY N/A 10/19/2022    Procedure: COLONOSCOPY with polypectomy using cold exacto snare;   Surgeon: Peter Shaw MD;  Location:  GI    CV CORONARY ANGIOGRAM N/A 10/27/2023    Procedure: Coronary Angiogram;  Surgeon: Elmer Andre MD;  Location:  HEART CARDIAC CATH LAB    FINGER SURGERY      right little finger joint replacement    HYSTERECTOMY VAGINAL  age 30    retained ovaries. no cervix     HYSTERECTOMY, PAP NO LONGER INDICATED      ORTHOPEDIC SURGERY      SURGICAL HISTORY OF -   10/25/13    left index cyst removal    SURGICAL HISTORY OF -       Bilat knee miniscus repair   *  *  Current Outpatient Medications   Medication Sig Dispense Refill    albuterol (ACCUNEB) 0.63 MG/3ML neb solution Take 3 mLs (0.63 mg) by nebulization every 6 hours as needed for shortness of breath, wheezing or cough 90 mL 0    albuterol (PROAIR HFA/PROVENTIL HFA/VENTOLIN HFA) 108 (90 Base) MCG/ACT inhaler Inhale 2 puffs into the lungs every 4 hours as needed for shortness of breath, wheezing or cough 18 g 3    aluminum chloride (DRYSOL) 20 % external solution Apply topically at bedtime 60 mL 0    aspirin 81 MG EC tablet Take 1 tablet (81 mg) by mouth daily 90 tablet 3    atorvastatin (LIPITOR) 40 MG tablet Take 1 tablet (40 mg) by mouth every evening 90 tablet 3    azelastine-fluticasone (DYMISTA) 137-50 MCG/ACT nasal spray Spray 1 spray into both nostrils 2 times daily as needed      budesonide (PULMICORT) 0.5 MG/2ML neb solution Spray 0.5 mg in nostril 2 times daily Uses as nasal rinse twice daily.      budesonide-formoterol (SYMBICORT) 160-4.5 MCG/ACT Inhaler Inhale 2 puffs into the lungs 2 times daily 10.2 g 2    buPROPion (WELLBUTRIN XL) 150 MG 24 hr tablet TAKE 1 TABLET(150 MG) BY MOUTH EVERY MORNING 90 tablet 2    carvedilol (COREG) 3.125 MG tablet Take 1 tablet (3.125 mg) by mouth 2 times daily (with meals) 180 tablet 3    empagliflozin (JARDIANCE) 10 MG TABS tablet Take 1 tablet (10 mg) by mouth daily 90 tablet 1    fexofenadine (ALLEGRA) 180 MG tablet Take 180 mg by mouth every other day       fluorouracil (EFUDEX) 5 % external cream APPLY SPARINGLY EXTERNALLY TO FACE EVERY SUNDAY AT NIGHT. AVOID EYES AND LIPS      furosemide (LASIX) 20 MG tablet Take 0.5 tablets (10 mg) by mouth daily 45 tablet 3    lisinopril (ZESTRIL) 2.5 MG tablet Take 1 tablet (2.5 mg) by mouth 2 times daily 180 tablet 3    loratadine (CLARITIN) 10 MG tablet Take 10 mg by mouth every other day      LORazepam (ATIVAN) 0.5 MG tablet Take 1 tablet (0.5 mg) by mouth nightly as needed for sleep 10 tablet 0    methocarbamol (ROBAXIN) 500 MG tablet Take 1 tablet (500 mg) by mouth 4 times daily as needed for muscle spasms 40 tablet 1    nitroGLYcerin (NITROSTAT) 0.4 MG sublingual tablet For chest pain place 1 tablet under the tongue every 5 minutes for 3 doses. If symptoms persist 5 minutes after 1st dose call 911. 30 tablet 11    spironolactone (ALDACTONE) 25 MG tablet Take 0.5 tablets (12.5 mg) by mouth daily 45 tablet 4    timolol maleate (TIMOPTIC) 0.5 % ophthalmic solution Apply 1 drop topically nightly as needed (dry skin cracks) Apply 1 drop to lesion every night at bedtime. Do Not use on normal skin.      amphetamine-dextroamphetamine (ADDERALL XR) 25 MG 24 hr capsule Take 1 capsule (25 mg) by mouth every morning (Patient not taking: Reported on 12/8/2023) 90 capsule 0         EXAM:    Vitals: /67   Wt 53.1 kg (117 lb)   LMP  (LMP Unknown)   BMI 18.88 kg/m    BMI: Body mass index is 18.88 kg/m .    Constitutional:  Mar Zeng is in no apparent distress, appears well-nourished.  Cooperative with history and physical exam.    Vascular:  Pedal pulses are palpable for both the DP and PT arteries.  CFT < 3 sec.  No edema.      Neuro: Light touch sensation is intact to the L4, L5, S1 distributions  No evidence of weakness, spasticity, or contracture in the lower extremities.     Derm: Light erythema, mild edema and tenderness involving the medial skin fold of the left hallux.    The left second toenail is very thickened  and discolored    Musculoskeletal:    Lower extremity muscle strength is normal.  Digital contractures involving the lesser toes.

## 2024-02-01 NOTE — PATIENT INSTRUCTIONS
Thank you for choosing [facility]!    DR. NEWSOME'S CLINIC LOCATIONS:     [facility] TRIAGE LINE: [phone]   [address] APPOINTMENTS: [phone]   [place] [address] RADIOLOGY: [phone]   (Every other Tues - Wed - Fri PM) SET UP SURGERY: [phone]    PHYSICAL THERAPY: [phone]   [place] SPECIALTY BILLING QUESTIONS: [phone] [id number] FAX: [phone]   [place] [address]    (Thurs & Fri AM)        [facility]  1. Keep bandage on until that evening or the day after your procedure. If the bandage falls off, start the soaking process.    2. Some bleeding is normal. If bleeding seems excessive to you, place ice on top of your foot for 15-20 minutes and elevate your foot above heart level.    3. Over the counter pain medication (tylenol / ibuprofen), elevating your foot and ice application is all you will need for pain control.    4. If the bandage feels too tight and your toe is throbbing it is ok to remove the bandage and start soaking.     5. For one to two weeks, soak your foot twice a day in mild skin friendly soap (dish or hand soap) and warm water for 15 minutes. It is ok to soak your foot for a few minutes to loosen the dressing applied in the clinic. After soaking, blot dry and apply a regular band aid.    6. It is normal to experience some discomfort and redness around the nail for several days following the procedure. Drainage will likely appear a red - yellow. This is normal. If your toe is still draining a red - yellow fluid after 2 weeks keep continuing to soak foot another few days.    7. Initial discomfort might last for 2-3 days. You may resume with regular activity as soon as you are comfortable, as long as you keep the wound clean and dry and follow the soaking instruction. It is recommended that you do not enter public swimming pools/hot tubs while your toe is draining.    8. If you  are experiencing worsening pain and redness or notice pus after 2-3 days please contact the clinic. Ask to speak with a triage nurse and they will inform our team of your symptoms and we can advise if a follow up is needed.

## 2024-02-13 ENCOUNTER — TELEPHONE (OUTPATIENT)
Dept: FAMILY MEDICINE | Facility: CLINIC | Age: 69
End: 2024-02-13
Payer: MEDICARE

## 2024-02-13 DIAGNOSIS — M54.9 UPPER BACK PAIN ON LEFT SIDE: Primary | ICD-10-CM

## 2024-02-13 NOTE — TELEPHONE ENCOUNTER
Please call patient. I can refer her to orthopedics. There isn't really an MRI of the left upper back where she was having her pain. I would prefer them to examine her first and decide if MRI is warranted. Referral placed.    Leroy Hoyos PA-C on 2/13/2024 at 3:18 PM

## 2024-02-13 NOTE — TELEPHONE ENCOUNTER
Pt called and requested a referral for a MRI as PT and meds aren't alleviating the pain. Pt did schedule an appt with AB on 2/15 if needed. If appt isn't needed and referral can be placed then pt requesting a call back for an update .     Referral within [facility]    Pt call back: [phone] Detailed  MINOO Man

## 2024-02-14 ENCOUNTER — OFFICE VISIT (OUTPATIENT)
Dept: ORTHOPEDICS | Facility: CLINIC | Age: 69
End: 2024-02-14
Payer: MEDICARE

## 2024-02-14 VITALS
DIASTOLIC BLOOD PRESSURE: 73 MMHG | BODY MASS INDEX: 18.56 KG/M2 | SYSTOLIC BLOOD PRESSURE: 121 MMHG | HEART RATE: 80 BPM | WEIGHT: 115.5 LBS | HEIGHT: 66 IN

## 2024-02-14 DIAGNOSIS — M54.9 UPPER BACK PAIN ON LEFT SIDE: ICD-10-CM

## 2024-02-14 DIAGNOSIS — S16.1XXA STRAIN OF NECK MUSCLE, INITIAL ENCOUNTER: ICD-10-CM

## 2024-02-14 DIAGNOSIS — M50.30 DEGENERATION OF CERVICAL INTERVERTEBRAL DISC: Primary | ICD-10-CM

## 2024-02-14 PROCEDURE — 99204 OFFICE O/P NEW MOD 45 MIN: CPT | Performed by: FAMILY MEDICINE

## 2024-02-14 NOTE — PROGRESS NOTES
ASSESSMENT & PLAN  Patient Instructions     1. Degeneration of cervical intervertebral disc    2. Strain of neck muscle, initial encounter      -Patient has chronic left-sided neck pain due to muscle spasm likely as a result of nerve root impingement from the cervical spine  -Patient has performed physical therapy, chiropractic treatments, acupuncture, dry needling, topical medications, oral medications including muscle relaxers without relief  -I reviewed cervical spine and left shoulder x-rays taken from an outside center which shows mild multilevel degenerative changes throughout the cervical spine.  No listhesis, scoliosis or acute fractures.  Left shoulder x-rays showed very mild degenerative changes.  -Patient will get an MRI of the cervical spine for further evaluation of intervertebral discs and arthritis potentially causing cervical stenosis  - Your MRI has been ordered. You may call [phone] to schedule over the phone. Once you get notified on [facility] of your results, send me a [facility] message to discuss results and next of treatment options.  Or please call my office 2 days after your MRI is complete to discuss results and next of treatment options.  -Call direct clinic number [[phone]] at any time with questions or concerns.    Albert Yeo MD [facility]  [facility] [place]        -----    SUBJECTIVE  Mar Zeng is a/an 68 year old Right handed female who is seen as a self referral for evaluation of left shoulder pain. The patient is seen by themselves.    Onset: 4 month(s) ago. Reports insidious onset without acute precipitating event.  Location of Pain: left trapezius / rhomboid  Rating of Pain at worst: 10/10  Rating of Pain Currently: 2/10  Worsened by: getting up / getting out of bed, reaching/grabbing items out of cupboard  Better with: nothing yet  Treatments tried: massaging, chiropractor, acupuncture, tens unit, dry needling,  "Voltaren cream, heat, ice, hook & tennis ball rolling,  physical therapy (3 visits), corticosteroid injection - trapezius/trigger point with no relief, muscle relaxer w/ no relief  Associated symptoms: no distal numbness or tingling; denies swelling or warmth, sharp, muscle spasms, pt feels \"lumps\" in painful area  Orthopedic history: NO  Relevant surgical history: NO  Social history: retired    Pt reports having heart troubles starting around the same time    Past Medical History:   Diagnosis Date    Mumps     NO ACTIVE PROBLEMS      Social History     Socioeconomic History    Marital status:    Tobacco Use    Smoking status: Former     Packs/day: 0     Types: Cigarettes     Passive exposure: Past    Smokeless tobacco: Never   Vaping Use    Vaping Use: Never used   Substance and Sexual Activity    Alcohol use: Yes     Alcohol/week: 0.0 standard drinks of alcohol     Comment: avg:3-4 drink/wk    Drug use: No    Sexual activity: Yes     Partners: Male     Birth control/protection: Female Surgical   Other Topics Concern    Parent/sibling w/ CABG, MI or angioplasty before 65F 55M? No   Social History Narrative    [place] in [place]     Social Determinants of Health     Financial Resource Strain: Low Risk  (1/29/2024)    Financial Resource Strain     Within the past 12 months, have you or your family members you live with been unable to get utilities (heat, electricity) when it was really needed?: No   Food Insecurity: Low Risk  (1/29/2024)    Food Insecurity     Within the past 12 months, did you worry that your food would run out before you got money to buy more?: No     Within the past 12 months, did the food you bought just not last and you didn t have money to get more?: No   Transportation Needs: Low Risk  (1/29/2024)    Transportation Needs     Within the past 12 months, has lack of transportation kept you from medical appointments, getting your medicines, non-medical meetings or appointments, work, or from " "getting things that you need?: No   Interpersonal Safety: Low Risk  (11/2/2023)    Interpersonal Safety     Do you feel physically and emotionally safe where you currently live?: Yes     Within the past 12 months, have you been hit, slapped, kicked or otherwise physically hurt by someone?: No     Within the past 12 months, have you been humiliated or emotionally abused in other ways by your partner or ex-partner?: No   Housing Stability: Low Risk  (1/29/2024)    Housing Stability     Do you have housing? : Yes     Are you worried about losing your housing?: No         Patient's past medical, surgical, social, and family histories were reviewed today and no changes are noted.    REVIEW OF SYSTEMS:  10 point ROS is negative other than symptoms noted above in HPI, Past Medical History or as stated below  Constitutional: NEGATIVE for fever, chills, change in weight  Skin: NEGATIVE for worrisome rashes, moles or lesions  GI/: NEGATIVE for bowel or bladder changes  Neuro: NEGATIVE for weakness, dizziness or paresthesias    OBJECTIVE:  /73   Pulse 80   Ht 1.676 m (5' 6\")   Wt 52.4 kg (115 lb 8 oz)   LMP  (LMP Unknown)   BMI 18.64 kg/m     General: healthy, alert and in no distress  HEENT: no scleral icterus or conjunctival erythema  Skin: no suspicious lesions or rash. No jaundice.  CV: regular rhythm by palpation  Resp: normal respiratory effort without conversational dyspnea   Psych: normal mood and affect  Gait: normal steady gait with appropriate coordination and balance  Neuro: normal light touch sensory exam of the bilateral upper extremities.    MSK:  LEFT SHOULDER  Inspection:    no swelling, bruising, discoloration, or obvious deformity or asymmetry  Palpation:  bony and tendinous landmarks are nontender.  Active Range of Motion:     Abduction normal0, FF normal0, ER normal0, IR normal.    Strength:    Scapular plane abduction grossly intact,  ER grossly intact, IR grossly intact, biceps grossly " intact, triceps grossly intact  Special Tests:    Positive: none    Negative: Neer's, Noel', supraspinatus (empty can), crossed arm adduction, and [place]'s    CERVICAL SPINE  Inspection:    normal cervical lordosis present, rounded shoulders, forward head posture  Palpation:    Tender about the paracervical musculature (left), levator scapula (left), and upper trapezius (left). Otherwise remainder of the landmarks and nontender.  Range of Motion:     Flexion within normal limits    Extension within normal limits    Right side bend within normal limits    Left side bend within normal limits    Right rotation within normal limits    Left rotation within normal limits  Strength:    Full strength throughout all neck muscles  Special Tests:    Positive: None    Negative: Spurling's (bilateral), Garcia's (bilateral)    Independent visualization of the below image:  No results found for this or any previous visit (from the past 24 hour(s)).        Albert Yeo MD [facility]

## 2024-02-14 NOTE — PATIENT INSTRUCTIONS
1. Degeneration of cervical intervertebral disc    2. Strain of neck muscle, initial encounter      -Patient has chronic left-sided neck pain due to muscle spasm likely as a result of nerve root impingement from the cervical spine  -Patient has performed physical therapy, chiropractic treatments, acupuncture, dry needling, topical medications, oral medications including muscle relaxers without relief  -I reviewed cervical spine and left shoulder x-rays taken from an outside center which shows mild multilevel degenerative changes throughout the cervical spine.  No listhesis, scoliosis or acute fractures.  Left shoulder x-rays showed very mild degenerative changes.  -Patient will get an MRI of the cervical spine for further evaluation of intervertebral discs and arthritis potentially causing cervical stenosis  - Your MRI has been ordered. You may call [phone] to schedule over the phone. Once you get notified on [employer] of your results, send me a [employer] message to discuss results and next of treatment options.  Or please call my office 2 days after your MRI is complete to discuss results and next of treatment options.  -Call direct clinic number [[phone]] at any time with questions or concerns.    Albert Yeo MD [facility]  [facility] [place]

## 2024-02-14 NOTE — LETTER
2/14/2024         RE: Mar Zeng  [address] [facility] [address]        Dear Colleague,    Thank you for referring your patient, Mar Zeng, to the [facility] [place]. Please see a copy of my visit note below.    ASSESSMENT & PLAN  Patient Instructions     1. Degeneration of cervical intervertebral disc    2. Strain of neck muscle, initial encounter      -Patient has chronic left-sided neck pain due to muscle spasm likely as a result of nerve root impingement from the cervical spine  -Patient has performed physical therapy, chiropractic treatments, acupuncture, dry needling, topical medications, oral medications including muscle relaxers without relief  -I reviewed cervical spine and left shoulder x-rays taken from an outside center which shows mild multilevel degenerative changes throughout the cervical spine.  No listhesis, scoliosis or acute fractures.  Left shoulder x-rays showed very mild degenerative changes.  -Patient will get an MRI of the cervical spine for further evaluation of intervertebral discs and arthritis potentially causing cervical stenosis  - Your MRI has been ordered. You may call [phone] to schedule over the phone. Once you get notified on [facility] of your results, send me a [facility] message to discuss results and next of treatment options.  Or please call my office 2 days after your MRI is complete to discuss results and next of treatment options.  -Call direct clinic number [[phone]] at any time with questions or concerns.    Albert Yeo MD [facility] Orthopedics and Sports Medicine  [facility] [place]        -----    SUBJECTIVE  Mar Zeng is a/an 68 year old Right handed female who is seen as a self referral for evaluation of left shoulder pain. The patient is seen by themselves.    Onset: 4 month(s) ago. Reports insidious onset without acute precipitating event.  Location of Pain:  "left trapezius / rhomboid  Rating of Pain at worst: 10/10  Rating of Pain Currently: 2/10  Worsened by: getting up / getting out of bed, reaching/grabbing items out of cupboard  Better with: nothing yet  Treatments tried: massaging, chiropractor, acupuncture, tens unit, dry needling, Voltaren cream, heat, ice, hook & tennis ball rolling,  physical therapy (3 visits), corticosteroid injection - trapezius/trigger point with no relief, muscle relaxer w/ no relief  Associated symptoms: no distal numbness or tingling; denies swelling or warmth, sharp, muscle spasms, pt feels \"lumps\" in painful area  Orthopedic history: NO  Relevant surgical history: NO  Social history: retired    Pt reports having heart troubles starting around the same time    Past Medical History:   Diagnosis Date     Mumps      NO ACTIVE PROBLEMS      Social History     Socioeconomic History     Marital status:    Tobacco Use     Smoking status: Former     Packs/day: 0     Types: Cigarettes     Passive exposure: Past     Smokeless tobacco: Never   Vaping Use     Vaping Use: Never used   Substance and Sexual Activity     Alcohol use: Yes     Alcohol/week: 0.0 standard drinks of alcohol     Comment: avg:3-4 drink/wk     Drug use: No     Sexual activity: Yes     Partners: Male     Birth control/protection: Female Surgical   Other Topics Concern     Parent/sibling w/ CABG, MI or angioplasty before 65F 55M? No   Social History Narrative    [place] in [place]     Social Determinants of Health     Financial Resource Strain: Low Risk  (1/29/2024)    Financial Resource Strain      Within the past 12 months, have you or your family members you live with been unable to get utilities (heat, electricity) when it was really needed?: No   Food Insecurity: Low Risk  (1/29/2024)    Food Insecurity      Within the past 12 months, did you worry that your food would run out before you got money to buy more?: No      Within the past 12 months, did the food you " "bought just not last and you didn t have money to get more?: No   Transportation Needs: Low Risk  (1/29/2024)    Transportation Needs      Within the past 12 months, has lack of transportation kept you from medical appointments, getting your medicines, non-medical meetings or appointments, work, or from getting things that you need?: No   Interpersonal Safety: Low Risk  (11/2/2023)    Interpersonal Safety      Do you feel physically and emotionally safe where you currently live?: Yes      Within the past 12 months, have you been hit, slapped, kicked or otherwise physically hurt by someone?: No      Within the past 12 months, have you been humiliated or emotionally abused in other ways by your partner or ex-partner?: No   Housing Stability: Low Risk  (1/29/2024)    Housing Stability      Do you have housing? : Yes      Are you worried about losing your housing?: No         Patient's past medical, surgical, social, and family histories were reviewed today and no changes are noted.    REVIEW OF SYSTEMS:  10 point ROS is negative other than symptoms noted above in HPI, Past Medical History or as stated below  Constitutional: NEGATIVE for fever, chills, change in weight  Skin: NEGATIVE for worrisome rashes, moles or lesions  GI/: NEGATIVE for bowel or bladder changes  Neuro: NEGATIVE for weakness, dizziness or paresthesias    OBJECTIVE:  /73   Pulse 80   Ht 1.676 m (5' 6\")   Wt 52.4 kg (115 lb 8 oz)   LMP  (LMP Unknown)   BMI 18.64 kg/m     General: healthy, alert and in no distress  HEENT: no scleral icterus or conjunctival erythema  Skin: no suspicious lesions or rash. No jaundice.  CV: regular rhythm by palpation  Resp: normal respiratory effort without conversational dyspnea   Psych: normal mood and affect  Gait: normal steady gait with appropriate coordination and balance  Neuro: normal light touch sensory exam of the bilateral upper extremities.    MSK:  LEFT SHOULDER  Inspection:    no swelling, " bruising, discoloration, or obvious deformity or asymmetry  Palpation:  bony and tendinous landmarks are nontender.  Active Range of Motion:     Abduction normal0, FF normal0, ER normal0, IR normal.    Strength:    Scapular plane abduction grossly intact,  ER grossly intact, IR grossly intact, biceps grossly intact, triceps grossly intact  Special Tests:    Positive: none    Negative: Neer's, Noel', supraspinatus (empty can), crossed arm adduction, and [place]'s    CERVICAL SPINE  Inspection:    normal cervical lordosis present, rounded shoulders, forward head posture  Palpation:    Tender about the paracervical musculature (left), levator scapula (left), and upper trapezius (left). Otherwise remainder of the landmarks and nontender.  Range of Motion:     Flexion within normal limits    Extension within normal limits    Right side bend within normal limits    Left side bend within normal limits    Right rotation within normal limits    Left rotation within normal limits  Strength:    Full strength throughout all neck muscles  Special Tests:    Positive: None    Negative: Spurling's (bilateral), Garcia's (bilateral)    Independent visualization of the below image:  No results found for this or any previous visit (from the past 24 hour(s)).        Albert Yeo MD [facility] Orth[facility]      Again, thank you for allowing me to participate in the care of your patient.        Sincerely,        Albert Yeo, MD

## 2024-02-16 ENCOUNTER — OFFICE VISIT (OUTPATIENT)
Dept: PULMONOLOGY | Facility: CLINIC | Age: 69
End: 2024-02-16
Attending: INTERNAL MEDICINE
Payer: MEDICARE

## 2024-02-16 ENCOUNTER — OFFICE VISIT (OUTPATIENT)
Dept: PULMONOLOGY | Facility: CLINIC | Age: 69
End: 2024-02-16
Payer: MEDICARE

## 2024-02-16 VITALS — OXYGEN SATURATION: 97 % | SYSTOLIC BLOOD PRESSURE: 114 MMHG | DIASTOLIC BLOOD PRESSURE: 71 MMHG | HEART RATE: 69 BPM

## 2024-02-16 DIAGNOSIS — J45.20 MILD INTERMITTENT ASTHMA, UNSPECIFIED WHETHER COMPLICATED: ICD-10-CM

## 2024-02-16 DIAGNOSIS — I42.8 NON-ISCHEMIC CARDIOMYOPATHY (H): ICD-10-CM

## 2024-02-16 DIAGNOSIS — J45.20 MILD INTERMITTENT ASTHMA, UNSPECIFIED WHETHER COMPLICATED: Primary | ICD-10-CM

## 2024-02-16 DIAGNOSIS — Z23 NEED FOR VACCINATION: ICD-10-CM

## 2024-02-16 PROCEDURE — G0009 ADMIN PNEUMOCOCCAL VACCINE: HCPCS | Performed by: INTERNAL MEDICINE

## 2024-02-16 PROCEDURE — G0463 HOSPITAL OUTPT CLINIC VISIT: HCPCS | Mod: 25 | Performed by: INTERNAL MEDICINE

## 2024-02-16 PROCEDURE — 250N000011 HC RX IP 250 OP 636: Performed by: INTERNAL MEDICINE

## 2024-02-16 PROCEDURE — 94375 RESPIRATORY FLOW VOLUME LOOP: CPT | Performed by: INTERNAL MEDICINE

## 2024-02-16 PROCEDURE — 99214 OFFICE O/P EST MOD 30 MIN: CPT | Mod: 25 | Performed by: INTERNAL MEDICINE

## 2024-02-16 PROCEDURE — 90677 PCV20 VACCINE IM: CPT | Performed by: INTERNAL MEDICINE

## 2024-02-16 RX ADMIN — PNEUMOCOCCAL 20-VALENT CONJUGATE VACCINE 0.5 ML
2.2; 2.2; 2.2; 2.2; 2.2; 2.2; 2.2; 2.2; 2.2; 2.2; 2.2; 2.2; 2.2; 2.2; 2.2; 2.2; 4.4; 2.2; 2.2; 2.2 INJECTION, SUSPENSION INTRAMUSCULAR at 14:22

## 2024-02-16 ASSESSMENT — PAIN SCALES - GENERAL: PAINLEVEL: NO PAIN (0)

## 2024-02-16 NOTE — LETTER
2/16/2024         RE: Mar Zeng  [address]  [facility] [address]        Dear Colleague,    Thank you for referring your patient, Mar Zeng, to the [facility] [place]. Please see a copy of my visit note below.    [facility]   Pulmonary Clinic Follow Up     Mar Zeng MRN: [id number]  Date: 02/16/2024    CC: Asthma follow up      HPI:  Mar Zeng is a 68F with mild intermittent asthma, chronic sinusitis, NICM (EF 30-35% 10/2023), chronic LBBB, and ADHD here for follow up. Patient seen by me in 9/2023.     Interval history: Called clinic 10/23/23 for new 4 pillow orthopnea, worsening OLSON, BLE [place]. I recommended her to go to ED. Admitted 10/25-10/31 for new acute HF exacerbation. [facility] with non obstructive coronaries. Now following cardiology and HF clinic outpatient. Based on last cardiology HF note in 12/8/23, plan is for repeat TTE in 2 monthsand if EF still low despite medication optimization, consideration for CRT-D. Etiology of acute HF thought to be mainly viral (URI 7/2023). Also had rheumatic fever as child however MR initially 3+ inpatient TTE and mild on outpatient cardiac MRI.     Today: Breathing overall better. Symbicort and albuterol nebs prn. No more orthpnea or nightime wakenings. Mild OLSON. Not as active as she used to be. Walk around the house. Able to climb stairs at home just fine. No fevers or chills. Year round runny nose. Takes antihistamine and nasal spray.     ROS: As stated in HPI     PMHx/PSHx:  Past Medical History:   Diagnosis Date    Mumps     NO ACTIVE PROBLEMS      Past Surgical History:   Procedure Laterality Date    COLONOSCOPY  8/23/2016    Dr. Shaw [facility]    COLONOSCOPY N/A 8/23/2016    Procedure: COLONOSCOPY;  Surgeon: Peter Shaw MD;  Location:  GI    COLONOSCOPY N/A 10/19/2022    Procedure: COLONOSCOPY with polypectomy using  cold exacto snare;  Surgeon: Peter Shaw MD;  Location:  GI    CV CORONARY ANGIOGRAM N/A 10/27/2023    Procedure: Coronary Angiogram;  Surgeon: Elmer Andre MD;  Location:  HEART CARDIAC CATH LAB    FINGER SURGERY      right little finger joint replacement    HYSTERECTOMY VAGINAL  age 30    retained ovaries. no cervix     HYSTERECTOMY, PAP NO LONGER INDICATED      ORTHOPEDIC SURGERY      SURGICAL HISTORY OF -   10/25/13    left index cyst removal    SURGICAL HISTORY OF -       Bilat knee miniscus repair       Outpatient Medications:   albuterol (ACCUNEB) 0.63 MG/3ML neb solution, Take 3 mLs (0.63 mg) by nebulization every 6 hours as needed for shortness of breath, wheezing or cough  albuterol (PROAIR HFA/PROVENTIL HFA/VENTOLIN HFA) 108 (90 Base) MCG/ACT inhaler, Inhale 2 puffs into the lungs every 4 hours as needed for shortness of breath, wheezing or cough  aluminum chloride (DRYSOL) 20 % external solution, Apply topically at bedtime  aspirin 81 MG EC tablet, Take 1 tablet (81 mg) by mouth daily  atorvastatin (LIPITOR) 40 MG tablet, Take 1 tablet (40 mg) by mouth every evening  azelastine-fluticasone (DYMISTA) 137-50 MCG/ACT nasal spray, Spray 1 spray into both nostrils 2 times daily as needed  budesonide (PULMICORT) 0.5 MG/2ML neb solution, Spray 0.5 mg in nostril 2 times daily Uses as nasal rinse twice daily.  budesonide-formoterol (SYMBICORT) 160-4.5 MCG/ACT Inhaler, Inhale 2 puffs into the lungs 2 times daily  buPROPion (WELLBUTRIN XL) 150 MG 24 hr tablet, TAKE 1 TABLET(150 MG) BY MOUTH EVERY MORNING  carvedilol (COREG) 3.125 MG tablet, Take 1 tablet (3.125 mg) by mouth 2 times daily (with meals)  empagliflozin (JARDIANCE) 10 MG TABS tablet, Take 1 tablet (10 mg) by mouth daily  fexofenadine (ALLEGRA) 180 MG tablet, Take 180 mg by mouth every other day  fluorouracil (EFUDEX) 5 % external cream, APPLY SPARINGLY EXTERNALLY TO FACE EVERY SUNDAY AT NIGHT. AVOID EYES AND LIPS  furosemide (LASIX)  20 MG tablet, Take 0.5 tablets (10 mg) by mouth daily  lisinopril (ZESTRIL) 2.5 MG tablet, Take 1 tablet (2.5 mg) by mouth 2 times daily  loratadine (CLARITIN) 10 MG tablet, Take 10 mg by mouth every other day  LORazepam (ATIVAN) 0.5 MG tablet, Take 1 tablet (0.5 mg) by mouth nightly as needed for sleep  methocarbamol (ROBAXIN) 500 MG tablet, Take 1 tablet (500 mg) by mouth 4 times daily as needed for muscle spasms  nitroGLYcerin (NITROSTAT) 0.4 MG sublingual tablet, For chest pain place 1 tablet under the tongue every 5 minutes for 3 doses. If symptoms persist 5 minutes after 1st dose call 911.  spironolactone (ALDACTONE) 25 MG tablet, Take 0.5 tablets (12.5 mg) by mouth daily  timolol maleate (TIMOPTIC) 0.5 % ophthalmic solution, Apply 1 drop topically nightly as needed (dry skin cracks) Apply 1 drop to lesion every night at bedtime. Do Not use on normal skin.  amphetamine-dextroamphetamine (ADDERALL XR) 25 MG 24 hr capsule, Take 1 capsule (25 mg) by mouth every morning (Patient not taking: Reported on 12/8/2023)    No current facility-administered medications on file prior to visit.    Physical Exam:   /71 (BP Location: Right arm, Patient Position: Sitting, Cuff Size: Adult Regular)   Pulse 69   LMP  (LMP Unknown)   SpO2 97%   - Gen: Aox3, NAD   - Lung: CTAB, no respiratory distress   - Ext: No BLE swelling  - Skin: No major rashes or lesions  - Neuro: CN grossly intact     Labs:   Absolute eosinophils ~100  9/26/23 ANCA negative     Images/Studies:   2/16/24 PFT  Normal spirometry. Bronchodilator testing not completed.     10/28/23 CT Chest WO  Personal read: Lung parenchyma unremarkable. Borderline/mild central bronchiectasis. Trace b/l effusion and no pulmonary edema - significantly improved compared to 10/25/23 CTPE.     ASSESSMENT:    # ?Mild intermittent asthma   # Mild central bronchiectasis   # Chronic sinusitis   # NICM (EF 30-35%)     Patient presents for follow up, last seen for months of OLSON  and fatigue. Was tentatively diagnosed with asthma based on clinical symptoms but in the interim developed acute HF. Many of her sx we initially saw her for has now resolved with GDMT of HF. Therefore, there is a question of whether she truly has asthma versus cardiac asthma. Unfortunately bronchodilation not done today with spirometry. Will proceed with methacholine challenge.     In terms of mild central bronchiectasis, unclear etiology. Likely from prior rheumatic fever and chronic sinusitis/post nasal drip. No h/o TB, recurrent bronchitis, aspiration. Airways a patent w/o mucous plugging. NTD.     PLAN:    - Methacholine challenge. Continue Symbicort and Albuterol nebs prn for now   - Immunization: Prevnar 20 today, RSV in a few weeks    RTC 6 months. If methacholine challenge negative, will reach out to pt and can follow up prn.    Patient seen and discussed with Dr. Zonia Swain MD   Pulmonary/Critical Care Fellow  Pager: [phone]     Attending note:  Patient seen, examined and discussed with Dr. Swain. All data reviewed. Agree with the assessment and plan as outlined in the above note.  Overall breathing is better from a respiratory standpoint. Will obtain methacholine challenge to confirm a diagnosis of asthma, previous findings may have all been related to heart failure..      Lisa Brar MD  [phone]

## 2024-02-16 NOTE — PATIENT INSTRUCTIONS
- Continue using Symbicort inhaler and Albuterol nebs as needed  - Please get RSV vaccine at local [facility], [facility], or [facility]   - You will be called to schedule a methacholine challenge to see if you have asthma   - Follow up with Dr. Lorenzo in 6 months

## 2024-02-16 NOTE — NURSING NOTE
Chief Complaint   Patient presents with    Follow Up     Asthma      Vitals were taken and medications were reconciled.     Malina Powell RMA  12:50 PM

## 2024-02-16 NOTE — PROGRESS NOTES
[facility]   Pulmonary Clinic Follow Up     Mar Zeng MRN: [id number]  Date: 02/16/2024    CC: Asthma follow up      HPI:  Mar Zeng is a 68F with mild intermittent asthma, chronic sinusitis, NICM (EF 30-35% 10/2023), chronic LBBB, and ADHD here for follow up. Patient seen by me in 9/2023.     Interval history: Called clinic 10/23/23 for new 4 pillow orthopnea, worsening OLSON, BLE [place]. I recommended her to go to ED. Admitted 10/25-10/31 for new acute HF exacerbation. [facility] with non obstructive coronaries. Now following cardiology and HF clinic outpatient. Based on last cardiology HF note in 12/8/23, plan is for repeat TTE in 2 monthsand if EF still low despite medication optimization, consideration for CRT-D. Etiology of acute HF thought to be mainly viral (URI 7/2023). Also had rheumatic fever as child however MR initially 3+ inpatient TTE and mild on outpatient cardiac MRI.     Today: Breathing overall better. Symbicort and albuterol nebs prn. No more orthpnea or nightime wakenings. Mild OLSON. Not as active as she used to be. Walk around the house. Able to climb stairs at home just fine. No fevers or chills. Year round runny nose. Takes antihistamine and nasal spray.     ROS: As stated in HPI     PMHx/PSHx:  Past Medical History:   Diagnosis Date    Mumps     NO ACTIVE PROBLEMS      Past Surgical History:   Procedure Laterality Date    COLONOSCOPY  8/23/2016    Dr. Shaw [facility]    COLONOSCOPY N/A 8/23/2016    Procedure: COLONOSCOPY;  Surgeon: Peter Shaw MD;  Location:  GI    COLONOSCOPY N/A 10/19/2022    Procedure: COLONOSCOPY with polypectomy using cold exacto snare;  Surgeon: Peter Shaw MD;  Location:  GI    CV CORONARY ANGIOGRAM N/A 10/27/2023    Procedure: Coronary Angiogram;  Surgeon: Elmer Andre MD;  Location:  HEART CARDIAC CATH LAB    FINGER SURGERY      right little finger joint replacement    HYSTERECTOMY VAGINAL  age 30     retained ovaries. no cervix     HYSTERECTOMY, PAP NO LONGER INDICATED      ORTHOPEDIC SURGERY      SURGICAL HISTORY OF -   10/25/13    left index cyst removal    SURGICAL HISTORY OF -       Bilat knee miniscus repair       Outpatient Medications:   albuterol (ACCUNEB) 0.63 MG/3ML neb solution, Take 3 mLs (0.63 mg) by nebulization every 6 hours as needed for shortness of breath, wheezing or cough  albuterol (PROAIR HFA/PROVENTIL HFA/VENTOLIN HFA) 108 (90 Base) MCG/ACT inhaler, Inhale 2 puffs into the lungs every 4 hours as needed for shortness of breath, wheezing or cough  aluminum chloride (DRYSOL) 20 % external solution, Apply topically at bedtime  aspirin 81 MG EC tablet, Take 1 tablet (81 mg) by mouth daily  atorvastatin (LIPITOR) 40 MG tablet, Take 1 tablet (40 mg) by mouth every evening  azelastine-fluticasone (DYMISTA) 137-50 MCG/ACT nasal spray, Spray 1 spray into both nostrils 2 times daily as needed  budesonide (PULMICORT) 0.5 MG/2ML neb solution, Spray 0.5 mg in nostril 2 times daily Uses as nasal rinse twice daily.  budesonide-formoterol (SYMBICORT) 160-4.5 MCG/ACT Inhaler, Inhale 2 puffs into the lungs 2 times daily  buPROPion (WELLBUTRIN XL) 150 MG 24 hr tablet, TAKE 1 TABLET(150 MG) BY MOUTH EVERY MORNING  carvedilol (COREG) 3.125 MG tablet, Take 1 tablet (3.125 mg) by mouth 2 times daily (with meals)  empagliflozin (JARDIANCE) 10 MG TABS tablet, Take 1 tablet (10 mg) by mouth daily  fexofenadine (ALLEGRA) 180 MG tablet, Take 180 mg by mouth every other day  fluorouracil (EFUDEX) 5 % external cream, APPLY SPARINGLY EXTERNALLY TO FACE EVERY SUNDAY AT NIGHT. AVOID EYES AND LIPS  furosemide (LASIX) 20 MG tablet, Take 0.5 tablets (10 mg) by mouth daily  lisinopril (ZESTRIL) 2.5 MG tablet, Take 1 tablet (2.5 mg) by mouth 2 times daily  loratadine (CLARITIN) 10 MG tablet, Take 10 mg by mouth every other day  LORazepam (ATIVAN) 0.5 MG tablet, Take 1 tablet (0.5 mg) by mouth nightly as needed for  sleep  methocarbamol (ROBAXIN) 500 MG tablet, Take 1 tablet (500 mg) by mouth 4 times daily as needed for muscle spasms  nitroGLYcerin (NITROSTAT) 0.4 MG sublingual tablet, For chest pain place 1 tablet under the tongue every 5 minutes for 3 doses. If symptoms persist 5 minutes after 1st dose call 911.  spironolactone (ALDACTONE) 25 MG tablet, Take 0.5 tablets (12.5 mg) by mouth daily  timolol maleate (TIMOPTIC) 0.5 % ophthalmic solution, Apply 1 drop topically nightly as needed (dry skin cracks) Apply 1 drop to lesion every night at bedtime. Do Not use on normal skin.  amphetamine-dextroamphetamine (ADDERALL XR) 25 MG 24 hr capsule, Take 1 capsule (25 mg) by mouth every morning (Patient not taking: Reported on 12/8/2023)    No current facility-administered medications on file prior to visit.    Physical Exam:   /71 (BP Location: Right arm, Patient Position: Sitting, Cuff Size: Adult Regular)   Pulse 69   LMP  (LMP Unknown)   SpO2 97%   - Gen: Aox3, NAD   - Lung: CTAB, no respiratory distress   - Ext: No BLE swelling  - Skin: No major rashes or lesions  - Neuro: CN grossly intact     Labs:   Absolute eosinophils ~100  9/26/23 ANCA negative     Images/Studies:   2/16/24 PFT  Normal spirometry. Bronchodilator testing not completed.     10/28/23 CT Chest WO  Personal read: Lung parenchyma unremarkable. Borderline/mild central bronchiectasis. Trace b/l effusion and no pulmonary edema - significantly improved compared to 10/25/23 CTPE.     ASSESSMENT:    # ?Mild intermittent asthma   # Mild central bronchiectasis   # Chronic sinusitis   # NICM (EF 30-35%)     Patient presents for follow up, last seen for months of OLSON and fatigue. Was tentatively diagnosed with asthma based on clinical symptoms but in the interim developed acute HF. Many of her sx we initially saw her for has now resolved with GDMT of HF. Therefore, there is a question of whether she truly has asthma versus cardiac asthma. Unfortunately  bronchodilation not done today with spirometry. Will proceed with methacholine challenge.     In terms of mild central bronchiectasis, unclear etiology. Likely from prior rheumatic fever and chronic sinusitis/post nasal drip. No h/o TB, recurrent bronchitis, aspiration. Airways a patent w/o mucous plugging. NTD.     PLAN:    - Methacholine challenge. Continue Symbicort and Albuterol nebs prn for now   - Immunization: Prevnar 20 today, RSV in a few weeks    RTC 6 months. If methacholine challenge negative, will reach out to pt and can follow up prn.    Patient seen and discussed with Dr. Zonia Swain MD   Pulmonary/Critical Care Fellow  Pager: [phone]     Attending note:  Patient seen, examined and discussed with Dr. Swain. All data reviewed. Agree with the assessment and plan as outlined in the above note.  Overall breathing is better from a respiratory standpoint. Will obtain methacholine challenge to confirm a diagnosis of asthma, previous findings may have all been related to heart failure..      Lisa Brar MD  [phone]

## 2024-02-19 LAB
EXPTIME-PRE: 7.24 SEC
FEF2575-%PRED-PRE: 80 %
FEF2575-PRE: 1.51 L/SEC
FEF2575-PRED: 1.87 L/SEC
FEFMAX-%PRED-PRE: 105 %
FEFMAX-PRE: 6.33 L/SEC
FEFMAX-PRED: 6 L/SEC
FEV1-%PRED-PRE: 107 %
FEV1-PRE: 2.37 L
FEV1FEV6-PRE: 72 %
FEV1FEV6-PRED: 79 %
FEV1FVC-PRE: 72 %
FEV1FVC-PRED: 79 %
FIFMAX-PRE: 5.24 L/SEC
FVC-%PRED-PRE: 117 %
FVC-PRE: 3.32 L
FVC-PRED: 2.83 L

## 2024-02-22 ENCOUNTER — TELEPHONE (OUTPATIENT)
Dept: FAMILY MEDICINE | Facility: CLINIC | Age: 69
End: 2024-02-22
Payer: MEDICARE

## 2024-02-22 NOTE — TELEPHONE ENCOUNTER
Pt calls.  She is wondering if she has to wait a while between a pneumonia and RSV vaccine?      She had a pneumonia shot on 2/16/24.  She said she had an allergy appt.

## 2024-02-22 NOTE — TELEPHONE ENCOUNTER
Talked to Lianne Moyer and Dr. Zheng.  They advised it should be fine to get the RSV shot tomorrow.  They are not live vaccines.  Advised pt to make sure to check with her insurance where she should get the shot.  She said she is supposed to get it in the pharmacy.

## 2024-02-23 ENCOUNTER — HOSPITAL ENCOUNTER (OUTPATIENT)
Dept: MRI IMAGING | Facility: CLINIC | Age: 69
Discharge: HOME | End: 2024-02-23
Attending: FAMILY MEDICINE | Admitting: FAMILY MEDICINE
Payer: MEDICARE

## 2024-02-23 DIAGNOSIS — S16.1XXA STRAIN OF NECK MUSCLE, INITIAL ENCOUNTER: ICD-10-CM

## 2024-02-23 DIAGNOSIS — M50.30 DEGENERATION OF CERVICAL INTERVERTEBRAL DISC: ICD-10-CM

## 2024-02-23 PROCEDURE — 72141 MRI NECK SPINE W/O DYE: CPT | Mod: ME

## 2024-02-26 ENCOUNTER — HOSPITAL ENCOUNTER (OUTPATIENT)
Dept: CARDIOLOGY | Facility: CLINIC | Age: 69
Discharge: HOME | End: 2024-02-26
Attending: PHYSICIAN ASSISTANT | Admitting: PHYSICIAN ASSISTANT
Payer: MEDICARE

## 2024-02-26 DIAGNOSIS — I42.8 NONISCHEMIC CARDIOMYOPATHY (H): ICD-10-CM

## 2024-02-26 LAB — 3D LVEF ECHO: NORMAL

## 2024-02-26 PROCEDURE — 93306 TTE W/DOPPLER COMPLETE: CPT

## 2024-02-26 PROCEDURE — 93306 TTE W/DOPPLER COMPLETE: CPT | Mod: 26 | Performed by: INTERNAL MEDICINE

## 2024-02-27 ENCOUNTER — OFFICE VISIT (OUTPATIENT)
Dept: CARDIOLOGY | Facility: CLINIC | Age: 69
End: 2024-02-27
Payer: MEDICARE

## 2024-02-27 ENCOUNTER — LAB (OUTPATIENT)
Dept: LAB | Facility: CLINIC | Age: 69
End: 2024-02-27
Payer: MEDICARE

## 2024-02-27 VITALS
WEIGHT: 116.4 LBS | HEART RATE: 72 BPM | OXYGEN SATURATION: 98 % | DIASTOLIC BLOOD PRESSURE: 68 MMHG | BODY MASS INDEX: 18.71 KG/M2 | HEIGHT: 66 IN | SYSTOLIC BLOOD PRESSURE: 104 MMHG

## 2024-02-27 DIAGNOSIS — I42.9 CARDIOMYOPATHY, UNSPECIFIED TYPE (H): ICD-10-CM

## 2024-02-27 DIAGNOSIS — I42.8 NONISCHEMIC CARDIOMYOPATHY (H): ICD-10-CM

## 2024-02-27 LAB
ANION GAP SERPL CALCULATED.3IONS-SCNC: 8 MMOL/L (ref 7–15)
BUN SERPL-MCNC: 32.1 MG/DL (ref 8–23)
CALCIUM SERPL-MCNC: 9.6 MG/DL (ref 8.8–10.2)
CHLORIDE SERPL-SCNC: 100 MMOL/L (ref 98–107)
CREAT SERPL-MCNC: 1.3 MG/DL (ref 0.51–0.95)
DEPRECATED HCO3 PLAS-SCNC: 27 MMOL/L (ref 22–29)
EGFRCR SERPLBLD CKD-EPI 2021: 44 ML/MIN/1.73M2
FERRITIN SERPL-MCNC: 77 NG/ML (ref 11–328)
GLUCOSE SERPL-MCNC: 91 MG/DL (ref 70–99)
IRON BINDING CAPACITY (ROCHE): 296 UG/DL (ref 240–430)
IRON SATN MFR SERPL: 28 % (ref 15–46)
IRON SERPL-MCNC: 82 UG/DL (ref 37–145)
POTASSIUM SERPL-SCNC: 4.7 MMOL/L (ref 3.4–5.3)
SODIUM SERPL-SCNC: 135 MMOL/L (ref 135–145)

## 2024-02-27 PROCEDURE — 82728 ASSAY OF FERRITIN: CPT | Performed by: PHYSICIAN ASSISTANT

## 2024-02-27 PROCEDURE — 83550 IRON BINDING TEST: CPT | Performed by: PHYSICIAN ASSISTANT

## 2024-02-27 PROCEDURE — 36415 COLL VENOUS BLD VENIPUNCTURE: CPT | Performed by: PHYSICIAN ASSISTANT

## 2024-02-27 PROCEDURE — 80048 BASIC METABOLIC PNL TOTAL CA: CPT | Performed by: PHYSICIAN ASSISTANT

## 2024-02-27 PROCEDURE — 99215 OFFICE O/P EST HI 40 MIN: CPT | Performed by: PHYSICIAN ASSISTANT

## 2024-02-27 PROCEDURE — 83540 ASSAY OF IRON: CPT | Performed by: PHYSICIAN ASSISTANT

## 2024-02-27 RX ORDER — LISINOPRIL 2.5 MG/1
TABLET ORAL
Qty: 270 TABLET | Refills: 3 | Status: SHIPPED | OUTPATIENT
Start: 2024-02-27 | End: 2024-02-27

## 2024-02-27 NOTE — LETTER
2/27/2024    Gail Crespo, APRN CNP  [address] [place] [address]    RE: Mar Zeng       Dear Colleague,     I had the pleasure of seeing Mar Zeng in the [facility].    Cardiology C.O.R.E (heart failure specialty) Clinic Progress Note    Mar Zeng MRN# [id number]   YOB: 1955 Age: 68 year old     Primary cardiology team: Dr. Tinoco         Assessment and Plan     In summary, Mar Zeng presents today for a CORE clinic follow up visit, after changing her from Toprol to Coreg (d/t fatigue sxs) and starting jardiance. She additionally had a repeat TTE showing a fairly stable EF of 36%. Unfortunately remains fatigued, although this did improve some with BB change.    HFrEF  EF: 32% (11/2023 MRI) --> 36% (2/2024 TTE).  ACC/AHA stage C; FC II   Etiology: Nonischemic - ?LBBB induced  Valves: OK  Volume: Appears well-compensated at 116 lbs clinic.  GDMT: lisinopril 2.5 mg BID, Coreg 3.125 mg BID, tanner 12.5 mg daily, jardiance 10 mg daily.   Rhythm: SR  QRS: 132 msec - LBBB  Device: None  Code status: FULL  Creatinine: Nml  CHF admissions: 10/2023.    Generalized fatigue, possibly in part BB-related.     Marginal BP.   Asymptomatic.     Plan:  BMP on her way out today (failed to have this drawn at time of echo).   Will also check iron studies.   Would consider IV Fe replacement - IIB recommendation per AHA guidelines in heart failure for patients with a ferritin < 100 or <300 with a Tsat <20% (reduces hospitalizations, improves QOL per FAIR-HF trial et al).  Stop lisinopril, start Entresto 24-26 mg BID tomorrow evening. Will avoid BB increase for now d/t fatigue.   Return to see my colleague in four weeks.   Return to see Dr. Tinoco in 3 months. Repeat echo in 6 months.   If EF falls below 35% in the future, consider CRT-D.    HEIDY Ibrahim [facility]         History of Presenting Illness     Mar K  Karri is a pleasant 68 year old patient with a pertinent history of the following -   Recent diagnosis of HFrEF, EF 30-35% 10/2023.   She underwent coronary angiography which revealed nonobstructive coronary artery disease maximal lesion estimated between 30 to 40% in the distal left circumflex and trivial disease noted in the LAD with 20% proximal lesion right coronary artery did not demonstrate any evidence of stenosis.   MRI c/w NICMP.  cLBBB.  MR. Mild per MRI.   ADD, on adderall (currently held).   Social/FH - No significant ETOH. She has two cousins that she reports passed away from heart failure as infants. Her father had a triple bypass in his late 60's.  She's flying to [place] tomorrow as her daughter in law is having a baby, unclear on how long she's staying.    Pt saw Dr. Tinoco 11/8 after hospital discharge. She reported progressive dyspnea/orthopnea as well as intermittent chest discomfort concerning for vasospasm. She was given SL NTG. CXR benign. Lisinopril was increased to 2.5 mg BID and tanner 12.5 was added. A 7d zio monitor was also placed, showing one 4-beat run of NSVT, otherwise SR with rare ectopics.    When I met Mar in December, she was feeling much better, overall, but was concerned with generalized fatigue which she felt was BB related. We tried changing her Toprol to Coreg to see if it would make a difference. Due to a marginal BP, no other changes were made. SGLT2i therapy was not covered for her in 2023, but is this year. Therefore we started this in January.     I repeated an echo on her yesterday, showing fairly stable EF of 36%, normal RV fn, no VHD.    Today, Mar returns to clinic stating she's feeling overall better, but still quite tired. Is walking routinely, but not pushing herself. Patient denies edema, palpitations, near syncope or syncope. Looking forward to travelling to [place] with her son in the near future.          Review of Systems     12-pt ROS is negative  "except for as noted in the HPI.          Physical Exam     Vitals: /68 (BP Location: Right arm, Patient Position: Sitting, Cuff Size: Adult Regular)   Pulse 72   Ht 1.676 m (5' 6\")   Wt 52.8 kg (116 lb 6.4 oz)   LMP  (LMP Unknown)   SpO2 98%   BMI 18.79 kg/m    Wt Readings from Last 10 Encounters:   02/27/24 52.8 kg (116 lb 6.4 oz)   02/14/24 52.4 kg (115 lb 8 oz)   02/01/24 53.1 kg (117 lb)   01/29/24 53.1 kg (117 lb)   12/08/23 53.8 kg (118 lb 8 oz)   11/08/23 53 kg (116 lb 12.8 oz)   11/02/23 51.7 kg (114 lb)   10/30/23 52.1 kg (114 lb 14.4 oz)   10/29/23 51.3 kg (113 lb 3.2 oz)   09/29/23 53.5 kg (118 lb)       Constitutional:  Patient is pleasant, alert, cooperative, and in NAD.  HEENT:  NCAT. PERRLA. EOM's intact.   Neck:  CVP appears normal. No carotid bruits.   Pulmonary: Normal respiratory effort. CTAB.   Cardiac: RRR, normal S1/S2, no S3/S4, no murmur or rub.   Abdomen:  Non-tender abdomen, no hepatosplenomegaly appreciated.   Vascular: Pulses in the upper and lower extremities are 2+ and equal bilaterally.  Extremities: No edema, erythema, cyanosis or tenderness appreciated.  Skin:  No rashes or lesions appreciated.   Neurological:  No gross motor or sensory deficits.   Psych: Appropriate affect.          Data   Labs reviewed:  Recent Labs   Lab Test 12/05/23  0841 10/30/23  0913 10/26/23  0806 09/26/23  1146 08/18/22  1145 08/18/20  0725 09/27/18  1445 12/06/17  1045   LDL  --   --  111*  --  105* 137*  --  118*   HDL  --   --  76  --  95 72  --  91   NHDL  --   --  125  --  119 159*  --  133*   CHOL  --   --  201*  --  214* 231*  --  224*   TRIG  --   --  70  --  70 110  --  76   TSH  --   --   --  0.74  --   --  0.59 0.73   NTBNP 1,745* 2,381*  --   --   --   --   --   --        Lab Results   Component Value Date    WBC 6.4 10/26/2023    WBC 5.8 10/16/2019    RBC 3.93 10/26/2023    RBC 4.17 10/16/2019    HGB 13.0 12/05/2023    HGB 12.5 10/16/2019    HCT 36.5 10/26/2023    HCT 38.2 " 10/16/2019    MCV 93 10/26/2023    MCV 92 10/16/2019    MCH 29.8 10/26/2023    MCH 30.0 10/16/2019    MCHC 32.1 10/26/2023    MCHC 32.7 10/16/2019    RDW 13.3 10/26/2023    RDW 12.7 10/16/2019     10/29/2023     10/16/2019       Lab Results   Component Value Date     12/05/2023     08/18/2020    POTASSIUM 4.2 12/05/2023    POTASSIUM 4.5 08/18/2022    POTASSIUM 4.1 08/18/2020    CHLORIDE 100 12/05/2023    CHLORIDE 103 08/18/2022    CHLORIDE 106 08/18/2020    CO2 29 12/05/2023    CO2 28 08/18/2022    CO2 27 08/18/2020    ANIONGAP 9 12/05/2023    ANIONGAP 4 08/18/2022    ANIONGAP 8 08/18/2020    GLC 92 12/05/2023    GLC 91 08/18/2022    GLC 82 08/18/2020    BUN 22.0 12/05/2023    BUN 14 08/18/2022    BUN 11 08/18/2020    CR 1.10 (H) 12/05/2023    CR 0.80 08/18/2020    GFRESTIMATED 54 (L) 12/05/2023    GFRESTIMATED 77 08/18/2020    GFRESTBLACK 89 08/18/2020    MERRILL 9.1 12/05/2023    MERRILL 8.8 08/18/2020      Lab Results   Component Value Date    AST 20 08/18/2022    AST 23 08/18/2020    ALT 20 08/18/2022    ALT 19 08/18/2020       Lab Results   Component Value Date    A1C 5.6 12/04/2017       Lab Results   Component Value Date    INR 1.05 01/23/2014            Problem List     Patient Active Problem List   Diagnosis    Multiple pigmented nevi    Advanced directives, counseling/discussion    Seasonal allergic rhinitis    Hematoma of rectus sheath    Generalized anxiety disorder    Attention deficit hyperactivity disorder (ADHD), combined type    Benign neoplasm of colon    Fibromyositis    Hammer toe, acquired    Ingrowing nail    Insomnia    Localized, primary osteoarthritis    Loose body in knee    Macrodactylia of toes    Migraine    Other disorders of bone and cartilage(733.99)    Plantar fascial fibromatosis    Postmenopausal HRT (hormone replacement therapy)    Talipes cavus    Fibromyalgia    Splinter hemorrhage of toenail    Congestive heart failure, unspecified HF chronicity, unspecified  heart failure type (H)            Medications     Current Outpatient Medications   Medication Sig Dispense Refill    albuterol (ACCUNEB) 0.63 MG/3ML neb solution Take 3 mLs (0.63 mg) by nebulization every 6 hours as needed for shortness of breath, wheezing or cough 90 mL 0    albuterol (PROAIR HFA/PROVENTIL HFA/VENTOLIN HFA) 108 (90 Base) MCG/ACT inhaler Inhale 2 puffs into the lungs every 4 hours as needed for shortness of breath, wheezing or cough 18 g 3    aluminum chloride (DRYSOL) 20 % external solution Apply topically at bedtime 60 mL 0    aspirin 81 MG EC tablet Take 1 tablet (81 mg) by mouth daily 90 tablet 3    atorvastatin (LIPITOR) 40 MG tablet Take 1 tablet (40 mg) by mouth every evening 90 tablet 3    azelastine-fluticasone (DYMISTA) 137-50 MCG/ACT nasal spray Spray 1 spray into both nostrils 2 times daily as needed      budesonide-formoterol (SYMBICORT) 160-4.5 MCG/ACT Inhaler Inhale 2 puffs into the lungs 2 times daily (Patient taking differently: Inhale 2 puffs into the lungs as needed) 10.2 g 2    buPROPion (WELLBUTRIN XL) 150 MG 24 hr tablet TAKE 1 TABLET(150 MG) BY MOUTH EVERY MORNING 90 tablet 2    carvedilol (COREG) 3.125 MG tablet Take 1 tablet (3.125 mg) by mouth 2 times daily (with meals) 180 tablet 3    empagliflozin (JARDIANCE) 10 MG TABS tablet Take 1 tablet (10 mg) by mouth daily (Patient taking differently: Take 10 mg by mouth at bedtime) 90 tablet 1    fexofenadine (ALLEGRA) 180 MG tablet Take 180 mg by mouth every other day      fluorouracil (EFUDEX) 5 % external cream APPLY SPARINGLY EXTERNALLY TO FACE EVERY SUNDAY AT NIGHT. AVOID EYES AND LIPS      furosemide (LASIX) 20 MG tablet Take 0.5 tablets (10 mg) by mouth daily 45 tablet 3    lisinopril (ZESTRIL) 2.5 MG tablet Take 1 tablet (2.5 mg) by mouth 2 times daily 180 tablet 3    loratadine (CLARITIN) 10 MG tablet Take 10 mg by mouth every other day Alternating with allegra      LORazepam (ATIVAN) 0.5 MG tablet Take 1 tablet (0.5 mg)  by mouth nightly as needed for sleep 10 tablet 0    LORazepam (ATIVAN) 1 MG tablet Take 1 tablet (1 mg) by mouth as needed for anxiety (30 min prior to MRI.  May take second dose 30 min laterl if needed.) 2 tablet 0    methocarbamol (ROBAXIN) 500 MG tablet Take 1 tablet (500 mg) by mouth 4 times daily as needed for muscle spasms 40 tablet 1    nitroGLYcerin (NITROSTAT) 0.4 MG sublingual tablet For chest pain place 1 tablet under the tongue every 5 minutes for 3 doses. If symptoms persist 5 minutes after 1st dose call 911. 30 tablet 11    spironolactone (ALDACTONE) 25 MG tablet Take 0.5 tablets (12.5 mg) by mouth daily (Patient taking differently: Take 12.5 mg by mouth every morning) 45 tablet 4    timolol maleate (TIMOPTIC) 0.5 % ophthalmic solution Apply 1 drop topically nightly as needed (dry skin cracks) Apply 1 drop to lesion every night at bedtime. Do Not use on normal skin.      amphetamine-dextroamphetamine (ADDERALL XR) 25 MG 24 hr capsule Take 1 capsule (25 mg) by mouth every morning (Patient not taking: Reported on 12/8/2023) 90 capsule 0            Past Medical History     Past Medical History:   Diagnosis Date    Mumps     NO ACTIVE PROBLEMS      Past Surgical History:   Procedure Laterality Date    COLONOSCOPY  8/23/2016    Dr. Shaw [facility]    COLONOSCOPY N/A 8/23/2016    Procedure: COLONOSCOPY;  Surgeon: Peter Shaw MD;  Location:  GI    COLONOSCOPY N/A 10/19/2022    Procedure: COLONOSCOPY with polypectomy using cold exacto snare;  Surgeon: Peter Shaw MD;  Location:  GI    CV CORONARY ANGIOGRAM N/A 10/27/2023    Procedure: Coronary Angiogram;  Surgeon: Elmer Andre MD;  Location:  HEART CARDIAC CATH LAB    FINGER SURGERY      right little finger joint replacement    HYSTERECTOMY VAGINAL  age 30    retained ovaries. no cervix     HYSTERECTOMY, PAP NO LONGER INDICATED      ORTHOPEDIC SURGERY      SURGICAL HISTORY OF -   10/25/13    left index cyst removal    SURGICAL HISTORY  OF -       Bilat knee miniscus repair     Family History   Problem Relation Age of Onset    No Known Problems Mother     Hypertension Father     Cancer Father     CABG Father     No Known Problems Sister     Asthma Brother     Hypertension Brother     Cancer Maternal Grandmother     Cancer Maternal Grandfather     Colon Cancer Paternal Grandmother     Cancer Paternal Grandfather     Thyroid Disease Daughter     No Known Problems Son     No Known Problems Son     Colon Cancer Paternal Aunt     Colon Cancer Cousin      Social History     Socioeconomic History    Marital status:      Spouse name: Not on file    Number of children: Not on file    Years of education: Not on file    Highest education level: Not on file   Occupational History    Not on file   Tobacco Use    Smoking status: Former     Packs/day: 0     Types: Cigarettes     Passive exposure: Past    Smokeless tobacco: Never   Vaping Use    Vaping Use: Never used   Substance and Sexual Activity    Alcohol use: Yes     Alcohol/week: 0.0 standard drinks of alcohol     Comment: avg:3-4 drink/wk    Drug use: No    Sexual activity: Yes     Partners: Male     Birth control/protection: Female Surgical   Other Topics Concern    Parent/sibling w/ CABG, MI or angioplasty before 65F 55M? No   Social History Narrative    [place] in [place]     Social Determinants of Health     Financial Resource Strain: Low Risk  (1/29/2024)    Financial Resource Strain     Within the past 12 months, have you or your family members you live with been unable to get utilities (heat, electricity) when it was really needed?: No   Food Insecurity: Low Risk  (1/29/2024)    Food Insecurity     Within the past 12 months, did you worry that your food would run out before you got money to buy more?: No     Within the past 12 months, did the food you bought just not last and you didn t have money to get more?: No   Transportation Needs: Low Risk  (1/29/2024)    Transportation Needs     Within  the past 12 months, has lack of transportation kept you from medical appointments, getting your medicines, non-medical meetings or appointments, work, or from getting things that you need?: No   Physical Activity: Not on file   Stress: Not on file   Social Connections: Not on file   Interpersonal Safety: Low Risk  (11/2/2023)    Interpersonal Safety     Do you feel physically and emotionally safe where you currently live?: Yes     Within the past 12 months, have you been hit, slapped, kicked or otherwise physically hurt by someone?: No     Within the past 12 months, have you been humiliated or emotionally abused in other ways by your partner or ex-partner?: No   Housing Stability: Low Risk  (1/29/2024)    Housing Stability     Do you have housing? : Yes     Are you worried about losing your housing?: No            Allergies   Codeine, Concerta [methylphenidate], and Hay fever & [a.r.m.]    Today's clinic visit entailed:  Review of the result(s) of each unique test - TTE, BMP, iron studies  Ordering of each unique test  Prescription drug management  I spent a total of 40 minutes on the day of the visit.   Time spent by me doing chart review, history and exam, documentation and further activities per the note  Provider  Link to MDM Help Grid     The level of medical decision making during this visit was of high complexity.      Thank you for allowing me to participate in the care of your patient.      Sincerely,     Kat Huber PA-C     [facility]  cc:   Kat Huber PA-C  [address] [place] [id number]

## 2024-02-27 NOTE — PATIENT INSTRUCTIONS
Today, we discussed the following:  Medication changes:   STOP lisinopril.   Tomorrow PM, start Entresto 24-26 mg twice daily.  $10 copay card.    Follow up:   Labs on the way out today. I will let you know if your iron studies are low, at which point I would recommend IV iron.   Return to see one of my colleagues in 4 weeks.   Return to see Dr. Tinoco in 3 months.   _____________________________________________  Please, remember to continue the followin.  Weigh yourself daily. Call if your weight is up > than 2 pounds in one day, or 5 pounds in one week; if you feel more short of breath or have worsening swelling in your legs or abdomen.  2.  Eat a low sodium diet (less than 2,000mg or 2g daily). If you eat less salt, you will retain less fluid.   3.  Avoid alcohol, as this can worsen heart failure.   4.  Avoid NSAIDs as able (For example, Ibuprofen / aleve / advil / naprosen / diclofenac).    Thank you for your visit with the [facility] Clinic today.   CORE stands for Cardiomyopathy Optimization Rehabilitation and Education. The CORE clinic will teach and help you to manage your heart failure and keep you out of the hospital.    Call C.O.R.E. nurse for any questions or concerns Mon-Fri 8am-4pm  [phone]: Nurse number   [phone]: After Hours Phone Number

## 2024-02-27 NOTE — PROGRESS NOTES
Cardiology C.O.R.E (heart failure specialty) Clinic Progress Note    Mar Zeng MRN# [id number]   YOB: 1955 Age: 68 year old     Primary cardiology team: Dr. Tinoco         Assessment and Plan     In summary, Mar Zeng presents today for a CORE clinic follow up visit, after changing her from Toprol to Coreg (d/t fatigue sxs) and starting jardiance. She additionally had a repeat TTE showing a fairly stable EF of 36%. Unfortunately remains fatigued, although this did improve some with BB change.    HFrEF  EF: 32% (11/2023 MRI) --> 36% (2/2024 TTE).  ACC/AHA stage C; FC II   Etiology: Nonischemic - ?LBBB induced  Valves: OK  Volume: Appears well-compensated at 116 lbs clinic.  GDMT: lisinopril 2.5 mg BID, Coreg 3.125 mg BID, tanner 12.5 mg daily, jardiance 10 mg daily.   Rhythm: SR  QRS: 132 msec - LBBB  Device: None  Code status: FULL  Creatinine: Nml  CHF admissions: 10/2023.    Generalized fatigue, possibly in part BB-related.     Marginal BP.   Asymptomatic.     Plan:  BMP on her way out today (failed to have this drawn at time of echo).   Will also check iron studies.   Would consider IV Fe replacement - IIB recommendation per AHA guidelines in heart failure for patients with a ferritin < 100 or <300 with a Tsat <20% (reduces hospitalizations, improves QOL per FAIR-HF trial et al).  Stop lisinopril, start Entresto 24-26 mg BID tomorrow evening. Will avoid BB increase for now d/t fatigue.   Return to see my colleague in four weeks.   Return to see Dr. Tinoco in 3 months. Repeat echo in 6 months.   If EF falls below 35% in the future, consider CRT-D.    [facility]         History of Presenting Illness     Mar Zeng is a pleasant 68 year old patient with a pertinent history of the following -   Recent diagnosis of HFrEF, EF 30-35% 10/2023.   She underwent coronary angiography which revealed nonobstructive coronary artery  "disease maximal lesion estimated between 30 to 40% in the distal left circumflex and trivial disease noted in the LAD with 20% proximal lesion right coronary artery did not demonstrate any evidence of stenosis.   MRI c/w NICMP.  cLBBB.  MR. Mild per MRI.   ADD, on adderall (currently held).   Social/FH - No significant ETOH. She has two cousins that she reports passed away from heart failure as infants. Her father had a triple bypass in his late 60's.  She's flying to [place] tomorrow as her daughter in law is having a baby, unclear on how long she's staying.    Pt saw Dr. Tinoco 11/8 after hospital discharge. She reported progressive dyspnea/orthopnea as well as intermittent chest discomfort concerning for vasospasm. She was given SL NTG. CXR benign. Lisinopril was increased to 2.5 mg BID and tanner 12.5 was added. A 7d zio monitor was also placed, showing one 4-beat run of NSVT, otherwise SR with rare ectopics.    When I met Mar in December, she was feeling much better, overall, but was concerned with generalized fatigue which she felt was BB related. We tried changing her Toprol to Coreg to see if it would make a difference. Due to a marginal BP, no other changes were made. SGLT2i therapy was not covered for her in 2023, but is this year. Therefore we started this in January.     I repeated an echo on her yesterday, showing fairly stable EF of 36%, normal RV fn, no VHD.    Today, Mar returns to clinic stating she's feeling overall better, but still quite tired. Is walking routinely, but not pushing herself. Patient denies edema, palpitations, near syncope or syncope. Looking forward to travelling to [place] with her son in the near future.          Review of Systems     12-pt ROS is negative except for as noted in the HPI.          Physical Exam     Vitals: /68 (BP Location: Right arm, Patient Position: Sitting, Cuff Size: Adult Regular)   Pulse 72   Ht 1.676 m (5' 6\")   Wt 52.8 kg (116 lb 6.4 oz)   " LMP  (LMP Unknown)   SpO2 98%   BMI 18.79 kg/m    Wt Readings from Last 10 Encounters:   02/27/24 52.8 kg (116 lb 6.4 oz)   02/14/24 52.4 kg (115 lb 8 oz)   02/01/24 53.1 kg (117 lb)   01/29/24 53.1 kg (117 lb)   12/08/23 53.8 kg (118 lb 8 oz)   11/08/23 53 kg (116 lb 12.8 oz)   11/02/23 51.7 kg (114 lb)   10/30/23 52.1 kg (114 lb 14.4 oz)   10/29/23 51.3 kg (113 lb 3.2 oz)   09/29/23 53.5 kg (118 lb)       Constitutional:  Patient is pleasant, alert, cooperative, and in NAD.  HEENT:  NCAT. PERRLA. EOM's intact.   Neck:  CVP appears normal. No carotid bruits.   Pulmonary: Normal respiratory effort. CTAB.   Cardiac: RRR, normal S1/S2, no S3/S4, no murmur or rub.   Abdomen:  Non-tender abdomen, no hepatosplenomegaly appreciated.   Vascular: Pulses in the upper and lower extremities are 2+ and equal bilaterally.  Extremities: No edema, erythema, cyanosis or tenderness appreciated.  Skin:  No rashes or lesions appreciated.   Neurological:  No gross motor or sensory deficits.   Psych: Appropriate affect.          Data   Labs reviewed:  Recent Labs   Lab Test 12/05/23  0841 10/30/23  0913 10/26/23  0806 09/26/23  1146 08/18/22  1145 08/18/20  0725 09/27/18  1445 12/06/17  1045   LDL  --   --  111*  --  105* 137*  --  118*   HDL  --   --  76  --  95 72  --  91   NHDL  --   --  125  --  119 159*  --  133*   CHOL  --   --  201*  --  214* 231*  --  224*   TRIG  --   --  70  --  70 110  --  76   TSH  --   --   --  0.74  --   --  0.59 0.73   NTBNP 1,745* 2,381*  --   --   --   --   --   --        Lab Results   Component Value Date    WBC 6.4 10/26/2023    WBC 5.8 10/16/2019    RBC 3.93 10/26/2023    RBC 4.17 10/16/2019    HGB 13.0 12/05/2023    HGB 12.5 10/16/2019    HCT 36.5 10/26/2023    HCT 38.2 10/16/2019    MCV 93 10/26/2023    MCV 92 10/16/2019    MCH 29.8 10/26/2023    MCH 30.0 10/16/2019    MCHC 32.1 10/26/2023    MCHC 32.7 10/16/2019    RDW 13.3 10/26/2023    RDW 12.7 10/16/2019     10/29/2023      10/16/2019       Lab Results   Component Value Date     12/05/2023     08/18/2020    POTASSIUM 4.2 12/05/2023    POTASSIUM 4.5 08/18/2022    POTASSIUM 4.1 08/18/2020    CHLORIDE 100 12/05/2023    CHLORIDE 103 08/18/2022    CHLORIDE 106 08/18/2020    CO2 29 12/05/2023    CO2 28 08/18/2022    CO2 27 08/18/2020    ANIONGAP 9 12/05/2023    ANIONGAP 4 08/18/2022    ANIONGAP 8 08/18/2020    GLC 92 12/05/2023    GLC 91 08/18/2022    GLC 82 08/18/2020    BUN 22.0 12/05/2023    BUN 14 08/18/2022    BUN 11 08/18/2020    CR 1.10 (H) 12/05/2023    CR 0.80 08/18/2020    GFRESTIMATED 54 (L) 12/05/2023    GFRESTIMATED 77 08/18/2020    GFRESTBLACK 89 08/18/2020    MERRILL 9.1 12/05/2023    MERRILL 8.8 08/18/2020      Lab Results   Component Value Date    AST 20 08/18/2022    AST 23 08/18/2020    ALT 20 08/18/2022    ALT 19 08/18/2020       Lab Results   Component Value Date    A1C 5.6 12/04/2017       Lab Results   Component Value Date    INR 1.05 01/23/2014            Problem List     Patient Active Problem List   Diagnosis    Multiple pigmented nevi    Advanced directives, counseling/discussion    Seasonal allergic rhinitis    Hematoma of rectus sheath    Generalized anxiety disorder    Attention deficit hyperactivity disorder (ADHD), combined type    Benign neoplasm of colon    Fibromyositis    Hammer toe, acquired    Ingrowing nail    Insomnia    Localized, primary osteoarthritis    Loose body in knee    Macrodactylia of toes    Migraine    Other disorders of bone and cartilage(733.99)    Plantar fascial fibromatosis    Postmenopausal HRT (hormone replacement therapy)    Talipes cavus    Fibromyalgia    Splinter hemorrhage of toenail    Congestive heart failure, unspecified HF chronicity, unspecified heart failure type (H)            Medications     Current Outpatient Medications   Medication Sig Dispense Refill    albuterol (ACCUNEB) 0.63 MG/3ML neb solution Take 3 mLs (0.63 mg) by nebulization every 6 hours as needed for  shortness of breath, wheezing or cough 90 mL 0    albuterol (PROAIR HFA/PROVENTIL HFA/VENTOLIN HFA) 108 (90 Base) MCG/ACT inhaler Inhale 2 puffs into the lungs every 4 hours as needed for shortness of breath, wheezing or cough 18 g 3    aluminum chloride (DRYSOL) 20 % external solution Apply topically at bedtime 60 mL 0    aspirin 81 MG EC tablet Take 1 tablet (81 mg) by mouth daily 90 tablet 3    atorvastatin (LIPITOR) 40 MG tablet Take 1 tablet (40 mg) by mouth every evening 90 tablet 3    azelastine-fluticasone (DYMISTA) 137-50 MCG/ACT nasal spray Spray 1 spray into both nostrils 2 times daily as needed      budesonide-formoterol (SYMBICORT) 160-4.5 MCG/ACT Inhaler Inhale 2 puffs into the lungs 2 times daily (Patient taking differently: Inhale 2 puffs into the lungs as needed) 10.2 g 2    buPROPion (WELLBUTRIN XL) 150 MG 24 hr tablet TAKE 1 TABLET(150 MG) BY MOUTH EVERY MORNING 90 tablet 2    carvedilol (COREG) 3.125 MG tablet Take 1 tablet (3.125 mg) by mouth 2 times daily (with meals) 180 tablet 3    empagliflozin (JARDIANCE) 10 MG TABS tablet Take 1 tablet (10 mg) by mouth daily (Patient taking differently: Take 10 mg by mouth at bedtime) 90 tablet 1    fexofenadine (ALLEGRA) 180 MG tablet Take 180 mg by mouth every other day      fluorouracil (EFUDEX) 5 % external cream APPLY SPARINGLY EXTERNALLY TO FACE EVERY SUNDAY AT NIGHT. AVOID EYES AND LIPS      furosemide (LASIX) 20 MG tablet Take 0.5 tablets (10 mg) by mouth daily 45 tablet 3    lisinopril (ZESTRIL) 2.5 MG tablet Take 1 tablet (2.5 mg) by mouth 2 times daily 180 tablet 3    loratadine (CLARITIN) 10 MG tablet Take 10 mg by mouth every other day Alternating with allegra      LORazepam (ATIVAN) 0.5 MG tablet Take 1 tablet (0.5 mg) by mouth nightly as needed for sleep 10 tablet 0    LORazepam (ATIVAN) 1 MG tablet Take 1 tablet (1 mg) by mouth as needed for anxiety (30 min prior to MRI.  May take second dose 30 min laterl if needed.) 2 tablet 0     methocarbamol (ROBAXIN) 500 MG tablet Take 1 tablet (500 mg) by mouth 4 times daily as needed for muscle spasms 40 tablet 1    nitroGLYcerin (NITROSTAT) 0.4 MG sublingual tablet For chest pain place 1 tablet under the tongue every 5 minutes for 3 doses. If symptoms persist 5 minutes after 1st dose call 911. 30 tablet 11    spironolactone (ALDACTONE) 25 MG tablet Take 0.5 tablets (12.5 mg) by mouth daily (Patient taking differently: Take 12.5 mg by mouth every morning) 45 tablet 4    timolol maleate (TIMOPTIC) 0.5 % ophthalmic solution Apply 1 drop topically nightly as needed (dry skin cracks) Apply 1 drop to lesion every night at bedtime. Do Not use on normal skin.      amphetamine-dextroamphetamine (ADDERALL XR) 25 MG 24 hr capsule Take 1 capsule (25 mg) by mouth every morning (Patient not taking: Reported on 12/8/2023) 90 capsule 0            Past Medical History     Past Medical History:   Diagnosis Date    Mumps     NO ACTIVE PROBLEMS      Past Surgical History:   Procedure Laterality Date    COLONOSCOPY  8/23/2016    Dr. Shaw [facility]    COLONOSCOPY N/A 8/23/2016    Procedure: COLONOSCOPY;  Surgeon: Peter Shaw MD;  Location:  GI    COLONOSCOPY N/A 10/19/2022    Procedure: COLONOSCOPY with polypectomy using cold exacto snare;  Surgeon: Peter Shaw MD;  Location:  GI    CV CORONARY ANGIOGRAM N/A 10/27/2023    Procedure: Coronary Angiogram;  Surgeon: Elmer Andre MD;  Location:  HEART CARDIAC CATH LAB    FINGER SURGERY      right little finger joint replacement    HYSTERECTOMY VAGINAL  age 30    retained ovaries. no cervix     HYSTERECTOMY, PAP NO LONGER INDICATED      ORTHOPEDIC SURGERY      SURGICAL HISTORY OF -   10/25/13    left index cyst removal    SURGICAL HISTORY OF -       Bilat knee miniscus repair     Family History   Problem Relation Age of Onset    No Known Problems Mother     Hypertension Father     Cancer Father     CABG Father     No Known Problems Sister     Asthma  Brother     Hypertension Brother     Cancer Maternal Grandmother     Cancer Maternal Grandfather     Colon Cancer Paternal Grandmother     Cancer Paternal Grandfather     Thyroid Disease Daughter     No Known Problems Son     No Known Problems Son     Colon Cancer Paternal Aunt     Colon Cancer Cousin      Social History     Socioeconomic History    Marital status:      Spouse name: Not on file    Number of children: Not on file    Years of education: Not on file    Highest education level: Not on file   Occupational History    Not on file   Tobacco Use    Smoking status: Former     Packs/day: 0     Types: Cigarettes     Passive exposure: Past    Smokeless tobacco: Never   Vaping Use    Vaping Use: Never used   Substance and Sexual Activity    Alcohol use: Yes     Alcohol/week: 0.0 standard drinks of alcohol     Comment: avg:3-4 drink/wk    Drug use: No    Sexual activity: Yes     Partners: Male     Birth control/protection: Female Surgical   Other Topics Concern    Parent/sibling w/ CABG, MI or angioplasty before 65F 55M? No   Social History Narrative    [place]     Social Determinants of Health     Financial Resource Strain: Low Risk  (1/29/2024)    Financial Resource Strain     Within the past 12 months, have you or your family members you live with been unable to get utilities (heat, electricity) when it was really needed?: No   Food Insecurity: Low Risk  (1/29/2024)    Food Insecurity     Within the past 12 months, did you worry that your food would run out before you got money to buy more?: No     Within the past 12 months, did the food you bought just not last and you didn t have money to get more?: No   Transportation Needs: Low Risk  (1/29/2024)    Transportation Needs     Within the past 12 months, has lack of transportation kept you from medical appointments, getting your medicines, non-medical meetings or appointments, work, or from getting things that you need?: No   Physical Activity:  Not on file   Stress: Not on file   Social Connections: Not on file   Interpersonal Safety: Low Risk  (11/2/2023)    Interpersonal Safety     Do you feel physically and emotionally safe where you currently live?: Yes     Within the past 12 months, have you been hit, slapped, kicked or otherwise physically hurt by someone?: No     Within the past 12 months, have you been humiliated or emotionally abused in other ways by your partner or ex-partner?: No   Housing Stability: Low Risk  (1/29/2024)    Housing Stability     Do you have housing? : Yes     Are you worried about losing your housing?: No            Allergies   Codeine, Concerta [methylphenidate], and Hay fever & [a.r.m.]    Today's clinic visit entailed:  Review of the result(s) of each unique test - TTE, BMP, iron studies  Ordering of each unique test  Prescription drug management  I spent a total of 40 minutes on the day of the visit.   Time spent by me doing chart review, history and exam, documentation and further activities per the note  Provider  Link to MDM Help Grid     The level of medical decision making during this visit was of high complexity.

## 2024-02-28 ENCOUNTER — TELEPHONE (OUTPATIENT)
Dept: CARDIOLOGY | Facility: CLINIC | Age: 69
End: 2024-02-28

## 2024-02-28 DIAGNOSIS — I50.9 CONGESTIVE HEART FAILURE, UNSPECIFIED HF CHRONICITY, UNSPECIFIED HEART FAILURE TYPE (H): Primary | ICD-10-CM

## 2024-02-28 NOTE — LETTER
February 29, 2024    Mar Zeng  Date of birth: September 7,1955    To Whom It May Concern,  My patient, Mar Zeng (Date of birth September 7, 1955) has systolic congestive heart failure / nonischemic cardiomyopathy. She was diagnosed on 10/25/2023. She was last seen by myself on February 27, 2024 and was found to be stable and well-compensated.  For this condition, she does require the following medications to treat her condition and symptoms:      - Carvedilol (Coreg)  - Empagliflozin (Jardiance)  - Furosemide (Lasix)  - Sacubitril-Valsartan (Entresto)  - Spironolactone (Aldactone)       The above medications and supplies are for the Mar's own use and must stay with the patient at all times. Please contact me at [phone] if you have any questions concerning Mar's care or medications.           Sincerely,          Kat Huber, PAC  NPI: [id number]

## 2024-02-28 NOTE — LETTER
2024    To:   Medicare   Medicare number: [id number]    [employer] Federal Employee Program  Basic Option Enrollment Code: 111  RxIIN [id number]  RxPCN [id number]  RxGrp: [id number]      RE: Mar Zeng  [address] [address]  : 1955  MRN: [id number]  Policy #:   Medicare number: [id number]  [employer] Member ID: [id number]    Case/Reference:     To Whom It May Concern,    I am writing on behalf of my patient, Mar Zeng to document the medical necessity of Iron ( IV Venofer) for the treatment of Systolic Congestive Heart Failure. This letter provides information about the patient's medical history and diagnosis and a statement summarizing my treatment rationale.     Summary of Patient History and Diagnosis  Mar was diagnosed with Systolic Congestive Heart Failure and Nonischemic Cardiomyopathy in 2023. At her last cardiology appointment on 2024, her heart failure was a ACC / AHA stage C, FC II. She has been optimized as much as she can tolerate per the GDMT recommendations. She notes generalized fatigue. Iron studies were completed per American College of Cardiology guidelines and Mar's ongoing fatigue.     HGB:  2023: 13.0 (11.7 - 15.7 g/dl)  10/26/2023: 11.7 (11.7 - 15.7 g/dl)  10/25/2023 11.6 (11.7 - 15.7 g/dl)   2023: 11.9 (11.7 - 15.7 g/dl)    Iron studies : Completed 2024:   Ferritin level:  77 (11 - 328 ng/ml)  Iron: 2024: 82 (37 - 145 micrograms/dl)  Iron Binding Capacity: 2024: 296 (240 - 430 micrograms/dl)  Iron sat index: 2024: 28 (15 - 46%)    Echocardiograms:   2024: Echocardiogram: LVEF 36%  2023: Cardiac MRI: LVEF 32%  10/27/2023: Coronary angiogram: Acute systolic heart failure LVEF 30 to 35%   10/26/2023: Echocardiogram LVEF 30-35%    10/25/2023: NT pro BNP: 5,176 (0 - 900 pg/ml)  2023: NT pro BNP: 1,745 (0 - 900 pg/ml)  10/30/2023: NT pro  BNP: 2,381 (0 - 900 pg/ml)    Treatment Rationale  Mar has been optimized on GDMT doses as she can tolerate (blood pressures). Her current GDMT consists of: Sacubitril - valsartan (Entresto) 24-26 mg twice daily; Carvedilol (coreg) 3.125 mg twice daily; Spironolactone (Aldactone) 12.5 once daily; Furosemide (Lasix) 10 mg daily; Empagliflozin (Jardiance) 10 mg daily.   Recent echocardiogram shows little improvement in ejection fraction. She experiencing continued shortness of breath and fatigue.     Iron infusion is recommended:  IV Fe replacement - IIB recommendation per AHA guidelines in heart failure for patients with a ferritin < 100 or <300 with a Tsat <20% (reduces hospitalizations, improves QOL per FAIR-HF trial et al).    In patients with heart failure (HF), iron deficiency (ID) correlates with decreased exercise capacity and poor health-related quality of life, and predicts worse outcomes. Both absolute (depleted iron stores) and functional (where iron is unavailable for dedicated tissues) ID can be easily evaluated in patients with HF using standard laboratory tests (assessment of serum ferritin and transferrin saturation). Intravenous iron therapy in iron-deficient patients with HF and reduced ejection fraction has been shown to alleviate HF symptoms and improve exercise capacity and quality of life. In this paper, we provide information on how to diagnose ID in HF. Further we discuss pros and cons of different iron preparations and discuss the results of major trials implementing iron supplementation in HF patients, in order to provide practical guidance for clinicians on how to manage ID in patients with HF.    Iron deficiency, regardless of haemoglobin level, is an indication for supplementation in symptomatic patients with heart failure with reduced ejection fraction. Oral iron supplementation is not effective in iron deficient patients with heart failure.    Risk factors for iron deficiency include  female sex, advanced heart failure, and higher levels of N-terminal pro-B-type natriuretic peptide and C-reactive protein.  At present, intravenous (IV) iron is the preferred route for treatment in heart failure patients. Most studies have used IV iron sucrose (maximum dose of 200 mg per setting) or ferric carboxymaltose (maximum dose of 1000 mg per week).  To date, no clinical trial has proven the efficacy of oral iron in patients with heart failure with reduced ejection fraction. Furthermore, oral iron preparations are associated with a high incidence of adverse effects (in up to 40% of patients), are poorly absorbed due to gut wall edema, and can take up to 6 months to replenish iron stores.    Duration  Currently, cardiology is recommending 3 - 5 infusion appointments.   Iron infusions are completed every 48 hours.       Summary  In summary, IV Iron  is medically necessary for this patient s medical condition. Please call my office at [phone]  if I can provide you with any additional information to approve my request. I look forward to receiving your timely response and approval of this request.       Sincerely             WALT Foote  NPI: [id number]  [facility]          References:    Iron Deficiency in Heart Failure. (n.d.). American College of Cardiology. https://www.acc.org/latest-in-cardiology/ten-points-to-remember/2018/12/31/20/40/iron-deficiency-in-heart-failure    KELLEN Estes, NEVAEH Ojeda, BRUCE Menard, & ALLEY Ledbetter (2016). Iron Therapy in Patients with Heart Failure and Iron Deficiency: Review of Iron Preparations for Practitioners. American Journal of Cardiovascular Drugs, 17(3), 183-201. https://doi.org/10.1007/e08455-063-1361-6

## 2024-02-28 NOTE — TELEPHONE ENCOUNTER
----- Message from Kat Huber PA-C sent at 2/27/2024  6:51 PM CST -----  Mild LILIANE - would not make med changes based on this.   Please offer IV Fe.   2 options:  Venofer 300 mg takes 90 minutes x 3 doses total  Venofer 200 mg takes 15 minutes x 5 doses total  Thank you!

## 2024-02-28 NOTE — TELEPHONE ENCOUNTER
Call placed to Mar to discuss labs resulted after her CORE appt 2/27 and recommendations for iron infusion per Kat.  No answer, left voice mail requesting a call back to discuss.     No orders placed until discussed with Mar.      Future Appointments   Date Time Provider Department Center   4/2/2024 12:15 PM RU LAB [facility]   4/2/2024  1:10 PM Gabby Hernández PA-C [facility] PSA [facility]   5/2/2024 11:00 AM UC PFL PENT [facility]   6/7/2024  9:15 AM RU LAB [facility]   6/7/2024 10:15 AM Yanira Tinoco,  [facility]     Meme Cleveland, BELKIS, RN 2:31 PM 02/28/24

## 2024-02-29 ENCOUNTER — CARE COORDINATION (OUTPATIENT)
Dept: CARDIOLOGY | Facility: CLINIC | Age: 69
End: 2024-02-29
Payer: MEDICARE

## 2024-02-29 DIAGNOSIS — D64.9 ANEMIA: Primary | ICD-10-CM

## 2024-02-29 DIAGNOSIS — I50.9 CONGESTIVE HEART FAILURE, UNSPECIFIED HF CHRONICITY, UNSPECIFIED HEART FAILURE TYPE (H): ICD-10-CM

## 2024-02-29 RX ORDER — HEPARIN SODIUM (PORCINE) LOCK FLUSH IV SOLN 100 UNIT/ML 100 UNIT/ML
5 SOLUTION INTRAVENOUS
Status: CANCELLED | OUTPATIENT
Start: 2024-03-01

## 2024-02-29 RX ORDER — ALBUTEROL SULFATE 90 UG/1
1-2 AEROSOL, METERED RESPIRATORY (INHALATION)
Status: CANCELLED
Start: 2024-03-01

## 2024-02-29 RX ORDER — HEPARIN SODIUM,PORCINE 10 UNIT/ML
5-20 VIAL (ML) INTRAVENOUS DAILY PRN
Status: CANCELLED | OUTPATIENT
Start: 2024-03-01

## 2024-02-29 RX ORDER — METHYLPREDNISOLONE SODIUM SUCCINATE 125 MG/2ML
125 INJECTION, POWDER, LYOPHILIZED, FOR SOLUTION INTRAMUSCULAR; INTRAVENOUS
Status: CANCELLED
Start: 2024-03-01

## 2024-02-29 RX ORDER — EPINEPHRINE 1 MG/ML
0.3 INJECTION, SOLUTION, CONCENTRATE INTRAVENOUS EVERY 5 MIN PRN
Status: CANCELLED | OUTPATIENT
Start: 2024-03-01

## 2024-02-29 RX ORDER — ALBUTEROL SULFATE 0.83 MG/ML
2.5 SOLUTION RESPIRATORY (INHALATION)
Status: CANCELLED | OUTPATIENT
Start: 2024-03-01

## 2024-02-29 RX ORDER — DIPHENHYDRAMINE HYDROCHLORIDE 50 MG/ML
50 INJECTION INTRAMUSCULAR; INTRAVENOUS
Status: CANCELLED
Start: 2024-03-01

## 2024-02-29 NOTE — TELEPHONE ENCOUNTER
Spoke to Mar regarding Iron Infusions. She is agreeable.   She is leaving for [place] 3/7/2024 and will [place] will return at the end of March.     02/27/2024 CORE progress note: Iron infusions recommended per Kat Huber PAC: Would consider IV Fe replacement - IIB recommendation per AHA guidelines in heart failure for patients with a ferritin < 100 or <300 with a Tsat <20% (reduces hospitalizations, improves QOL per FAIR-HF trial et al).     Call to infusion to see if infusions could be completed prior to her trip.  They are able to complete the transfusions prior to her trip.     Mar is also requesting a letter from Kat that describes her medical condition and any prescription medications, including the generic name of prescribed drugs.    Call to Mar and days and times of infusions. She expressed understanding.     Orders placed in 2/29/2024 care coordinator encounter in a therapy plan.   Venofer 300 mg takes 90 minutes x 3 doses total       Drafted provider letter for traveling with medical condition and will review it with WALT Foote for approval and signature.     Mar is anticipating on picking up the letter after Iron infusion on 3/6/024 at the  of cardiology clinic.         Future Appointments   Date Time Provider Department Center   3/1/2024 10:00 AM RH INFUSION CHAIR [facility]   3/4/2024 12:00 PM RH INFUSION CHAIR [facility]   3/6/2024  8:00 AM RH INFUSION CHAIR [facility]   4/2/2024 12:15 PM RU LAB [facility]   4/2/2024  1:10 PM Gabby Hernández PA-C [facility]IN   5/2/2024 11:00 AM UC PFL PENT [facility]   6/7/2024  9:15 AM RU LAB [facility]   6/7/2024 10:15 AM Yanira Tinoco,  [facility]           BELKIS Sin, RN 10:54 AM 02/29/24

## 2024-02-29 NOTE — TELEPHONE ENCOUNTER
"Message received from Corinna Rose with an update on insurance coverage for Iron Infusions. An off label PA medical of necessity letter and \"would need an LMN to submit with it.\"      Letter of Medical Necessity has been started.      Insurance companies:  Medicare   Medicare number: [id number]  1-800-[id number]        2. Blue [employer] Blue [employer] Federal Employee Program  Member ID: [id number]  Basic Option Enrollment Code: 111  RxIIN [id number]  RxPCN [id number]  RxGrp: [id number]  [phone]      BELKIS Sin, RN 4:58 PM 02/29/24      "

## 2024-02-29 NOTE — PROGRESS NOTES
Iron orders received from WALT Foote on 02/27/20224:  ----- Message from Kat Huber PA-C sent at 2/27/2024  6:51 PM CST -----  Mild LILIANE - would not make med changes based on this.   Please offer IV Fe.   2 options:  Venofer 300 mg takes 90 minutes x 3 doses total  Venofer 200 mg takes 15 minutes x 5 doses total       Reviewed with Mar who refers the 3 doses.     Orders placed and discussed with infusion clinic to see if they are able to accommodate infusions prior to her leaving the country.    Call to Mar to review appts. See 2/28/2024 pt care encounter for more details.           Future Appointments   Date Time Provider Department Center   3/1/2024 10:00 AM RH INFUSION CHAIR [facility]   3/4/2024 12:00 PM RH INFUSION CHAIR [facility]   3/6/2024  8:00 AM RH INFUSION CHAIR [facility]   4/2/2024 12:15 PM RU LAB [facility]   4/2/2024  1:10 PM Gabby Hernández PA-C [facility]   5/2/2024 11:00 AM [facility]   6/7/2024  9:15 AM RU LAB [facility]   6/7/2024 10:15 AM Yanira Tinoco,  [facility]         BELKIS Sin, RN 10:48 AM 02/29/24

## 2024-02-29 NOTE — LETTER
2024    To:   [Insurance Company]  [Address]    RE: Mar Zeng  [address] [address]  : 1955  MRN: [id number]  Policy #: ***  Case/Reference: ***    To Whom It May Concern,    I am writing on behalf of my patient, Mar Zeng to document the medical necessity of *** for the treatment of ***. This letter provides information about the patient's medical history and diagnosis and a statement summarizing my treatment rationale.     Summary of Patient History and Diagnosis  (Include information here regarding the patient's condition and specific diagnosis. Also include the patient's history related to their condition)      Treatment Rationale  (Include information on the treatment up to this point, course of care and why the treatment/medication/equipment is necessary and how you expect that it will help the patient. Include any relevant clinical studies).      Duration  (Length of time treatment/medication/equipment (item in question) is necessary - not to exceed 12 months)       Summary  In summary, *** is medically necessary for this patient s medical condition. Please call my office at *** if I can provide you with any additional information to approve my request. I look forward to receiving your timely response and approval of this request.     Sincerely,    Meme Cleveland {[email or web address]:[id number]}

## 2024-03-01 ENCOUNTER — INFUSION THERAPY VISIT (OUTPATIENT)
Dept: INFUSION THERAPY | Facility: CLINIC | Age: 69
End: 2024-03-01
Attending: PHYSICIAN ASSISTANT
Payer: MEDICARE

## 2024-03-01 VITALS
DIASTOLIC BLOOD PRESSURE: 65 MMHG | HEART RATE: 75 BPM | OXYGEN SATURATION: 97 % | SYSTOLIC BLOOD PRESSURE: 122 MMHG | TEMPERATURE: 97.2 F

## 2024-03-01 DIAGNOSIS — I50.9 CONGESTIVE HEART FAILURE, UNSPECIFIED HF CHRONICITY, UNSPECIFIED HEART FAILURE TYPE (H): Primary | ICD-10-CM

## 2024-03-01 RX ORDER — ALBUTEROL SULFATE 0.83 MG/ML
2.5 SOLUTION RESPIRATORY (INHALATION)
OUTPATIENT
Start: 2024-03-03

## 2024-03-01 RX ORDER — HEPARIN SODIUM (PORCINE) LOCK FLUSH IV SOLN 100 UNIT/ML 100 UNIT/ML
5 SOLUTION INTRAVENOUS
OUTPATIENT
Start: 2024-03-03

## 2024-03-01 RX ORDER — METHYLPREDNISOLONE SODIUM SUCCINATE 125 MG/2ML
125 INJECTION, POWDER, LYOPHILIZED, FOR SOLUTION INTRAMUSCULAR; INTRAVENOUS
Start: 2024-03-03

## 2024-03-01 RX ORDER — ALBUTEROL SULFATE 90 UG/1
1-2 AEROSOL, METERED RESPIRATORY (INHALATION)
Start: 2024-03-03

## 2024-03-01 RX ORDER — EPINEPHRINE 1 MG/ML
0.3 INJECTION, SOLUTION INTRAMUSCULAR; SUBCUTANEOUS EVERY 5 MIN PRN
OUTPATIENT
Start: 2024-03-03

## 2024-03-01 RX ORDER — HEPARIN SODIUM,PORCINE 10 UNIT/ML
5-20 VIAL (ML) INTRAVENOUS DAILY PRN
OUTPATIENT
Start: 2024-03-03

## 2024-03-01 RX ORDER — DIPHENHYDRAMINE HYDROCHLORIDE 50 MG/ML
50 INJECTION INTRAMUSCULAR; INTRAVENOUS
Start: 2024-03-03

## 2024-03-01 NOTE — PROGRESS NOTES
Infusion Nursing Note:  Mar Zeng presents today for Venofer #1- Not given- See note.    Patient seen by provider today: No   present during visit today: Not Applicable.    Note: Patient arrived and was not able to receive infusion due to insurance/finance. Lex Del Rosario, PharmD spoke with and explained to patient.       Intravenous Access:  Peripheral IV placed.        Discharge Plan:   Discharge instructions reviewed with: Patient.  Patient and/or family verbalized understanding of discharge instructions and all questions answered.  AVS to patient via [employer].  Patient will return 3/4 for next appointment.   Patient discharged in stable condition accompanied by: self.  Departure Mode: Ambulatory.      Georgina Christianson RN

## 2024-03-01 NOTE — TELEPHONE ENCOUNTER
Discussed diagnosis with WALT Foote.  Mar does qualify for dx of iron deficiency anemia per recent labs.    Updated Corinna.    Future Appointments   Date Time Provider Department Center   3/4/2024 12:00 PM RH INFUSION CHAIR [facility]   3/6/2024  8:00 AM RH INFUSION CHAIR [facility]   4/2/2024 12:15 PM RU LAB [facility]   4/2/2024  1:10 PM Gabby Hernández PA-C [facility]   5/2/2024 11:00 AM UC PFL PENT [facility]   6/7/2024  9:15 AM RU LAB [facility]   6/7/2024 10:15 AM Yanira Tinoco DO [facility]         Meme Cleveland, BELKIS, RN 2:29 PM 03/01/24

## 2024-03-04 ENCOUNTER — TELEPHONE (OUTPATIENT)
Dept: PHARMACY | Facility: CLINIC | Age: 69
End: 2024-03-04
Payer: MEDICARE

## 2024-03-04 NOTE — ORAL ONC MGMT
Called patient and notified her to cancel infusion appointment until we get approval from insurance to proceed.    Thank You,   Rickey Del Rosario, PharmD  Hematology/Oncology Clinical Pharmacist  [facility]  [phone]

## 2024-03-05 NOTE — TELEPHONE ENCOUNTER
Letter drafted and given to WALT Foote for review and signature      SAL SinN, RN 5:48 PM 03/05/24

## 2024-03-06 NOTE — TELEPHONE ENCOUNTER
Letter has been signed and faxed to Corinna Rose at [phone]               Future Appointments   Date Time Provider Department Center   4/2/2024 12:15 PM RU LAB [facility]   4/2/2024  1:10 PM Gabby Hernández PA-C [facility] PSA [facility]   5/2/2024 11:00 AM UC PFL PENT [facility]   6/7/2024  9:15 AM RU LAB [facility]   6/7/2024 10:15 AM Yanira Tinoco,  [facility]         Meme Cleveland, BELKIS, RN 9:34 AM 03/06/24

## 2024-04-01 NOTE — PROGRESS NOTES
Cardiology Clinic Progress Note  Mar Zeng MRN# [id number]   YOB: 1955 Age: 68 year old   Primary Cardiologist: Dr. Tinoco, Kat Huber, CORE PA-C Reason for visit: CORE follow-up             Assessment and Plan:   Mar Zeng is a very pleasant 68 year old female who is here today for CORE follow-up.      1.  HFrEF and nonischemic cardiomyopathy  - LVEF: 36% on echo 2/2024  - NYHA class II, stage C  - Etiology: nonischemic; ? LBBB  - Fluid status: euvolemic; suspected dry weight: 116#  - Diuretic regimen: Stop taking Lasix daily. Take Lasix 10 mg once daily as needed for a weight gain of 2-3 pounds in 1 day or 5 pounds in 1 week.   - Ischemic evaluation: cor angio 10/2023 with nonobstructive CAD  - Guideline directed medical therapy:  - Beta blocker: Carvedilol 3.125 mg twice daily; avoiding increase for now secondary to fatigue  - ACEI/ARB/ARNI: INCREASE Entresto to 49/51 mg twice daily  - Aldactone antagonist: Spironolactone 12.5 mg once daily  - SGLT2 inhibitor: Jardiance 10 mg once daily  -Counseled patient on fluid and sodium restriction  2.  Chronic LBBB, QRS duration 132 ms  3.  Nonobstructive coronary artery disease with cor angio 10/2023 revealing a 20% pLAD lesion and a 30 to 40% dLCX lesion.  4.  Mild LILIANE on CKD. Baseline creatinine 1.1. BMP today with creatinine 1.34.     Changes today:   Stop taking Lasix daily. Take Lasix 10 mg once daily as needed for a weight gain of 2-3 pounds in 1 day or 5 pounds in 1 week.   INCREASE Entresto to 49/51 mg twice daily    Ms. Zeng has been feeling stable since the time of her last CORE apt. Tolerated the transition from lisinopril to Entresto well without apparent side effects. Weight remains stable at 116#. BMP today with mild LILIANE, creatinine 1.34 (baseline 1.1). Recommend trialing off of daily Lasix. She will only take as needed for a weight gain of 2-3 pounds in 1 day or 5 pounds in 1 week.     In order to optimize her  GDMT regimen further, recommend increasing Entresto to 49/51 mg BID. Educated regarding potential side effects. CORE follow-up in 1 month with labs prior. At that time, can consider further Entresto increase versus up-titration of spironolactone to full dose.     Gabby Hernández PA-C  [facility]  Pager: [phone]          History of Presenting Illness:    Mar Zeng is a very pleasant 68 year old female with a history of ADD on Adderall, chronic LBBB, and newly diagnosed HFrEF and nonischemic cardiomyopathy.    Patient presented with progressive shortness of breath over a period of months 10/2023.  At that time, she was found to have possible new LBBB and new HFrEF.  Coronary angiogram was completed at that time revealing a 20% pLAD lesion and a 30 to 40% dLCX lesion.  Ms. Zeng was started on appropriate GDMT and referred for outpatient cardiac MRI which was completed 11/2/2023.  This showed mildly dilated LV with normal wall thickness, LVEF 32%.  Normal RV size and function.  There was no late enhancement to suggest prior infarct, inflammation or infiltration.  Overall, findings consistent with severe nonischemic cardiomyopathy. She also wore a Zio patch monitor for 7 days which revealed normal sinus rhythm with an average VR of 75 bpm (48 to 164 bpm).  There was 1 episode of NSVT lasting 4 beats in duration and 2 episodes of SVT with the longest lasting 8 beats in duration.    She has been following closely in the CORE clinic for medication optimization since the time of her initial HFrEF diagnosis. Most recently, her lisinopril was transitioned to Entresto 24/26 mg twice daily.    Her last echocardiogram was completed 2/26/2024 and revealed LVEF 36% with moderate to severe global hypokinesia of the LV and no significant valvular heart disease.    Patient is here today for CORE follow-up. BMP today reveals mild LILIANE with creatinine 1.34, BUN 22.5. Normal electrolytes. Ms.  Karri has been feeling stable compared to her last apt. Denies chest pain, palpitations, lightheadedness, dizziness, near syncope and syncope. Her shortness of breath is stable - she will become dyspneic with extreme exertion (hiking in [place]) but otherwise denies OLSON. No orthopnea or PND. She struggled with significant peripheral edema on the airplane from [place] to [place], but this has since resolved.     Taking all medications daily as prescribed. Blood pressure 110/64 and HR 79 in clinic today. Does not check her BP at home. Weights at home have remained stable at ~116# for several months.     Watches the sodium in her diet closely.      Social History       Social History     Socioeconomic History    Marital status:      Spouse name: Not on file    Number of children: Not on file    Years of education: Not on file    Highest education level: Not on file   Occupational History    Not on file   Tobacco Use    Smoking status: Former     Packs/day: 0     Types: Cigarettes     Passive exposure: Past    Smokeless tobacco: Never   Vaping Use    Vaping Use: Never used   Substance and Sexual Activity    Alcohol use: Yes     Alcohol/week: 0.0 standard drinks of alcohol     Comment: avg:3-4 drink/wk    Drug use: No    Sexual activity: Yes     Partners: Male     Birth control/protection: Female Surgical   Other Topics Concern    Parent/sibling w/ CABG, MI or angioplasty before 65F 55M? No   Social History Narrative    [place] in [place]     Social Determinants of Health     Financial Resource Strain: Low Risk  (1/29/2024)    Financial Resource Strain     Within the past 12 months, have you or your family members you live with been unable to get utilities (heat, electricity) when it was really needed?: No   Food Insecurity: Low Risk  (1/29/2024)    Food Insecurity     Within the past 12 months, did you worry that your food would run out before you got money to buy more?: No     Within the past 12 months, did the  "food you bought just not last and you didn t have money to get more?: No   Transportation Needs: Low Risk  (1/29/2024)    Transportation Needs     Within the past 12 months, has lack of transportation kept you from medical appointments, getting your medicines, non-medical meetings or appointments, work, or from getting things that you need?: No   Physical Activity: Not on file   Stress: Not on file   Social Connections: Not on file   Interpersonal Safety: Low Risk  (11/2/2023)    Interpersonal Safety     Do you feel physically and emotionally safe where you currently live?: Yes     Within the past 12 months, have you been hit, slapped, kicked or otherwise physically hurt by someone?: No     Within the past 12 months, have you been humiliated or emotionally abused in other ways by your partner or ex-partner?: No   Housing Stability: Low Risk  (1/29/2024)    Housing Stability     Do you have housing? : Yes     Are you worried about losing your housing?: No            Review of Systems:   Please see HPI         Physical Exam:   Vitals: /64 (BP Location: Right arm, Patient Position: Sitting, Cuff Size: Adult Regular)   Pulse 79   Ht 1.676 m (5' 6\")   Wt 53.5 kg (118 lb)   LMP  (LMP Unknown)   SpO2 98%   BMI 19.05 kg/m     Wt Readings from Last 4 Encounters:   02/27/24 52.8 kg (116 lb 6.4 oz)   02/14/24 52.4 kg (115 lb 8 oz)   02/01/24 53.1 kg (117 lb)   01/29/24 53.1 kg (117 lb)     GEN: well nourished, in no acute distress.  HEENT:  Pupils equal, round. Sclerae nonicteric.   NECK: Supple, no masses appreciated. No JVD  C/V:  Regular rate and rhythm, no murmur, rub or gallop.    RESP: Respirations are unlabored. Clear to auscultation bilaterally without wheezing, rales, or rhonchi.  GI: Abdomen soft, nontender.  EXTREM: no LE edema.  NEURO: Alert and oriented, cooperative.  SKIN: Warm and dry.        Data:   LIPID RESULTS:  Lab Results   Component Value Date    CHOL 201 (H) 10/26/2023    CHOL 231 (H) " 08/18/2020    HDL 76 10/26/2023    HDL 72 08/18/2020     (H) 10/26/2023     (H) 08/18/2020    TRIG 70 10/26/2023    TRIG 110 08/18/2020    CHOLHDLRATIO 2.3 09/03/2014     LIVER ENZYME RESULTS:  Lab Results   Component Value Date    AST 20 08/18/2022    AST 23 08/18/2020    ALT 20 08/18/2022    ALT 19 08/18/2020     CBC RESULTS:  Lab Results   Component Value Date    WBC 6.4 10/26/2023    WBC 5.8 10/16/2019    RBC 3.93 10/26/2023    RBC 4.17 10/16/2019    HGB 13.0 12/05/2023    HGB 12.5 10/16/2019    HCT 36.5 10/26/2023    HCT 38.2 10/16/2019    MCV 93 10/26/2023    MCV 92 10/16/2019    MCH 29.8 10/26/2023    MCH 30.0 10/16/2019    MCHC 32.1 10/26/2023    MCHC 32.7 10/16/2019    RDW 13.3 10/26/2023    RDW 12.7 10/16/2019     10/29/2023     10/16/2019     BMP RESULTS:  Lab Results   Component Value Date     02/27/2024     08/18/2020    POTASSIUM 4.7 02/27/2024    POTASSIUM 4.5 08/18/2022    POTASSIUM 4.1 08/18/2020    CHLORIDE 100 02/27/2024    CHLORIDE 103 08/18/2022    CHLORIDE 106 08/18/2020    CO2 27 02/27/2024    CO2 28 08/18/2022    CO2 27 08/18/2020    ANIONGAP 8 02/27/2024    ANIONGAP 4 08/18/2022    ANIONGAP 8 08/18/2020    GLC 91 02/27/2024    GLC 91 08/18/2022    GLC 82 08/18/2020    BUN 32.1 (H) 02/27/2024    BUN 14 08/18/2022    BUN 11 08/18/2020    CR 1.30 (H) 02/27/2024    CR 0.80 08/18/2020    GFRESTIMATED 44 (L) 02/27/2024    GFRESTIMATED 77 08/18/2020    GFRESTBLACK 89 08/18/2020    MERRILL 9.6 02/27/2024    MERRILL 8.8 08/18/2020      A1C RESULTS:  Lab Results   Component Value Date    A1C 5.6 12/04/2017     INR RESULTS:  Lab Results   Component Value Date    INR 1.05 01/23/2014            Medications     Current Outpatient Medications   Medication Sig Dispense Refill    albuterol (ACCUNEB) 0.63 MG/3ML neb solution Take 3 mLs (0.63 mg) by nebulization every 6 hours as needed for shortness of breath, wheezing or cough 90 mL 0    albuterol (PROAIR HFA/PROVENTIL  HFA/VENTOLIN HFA) 108 (90 Base) MCG/ACT inhaler Inhale 2 puffs into the lungs every 4 hours as needed for shortness of breath, wheezing or cough 18 g 3    aluminum chloride (DRYSOL) 20 % external solution Apply topically at bedtime 60 mL 0    amphetamine-dextroamphetamine (ADDERALL XR) 25 MG 24 hr capsule Take 1 capsule (25 mg) by mouth every morning (Patient not taking: Reported on 12/8/2023) 90 capsule 0    aspirin 81 MG EC tablet Take 1 tablet (81 mg) by mouth daily 90 tablet 3    atorvastatin (LIPITOR) 40 MG tablet Take 1 tablet (40 mg) by mouth every evening 90 tablet 3    azelastine-fluticasone (DYMISTA) 137-50 MCG/ACT nasal spray Spray 1 spray into both nostrils 2 times daily as needed      budesonide-formoterol (SYMBICORT) 160-4.5 MCG/ACT Inhaler Inhale 2 puffs into the lungs 2 times daily (Patient taking differently: Inhale 2 puffs into the lungs as needed) 10.2 g 2    buPROPion (WELLBUTRIN XL) 150 MG 24 hr tablet TAKE 1 TABLET(150 MG) BY MOUTH EVERY MORNING 90 tablet 2    carvedilol (COREG) 3.125 MG tablet Take 1 tablet (3.125 mg) by mouth 2 times daily (with meals) 180 tablet 3    empagliflozin (JARDIANCE) 10 MG TABS tablet Take 1 tablet (10 mg) by mouth daily (Patient taking differently: Take 10 mg by mouth at bedtime) 90 tablet 1    fexofenadine (ALLEGRA) 180 MG tablet Take 180 mg by mouth every other day      fluorouracil (EFUDEX) 5 % external cream APPLY SPARINGLY EXTERNALLY TO FACE EVERY SUNDAY AT NIGHT. AVOID EYES AND LIPS      furosemide (LASIX) 20 MG tablet Take 0.5 tablets (10 mg) by mouth daily 45 tablet 3    loratadine (CLARITIN) 10 MG tablet Take 10 mg by mouth every other day Alternating with allegra      LORazepam (ATIVAN) 0.5 MG tablet Take 1 tablet (0.5 mg) by mouth nightly as needed for sleep 10 tablet 0    LORazepam (ATIVAN) 1 MG tablet Take 1 tablet (1 mg) by mouth as needed for anxiety (30 min prior to MRI.  May take second dose 30 min laterl if needed.) 2 tablet 0    methocarbamol  (ROBAXIN) 500 MG tablet Take 1 tablet (500 mg) by mouth 4 times daily as needed for muscle spasms 40 tablet 1    nitroGLYcerin (NITROSTAT) 0.4 MG sublingual tablet For chest pain place 1 tablet under the tongue every 5 minutes for 3 doses. If symptoms persist 5 minutes after 1st dose call 911. 30 tablet 11    sacubitril-valsartan (ENTRESTO) 24-26 MG per tablet Take 1 tablet by mouth 2 times daily 180 tablet 3    spironolactone (ALDACTONE) 25 MG tablet Take 0.5 tablets (12.5 mg) by mouth daily (Patient taking differently: Take 12.5 mg by mouth every morning) 45 tablet 4    timolol maleate (TIMOPTIC) 0.5 % ophthalmic solution Apply 1 drop topically nightly as needed (dry skin cracks) Apply 1 drop to lesion every night at bedtime. Do Not use on normal skin.            Past Medical History     Past Medical History:   Diagnosis Date    Mumps     NO ACTIVE PROBLEMS      Past Surgical History:   Procedure Laterality Date    COLONOSCOPY  8/23/2016    Dr. Shaw [facility]    COLONOSCOPY N/A 8/23/2016    Procedure: COLONOSCOPY;  Surgeon: Peter Shaw MD;  Location:  GI    COLONOSCOPY N/A 10/19/2022    Procedure: COLONOSCOPY with polypectomy using cold exacto snare;  Surgeon: Peter Shaw MD;  Location:  GI    CV CORONARY ANGIOGRAM N/A 10/27/2023    Procedure: Coronary Angiogram;  Surgeon: Elmer Andre MD;  Location:  HEART CARDIAC CATH LAB    FINGER SURGERY      right little finger joint replacement    HYSTERECTOMY VAGINAL  age 30    retained ovaries. no cervix     HYSTERECTOMY, PAP NO LONGER INDICATED      ORTHOPEDIC SURGERY      SURGICAL HISTORY OF -   10/25/13    left index cyst removal    SURGICAL HISTORY OF -       Bilat knee miniscus repair     Family History   Problem Relation Age of Onset    No Known Problems Mother     Hypertension Father     Cancer Father     CABG Father     No Known Problems Sister     Asthma Brother     Hypertension Brother     Cancer Maternal Grandmother     Cancer Maternal  Grandfather     Colon Cancer Paternal Grandmother     Cancer Paternal Grandfather     Thyroid Disease Daughter     No Known Problems Son     No Known Problems Son     Colon Cancer Paternal Aunt     Colon Cancer Cousin             Allergies   Codeine, Concerta [methylphenidate], and Hay fever & [a.r.m.]    The longitudinal plan of care for the diagnosis(es)/condition(s) as documented were addressed during this visit. Due to the added complexity in care, I will continue to support Mar in the subsequent management and with ongoing continuity of care.     40 minutes spent on the date of the encounter doing chart review, history and exam, documentation and further activities as noted above    Gabby Hernández PA-C  [facility]  Pager: [phone]

## 2024-04-02 ENCOUNTER — OFFICE VISIT (OUTPATIENT)
Dept: CARDIOLOGY | Facility: CLINIC | Age: 69
End: 2024-04-02
Attending: PHYSICIAN ASSISTANT
Payer: MEDICARE

## 2024-04-02 ENCOUNTER — LAB (OUTPATIENT)
Dept: LAB | Facility: CLINIC | Age: 69
End: 2024-04-02
Payer: MEDICARE

## 2024-04-02 VITALS
BODY MASS INDEX: 18.96 KG/M2 | WEIGHT: 118 LBS | DIASTOLIC BLOOD PRESSURE: 64 MMHG | SYSTOLIC BLOOD PRESSURE: 110 MMHG | OXYGEN SATURATION: 98 % | HEIGHT: 66 IN | HEART RATE: 79 BPM

## 2024-04-02 DIAGNOSIS — I42.8 NONISCHEMIC CARDIOMYOPATHY (H): ICD-10-CM

## 2024-04-02 DIAGNOSIS — I42.9 CARDIOMYOPATHY, UNSPECIFIED TYPE (H): ICD-10-CM

## 2024-04-02 LAB
ANION GAP SERPL CALCULATED.3IONS-SCNC: 9 MMOL/L (ref 7–15)
BUN SERPL-MCNC: 22.5 MG/DL (ref 8–23)
CALCIUM SERPL-MCNC: 9.1 MG/DL (ref 8.8–10.2)
CHLORIDE SERPL-SCNC: 100 MMOL/L (ref 98–107)
CREAT SERPL-MCNC: 1.34 MG/DL (ref 0.51–0.95)
DEPRECATED HCO3 PLAS-SCNC: 27 MMOL/L (ref 22–29)
EGFRCR SERPLBLD CKD-EPI 2021: 43 ML/MIN/1.73M2
GLUCOSE SERPL-MCNC: 88 MG/DL (ref 70–99)
POTASSIUM SERPL-SCNC: 4.3 MMOL/L (ref 3.4–5.3)
SODIUM SERPL-SCNC: 136 MMOL/L (ref 135–145)

## 2024-04-02 PROCEDURE — 80048 BASIC METABOLIC PNL TOTAL CA: CPT | Performed by: PHYSICIAN ASSISTANT

## 2024-04-02 PROCEDURE — 36415 COLL VENOUS BLD VENIPUNCTURE: CPT | Performed by: PHYSICIAN ASSISTANT

## 2024-04-02 PROCEDURE — 99215 OFFICE O/P EST HI 40 MIN: CPT | Performed by: INTERNAL MEDICINE

## 2024-04-02 RX ORDER — SACUBITRIL AND VALSARTAN 49; 51 MG/1; MG/1
1 TABLET, FILM COATED ORAL 2 TIMES DAILY
Qty: 180 TABLET | Refills: 3 | Status: SHIPPED | OUTPATIENT
Start: 2024-04-02 | End: 2024-05-16

## 2024-04-02 RX ORDER — FUROSEMIDE 20 MG
10 TABLET ORAL DAILY PRN
COMMUNITY
Start: 2024-04-02

## 2024-04-02 NOTE — PATIENT INSTRUCTIONS
Today you had a visit in the CORE clinic. CORE stands for Cardiomyopathy Optimization Rehabilitation Education. The CORE clinic will teach and help you to manage your heart failure and keep you out of the hospital. Call the CORE nurse for any questions or concerns at [phone]      Plan:  1. Medication changes:     Stop taking Lasix daily.     Increase Entresto to 49/51 mg twice daily. To finish your current prescription, you will take two 24/26 mg tablets twice daily. When you get your new prescription, you will take one 49/51 mg tablet twice daily.     2. CORE follow-up in 1 month with labs prior. If you develop dizziness, lightheadedness, or weight gain, please call us right away.    -Scheduling phone number: [phone]      It was great seeing you today!    Gabby Hernández PA-C  Physician Assistant  [facility]

## 2024-04-02 NOTE — LETTER
4/2/2024    Gail Crespo, APRN CNP  [address] [place] [address]    RE: Mar Zeng       Dear Colleague,     I had the pleasure of seeing Mar Zeng in the [facility].  Cardiology Clinic Progress Note  Mar Zeng MRN# [id number]   YOB: 1955 Age: 68 year old   Primary Cardiologist: Dr. Tinoco, Kat Huber, CORE PA-C Reason for visit: CORE follow-up             Assessment and Plan:   Mar Zeng is a very pleasant 68 year old female who is here today for CORE follow-up.      1.  HFrEF and nonischemic cardiomyopathy  - LVEF: 36% on echo 2/2024  - NYHA class II, stage C  - Etiology: nonischemic; ? LBBB  - Fluid status: euvolemic; suspected dry weight: 116#  - Diuretic regimen: Stop taking Lasix daily. Take Lasix 10 mg once daily as needed for a weight gain of 2-3 pounds in 1 day or 5 pounds in 1 week.   - Ischemic evaluation: cor angio 10/2023 with nonobstructive CAD  - Guideline directed medical therapy:  - Beta blocker: Carvedilol 3.125 mg twice daily; avoiding increase for now secondary to fatigue  - ACEI/ARB/ARNI: INCREASE Entresto to 49/51 mg twice daily  - Aldactone antagonist: Spironolactone 12.5 mg once daily  - SGLT2 inhibitor: Jardiance 10 mg once daily  -Counseled patient on fluid and sodium restriction  2.  Chronic LBBB, QRS duration 132 ms  3.  Nonobstructive coronary artery disease with cor angio 10/2023 revealing a 20% pLAD lesion and a 30 to 40% dLCX lesion.  4.  Mild LILIANE on CKD. Baseline creatinine 1.1. BMP today with creatinine 1.34.     Changes today:   Stop taking Lasix daily. Take Lasix 10 mg once daily as needed for a weight gain of 2-3 pounds in 1 day or 5 pounds in 1 week.   INCREASE Entresto to 49/51 mg twice daily    Ms. Zeng has been feeling stable since the time of her last CORE apt. Tolerated the transition from lisinopril to Entresto well without apparent side effects. Weight remains stable at  116#. BMP today with mild LILIANE, creatinine 1.34 (baseline 1.1). Recommend trialing off of daily Lasix. She will only take as needed for a weight gain of 2-3 pounds in 1 day or 5 pounds in 1 week.     In order to optimize her GDMT regimen further, recommend increasing Entresto to 49/51 mg BID. Educated regarding potential side effects. CORE follow-up in 1 month with labs prior. At that time, can consider further Entresto increase versus up-titration of spironolactone to full dose.     Gabby Hernández PA-C  [facility]  Pager: [phone]          History of Presenting Illness:    Mar Zeng is a very pleasant 68 year old female with a history of ADD on Adderall, chronic LBBB, and newly diagnosed HFrEF and nonischemic cardiomyopathy.    Patient presented with progressive shortness of breath over a period of months 10/2023.  At that time, she was found to have possible new LBBB and new HFrEF.  Coronary angiogram was completed at that time revealing a 20% pLAD lesion and a 30 to 40% dLCX lesion.  Ms. Zeng was started on appropriate GDMT and referred for outpatient cardiac MRI which was completed 11/2/2023.  This showed mildly dilated LV with normal wall thickness, LVEF 32%.  Normal RV size and function.  There was no late enhancement to suggest prior infarct, inflammation or infiltration.  Overall, findings consistent with severe nonischemic cardiomyopathy. She also wore a Zio patch monitor for 7 days which revealed normal sinus rhythm with an average VR of 75 bpm (48 to 164 bpm).  There was 1 episode of NSVT lasting 4 beats in duration and 2 episodes of SVT with the longest lasting 8 beats in duration.    She has been following closely in the CORE clinic for medication optimization since the time of her initial HFrEF diagnosis. Most recently, her lisinopril was transitioned to Entresto 24/26 mg twice daily.    Her last echocardiogram was completed 2/26/2024 and revealed LVEF 36% with  moderate to severe global hypokinesia of the LV and no significant valvular heart disease.    Patient is here today for CORE follow-up. BMP today reveals mild LILIANE with creatinine 1.34, BUN 22.5. Normal electrolytes. Ms. Zeng has been feeling stable compared to her last apt. Denies chest pain, palpitations, lightheadedness, dizziness, near syncope and syncope. Her shortness of breath is stable - she will become dyspneic with extreme exertion (hiking in [place]) but otherwise denies OLSON. No orthopnea or PND. She struggled with significant peripheral edema on the airplane from [place] to [place], but this has since resolved.     Taking all medications daily as prescribed. Blood pressure 110/64 and HR 79 in clinic today. Does not check her BP at home. Weights at home have remained stable at ~116# for several months.     Watches the sodium in her diet closely.      Social History       Social History     Socioeconomic History    Marital status:      Spouse name: Not on file    Number of children: Not on file    Years of education: Not on file    Highest education level: Not on file   Occupational History    Not on file   Tobacco Use    Smoking status: Former     Packs/day: 0     Types: Cigarettes     Passive exposure: Past    Smokeless tobacco: Never   Vaping Use    Vaping Use: Never used   Substance and Sexual Activity    Alcohol use: Yes     Alcohol/week: 0.0 standard drinks of alcohol     Comment: avg:3-4 drink/wk    Drug use: No    Sexual activity: Yes     Partners: Male     Birth control/protection: Female Surgical   Other Topics Concern    Parent/sibling w/ CABG, MI or angioplasty before 65F 55M? No   Social History Narrative    [place] in [place]     Social Determinants of Health     Financial Resource Strain: Low Risk  (1/29/2024)    Financial Resource Strain     Within the past 12 months, have you or your family members you live with been unable to get utilities (heat, electricity) when it was really  "needed?: No   Food Insecurity: Low Risk  (1/29/2024)    Food Insecurity     Within the past 12 months, did you worry that your food would run out before you got money to buy more?: No     Within the past 12 months, did the food you bought just not last and you didn t have money to get more?: No   Transportation Needs: Low Risk  (1/29/2024)    Transportation Needs     Within the past 12 months, has lack of transportation kept you from medical appointments, getting your medicines, non-medical meetings or appointments, work, or from getting things that you need?: No   Physical Activity: Not on file   Stress: Not on file   Social Connections: Not on file   Interpersonal Safety: Low Risk  (11/2/2023)    Interpersonal Safety     Do you feel physically and emotionally safe where you currently live?: Yes     Within the past 12 months, have you been hit, slapped, kicked or otherwise physically hurt by someone?: No     Within the past 12 months, have you been humiliated or emotionally abused in other ways by your partner or ex-partner?: No   Housing Stability: Low Risk  (1/29/2024)    Housing Stability     Do you have housing? : Yes     Are you worried about losing your housing?: No            Review of Systems:   Please see HPI         Physical Exam:   Vitals: /64 (BP Location: Right arm, Patient Position: Sitting, Cuff Size: Adult Regular)   Pulse 79   Ht 1.676 m (5' 6\")   Wt 53.5 kg (118 lb)   LMP  (LMP Unknown)   SpO2 98%   BMI 19.05 kg/m     Wt Readings from Last 4 Encounters:   02/27/24 52.8 kg (116 lb 6.4 oz)   02/14/24 52.4 kg (115 lb 8 oz)   02/01/24 53.1 kg (117 lb)   01/29/24 53.1 kg (117 lb)     GEN: well nourished, in no acute distress.  HEENT:  Pupils equal, round. Sclerae nonicteric.   NECK: Supple, no masses appreciated. No JVD  C/V:  Regular rate and rhythm, no murmur, rub or gallop.    RESP: Respirations are unlabored. Clear to auscultation bilaterally without wheezing, rales, or rhonchi.  GI: " Abdomen soft, nontender.  EXTREM: no LE edema.  NEURO: Alert and oriented, cooperative.  SKIN: Warm and dry.        Data:   LIPID RESULTS:  Lab Results   Component Value Date    CHOL 201 (H) 10/26/2023    CHOL 231 (H) 08/18/2020    HDL 76 10/26/2023    HDL 72 08/18/2020     (H) 10/26/2023     (H) 08/18/2020    TRIG 70 10/26/2023    TRIG 110 08/18/2020    CHOLHDLRATIO 2.3 09/03/2014     LIVER ENZYME RESULTS:  Lab Results   Component Value Date    AST 20 08/18/2022    AST 23 08/18/2020    ALT 20 08/18/2022    ALT 19 08/18/2020     CBC RESULTS:  Lab Results   Component Value Date    WBC 6.4 10/26/2023    WBC 5.8 10/16/2019    RBC 3.93 10/26/2023    RBC 4.17 10/16/2019    HGB 13.0 12/05/2023    HGB 12.5 10/16/2019    HCT 36.5 10/26/2023    HCT 38.2 10/16/2019    MCV 93 10/26/2023    MCV 92 10/16/2019    MCH 29.8 10/26/2023    MCH 30.0 10/16/2019    MCHC 32.1 10/26/2023    MCHC 32.7 10/16/2019    RDW 13.3 10/26/2023    RDW 12.7 10/16/2019     10/29/2023     10/16/2019     BMP RESULTS:  Lab Results   Component Value Date     02/27/2024     08/18/2020    POTASSIUM 4.7 02/27/2024    POTASSIUM 4.5 08/18/2022    POTASSIUM 4.1 08/18/2020    CHLORIDE 100 02/27/2024    CHLORIDE 103 08/18/2022    CHLORIDE 106 08/18/2020    CO2 27 02/27/2024    CO2 28 08/18/2022    CO2 27 08/18/2020    ANIONGAP 8 02/27/2024    ANIONGAP 4 08/18/2022    ANIONGAP 8 08/18/2020    GLC 91 02/27/2024    GLC 91 08/18/2022    GLC 82 08/18/2020    BUN 32.1 (H) 02/27/2024    BUN 14 08/18/2022    BUN 11 08/18/2020    CR 1.30 (H) 02/27/2024    CR 0.80 08/18/2020    GFRESTIMATED 44 (L) 02/27/2024    GFRESTIMATED 77 08/18/2020    GFRESTBLACK 89 08/18/2020    MERRILL 9.6 02/27/2024    MERRILL 8.8 08/18/2020      A1C RESULTS:  Lab Results   Component Value Date    A1C 5.6 12/04/2017     INR RESULTS:  Lab Results   Component Value Date    INR 1.05 01/23/2014            Medications     Current Outpatient Medications   Medication Sig  Dispense Refill    albuterol (ACCUNEB) 0.63 MG/3ML neb solution Take 3 mLs (0.63 mg) by nebulization every 6 hours as needed for shortness of breath, wheezing or cough 90 mL 0    albuterol (PROAIR HFA/PROVENTIL HFA/VENTOLIN HFA) 108 (90 Base) MCG/ACT inhaler Inhale 2 puffs into the lungs every 4 hours as needed for shortness of breath, wheezing or cough 18 g 3    aluminum chloride (DRYSOL) 20 % external solution Apply topically at bedtime 60 mL 0    amphetamine-dextroamphetamine (ADDERALL XR) 25 MG 24 hr capsule Take 1 capsule (25 mg) by mouth every morning (Patient not taking: Reported on 12/8/2023) 90 capsule 0    aspirin 81 MG EC tablet Take 1 tablet (81 mg) by mouth daily 90 tablet 3    atorvastatin (LIPITOR) 40 MG tablet Take 1 tablet (40 mg) by mouth every evening 90 tablet 3    azelastine-fluticasone (DYMISTA) 137-50 MCG/ACT nasal spray Spray 1 spray into both nostrils 2 times daily as needed      budesonide-formoterol (SYMBICORT) 160-4.5 MCG/ACT Inhaler Inhale 2 puffs into the lungs 2 times daily (Patient taking differently: Inhale 2 puffs into the lungs as needed) 10.2 g 2    buPROPion (WELLBUTRIN XL) 150 MG 24 hr tablet TAKE 1 TABLET(150 MG) BY MOUTH EVERY MORNING 90 tablet 2    carvedilol (COREG) 3.125 MG tablet Take 1 tablet (3.125 mg) by mouth 2 times daily (with meals) 180 tablet 3    empagliflozin (JARDIANCE) 10 MG TABS tablet Take 1 tablet (10 mg) by mouth daily (Patient taking differently: Take 10 mg by mouth at bedtime) 90 tablet 1    fexofenadine (ALLEGRA) 180 MG tablet Take 180 mg by mouth every other day      fluorouracil (EFUDEX) 5 % external cream APPLY SPARINGLY EXTERNALLY TO FACE EVERY SUNDAY AT NIGHT. AVOID EYES AND LIPS      furosemide (LASIX) 20 MG tablet Take 0.5 tablets (10 mg) by mouth daily 45 tablet 3    loratadine (CLARITIN) 10 MG tablet Take 10 mg by mouth every other day Alternating with allegra      LORazepam (ATIVAN) 0.5 MG tablet Take 1 tablet (0.5 mg) by mouth nightly as  needed for sleep 10 tablet 0    LORazepam (ATIVAN) 1 MG tablet Take 1 tablet (1 mg) by mouth as needed for anxiety (30 min prior to MRI.  May take second dose 30 min laterl if needed.) 2 tablet 0    methocarbamol (ROBAXIN) 500 MG tablet Take 1 tablet (500 mg) by mouth 4 times daily as needed for muscle spasms 40 tablet 1    nitroGLYcerin (NITROSTAT) 0.4 MG sublingual tablet For chest pain place 1 tablet under the tongue every 5 minutes for 3 doses. If symptoms persist 5 minutes after 1st dose call 911. 30 tablet 11    sacubitril-valsartan (ENTRESTO) 24-26 MG per tablet Take 1 tablet by mouth 2 times daily 180 tablet 3    spironolactone (ALDACTONE) 25 MG tablet Take 0.5 tablets (12.5 mg) by mouth daily (Patient taking differently: Take 12.5 mg by mouth every morning) 45 tablet 4    timolol maleate (TIMOPTIC) 0.5 % ophthalmic solution Apply 1 drop topically nightly as needed (dry skin cracks) Apply 1 drop to lesion every night at bedtime. Do Not use on normal skin.            Past Medical History     Past Medical History:   Diagnosis Date    Mumps     NO ACTIVE PROBLEMS      Past Surgical History:   Procedure Laterality Date    COLONOSCOPY  8/23/2016    Dr. Shaw [facility]    COLONOSCOPY N/A 8/23/2016    Procedure: COLONOSCOPY;  Surgeon: Peter Shaw MD;  Location:  GI    COLONOSCOPY N/A 10/19/2022    Procedure: COLONOSCOPY with polypectomy using cold exacto snare;  Surgeon: Peter Shaw MD;  Location:  GI    CV CORONARY ANGIOGRAM N/A 10/27/2023    Procedure: Coronary Angiogram;  Surgeon: Elmer Andre MD;  Location:  HEART CARDIAC CATH LAB    FINGER SURGERY      right little finger joint replacement    HYSTERECTOMY VAGINAL  age 30    retained ovaries. no cervix     HYSTERECTOMY, PAP NO LONGER INDICATED      ORTHOPEDIC SURGERY      SURGICAL HISTORY OF -   10/25/13    left index cyst removal    SURGICAL HISTORY OF -       Bilat knee miniscus repair     Family History   Problem Relation Age of  Onset    No Known Problems Mother     Hypertension Father     Cancer Father     CABG Father     No Known Problems Sister     Asthma Brother     Hypertension Brother     Cancer Maternal Grandmother     Cancer Maternal Grandfather     Colon Cancer Paternal Grandmother     Cancer Paternal Grandfather     Thyroid Disease Daughter     No Known Problems Son     No Known Problems Son     Colon Cancer Paternal Aunt     Colon Cancer Cousin             Allergies   Codeine, Concerta [methylphenidate], and Hay fever & [a.r.m.]    The longitudinal plan of care for the diagnosis(es)/condition(s) as documented were addressed during this visit. Due to the added complexity in care, I will continue to support Mar in the subsequent management and with ongoing continuity of care.     40 minutes spent on the date of the encounter doing chart review, history and exam, documentation and further activities as noted above    HEIDY Solorio [facility]  Pager: [phone]      Thank you for allowing me to participate in the care of your patient.      Sincerely,     HEIDY Solorio [facility]  cc:   Kat Huber PA-C  [address]  [place] [address]

## 2024-04-06 ENCOUNTER — TELEPHONE (OUTPATIENT)
Dept: FAMILY MEDICINE | Facility: CLINIC | Age: 69
End: 2024-04-06

## 2024-04-06 ENCOUNTER — OFFICE VISIT (OUTPATIENT)
Dept: URGENT CARE | Facility: URGENT CARE | Age: 69
End: 2024-04-06
Payer: MEDICARE

## 2024-04-06 VITALS
OXYGEN SATURATION: 97 % | TEMPERATURE: 98 F | HEART RATE: 63 BPM | DIASTOLIC BLOOD PRESSURE: 73 MMHG | SYSTOLIC BLOOD PRESSURE: 138 MMHG

## 2024-04-06 DIAGNOSIS — H69.90 DYSFUNCTION OF EUSTACHIAN TUBE, UNSPECIFIED LATERALITY: Primary | ICD-10-CM

## 2024-04-06 DIAGNOSIS — J30.9 ALLERGIC RHINITIS, UNSPECIFIED SEASONALITY, UNSPECIFIED TRIGGER: ICD-10-CM

## 2024-04-06 DIAGNOSIS — H69.93 DYSFUNCTION OF BOTH EUSTACHIAN TUBES: Primary | ICD-10-CM

## 2024-04-06 PROCEDURE — 99214 OFFICE O/P EST MOD 30 MIN: CPT | Performed by: PHYSICIAN ASSISTANT

## 2024-04-06 RX ORDER — PREDNISONE 20 MG/1
20 TABLET ORAL DAILY
Qty: 5 TABLET | Refills: 0 | Status: SHIPPED | OUTPATIENT
Start: 2024-04-06 | End: 2024-04-11

## 2024-04-06 RX ORDER — LEVOCETIRIZINE DIHYDROCHLORIDE 5 MG/1
5 TABLET, FILM COATED ORAL EVERY EVENING
Qty: 30 TABLET | Refills: 5 | Status: SHIPPED | OUTPATIENT
Start: 2024-04-06

## 2024-04-06 NOTE — PATIENT INSTRUCTIONS
(H69.93) Dysfunction of both eustachian tubes  (primary encounter diagnosis)  Comment:   Plan: levocetirizine (XYZAL) 5 MG tablet, predniSONE         (DELTASONE) 20 MG tablet            (J30.9) Allergic rhinitis, unspecified seasonality, unspecified trigger  Comment:   Plan: levocetirizine (XYZAL) 5 MG tablet          Take Xyzal every night, consider doing claritin every morning if you do not have enough relief with doing the flonase 2 sprays each nostril twice a day for the next 2 weeks or so.

## 2024-04-06 NOTE — PROGRESS NOTES
Patient presents with:  Urgent Care: Bilateral ear pain- left ear hurts more. Pain started 2 days ago.     (H69.93) Dysfunction of both eustachian tubes  (primary encounter diagnosis)  Comment:   Plan: levocetirizine (XYZAL) 5 MG tablet, predniSONE         (DELTASONE) 20 MG tablet            (J30.9) Allergic rhinitis, unspecified seasonality, unspecified trigger  Comment:   Plan: levocetirizine (XYZAL) 5 MG tablet          Take Xyzal every night, consider doing claritin every morning if you do not have enough relief with doing the flonase 2 sprays each nostril twice a day for the next 2 weeks or so.      See orders in Epic    At the end of the encounter, I discussed results, diagnosis, medications. Discussed red flags for immediate return to clinic/ER, as well as indications for follow up if no improvement. Patient understood and agreed to plan. Patient was stable for discharge     If not improving or if condition worsens, follow up with your Primary Care Provider          SUBJECTIVE:   Mar Zeng is a 68 year old female who presents today with bilateral ear discomfort for the past 2 days.  Also some nasal congestion.  She is using Flonase daily.    Patient Active Problem List   Diagnosis    Multiple pigmented nevi    Advanced directives, counseling/discussion    Seasonal allergic rhinitis    Hematoma of rectus sheath    Generalized anxiety disorder    Attention deficit hyperactivity disorder (ADHD), combined type    Benign neoplasm of colon    Fibromyositis    Hammer toe, acquired    Ingrowing nail    Insomnia    Localized, primary osteoarthritis    Loose body in knee    Macrodactylia of toes    Migraine    Other disorders of bone and cartilage(733.99)    Plantar fascial fibromatosis    Postmenopausal HRT (hormone replacement therapy)    Talipes cavus    Fibromyalgia    Splinter hemorrhage of toenail    Congestive heart failure, unspecified HF chronicity, unspecified heart failure type (H)    Anemia          Past Medical History:   Diagnosis Date    Mumps     NO ACTIVE PROBLEMS          Current Outpatient Medications   Medication Sig Dispense Refill    Multiple Vitamins-Iron (DAILY-CARROL/IRON/BETA-CAROTENE) TABS TAKE 1 TABLET BY MOUTH DAILY. (Patient not taking: Reported on 10/19/2020) 30 tablet 7     Social History     Tobacco Use    Smoking status: Never Smoker    Smokeless tobacco: Never Used   Substance Use Topics    Alcohol use: Not on file     Family History   Problem Relation Age of Onset    Diabetes Mother     Diabetes Father          ROS:    10 point ROS of systems including Constitutional, Eyes, Respiratory, Cardiovascular, Gastroenterology, Genitourinary, Integumentary, Muscularskeletal, Psychiatric ,neurological were all negative except for pertinent positives noted in my HPI       OBJECTIVE:  /73 (BP Location: Right arm, Patient Position: Sitting, Cuff Size: Adult Regular)   Pulse 63   Temp 98  F (36.7  C) (Tympanic)   LMP  (LMP Unknown)   SpO2 97%   Physical Exam:  GENERAL APPEARANCE: healthy, alert and no distress  EYES: EOMI,  PERRL, conjunctiva clear  HENT: ear canals and TM's normal albeit retracted bilaterally.  Nose with boggy turbinates, nearly occluding nares bilaterally.  Clear coryza as well.  Mouth without ulcers, erythema or lesions  NECK: supple, nontender, no lymphadenopathy  RESP: lungs clear to auscultation - no rales, rhonchi or wheezes  CV: regular rates and rhythm, normal S1 S2, no murmur noted  NEURO: Normal strength and tone, sensory exam grossly normal,  normal speech and mentation  SKIN: no suspicious lesions or rashes

## 2024-04-06 NOTE — TELEPHONE ENCOUNTER
General Call    Contacts         Type Contact Phone/Fax    04/06/2024 06:29 PM CDT Phone (Incoming) Karri Mar K (Self) [phone] (H)          Reason for Call:  Pharmacy is closed and patient would like prescription sent [facility] [address] [facility] [address] - PH: [phone]    Patient also asking if the medication prescribed Levocetirizine (Xyzal) is generic as pharmacy told her the cost would be $88 and asking if there is an alternative.    What are your questions or concerns:  Patient's pharmacy is closed and would like medication forwarded to Pharmacy listed above.     Date of last appointment with provider: 04/06/2024 - [facility]    Could we send this information to you in [facility] or would you prefer to receive a phone call?:   Patient would prefer a phone call   Okay to leave a detailed message?: Yes at Cell number on file:    Telephone Information:   Mobile [phone]

## 2024-04-07 RX ORDER — CETIRIZINE HYDROCHLORIDE 10 MG/1
10 TABLET ORAL DAILY
Qty: 30 TABLET | Refills: 2 | Status: SHIPPED | OUTPATIENT
Start: 2024-04-07

## 2024-04-07 RX ORDER — PREDNISONE 20 MG/1
20 TABLET ORAL DAILY
Qty: 5 TABLET | Refills: 0 | Status: SHIPPED | OUTPATIENT
Start: 2024-04-07 | End: 2024-04-12

## 2024-04-07 NOTE — TELEPHONE ENCOUNTER
Please contact patient to let her know the medication has been sent to the [facility].  I sent generic Zyrtec, if it is not covered I advised the pharmacy they can substitute her formulary as alternative.  If there is no other alternative that is covered by her pharmacy, she may purchase the Zyrtec over-the-counter, the generic form is cetirizine.    May VINCENT, PADylanC

## 2024-04-08 NOTE — PROGRESS NOTES
"Medicare is not approving iron infusions:      - 2/29/24: Per Billing:  Insurance requires that we show proof of the anemia DX for all iron infusions.  Do you have any supporting documentation for this diagnosis? Patients labs do not support Anemia and insurance will not cover it. We need a letter of medical necessity and will have to send for a prior authorization.   If there is no documentation to support anemia, we will need to do an off label PA and would need an LMN to submit with it.   - CORE requesting billing to start the off label PA process  Iron infusion is recommended:  IV Fe replacement - IIB recommendation per AHA guidelines in heart failure for patients with a ferritin < 100 or <300 with a Tsat <20% (reduces hospitalizations, improves QOL per FAIR-HF trial et al).   - Billing: We will need a letter of medical necessity from the provider to send with the off label PA.     - 3/6/24: LMN completed. Signed copy faxed to billing    - 3/6/24:\"patient has Medicare as primary insurance. Medicare does not allow for any off label indications for Venofer. We can send this to pharmacy operations team to look at other coverage options also. \"    - 3/7/24: Pharmacy ran a test claim: we did run a test claim to see if her prescription benefits would cover the Venofer. Patient has BCBS FEDERAL which is a slippery slope. That insurance will allow ONE TRANSITION FILL ONLY and then the patient is restricted to [employer] [facility]. A test claim for all four doses at one time is $50.   A liquid oral preparations of iron (Ferrous sulfate) both over the counter and by RX. I know there is some pediatric formulations that could be utilized. I don't know about pharmacy benefit coverage, you would have to send RX to the patients preferred pharmacy to start that process.             Checking with provider to see if there is a preference of IV iron or liquid.    Future Appointments   Date Time Provider Department Center   5/2/2024 " 11:00 AM UC PFL [facility]   5/16/2024  1:30 PM RU LAB [facility]   5/16/2024  2:30 PM Dafne Sol PA-C [facility]   6/7/2024  9:15 AM RU LAB [facility]   6/7/2024 10:15 AM Yanira Tinoco DO [facility]           BELKIS Sin, RN 11:33 AM 04/08/24

## 2024-04-09 ENCOUNTER — MYC REFILL (OUTPATIENT)
Dept: FAMILY MEDICINE | Facility: CLINIC | Age: 69
End: 2024-04-09
Payer: MEDICARE

## 2024-04-09 DIAGNOSIS — H69.93 DYSFUNCTION OF BOTH EUSTACHIAN TUBES: ICD-10-CM

## 2024-04-09 DIAGNOSIS — J30.9 ALLERGIC RHINITIS, UNSPECIFIED SEASONALITY, UNSPECIFIED TRIGGER: ICD-10-CM

## 2024-04-10 RX ORDER — LEVOCETIRIZINE DIHYDROCHLORIDE 5 MG/1
5 TABLET, FILM COATED ORAL EVERY EVENING
Qty: 30 TABLET | Refills: 5 | OUTPATIENT
Start: 2024-04-10

## 2024-04-10 NOTE — TELEPHONE ENCOUNTER
PA initiated for levocetirizine (XYZAL) 5 MG tablet     Rosaura SHELDON RN  [facility]

## 2024-04-15 ENCOUNTER — TELEPHONE (OUTPATIENT)
Dept: PULMONOLOGY | Facility: CLINIC | Age: 69
End: 2024-04-15
Payer: MEDICARE

## 2024-04-15 NOTE — TELEPHONE ENCOUNTER
Left Voicemail (1st Attempt) for the patient to call back and schedule the following:    Appointment type: RTA  Provider: BEATRIZ MADRID  Return date: 08/16/2024  Specialty phone number: [phone]  Additional appointment(s) needed: N/A  Additonal Notes: N/A

## 2024-05-10 ENCOUNTER — OFFICE VISIT (OUTPATIENT)
Dept: PULMONOLOGY | Facility: CLINIC | Age: 69
End: 2024-05-10
Attending: INTERNAL MEDICINE
Payer: MEDICARE

## 2024-05-10 DIAGNOSIS — J45.20 MILD INTERMITTENT ASTHMA, UNSPECIFIED WHETHER COMPLICATED: ICD-10-CM

## 2024-05-10 PROCEDURE — 95070 INHLJ BRNCL CHALLENGE TSTG: CPT | Performed by: INTERNAL MEDICINE

## 2024-05-10 PROCEDURE — 94070 EVALUATION OF WHEEZING: CPT | Performed by: INTERNAL MEDICINE

## 2024-05-10 NOTE — PROGRESS NOTES
Mar Zeng comes into clinic today at the request of Britni Swain Ordering Provider for Methacholine inhalation challenge    This service provided today was under the supervising provider of the day Dr. Chanel Ham, who was available if needed.    Prasad Nguyen, RRT

## 2024-05-14 LAB
EXPTIME-%CHANGE-CHLG: -7 %
EXPTIME-CHLG: 7.32 SEC
EXPTIME-PRE: 7.91 SEC
FEF2575-%CHANGE-CHLG: -36 %
FEF2575-%PRED-CHLG: 46 %
FEF2575-%PRED-PRE: 73 %
FEF2575-PRE: 1.37 L/SEC
FEF2575-PRED: 1.87 L/SEC
FEFMAX-%PRED-PRE: 104 %
FEFMAX-PRE: 6.28 L/SEC
FEFMAX-PRED: 5.98 L/SEC
FEV1-%PRED-PRE: 110 %
FEV1-PRE: 2.43 L
FEV1FEV6-PRE: 69 %
FEV1FEV6-PRED: 79 %
FEV1FVC-PRE: 68 %
FEV1FVC-PRED: 79 %
FIFMAX-%CHANGE-CHLG: 14 %
FIFMAX-CHLG: 5.05 L/SEC
FIFMAX-PRE: 4.41 L/SEC
FVC-%PRED-PRE: 126 %
FVC-PRE: 3.58 L
FVC-PRED: 2.82 L

## 2024-05-16 ENCOUNTER — OFFICE VISIT (OUTPATIENT)
Dept: CARDIOLOGY | Facility: CLINIC | Age: 69
End: 2024-05-16
Attending: INTERNAL MEDICINE
Payer: MEDICARE

## 2024-05-16 ENCOUNTER — OFFICE VISIT (OUTPATIENT)
Dept: PODIATRY | Facility: CLINIC | Age: 69
End: 2024-05-16
Payer: MEDICARE

## 2024-05-16 ENCOUNTER — LAB (OUTPATIENT)
Dept: LAB | Facility: CLINIC | Age: 69
End: 2024-05-16
Payer: MEDICARE

## 2024-05-16 VITALS
HEART RATE: 68 BPM | OXYGEN SATURATION: 96 % | DIASTOLIC BLOOD PRESSURE: 62 MMHG | WEIGHT: 121.4 LBS | BODY MASS INDEX: 19.51 KG/M2 | SYSTOLIC BLOOD PRESSURE: 108 MMHG | HEIGHT: 66 IN

## 2024-05-16 DIAGNOSIS — I42.8 NONISCHEMIC CARDIOMYOPATHY (H): ICD-10-CM

## 2024-05-16 DIAGNOSIS — M79.675 TOE PAIN, LEFT: ICD-10-CM

## 2024-05-16 DIAGNOSIS — L84 CORNS AND CALLOSITIES: Primary | ICD-10-CM

## 2024-05-16 LAB
ANION GAP SERPL CALCULATED.3IONS-SCNC: 9 MMOL/L (ref 7–15)
BUN SERPL-MCNC: 23.2 MG/DL (ref 8–23)
CALCIUM SERPL-MCNC: 9.4 MG/DL (ref 8.8–10.2)
CHLORIDE SERPL-SCNC: 104 MMOL/L (ref 98–107)
CREAT SERPL-MCNC: 1.63 MG/DL (ref 0.51–0.95)
DEPRECATED HCO3 PLAS-SCNC: 27 MMOL/L (ref 22–29)
EGFRCR SERPLBLD CKD-EPI 2021: 34 ML/MIN/1.73M2
GLUCOSE SERPL-MCNC: 91 MG/DL (ref 70–99)
POTASSIUM SERPL-SCNC: 4.8 MMOL/L (ref 3.4–5.3)
SODIUM SERPL-SCNC: 140 MMOL/L (ref 135–145)

## 2024-05-16 PROCEDURE — 36415 COLL VENOUS BLD VENIPUNCTURE: CPT | Performed by: INTERNAL MEDICINE

## 2024-05-16 PROCEDURE — 99214 OFFICE O/P EST MOD 30 MIN: CPT | Performed by: PHYSICIAN ASSISTANT

## 2024-05-16 PROCEDURE — 99213 OFFICE O/P EST LOW 20 MIN: CPT | Performed by: PODIATRIST

## 2024-05-16 PROCEDURE — G2211 COMPLEX E/M VISIT ADD ON: HCPCS | Performed by: PHYSICIAN ASSISTANT

## 2024-05-16 PROCEDURE — 80048 BASIC METABOLIC PNL TOTAL CA: CPT | Performed by: INTERNAL MEDICINE

## 2024-05-16 RX ORDER — SACUBITRIL AND VALSARTAN 49; 51 MG/1; MG/1
TABLET, FILM COATED ORAL
Qty: 180 TABLET | Refills: 3 | Status: SHIPPED | OUTPATIENT
Start: 2024-05-16

## 2024-05-16 NOTE — PROGRESS NOTES
CARDIOLOGY CLINIC PROGRESS NOTE    DOS: 2024      Mar Zeng  : 1955, 68 year old  MRN: [id number]      Primary cardiologist: Dr. Tinoco      History: Mar Zeng is a very pleasant 68 year old female with a history of ADD on Adderall, chronic LBBB, and recently diagnosed HFrEF and nonischemic cardiomyopathy.     Patient presented with progressive shortness of breath over a period of months 10/2023.  At that time, she was found to have possible new LBBB and new HFrEF.  Coronary angiogram was completed at that time revealing a 20% pLAD lesion and a 30 to 40% dLCX lesion.  Ms. Zeng was started on appropriate GDMT and referred for outpatient cardiac MRI which was completed 2023.  This showed mildly dilated LV with normal wall thickness, LVEF 32%.  Normal RV size and function.  There was no late enhancement to suggest prior infarct, inflammation or infiltration.  Overall, findings consistent with severe nonischemic cardiomyopathy. She also wore a Zio patch monitor for 7 days which revealed normal sinus rhythm with an average VR of 75 bpm (48 to 164 bpm).  There was 1 episode of NSVT lasting 4 beats in duration and 2 episodes of SVT with the longest lasting 8 beats in duration.     Her last echocardiogram was completed 2024 and revealed LVEF 36% with moderate to severe global hypokinesia of the LV and no significant valvular heart disease.         Last visit, Entresto increased and Lasix stopped (only to take PRN).     Patient is here today for CORE follow-up.   BMP today reveals LILIANE with creatinine 1.63, BUN 23.2. Normal electrolytes.   Denies chest pain, palpitations, lightheadedness, dizziness, near syncope and syncope. Her shortness of breath is stable, mild OLSON.   But she is more fatigued again now.   She does feel she stays hydrated.    Taking all medications daily as prescribed.   Blood pressure 108/62 and HR 68 in clinic today. Does not check her BP at home.  Weights at home are up a few lbs, at 120 lbs.   Watches the sodium in her diet closely.        ROS:  Skin:        Eyes:       ENT:       Respiratory:       Cardiovascular:       Gastroenterology:      Genitourinary:       Musculoskeletal:       Neurologic:       Psychiatric:       Heme/Lymph/Imm:       Endocrine:         PAST MEDICAL HISTORY:  Past Medical History:   Diagnosis Date    Mumps     NO ACTIVE PROBLEMS        PAST SURGICAL HISTORY:  Past Surgical History:   Procedure Laterality Date    COLONOSCOPY  8/23/2016    Dr. Shaw [facility]    COLONOSCOPY N/A 8/23/2016    Procedure: COLONOSCOPY;  Surgeon: Peter Shaw MD;  Location:  GI    COLONOSCOPY N/A 10/19/2022    Procedure: COLONOSCOPY with polypectomy using cold exacto snare;  Surgeon: Peter Shaw MD;  Location:  GI    CV CORONARY ANGIOGRAM N/A 10/27/2023    Procedure: Coronary Angiogram;  Surgeon: Elmer Andre MD;  Location:  [facility]ATH [facility]    FINGER SURGERY      right little finger joint replacement    HYSTERECTOMY VAGINAL  age 30    retained ovaries. no cervix     HYSTERECTOMY, PAP NO LONGER INDICATED      ORTHOPEDIC SURGERY      SURGICAL HISTORY OF -   10/25/13    left index cyst removal    SURGICAL HISTORY OF -       Bilat knee miniscus repair       SOCIAL HISTORY:  Social History     Socioeconomic History    Marital status:      Spouse name: None    Number of children: None    Years of education: None    Highest education level: None   Tobacco Use    Smoking status: Former     Types: Cigarettes     Passive exposure: Past    Smokeless tobacco: Never   Vaping Use    Vaping status: Never Used   Substance and Sexual Activity    Alcohol use: Yes     Alcohol/week: 0.0 standard drinks of alcohol     Comment: avg:3-4 drink/wk    Drug use: No    Sexual activity: Yes     Partners: Male     Birth control/protection: Female Surgical   Other Topics Concern    Parent/sibling w/ CABG, MI or angioplasty before 65F 55M? No    Social History Narrative    [place]     Social Determinants of Health     Financial Resource Strain: Low Risk  (1/29/2024)    Financial Resource Strain     Within the past 12 months, have you or your family members you live with been unable to get utilities (heat, electricity) when it was really needed?: No   Food Insecurity: Low Risk  (1/29/2024)    Food Insecurity     Within the past 12 months, did you worry that your food would run out before you got money to buy more?: No     Within the past 12 months, did the food you bought just not last and you didn t have money to get more?: No   Transportation Needs: Low Risk  (1/29/2024)    Transportation Needs     Within the past 12 months, has lack of transportation kept you from medical appointments, getting your medicines, non-medical meetings or appointments, work, or from getting things that you need?: No   Interpersonal Safety: Low Risk  (11/2/2023)    Interpersonal Safety     Do you feel physically and emotionally safe where you currently live?: Yes     Within the past 12 months, have you been hit, slapped, kicked or otherwise physically hurt by someone?: No     Within the past 12 months, have you been humiliated or emotionally abused in other ways by your partner or ex-partner?: No   Housing Stability: Low Risk  (1/29/2024)    Housing Stability     Do you have housing? : Yes     Are you worried about losing your housing?: No       FAMILY HISTORY:  Family History   Problem Relation Age of Onset    No Known Problems Mother     Hypertension Father     Cancer Father     CABG Father     No Known Problems Sister     Asthma Brother     Hypertension Brother     Cancer Maternal Grandmother     Cancer Maternal Grandfather     Colon Cancer Paternal Grandmother     Cancer Paternal Grandfather     Thyroid Disease Daughter     No Known Problems Son     No Known Problems Son     Colon Cancer Paternal Aunt     Colon Cancer Cousin        MEDS:   Current Outpatient  Medications   Medication Sig Dispense Refill    albuterol (ACCUNEB) 0.63 MG/3ML neb solution Take 3 mLs (0.63 mg) by nebulization every 6 hours as needed for shortness of breath, wheezing or cough 90 mL 0    albuterol (PROAIR HFA/PROVENTIL HFA/VENTOLIN HFA) 108 (90 Base) MCG/ACT inhaler Inhale 2 puffs into the lungs every 4 hours as needed for shortness of breath, wheezing or cough 18 g 3    aluminum chloride (DRYSOL) 20 % external solution Apply topically at bedtime 60 mL 0    amphetamine-dextroamphetamine (ADDERALL XR) 25 MG 24 hr capsule Take 1 capsule (25 mg) by mouth every morning 90 capsule 0    aspirin 81 MG EC tablet Take 1 tablet (81 mg) by mouth daily 90 tablet 3    atorvastatin (LIPITOR) 40 MG tablet Take 1 tablet (40 mg) by mouth every evening 90 tablet 3    azelastine-fluticasone (DYMISTA) 137-50 MCG/ACT nasal spray Spray 1 spray into both nostrils 2 times daily as needed      budesonide-formoterol (SYMBICORT) 160-4.5 MCG/ACT Inhaler Inhale 2 puffs into the lungs 2 times daily (Patient taking differently: Inhale 2 puffs into the lungs as needed) 10.2 g 2    buPROPion (WELLBUTRIN XL) 150 MG 24 hr tablet TAKE 1 TABLET(150 MG) BY MOUTH EVERY MORNING 90 tablet 2    carvedilol (COREG) 3.125 MG tablet Take 1 tablet (3.125 mg) by mouth 2 times daily (with meals) 180 tablet 3    cetirizine (ZYRTEC) 10 MG tablet Take 1 tablet (10 mg) by mouth daily 30 tablet 2    empagliflozin (JARDIANCE) 10 MG TABS tablet Take 1 tablet (10 mg) by mouth daily (Patient taking differently: Take 10 mg by mouth at bedtime) 90 tablet 1    fexofenadine (ALLEGRA) 180 MG tablet Take 180 mg by mouth every other day      fluorouracil (EFUDEX) 5 % external cream APPLY SPARINGLY EXTERNALLY TO FACE EVERY SUNDAY AT NIGHT. AVOID EYES AND LIPS      furosemide (LASIX) 20 MG tablet Take 0.5 tablets (10 mg) by mouth daily as needed (weight gain of 3 pounds in one day or 5 pounds in 1 week)      levocetirizine (XYZAL) 5 MG tablet Take 1 tablet (5  "mg) by mouth every evening 30 tablet 5    loratadine (CLARITIN) 10 MG tablet Take 10 mg by mouth every other day Alternating with allegra      LORazepam (ATIVAN) 1 MG tablet Take 1 tablet (1 mg) by mouth as needed for anxiety (30 min prior to MRI.  May take second dose 30 min laterl if needed.) 2 tablet 0    nitroGLYcerin (NITROSTAT) 0.4 MG sublingual tablet For chest pain place 1 tablet under the tongue every 5 minutes for 3 doses. If symptoms persist 5 minutes after 1st dose call 911. 30 tablet 11    sacubitril-valsartan (ENTRESTO) 49-51 MG per tablet Take half tab in AM, full tab in  tablet 3    spironolactone (ALDACTONE) 25 MG tablet Take 0.5 tablets (12.5 mg) by mouth daily (Patient taking differently: Take 12.5 mg by mouth every morning) 45 tablet 4    timolol maleate (TIMOPTIC) 0.5 % ophthalmic solution Apply 1 drop topically nightly as needed (dry skin cracks) Apply 1 drop to lesion every night at bedtime. Do Not use on normal skin.       No current facility-administered medications for this visit.       ALLERGIES:   Allergies   Allergen Reactions    Codeine Itching    Concerta [Methylphenidate] Itching    Hay Fever & [A.R.M.]        PHYSICAL EXAM:  Vitals: /62 (BP Location: Right arm, Patient Position: Sitting, Cuff Size: Adult Regular)   Pulse 68   Ht 1.676 m (5' 6\")   Wt 55.1 kg (121 lb 6.4 oz)   LMP  (LMP Unknown)   SpO2 96%   BMI 19.59 kg/m    Constitutional:  cooperative, alert and oriented, well developed, well nourished, in no acute distress thin      Skin:  warm and dry to the touch, no apparent skin lesions or masses noted        Head:  normocephalic, no masses or lesions        Eyes:  pupils equal and round;conjunctivae and lids unremarkable;sclera white        ENT:  no pallor or cyanosis        Neck:  JVP normal        Respiratory:  clear to auscultation        Cardiac: regular rhythm, normal S1/S2, no S3 or S4, apical impulse not displaced, no murmurs, gallops or rubs           "        GI:  abdomen soft;BS normoactive        Vascular:                                        Extremities and Musculoskeletal:  no edema;no deformities, clubbing, cyanosis, erythema observed        Neurological:  no gross motor deficits;affect appropriate              LABS/DATA:  I reviewed the following:  Component      Latest Ref Rng 2/27/2024  10:03 AM 4/2/2024  12:08 PM 5/16/2024  1:42 PM   Sodium      135 - 145 mmol/L 135  136  140    Potassium      3.4 - 5.3 mmol/L 4.7  4.3  4.8    Chloride      98 - 107 mmol/L 100  100  104    Carbon Dioxide (CO2)      22 - 29 mmol/L 27  27  27    Anion Gap      7 - 15 mmol/L 8  9  9    Urea Nitrogen      8.0 - 23.0 mg/dL 32.1 (H)  22.5  23.2 (H)    Creatinine      0.51 - 0.95 mg/dL 1.30 (H)  1.34 (H)  1.63 (H)    GFR Estimate      >60 mL/min/1.73m2 44 (L)  43 (L)  34 (L)    Calcium      8.8 - 10.2 mg/dL 9.6  9.1  9.4    Glucose      70 - 99 mg/dL 91  88  91           Echo 10/26/23:  Interpretation Summary  Left ventricular systolic function is moderate to severely reduced.  The visual ejection fraction is 30-35%.  There is moderately severe (3+) mitral regurgitation.  There is no comparison study available.          Echo 2/26/24:  Interpretation Summary  3D LVEF volumetric analysis is 36%.  There is mod-severe global hypokinesia of the left ventricle.  No significant valvular heart disease.          ASSESSMENT/PLAN:  1.  HFrEF and nonischemic cardiomyopathy  - LVEF: 36% on echo 2/2024  - NYHA class II, stage C  - Etiology: nonischemic; ? LBBB  - Fluid status: euvolemic to mildly hypovolemic based on labs; suspected dry weight: 115-120 lbs. She is 120 lbs today and no evidence of fluid on exam  - Diuretic regimen: Lasix 10 mg once daily as needed for a weight gain of 2-3 pounds in 1 day or 5 pounds in 1 week.   - Ischemic evaluation: cor angio 10/2023 with nonobstructive CAD  - Guideline directed medical therapy:  - Beta blocker: Carvedilol 3.125 mg twice daily; avoiding  increase for now secondary to fatigue  - ACEI/ARB/ARNI: Entresto to 49/51 mg twice daily. She has increased fatigue, bump in creat since increasing. We will have her trial 1/2 tab in AM, continue full tab in PM.   - Aldactone antagonist: Spironolactone 12.5 mg once daily. Continue for now. In follow up, may need to hold this is renal function does not improve  - SGLT2 inhibitor: Jardiance 10 mg once daily  -SCD prophylaxis.  EF is >35% so does not qualify for ICD  -If EF does not continue to improve with GDMT, may consider CRT  -Counseled patient on sodium restriction      2.  Chronic LBBB, QRS duration 132 ms      3.  Nonobstructive coronary artery disease with cor angio 10/2023 revealing a 20% pLAD lesion and a 30 to 40% dLCX lesion.  - Continue ASA, statin      4.  LILIANE on CKD.   - Baseline creatinine 1.1.   - BMP today with creatinine 1.63.            Changes today:   Decrease Entresto to 1/2 tab in AM, continue full tab in PM.   CORE RN BP and sxs check next week and BMP.   If renal function is not improving, then may need to hold spironolactone.          Dafne Sol PA-C      The longitudinal plan of care for the diagnosis(es)/condition(s) as documented were addressed during this visit. Due to the added complexity in care, I will continue to support Mar in the subsequent management and with ongoing continuity of care.

## 2024-05-16 NOTE — LETTER
2024    Gail Crespo, APRN CNP  [address] [place] [address]    RE: Mar Zeng       Dear Colleague,     I had the pleasure of seeing Mar Zeng in the [facility].      CARDIOLOGY CLINIC PROGRESS NOTE    DOS: 2024      Mar Zeng  : 1955, 68 year old  MRN: [id number]      Primary cardiologist: Dr. Tinoco      History: Mar Zeng is a very pleasant 68 year old female with a history of ADD on Adderall, chronic LBBB, and recently diagnosed HFrEF and nonischemic cardiomyopathy.     Patient presented with progressive shortness of breath over a period of months 10/2023.  At that time, she was found to have possible new LBBB and new HFrEF.  Coronary angiogram was completed at that time revealing a 20% pLAD lesion and a 30 to 40% dLCX lesion.  Ms. Zeng was started on appropriate GDMT and referred for outpatient cardiac MRI which was completed 2023.  This showed mildly dilated LV with normal wall thickness, LVEF 32%.  Normal RV size and function.  There was no late enhancement to suggest prior infarct, inflammation or infiltration.  Overall, findings consistent with severe nonischemic cardiomyopathy. She also wore a Zio patch monitor for 7 days which revealed normal sinus rhythm with an average VR of 75 bpm (48 to 164 bpm).  There was 1 episode of NSVT lasting 4 beats in duration and 2 episodes of SVT with the longest lasting 8 beats in duration.     Her last echocardiogram was completed 2024 and revealed LVEF 36% with moderate to severe global hypokinesia of the LV and no significant valvular heart disease.         Last visit, Entresto increased and Lasix stopped (only to take PRN).     Patient is here today for CORE follow-up.   BMP today reveals LILIANE with creatinine 1.63, BUN 23.2. Normal electrolytes.   Denies chest pain, palpitations, lightheadedness, dizziness, near syncope and syncope. Her shortness of breath  is stable, mild OLSON.   But she is more fatigued again now.   She does feel she stays hydrated.    Taking all medications daily as prescribed.   Blood pressure 108/62 and HR 68 in clinic today. Does not check her BP at home. Weights at home are up a few lbs, at 120 lbs.   Watches the sodium in her diet closely.        ROS:  Skin:        Eyes:       ENT:       Respiratory:       Cardiovascular:       Gastroenterology:      Genitourinary:       Musculoskeletal:       Neurologic:       Psychiatric:       Heme/Lymph/Imm:       Endocrine:         PAST MEDICAL HISTORY:  Past Medical History:   Diagnosis Date    Mumps     NO ACTIVE PROBLEMS        PAST SURGICAL HISTORY:  Past Surgical History:   Procedure Laterality Date    COLONOSCOPY  8/23/2016    Dr. Shaw [facility]    COLONOSCOPY N/A 8/23/2016    Procedure: COLONOSCOPY;  Surgeon: Peter Shaw MD;  Location:  GI    COLONOSCOPY N/A 10/19/2022    Procedure: COLONOSCOPY with polypectomy using cold exacto snare;  Surgeon: Peter Shaw MD;  Location:  GI    CV CORONARY ANGIOGRAM N/A 10/27/2023    Procedure: Coronary Angiogram;  Surgeon: Elmer Andre MD;  Location:  HEART CARDIAC CATH LAB    FINGER SURGERY      right little finger joint replacement    HYSTERECTOMY VAGINAL  age 30    retained ovaries. no cervix     HYSTERECTOMY, PAP NO LONGER INDICATED      ORTHOPEDIC SURGERY      SURGICAL HISTORY OF -   10/25/13    left index cyst removal    SURGICAL HISTORY OF -       Bilat knee miniscus repair       SOCIAL HISTORY:  Social History     Socioeconomic History    Marital status:      Spouse name: None    Number of children: None    Years of education: None    Highest education level: None   Tobacco Use    Smoking status: Former     Types: Cigarettes     Passive exposure: Past    Smokeless tobacco: Never   Vaping Use    Vaping status: Never Used   Substance and Sexual Activity    Alcohol use: Yes     Alcohol/week: 0.0 standard drinks of alcohol      Comment: avg:3-4 drink/wk    Drug use: No    Sexual activity: Yes     Partners: Male     Birth control/protection: Female Surgical   Other Topics Concern    Parent/sibling w/ CABG, MI or angioplasty before 65F 55M? No   Social History Narrative    [place] in [place]     Social Determinants of Health     Financial Resource Strain: Low Risk  (1/29/2024)    Financial Resource Strain     Within the past 12 months, have you or your family members you live with been unable to get utilities (heat, electricity) when it was really needed?: No   Food Insecurity: Low Risk  (1/29/2024)    Food Insecurity     Within the past 12 months, did you worry that your food would run out before you got money to buy more?: No     Within the past 12 months, did the food you bought just not last and you didn t have money to get more?: No   Transportation Needs: Low Risk  (1/29/2024)    Transportation Needs     Within the past 12 months, has lack of transportation kept you from medical appointments, getting your medicines, non-medical meetings or appointments, work, or from getting things that you need?: No   Interpersonal Safety: Low Risk  (11/2/2023)    Interpersonal Safety     Do you feel physically and emotionally safe where you currently live?: Yes     Within the past 12 months, have you been hit, slapped, kicked or otherwise physically hurt by someone?: No     Within the past 12 months, have you been humiliated or emotionally abused in other ways by your partner or ex-partner?: No   Housing Stability: Low Risk  (1/29/2024)    Housing Stability     Do you have housing? : Yes     Are you worried about losing your housing?: No       FAMILY HISTORY:  Family History   Problem Relation Age of Onset    No Known Problems Mother     Hypertension Father     Cancer Father     CABG Father     No Known Problems Sister     Asthma Brother     Hypertension Brother     Cancer Maternal Grandmother     Cancer Maternal Grandfather     Colon Cancer Paternal  Grandmother     Cancer Paternal Grandfather     Thyroid Disease Daughter     No Known Problems Son     No Known Problems Son     Colon Cancer Paternal Aunt     Colon Cancer Cousin        MEDS:   Current Outpatient Medications   Medication Sig Dispense Refill    albuterol (ACCUNEB) 0.63 MG/3ML neb solution Take 3 mLs (0.63 mg) by nebulization every 6 hours as needed for shortness of breath, wheezing or cough 90 mL 0    albuterol (PROAIR HFA/PROVENTIL HFA/VENTOLIN HFA) 108 (90 Base) MCG/ACT inhaler Inhale 2 puffs into the lungs every 4 hours as needed for shortness of breath, wheezing or cough 18 g 3    aluminum chloride (DRYSOL) 20 % external solution Apply topically at bedtime 60 mL 0    amphetamine-dextroamphetamine (ADDERALL XR) 25 MG 24 hr capsule Take 1 capsule (25 mg) by mouth every morning 90 capsule 0    aspirin 81 MG EC tablet Take 1 tablet (81 mg) by mouth daily 90 tablet 3    atorvastatin (LIPITOR) 40 MG tablet Take 1 tablet (40 mg) by mouth every evening 90 tablet 3    azelastine-fluticasone (DYMISTA) 137-50 MCG/ACT nasal spray Spray 1 spray into both nostrils 2 times daily as needed      budesonide-formoterol (SYMBICORT) 160-4.5 MCG/ACT Inhaler Inhale 2 puffs into the lungs 2 times daily (Patient taking differently: Inhale 2 puffs into the lungs as needed) 10.2 g 2    buPROPion (WELLBUTRIN XL) 150 MG 24 hr tablet TAKE 1 TABLET(150 MG) BY MOUTH EVERY MORNING 90 tablet 2    carvedilol (COREG) 3.125 MG tablet Take 1 tablet (3.125 mg) by mouth 2 times daily (with meals) 180 tablet 3    cetirizine (ZYRTEC) 10 MG tablet Take 1 tablet (10 mg) by mouth daily 30 tablet 2    empagliflozin (JARDIANCE) 10 MG TABS tablet Take 1 tablet (10 mg) by mouth daily (Patient taking differently: Take 10 mg by mouth at bedtime) 90 tablet 1    fexofenadine (ALLEGRA) 180 MG tablet Take 180 mg by mouth every other day      fluorouracil (EFUDEX) 5 % external cream APPLY SPARINGLY EXTERNALLY TO FACE EVERY SUNDAY AT NIGHT. AVOID EYES  "AND LIPS      furosemide (LASIX) 20 MG tablet Take 0.5 tablets (10 mg) by mouth daily as needed (weight gain of 3 pounds in one day or 5 pounds in 1 week)      levocetirizine (XYZAL) 5 MG tablet Take 1 tablet (5 mg) by mouth every evening 30 tablet 5    loratadine (CLARITIN) 10 MG tablet Take 10 mg by mouth every other day Alternating with allegra      LORazepam (ATIVAN) 1 MG tablet Take 1 tablet (1 mg) by mouth as needed for anxiety (30 min prior to MRI.  May take second dose 30 min laterl if needed.) 2 tablet 0    nitroGLYcerin (NITROSTAT) 0.4 MG sublingual tablet For chest pain place 1 tablet under the tongue every 5 minutes for 3 doses. If symptoms persist 5 minutes after 1st dose call 911. 30 tablet 11    sacubitril-valsartan (ENTRESTO) 49-51 MG per tablet Take half tab in AM, full tab in  tablet 3    spironolactone (ALDACTONE) 25 MG tablet Take 0.5 tablets (12.5 mg) by mouth daily (Patient taking differently: Take 12.5 mg by mouth every morning) 45 tablet 4    timolol maleate (TIMOPTIC) 0.5 % ophthalmic solution Apply 1 drop topically nightly as needed (dry skin cracks) Apply 1 drop to lesion every night at bedtime. Do Not use on normal skin.       No current facility-administered medications for this visit.       ALLERGIES:   Allergies   Allergen Reactions    Codeine Itching    Concerta [Methylphenidate] Itching    Hay Fever & [A.R.M.]        PHYSICAL EXAM:  Vitals: /62 (BP Location: Right arm, Patient Position: Sitting, Cuff Size: Adult Regular)   Pulse 68   Ht 1.676 m (5' 6\")   Wt 55.1 kg (121 lb 6.4 oz)   LMP  (LMP Unknown)   SpO2 96%   BMI 19.59 kg/m    Constitutional:  cooperative, alert and oriented, well developed, well nourished, in no acute distress thin      Skin:  warm and dry to the touch, no apparent skin lesions or masses noted        Head:  normocephalic, no masses or lesions        Eyes:  pupils equal and round;conjunctivae and lids unremarkable;sclera white        ENT:  no " pallor or cyanosis        Neck:  JVP normal        Respiratory:  clear to auscultation        Cardiac: regular rhythm, normal S1/S2, no S3 or S4, apical impulse not displaced, no murmurs, gallops or rubs                  GI:  abdomen soft;BS normoactive        Vascular:                                        Extremities and Musculoskeletal:  no edema;no deformities, clubbing, cyanosis, erythema observed        Neurological:  no gross motor deficits;affect appropriate              LABS/DATA:  I reviewed the following:  Component      Latest Ref Rng 2/27/2024  10:03 AM 4/2/2024  12:08 PM 5/16/2024  1:42 PM   Sodium      135 - 145 mmol/L 135  136  140    Potassium      3.4 - 5.3 mmol/L 4.7  4.3  4.8    Chloride      98 - 107 mmol/L 100  100  104    Carbon Dioxide (CO2)      22 - 29 mmol/L 27  27  27    Anion Gap      7 - 15 mmol/L 8  9  9    Urea Nitrogen      8.0 - 23.0 mg/dL 32.1 (H)  22.5  23.2 (H)    Creatinine      0.51 - 0.95 mg/dL 1.30 (H)  1.34 (H)  1.63 (H)    GFR Estimate      >60 mL/min/1.73m2 44 (L)  43 (L)  34 (L)    Calcium      8.8 - 10.2 mg/dL 9.6  9.1  9.4    Glucose      70 - 99 mg/dL 91  88  91           Echo 10/26/23:  Interpretation Summary  Left ventricular systolic function is moderate to severely reduced.  The visual ejection fraction is 30-35%.  There is moderately severe (3+) mitral regurgitation.  There is no comparison study available.          Echo 2/26/24:  Interpretation Summary  3D LVEF volumetric analysis is 36%.  There is mod-severe global hypokinesia of the left ventricle.  No significant valvular heart disease.          ASSESSMENT/PLAN:  1.  HFrEF and nonischemic cardiomyopathy  - LVEF: 36% on echo 2/2024  - NYHA class II, stage C  - Etiology: nonischemic; ? LBBB  - Fluid status: euvolemic to mildly hypovolemic based on labs; suspected dry weight: 115-120 lbs. She is 120 lbs today and no evidence of fluid on exam  - Diuretic regimen: Lasix 10 mg once daily as needed for a weight gain  of 2-3 pounds in 1 day or 5 pounds in 1 week.   - Ischemic evaluation: cor angio 10/2023 with nonobstructive CAD  - Guideline directed medical therapy:  - Beta blocker: Carvedilol 3.125 mg twice daily; avoiding increase for now secondary to fatigue  - ACEI/ARB/ARNI: Entresto to 49/51 mg twice daily. She has increased fatigue, bump in creat since increasing. We will have her trial 1/2 tab in AM, continue full tab in PM.   - Aldactone antagonist: Spironolactone 12.5 mg once daily. Continue for now. In follow up, may need to hold this is renal function does not improve  - SGLT2 inhibitor: Jardiance 10 mg once daily  -SCD prophylaxis.  EF is >35% so does not qualify for ICD  -If EF does not continue to improve with GDMT, may consider CRT  -Counseled patient on sodium restriction      2.  Chronic LBBB, QRS duration 132 ms      3.  Nonobstructive coronary artery disease with cor angio 10/2023 revealing a 20% pLAD lesion and a 30 to 40% dLCX lesion.  - Continue ASA, statin      4.  LILIANE on CKD.   - Baseline creatinine 1.1.   - BMP today with creatinine 1.63.            Changes today:   Decrease Entresto to 1/2 tab in AM, continue full tab in PM.   CORE RN BP and sxs check next week and BMP.   If renal function is not improving, then may need to hold spironolactone.          Dafne Sol PA-C      The longitudinal plan of care for the diagnosis(es)/condition(s) as documented were addressed during this visit. Due to the added complexity in care, I will continue to support Mar in the subsequent management and with ongoing continuity of care.    Thank you for allowing me to participate in the care of your patient.      Sincerely,     Dafne Sol PA-C     [facility]  cc:   Gabby Hernández PA-C  [address] [place] [address]

## 2024-05-16 NOTE — PROGRESS NOTES
ASSESSMENT:  Encounter Diagnoses   Name Primary?    Corns and callosities Yes    Toe pain, left      MEDICAL DECISION MAKING:  Mar's pain is most consistent with interdigital corns.  There is no ulceration.  We discussed the cause and nature of corns and how they are related to the toe bony architecture, pressure and friction.    Recommendations:  Donut pads  Toe spacers  Filing any hyperkeratotic skin down  A list of options for routine footcare was provided  We did briefly discuss the option of surgical intervention or toe arthroplasty, should conservative cares not be successful.    Disclaimer: This note consists of symbols derived from keyboarding, dictation and/or voice recognition software. As a result, there may be errors in the script that have gone undetected. Please consider this when interpreting information found in this chart.    Elmer Ortiz DPM, FACFAS, MS    [facility]      ____________________________________________________________________    HPI:       Mar presents today reporting pain related to her left fourth and fifth toe.  She has dealt with this for 2 weeks.    Aching pain  Pain rating an 8 out of 10 at worst  Constant  More pain with weightbearing activities and walking  She is used the Band-Aid as well as a toe spacer  She walks for exercise  *  Past Medical History:   Diagnosis Date    Mumps     NO ACTIVE PROBLEMS    *  *  Past Surgical History:   Procedure Laterality Date    COLONOSCOPY  8/23/2016    Dr. Shaw [facility]    COLONOSCOPY N/A 8/23/2016    Procedure: COLONOSCOPY;  Surgeon: Peter Shaw MD;  Location:  GI    COLONOSCOPY N/A 10/19/2022    Procedure: COLONOSCOPY with polypectomy using cold exacto snare;  Surgeon: Peter Shaw MD;  Location:  GI    CV CORONARY ANGIOGRAM N/A 10/27/2023    Procedure: Coronary Angiogram;  Surgeon: Elmer Andre MD;  Location:  HEART CARDIAC CATH LAB    FINGER SURGERY      right  little finger joint replacement    HYSTERECTOMY VAGINAL  age 30    retained ovaries. no cervix     HYSTERECTOMY, PAP NO LONGER INDICATED      ORTHOPEDIC SURGERY      SURGICAL HISTORY OF -   10/25/13    left index cyst removal    SURGICAL HISTORY OF -       Bilat knee miniscus repair   *  *  Current Outpatient Medications   Medication Sig Dispense Refill    albuterol (ACCUNEB) 0.63 MG/3ML neb solution Take 3 mLs (0.63 mg) by nebulization every 6 hours as needed for shortness of breath, wheezing or cough 90 mL 0    albuterol (PROAIR HFA/PROVENTIL HFA/VENTOLIN HFA) 108 (90 Base) MCG/ACT inhaler Inhale 2 puffs into the lungs every 4 hours as needed for shortness of breath, wheezing or cough 18 g 3    aluminum chloride (DRYSOL) 20 % external solution Apply topically at bedtime 60 mL 0    amphetamine-dextroamphetamine (ADDERALL XR) 25 MG 24 hr capsule Take 1 capsule (25 mg) by mouth every morning 90 capsule 0    aspirin 81 MG EC tablet Take 1 tablet (81 mg) by mouth daily 90 tablet 3    atorvastatin (LIPITOR) 40 MG tablet Take 1 tablet (40 mg) by mouth every evening 90 tablet 3    azelastine-fluticasone (DYMISTA) 137-50 MCG/ACT nasal spray Spray 1 spray into both nostrils 2 times daily as needed      budesonide-formoterol (SYMBICORT) 160-4.5 MCG/ACT Inhaler Inhale 2 puffs into the lungs 2 times daily (Patient taking differently: Inhale 2 puffs into the lungs as needed) 10.2 g 2    buPROPion (WELLBUTRIN XL) 150 MG 24 hr tablet TAKE 1 TABLET(150 MG) BY MOUTH EVERY MORNING 90 tablet 2    carvedilol (COREG) 3.125 MG tablet Take 1 tablet (3.125 mg) by mouth 2 times daily (with meals) 180 tablet 3    cetirizine (ZYRTEC) 10 MG tablet Take 1 tablet (10 mg) by mouth daily 30 tablet 2    empagliflozin (JARDIANCE) 10 MG TABS tablet Take 1 tablet (10 mg) by mouth daily (Patient taking differently: Take 10 mg by mouth at bedtime) 90 tablet 1    fexofenadine (ALLEGRA) 180 MG tablet Take 180 mg by mouth every other day      fluorouracil  (EFUDEX) 5 % external cream APPLY SPARINGLY EXTERNALLY TO FACE EVERY SUNDAY AT NIGHT. AVOID EYES AND LIPS      furosemide (LASIX) 20 MG tablet Take 0.5 tablets (10 mg) by mouth daily as needed (weight gain of 3 pounds in one day or 5 pounds in 1 week)      levocetirizine (XYZAL) 5 MG tablet Take 1 tablet (5 mg) by mouth every evening 30 tablet 5    loratadine (CLARITIN) 10 MG tablet Take 10 mg by mouth every other day Alternating with allegra      LORazepam (ATIVAN) 1 MG tablet Take 1 tablet (1 mg) by mouth as needed for anxiety (30 min prior to MRI.  May take second dose 30 min laterl if needed.) 2 tablet 0    nitroGLYcerin (NITROSTAT) 0.4 MG sublingual tablet For chest pain place 1 tablet under the tongue every 5 minutes for 3 doses. If symptoms persist 5 minutes after 1st dose call 911. 30 tablet 11    sacubitril-valsartan (ENTRESTO) 49-51 MG per tablet Take 1 tablet by mouth 2 times daily 180 tablet 3    spironolactone (ALDACTONE) 25 MG tablet Take 0.5 tablets (12.5 mg) by mouth daily (Patient taking differently: Take 12.5 mg by mouth every morning) 45 tablet 4    timolol maleate (TIMOPTIC) 0.5 % ophthalmic solution Apply 1 drop topically nightly as needed (dry skin cracks) Apply 1 drop to lesion every night at bedtime. Do Not use on normal skin.           EXAM:    Vitals: LMP  (LMP Unknown)   BMI: There is no height or weight on file to calculate BMI.    Constitutional:  Mar Zeng is in no apparent distress, appears well-nourished.  Cooperative with history and physical exam.    Vascular:  Pedal pulses are palpable for both the DP and PT arteries.  CFT < 3 sec.  No edema.      Neuro: Light touch sensation is intact to the L4, L5, S1 distributions  No evidence of weakness, spasticity, or contracture in the lower extremities.     Derm: There is erythema and a small amount of hyperkeratotic skin laterally over the left fourth toe proximal interphalangeal joint.  There is erythematous skin on the medial  aspect of the fifth toe.  Both areas are tender to the touch.  No open lesions.      Musculoskeletal:    Lower extremity muscle strength is normal.  Contractures of the lesser digits on the left.  These are not fully reducible.  Mild varus rotation of the fourth and fifth toe.

## 2024-05-16 NOTE — PATIENT INSTRUCTIONS
Call CORE nurse for any questions or concerns Mon-Fri 8am-4pm:                                                 #[phone]                                       For concerns after hours:                                               #[phone]         Plan from today:   Decrease Entresto - to 1/2 tablet in the AM, and continue full tablet in the PM.   See us back for a nurse visit next week to check blood pressure and check in on how you are feeling.   We will repeat a lab that day as well.       Lab results: see attached:   Component      Latest Ref Rng 4/2/2024  12:08 PM 5/16/2024  1:42 PM   Sodium      135 - 145 mmol/L 136  140    Potassium      3.4 - 5.3 mmol/L 4.3  4.8    Chloride      98 - 107 mmol/L 100  104    Carbon Dioxide (CO2)      22 - 29 mmol/L 27  27    Anion Gap      7 - 15 mmol/L 9  9    Urea Nitrogen      8.0 - 23.0 mg/dL 22.5  23.2 (H)    Creatinine      0.51 - 0.95 mg/dL 1.34 (H)  1.63 (H)    GFR Estimate      >60 mL/min/1.73m2 43 (L)  34 (L)    Calcium      8.8 - 10.2 mg/dL 9.1  9.4    Glucose      70 - 99 mg/dL 88  91       Legend:  (H) High  (L) Low

## 2024-05-16 NOTE — LETTER
5/16/2024         RE: Mar Zeng  [address] [facility] [address]        Dear Colleague,    Thank you for referring your patient, Mar Zeng, to the [facility]. Please see a copy of my visit note below.    ASSESSMENT:  Encounter Diagnoses   Name Primary?     Corns and callosities Yes     Toe pain, left      MEDICAL DECISION MAKING:  Mar's pain is most consistent with interdigital corns.  There is no ulceration.  We discussed the cause and nature of corns and how they are related to the toe bony architecture, pressure and friction.    Recommendations:  Donut pads  Toe spacers  Filing any hyperkeratotic skin down  A list of options for routine footcare was provided  We did briefly discuss the option of surgical intervention or toe arthroplasty, should conservative cares not be successful.    Disclaimer: This note consists of symbols derived from keyboarding, dictation and/or voice recognition software. As a result, there may be errors in the script that have gone undetected. Please consider this when interpreting information found in this chart.    Elmer Ortiz DPM, FACFAS, [facility] Department of Podiatry/Foot & Ankle Surgery      ____________________________________________________________________    HPI:       Mar presents today reporting pain related to her left fourth and fifth toe.  She has dealt with this for 2 weeks.    Aching pain  Pain rating an 8 out of 10 at worst  Constant  More pain with weightbearing activities and walking  She is used the Band-Aid as well as a toe spacer  She walks for exercise  *  Past Medical History:   Diagnosis Date     Mumps      NO ACTIVE PROBLEMS    *  *  Past Surgical History:   Procedure Laterality Date     COLONOSCOPY  8/23/2016    Dr. Shaw [facility]     COLONOSCOPY N/A 8/23/2016    Procedure: COLONOSCOPY;  Surgeon: Peter Shaw MD;  Location:  GI     COLONOSCOPY N/A 10/19/2022     Procedure: COLONOSCOPY with polypectomy using cold exacto snare;  Surgeon: Peter Shaw MD;  Location:  GI     CV CORONARY ANGIOGRAM N/A 10/27/2023    Procedure: Coronary Angiogram;  Surgeon: Elmer Andre MD;  Location:  HEART CARDIAC CATH LAB     FINGER SURGERY      right little finger joint replacement     HYSTERECTOMY VAGINAL  age 30    retained ovaries. no cervix      HYSTERECTOMY, PAP NO LONGER INDICATED       ORTHOPEDIC SURGERY       SURGICAL HISTORY OF -   10/25/13    left index cyst removal     SURGICAL HISTORY OF -       Bilat knee miniscus repair   *  *  Current Outpatient Medications   Medication Sig Dispense Refill     albuterol (ACCUNEB) 0.63 MG/3ML neb solution Take 3 mLs (0.63 mg) by nebulization every 6 hours as needed for shortness of breath, wheezing or cough 90 mL 0     albuterol (PROAIR HFA/PROVENTIL HFA/VENTOLIN HFA) 108 (90 Base) MCG/ACT inhaler Inhale 2 puffs into the lungs every 4 hours as needed for shortness of breath, wheezing or cough 18 g 3     aluminum chloride (DRYSOL) 20 % external solution Apply topically at bedtime 60 mL 0     amphetamine-dextroamphetamine (ADDERALL XR) 25 MG 24 hr capsule Take 1 capsule (25 mg) by mouth every morning 90 capsule 0     aspirin 81 MG EC tablet Take 1 tablet (81 mg) by mouth daily 90 tablet 3     atorvastatin (LIPITOR) 40 MG tablet Take 1 tablet (40 mg) by mouth every evening 90 tablet 3     azelastine-fluticasone (DYMISTA) 137-50 MCG/ACT nasal spray Spray 1 spray into both nostrils 2 times daily as needed       budesonide-formoterol (SYMBICORT) 160-4.5 MCG/ACT Inhaler Inhale 2 puffs into the lungs 2 times daily (Patient taking differently: Inhale 2 puffs into the lungs as needed) 10.2 g 2     buPROPion (WELLBUTRIN XL) 150 MG 24 hr tablet TAKE 1 TABLET(150 MG) BY MOUTH EVERY MORNING 90 tablet 2     carvedilol (COREG) 3.125 MG tablet Take 1 tablet (3.125 mg) by mouth 2 times daily (with meals) 180 tablet 3     cetirizine (ZYRTEC) 10  MG tablet Take 1 tablet (10 mg) by mouth daily 30 tablet 2     empagliflozin (JARDIANCE) 10 MG TABS tablet Take 1 tablet (10 mg) by mouth daily (Patient taking differently: Take 10 mg by mouth at bedtime) 90 tablet 1     fexofenadine (ALLEGRA) 180 MG tablet Take 180 mg by mouth every other day       fluorouracil (EFUDEX) 5 % external cream APPLY SPARINGLY EXTERNALLY TO FACE EVERY SUNDAY AT NIGHT. AVOID EYES AND LIPS       furosemide (LASIX) 20 MG tablet Take 0.5 tablets (10 mg) by mouth daily as needed (weight gain of 3 pounds in one day or 5 pounds in 1 week)       levocetirizine (XYZAL) 5 MG tablet Take 1 tablet (5 mg) by mouth every evening 30 tablet 5     loratadine (CLARITIN) 10 MG tablet Take 10 mg by mouth every other day Alternating with allegra       LORazepam (ATIVAN) 1 MG tablet Take 1 tablet (1 mg) by mouth as needed for anxiety (30 min prior to MRI.  May take second dose 30 min laterl if needed.) 2 tablet 0     nitroGLYcerin (NITROSTAT) 0.4 MG sublingual tablet For chest pain place 1 tablet under the tongue every 5 minutes for 3 doses. If symptoms persist 5 minutes after 1st dose call 911. 30 tablet 11     sacubitril-valsartan (ENTRESTO) 49-51 MG per tablet Take 1 tablet by mouth 2 times daily 180 tablet 3     spironolactone (ALDACTONE) 25 MG tablet Take 0.5 tablets (12.5 mg) by mouth daily (Patient taking differently: Take 12.5 mg by mouth every morning) 45 tablet 4     timolol maleate (TIMOPTIC) 0.5 % ophthalmic solution Apply 1 drop topically nightly as needed (dry skin cracks) Apply 1 drop to lesion every night at bedtime. Do Not use on normal skin.           EXAM:    Vitals: LMP  (LMP Unknown)   BMI: There is no height or weight on file to calculate BMI.    Constitutional:  Mar Zeng is in no apparent distress, appears well-nourished.  Cooperative with history and physical exam.    Vascular:  Pedal pulses are palpable for both the DP and PT arteries.  CFT < 3 sec.  No edema.      Neuro:  Light touch sensation is intact to the L4, L5, S1 distributions  No evidence of weakness, spasticity, or contracture in the lower extremities.     Derm: There is erythema and a small amount of hyperkeratotic skin laterally over the left fourth toe proximal interphalangeal joint.  There is erythematous skin on the medial aspect of the fifth toe.  Both areas are tender to the touch.  No open lesions.      Musculoskeletal:    Lower extremity muscle strength is normal.  Contractures of the lesser digits on the left.  These are not fully reducible.  Mild varus rotation of the fourth and fifth toe.        Again, thank you for allowing me to participate in the care of your patient.        Sincerely,        Elmer Ortiz DPM

## 2024-05-16 NOTE — PATIENT INSTRUCTIONS
Thank you for choosing [facility] Podiatry / Foot & Ankle Surgery!    DR. NEWSOME'S CLINIC LOCATIONS:     [facility] TRIAGE LINE: [phone]   [address] APPOINTMENTS: [phone]   [place] [address] RADIOLOGY: [phone]   (Every other Tues - Wed - Fri PM) SET UP SURGERY: [phone]    PHYSICAL THERAPY: [phone]   [facility] BILLING QUESTIONS: [phone] [id number] FAX: [phone]   [place] [address]    (Thurs & Fri AM)         DR. NEWSOME'S CALLUS/ CORN TREATMENT RECOMMENDATIONS    Please realize that most calluses come from your foot structure, the way you walk and the resulting stress on the skin. They cannot be frozen, burned, or surgically removed. They will come back. It is the skin responding to stress.    1) Use a foot cream to moisturize the callus and any dry skin on your feet.    2) Try to file the callus down with a pumice stone.  Any trimming with a sharp tool is at your own risk. If you injure yourself or are concerned about an infection, please return to clinic.    3) Try wearing shoes that have a rigid/ stiffer sole.  If they don't bend much when you walk, you won't but as much pressure on calluses that are in the ball-of-foot area.    4) Consider purchasing a cushioned insert and cutting a hole below the callus.    5) Another option for calluses under the ball of of the foot is to purchase a felt metatarsal pad. Hapad is a good brand. These can be found online.    6) Calluses on toes are called corns.  These also can be filed down and pads include donut pads, toe sleeves, tube foam, toe spacers.    7) You can return periodically for trimming of the corn or callus, but please check with your insurance to know if you have coverage or if you will have to pay out-of-pocket. [facility] will require you sign a notification waiver, stating that your insurance will be billed for the service and you are responsible for payment if it is not  covered.    CALLUS / CORNS / IPKs  When there is excessive friction or pressure on the skin, the body responds by making the skin thicker to protect the deeper structures from becoming exposed. While this works well to protect the deeper structures, the thickened skin can increase pressure and pain.    CALLUS: Flat, diffuse thickening are simple calluses and they are usually caused by friction. Often these are the result of rubbing on a shoe or going barefoot.    CORNS: Calluses with a central core between the toes are called corns. These result from prominent joints on adjacent toes rubbing together. Theses are a symptom of bone malalignment and will always recur unless the underlying bones are addressed surgically.    IPKs: Calluses with a central core on the ball of the foot are usually IPKs (intractable plantar keratosis). These are caused by excessive pressure from the metatarsals, the bones that make up the ball of the foot. Often one of these bones is too long or too prominent.  Again, these will always recur unless the underlying bone issue is addressed. There is no cure for these. They will either go away by themselves, recur, or more could develop.    ROUTINE MAINTENANCE  1. File them down with a pumice stone or callus file a couple times a week.   2. An electric callus removing device. Amope Pedi Perfect Electronic Pedicure Foot File and Callus Remover can be a good option.   3. Lotion can be applied to soften the callus. A urea based cream such as Kersal or Vanicream or thicker cream with shea butter are good options.  4. Toe spacers or toe covers can be used for corns, gel pads can be used for other lesions on the bottom of the foot.   If there is a surgical pathology noted, such as a prominent bone, often this needs to be addressed surgically to minimize recurrence. However, sometimes the lesion simply migrates to another spot after surgery, so it is not a guaranteed cure.     **If you come back to  clinic for treatment, insurance does not cover it, and you would be billed. This charge could range from $100 - $227**     Here is a list of routine foot care resources, which includes toenail trimming and callus/corn management.     This is not a referral. It is your responsibility to contact the organization and your insurance to confirm cost and coverage.      ROUTINE FOOT CARE (NAIL TRIMMING / CALLUSES)      [facility]  [phone]   [facility]  [phone]  Multiple locations   [facility]  [phone] Dr. Mcmillan and Dr. Lanier  [address]  [place] [address]  [phone]   [facility]  [phone]  [email or web address]

## 2024-05-20 ENCOUNTER — TELEPHONE (OUTPATIENT)
Dept: PULMONOLOGY | Facility: CLINIC | Age: 69
End: 2024-05-20

## 2024-05-20 NOTE — CONFIDENTIAL NOTE
Pulmonary Note:     Called patient regarding recent PFT. Negative methacholine challenge. Mild obstruction. She's using symbicort prn and sinuses are bother her more which her allergist is helping manage (on nasal sprays and antihistamine). Okay to have asthma managed by PCP and continue LABA/ICS prn. Will notify pulmonary coordinators to cancel upcoming 9/11/24 appointment with me.

## 2024-05-21 ENCOUNTER — ALLIED HEALTH/NURSE VISIT (OUTPATIENT)
Dept: CARDIOLOGY | Facility: CLINIC | Age: 69
End: 2024-05-21
Attending: PHYSICIAN ASSISTANT
Payer: MEDICARE

## 2024-05-21 ENCOUNTER — LAB (OUTPATIENT)
Dept: LAB | Facility: CLINIC | Age: 69
End: 2024-05-21
Attending: PHYSICIAN ASSISTANT
Payer: MEDICARE

## 2024-05-21 VITALS
BODY MASS INDEX: 19.32 KG/M2 | WEIGHT: 119.7 LBS | SYSTOLIC BLOOD PRESSURE: 102 MMHG | DIASTOLIC BLOOD PRESSURE: 60 MMHG | HEART RATE: 80 BPM

## 2024-05-21 DIAGNOSIS — I42.8 NONISCHEMIC CARDIOMYOPATHY (H): ICD-10-CM

## 2024-05-21 LAB
ANION GAP SERPL CALCULATED.3IONS-SCNC: 10 MMOL/L (ref 7–15)
BUN SERPL-MCNC: 31.4 MG/DL (ref 8–23)
CALCIUM SERPL-MCNC: 9.1 MG/DL (ref 8.8–10.2)
CHLORIDE SERPL-SCNC: 99 MMOL/L (ref 98–107)
CREAT SERPL-MCNC: 1.36 MG/DL (ref 0.51–0.95)
DEPRECATED HCO3 PLAS-SCNC: 26 MMOL/L (ref 22–29)
EGFRCR SERPLBLD CKD-EPI 2021: 42 ML/MIN/1.73M2
GLUCOSE SERPL-MCNC: 101 MG/DL (ref 70–99)
POTASSIUM SERPL-SCNC: 4.4 MMOL/L (ref 3.4–5.3)
SODIUM SERPL-SCNC: 135 MMOL/L (ref 135–145)

## 2024-05-21 PROCEDURE — 99207 PR NO CHARGE LOS: CPT

## 2024-05-21 PROCEDURE — 36415 COLL VENOUS BLD VENIPUNCTURE: CPT | Performed by: PHYSICIAN ASSISTANT

## 2024-05-21 PROCEDURE — 80048 BASIC METABOLIC PNL TOTAL CA: CPT | Performed by: PHYSICIAN ASSISTANT

## 2024-05-21 NOTE — PROGRESS NOTES
[facility]: Heart Failure Care Coordination   Nurse Visit     Situation/Background:      Reason for visit:    Chief Complaint   Patient presents with    Clinic Care Coordination - Face To Face     CORE nurse visit- 1 week follow up     Last office visit: 5/16/24 w/ WALT Bah.   Patient is here today for CORE follow-up.   BMP today reveals LILIANE with creatinine 1.63, BUN 23.2. Normal electrolytes.   Denies chest pain, palpitations, lightheadedness, dizziness, near syncope and syncope. Her shortness of breath is stable, mild OLSON.   But she is more fatigued again now.   She does feel she stays hydrated.    Taking all medications daily as prescribed.   Blood pressure 108/62 and HR 68 in clinic today. Does not check her BP at home. Weights at home are up a few lbs, at 120 lbs.   Watches the sodium in her diet closely.     HFrEF and nonischemic cardiomyopathy  - LVEF: 36% on echo 2/2024  - NYHA class II, stage C  - Etiology: nonischemic; ? LBBB  - Fluid status: euvolemic to mildly hypovolemic based on labs; suspected dry weight: 115-120 lbs. She is 120 lbs today and no evidence of fluid on exam  - Diuretic regimen: Lasix 10 mg once daily as needed for a weight gain of 2-3 pounds in 1 day or 5 pounds in 1 week.   - Ischemic evaluation: cor angio 10/2023 with nonobstructive CAD  - Guideline directed medical therapy:  - Beta blocker: Carvedilol 3.125 mg twice daily; avoiding increase for now secondary to fatigue  - ACEI/ARB/ARNI: Entresto to 49/51 mg twice daily. She has increased fatigue, bump in creat since increasing. We will have her trial 1/2 tab in AM, continue full tab in PM.   - Aldactone antagonist: Spironolactone 12.5 mg once daily. Continue for now. In follow up, may need to hold this is renal function does not improve  - SGLT2 inhibitor: Jardiance 10 mg once daily  -SCD prophylaxis.  EF is >35% so does not qualify for ICD  -If EF does not continue to improve with GDMT, may consider CRT  -Counseled  patient on sodium restriction    Changes today:   Decrease Entresto to 1/2 tab in AM, continue full tab in PM.   CORE RN BP and sxs check next week and BMP.   If renal function is not improving, then may need to hold spironolactone.     Assessment:      Most recent home weights:  117-120 lbs     Today's blood pressure: 102/60 mmHg   Today's heart rate: 80 bpm    Today's clinic weight: 120 lbs   Today's at-home weight: 117.6 lbs   Today's symptoms: no changes since LOV   Current medication regimen:  Confirmed with patient they are taking current medications as prescribed.  pt has not needed any PRN lasix.     Recent lab work:  Last BMP Date:  5/21/2024-  Component      Latest Ref Rng 5/16/2024  1:42 PM 5/21/2024  2:34 PM   Sodium      135 - 145 mmol/L 140  135    Potassium      3.4 - 5.3 mmol/L 4.8  4.4    Chloride      98 - 107 mmol/L 104  99    Carbon Dioxide (CO2)      22 - 29 mmol/L 27  26    Anion Gap      7 - 15 mmol/L 9  10    Urea Nitrogen      8.0 - 23.0 mg/dL 23.2 (H)  31.4 (H)    Creatinine      0.51 - 0.95 mg/dL 1.63 (H)  1.36 (H)    GFR Estimate      >60 mL/min/1.73m2 34 (L)  42 (L)    Calcium      8.8 - 10.2 mg/dL 9.4  9.1    Glucose      70 - 99 mg/dL 91  101 (H)       CHF Assessment:  Respiratory  Shortness of breath:: Yes  Shortness of breath symptom course:: Stable (no change since LOV w/ [place] 5/16/24- mild OLSON)    Cardiovascular  Fatigue:: Yes, at baseline (no change since LOV w/ Dafne Sol 5/16/24 where pt reported increased fatigue)    Pt leaving out of town tomorrow 5/22 through Sun 5/26/24, but will bring all meds with.  RN can call her cell with recommendations tomorrow.     Intervention/Plan:      Visit ordered by: Dafne Sol PAC   Provider in clinic today: Dr. Gutierrez   Patient's cardiologist: Dr. Tinoco     Patient to follow up as scheduled.  RN CC reviewed and reinforced fluid/dietary sodium restrictions; patient stated understanding.  Instructed patient to call RN line with new or  worsening heart failure symptoms and/or rapid weight gain.  Confirmed patient has clinic and scheduling phone numbers.  RN CC routing to provider for review.  Informed pt will call her tomorrow when Anah back in clinic to give update on plan.     Future Appointments   Date Time Provider Department Center   6/7/2024  9:15 AM RU LAB [facility]   6/7/2024 10:15 [facility]   9/11/2024  2:15 PM Britni Escobar MD [facility]      Lotus Johnson RN BSN   [place], [place]  C.O.R.E. Clinic Care Coordinator  05/21/24, 3:39 PM

## 2024-05-22 NOTE — PROGRESS NOTES
Labs better, BP ok, weight stable.   Still with fatigue.     No further changes, but see Dr. Tinoco june 7th as scheduled.     Dafne Sol PA-C 5/22/2024 4:56 PM

## 2024-05-23 NOTE — PROGRESS NOTES
Call to pt to discuss RN visit / Labs from 5/22 and that Dafne reviewed. Message left for pt stating that labs have improved, wt and BP are stable. Recommended she continue on current medications, follow up in June with Dr Tinoco as scheduled and call CORE Clinic with questions or concerns.       Future Appointments   Date Time Provider Department Center   6/7/2024  9:15 AM RU LAB [facility]   6/7/2024 10:15 [facility]   9/11/2024  2:15 PM Britni Escobar MD [facility]           SAL SinN, RN 11:24 AM 05/23/24

## 2024-05-28 NOTE — PROGRESS NOTES
"[facility]- [facility] Clinic    Received call from patient stating she did not receive a call back following her RN visit on 5/21/24 with Dafne Sol PA-C review.    I discussed with pt, Dafne Sol PA-C note following RN visit stating,     \"Labs better, BP ok, weight stable.   Still with fatigue. No further changes, but see Dr. Tinoco june 7th as scheduled. Dafne Sol PA-C 5/22/2024 4:56 PM\"    Pt expresses understanding with no further questions at this time.    Tena Garcia, RN, BSN  [facility] Heart [facility]/ YOUS[facility] Hoang  C.O.R.E. Clinic Care Coordinator  May 28, 2024 2:40 PM    Future Appointments   Date Time Provider Department Center   6/7/2024  9:15 AM RU LAB [facility]   6/7/2024 10:15 AM Yanira Tinoco DO [facility] PSA CLIN   9/11/2024  2:15 PM Britni Escobar MD [facility]       "

## 2024-06-07 ENCOUNTER — TELEPHONE (OUTPATIENT)
Dept: PULMONOLOGY | Facility: CLINIC | Age: 69
End: 2024-06-07

## 2024-06-07 ENCOUNTER — OFFICE VISIT (OUTPATIENT)
Dept: CARDIOLOGY | Facility: CLINIC | Age: 69
End: 2024-06-07
Attending: PHYSICIAN ASSISTANT
Payer: MEDICARE

## 2024-06-07 ENCOUNTER — LAB (OUTPATIENT)
Dept: LAB | Facility: CLINIC | Age: 69
End: 2024-06-07
Payer: MEDICARE

## 2024-06-07 VITALS
HEIGHT: 66 IN | DIASTOLIC BLOOD PRESSURE: 69 MMHG | WEIGHT: 119.6 LBS | HEART RATE: 72 BPM | OXYGEN SATURATION: 97 % | SYSTOLIC BLOOD PRESSURE: 119 MMHG | BODY MASS INDEX: 19.22 KG/M2

## 2024-06-07 DIAGNOSIS — I44.7 LBBB (LEFT BUNDLE BRANCH BLOCK): Primary | ICD-10-CM

## 2024-06-07 DIAGNOSIS — I42.8 NONISCHEMIC CARDIOMYOPATHY (H): ICD-10-CM

## 2024-06-07 LAB
ANION GAP SERPL CALCULATED.3IONS-SCNC: 12 MMOL/L (ref 7–15)
BUN SERPL-MCNC: 24.5 MG/DL (ref 8–23)
CALCIUM SERPL-MCNC: 9.1 MG/DL (ref 8.8–10.2)
CHLORIDE SERPL-SCNC: 101 MMOL/L (ref 98–107)
CREAT SERPL-MCNC: 1.1 MG/DL (ref 0.51–0.95)
DEPRECATED HCO3 PLAS-SCNC: 24 MMOL/L (ref 22–29)
EGFRCR SERPLBLD CKD-EPI 2021: 54 ML/MIN/1.73M2
GLUCOSE SERPL-MCNC: 80 MG/DL (ref 70–99)
POTASSIUM SERPL-SCNC: 4.1 MMOL/L (ref 3.4–5.3)
SODIUM SERPL-SCNC: 137 MMOL/L (ref 135–145)

## 2024-06-07 PROCEDURE — 80048 BASIC METABOLIC PNL TOTAL CA: CPT | Performed by: PHYSICIAN ASSISTANT

## 2024-06-07 PROCEDURE — 36415 COLL VENOUS BLD VENIPUNCTURE: CPT | Performed by: PHYSICIAN ASSISTANT

## 2024-06-07 PROCEDURE — 99214 OFFICE O/P EST MOD 30 MIN: CPT | Performed by: INTERNAL MEDICINE

## 2024-06-07 NOTE — PROGRESS NOTES
HPI and Plan:   Mar Zeng is a 68 year old female who presents with nonischemic cardiomyopathy with severe LV dysfunction, EF estimated around 36%,HFrEF, chronic left bundle branch block.  She is here for a follow-up visit today.  She has had some adjustment to her GDMT, including a decrease in her Entresto dose in the morning due to some renal insufficiency noted on her previous visit earlier in May.  With this slight decline in the a.m. dose her creatinine has come down from 1.36-1.1 on BMP today.  Electrolytes look normal.  She is still very tired and fatigued and feels like she may be getting a cold with some chest congestion.  She denies symptoms of dyspnea on exertion or orthopnea or peripheral edema.  On exam cardiovascular tones are regular and lung fields are clear    Summary    1.  Nonischemic cardiomyopathy-class II with persistent fatigue and exercise intolerance.  GDMT is optimized and she has persistent severe LV dysfunction with a left bundle branch block.  Will recommend follow-up with the EP to discuss candidacy for CRT therapy.  Will order repeat ECG with that visit.  I recommend to continue current medical management  I will plan to see her back in 6 months with repeat echocardiogram or earlier if needed.    Please feel free to contact me with any questions given regards to her care         Today's clinic visit entailed:  Review of the result(s) of each unique test - BMP    Provider  Link to [facility] Help Grid     The level of medical decision making during this visit was of moderate complexity.    Orders Placed This Encounter   Procedures    Follow-Up with Cardiology    Follow-Up with Cardiology    EKG 12-lead complete w/read - Clinics (to be scheduled)     No orders of the defined types were placed in this encounter.    There are no discontinued medications.      Encounter Diagnoses   Name Primary?    Nonischemic cardiomyopathy (H)     LBBB (left bundle branch block) Yes       CURRENT  MEDICATIONS:  Current Outpatient Medications   Medication Sig Dispense Refill    albuterol (ACCUNEB) 0.63 MG/3ML neb solution Take 3 mLs (0.63 mg) by nebulization every 6 hours as needed for shortness of breath, wheezing or cough 90 mL 0    albuterol (PROAIR HFA/PROVENTIL HFA/VENTOLIN HFA) 108 (90 Base) MCG/ACT inhaler Inhale 2 puffs into the lungs every 4 hours as needed for shortness of breath, wheezing or cough 18 g 3    aluminum chloride (DRYSOL) 20 % external solution Apply topically at bedtime 60 mL 0    amphetamine-dextroamphetamine (ADDERALL XR) 25 MG 24 hr capsule Take 1 capsule (25 mg) by mouth every morning 90 capsule 0    aspirin 81 MG EC tablet Take 1 tablet (81 mg) by mouth daily 90 tablet 3    atorvastatin (LIPITOR) 40 MG tablet Take 1 tablet (40 mg) by mouth every evening 90 tablet 3    azelastine-fluticasone (DYMISTA) 137-50 MCG/ACT nasal spray Spray 1 spray into both nostrils 2 times daily as needed      budesonide-formoterol (SYMBICORT) 160-4.5 MCG/ACT Inhaler Inhale 2 puffs into the lungs 2 times daily (Patient taking differently: Inhale 2 puffs into the lungs as needed) 10.2 g 2    buPROPion (WELLBUTRIN XL) 150 MG 24 hr tablet TAKE 1 TABLET(150 MG) BY MOUTH EVERY MORNING 90 tablet 2    carvedilol (COREG) 3.125 MG tablet Take 1 tablet (3.125 mg) by mouth 2 times daily (with meals) 180 tablet 3    cetirizine (ZYRTEC) 10 MG tablet Take 1 tablet (10 mg) by mouth daily 30 tablet 2    empagliflozin (JARDIANCE) 10 MG TABS tablet Take 1 tablet (10 mg) by mouth daily (Patient taking differently: Take 10 mg by mouth at bedtime) 90 tablet 1    fexofenadine (ALLEGRA) 180 MG tablet Take 180 mg by mouth every other day      fluorouracil (EFUDEX) 5 % external cream APPLY SPARINGLY EXTERNALLY TO FACE EVERY SUNDAY AT NIGHT. AVOID EYES AND LIPS      furosemide (LASIX) 20 MG tablet Take 0.5 tablets (10 mg) by mouth daily as needed (weight gain of 3 pounds in one day or 5 pounds in 1 week)      levocetirizine  (XYZAL) 5 MG tablet Take 1 tablet (5 mg) by mouth every evening 30 tablet 5    loratadine (CLARITIN) 10 MG tablet Take 10 mg by mouth every other day Alternating with allegra      LORazepam (ATIVAN) 1 MG tablet Take 1 tablet (1 mg) by mouth as needed for anxiety (30 min prior to MRI.  May take second dose 30 min laterl if needed.) 2 tablet 0    nitroGLYcerin (NITROSTAT) 0.4 MG sublingual tablet For chest pain place 1 tablet under the tongue every 5 minutes for 3 doses. If symptoms persist 5 minutes after 1st dose call 911. 30 tablet 11    sacubitril-valsartan (ENTRESTO) 49-51 MG per tablet Take half tab in AM, full tab in  tablet 3    spironolactone (ALDACTONE) 25 MG tablet Take 0.5 tablets (12.5 mg) by mouth daily (Patient taking differently: Take 12.5 mg by mouth every morning) 45 tablet 4    timolol maleate (TIMOPTIC) 0.5 % ophthalmic solution Apply 1 drop topically nightly as needed (dry skin cracks) Apply 1 drop to lesion every night at bedtime. Do Not use on normal skin.         ALLERGIES     Allergies   Allergen Reactions    Codeine Itching    Concerta [Methylphenidate] Itching    Hay Fever & [A.R.M.]        PAST MEDICAL HISTORY:  Past Medical History:   Diagnosis Date    Mumps     NO ACTIVE PROBLEMS        PAST SURGICAL HISTORY:  Past Surgical History:   Procedure Laterality Date    COLONOSCOPY  8/23/2016    Dr. Shaw [facility]    COLONOSCOPY N/A 8/23/2016    Procedure: COLONOSCOPY;  Surgeon: Peter Shaw MD;  Location:  GI    COLONOSCOPY N/A 10/19/2022    Procedure: COLONOSCOPY with polypectomy using cold exacto snare;  Surgeon: Peter Shaw MD;  Location:  GI    CV CORONARY ANGIOGRAM N/A 10/27/2023    Procedure: Coronary Angiogram;  Surgeon: Elmer Andre MD;  Location:  [facility]    FINGER SURGERY      right little finger joint replacement    HYSTERECTOMY VAGINAL  age 30    retained ovaries. no cervix     HYSTERECTOMY, PAP NO LONGER INDICATED      ORTHOPEDIC  SURGERY      SURGICAL HISTORY OF -   10/25/13    left index cyst removal    SURGICAL HISTORY OF -       Bilat knee miniscus repair       FAMILY HISTORY:  Family History   Problem Relation Age of Onset    No Known Problems Mother     Hypertension Father     Cancer Father     CABG Father     No Known Problems Sister     Asthma Brother     Hypertension Brother     Cancer Maternal Grandmother     Cancer Maternal Grandfather     Colon Cancer Paternal Grandmother     Cancer Paternal Grandfather     Thyroid Disease Daughter     No Known Problems Son     No Known Problems Son     Colon Cancer Paternal Aunt     Colon Cancer Cousin        SOCIAL HISTORY:  Social History     Socioeconomic History    Marital status:      Spouse name: None    Number of children: None    Years of education: None    Highest education level: None   Tobacco Use    Smoking status: Former     Types: Cigarettes     Passive exposure: Past    Smokeless tobacco: Never   Vaping Use    Vaping status: Never Used   Substance and Sexual Activity    Alcohol use: Yes     Comment: avg:3-4 drink/wk social    Drug use: No    Sexual activity: Yes     Partners: Male     Birth control/protection: Female Surgical   Other Topics Concern    Parent/sibling w/ CABG, MI or angioplasty before 65F 55M? No   Social History Narrative    [place]     Social Determinants of Health     Financial Resource Strain: Low Risk  (1/29/2024)    Financial Resource Strain     Within the past 12 months, have you or your family members you live with been unable to get utilities (heat, electricity) when it was really needed?: No   Food Insecurity: Low Risk  (1/29/2024)    Food Insecurity     Within the past 12 months, did you worry that your food would run out before you got money to buy more?: No     Within the past 12 months, did the food you bought just not last and you didn t have money to get more?: No   Transportation Needs: Low Risk  (1/29/2024)    Transportation Needs  "    Within the past 12 months, has lack of transportation kept you from medical appointments, getting your medicines, non-medical meetings or appointments, work, or from getting things that you need?: No   Interpersonal Safety: Low Risk  (11/2/2023)    Interpersonal Safety     Do you feel physically and emotionally safe where you currently live?: Yes     Within the past 12 months, have you been hit, slapped, kicked or otherwise physically hurt by someone?: No     Within the past 12 months, have you been humiliated or emotionally abused in other ways by your partner or ex-partner?: No   Housing Stability: Low Risk  (1/29/2024)    Housing Stability     Do you have housing? : Yes     Are you worried about losing your housing?: No       Review of Systems:  Skin:  not assessed     Eyes:  not assessed    ENT:  not assessed    Respiratory:  Positive for dyspnea on exertion  Cardiovascular:    Positive for;chest pain  Gastroenterology: not assessed    Genitourinary:  not assessed    Musculoskeletal:  not assessed    Neurologic:  not assessed    Psychiatric:  not assessed    Heme/Lymph/Imm:  not assessed    Endocrine:  not assessed      Physical Exam:  Vitals: /69 (BP Location: Right arm, Patient Position: Sitting, Cuff Size: Adult Regular)   Pulse 72   Ht 1.676 m (5' 6\")   Wt 54.3 kg (119 lb 9.6 oz)   LMP  (LMP Unknown)   SpO2 97%   BMI 19.30 kg/m      Constitutional:  cooperative, alert and oriented, well developed, well nourished, in no acute distress thin      Skin:  warm and dry to the touch, no apparent skin lesions or masses noted          Head:  normocephalic, no masses or lesions        Eyes:  pupils equal and round;conjunctivae and lids unremarkable;sclera white        Lymph:      ENT:  no pallor or cyanosis        Neck:  JVP normal        Respiratory:  clear to auscultation         Cardiac: regular rhythm, normal S1/S2, no S3 or S4, apical impulse not displaced, no murmurs, gallops or rubs              "                                            GI:  abdomen soft;BS normoactive        Extremities and Muscular Skeletal:  no edema;no deformities, clubbing, cyanosis, erythema observed              Neurological:  no gross motor deficits;affect appropriate        Psych:  Alert and Oriented x 3        Recent Lab Results:  LIPID RESULTS:  Lab Results   Component Value Date    CHOL 201 (H) 10/26/2023    CHOL 231 (H) 08/18/2020    HDL 76 10/26/2023    HDL 72 08/18/2020     (H) 10/26/2023     (H) 08/18/2020    TRIG 70 10/26/2023    TRIG 110 08/18/2020    CHOLHDLRATIO 2.3 09/03/2014       LIVER ENZYME RESULTS:  Lab Results   Component Value Date    AST 20 08/18/2022    AST 23 08/18/2020    ALT 20 08/18/2022    ALT 19 08/18/2020       CBC RESULTS:  Lab Results   Component Value Date    WBC 6.4 10/26/2023    WBC 5.8 10/16/2019    RBC 3.93 10/26/2023    RBC 4.17 10/16/2019    HGB 13.0 12/05/2023    HGB 12.5 10/16/2019    HCT 36.5 10/26/2023    HCT 38.2 10/16/2019    MCV 93 10/26/2023    MCV 92 10/16/2019    MCH 29.8 10/26/2023    MCH 30.0 10/16/2019    MCHC 32.1 10/26/2023    MCHC 32.7 10/16/2019    RDW 13.3 10/26/2023    RDW 12.7 10/16/2019     10/29/2023     10/16/2019       BMP RESULTS:  Lab Results   Component Value Date     06/07/2024     08/18/2020    POTASSIUM 4.1 06/07/2024    POTASSIUM 4.5 08/18/2022    POTASSIUM 4.1 08/18/2020    CHLORIDE 101 06/07/2024    CHLORIDE 103 08/18/2022    CHLORIDE 106 08/18/2020    CO2 24 06/07/2024    CO2 28 08/18/2022    CO2 27 08/18/2020    ANIONGAP 12 06/07/2024    ANIONGAP 4 08/18/2022    ANIONGAP 8 08/18/2020    GLC 80 06/07/2024    GLC 91 08/18/2022    GLC 82 08/18/2020    BUN 24.5 (H) 06/07/2024    BUN 14 08/18/2022    BUN 11 08/18/2020    CR 1.10 (H) 06/07/2024    CR 0.80 08/18/2020    GFRESTIMATED 54 (L) 06/07/2024    GFRESTIMATED 77 08/18/2020    GFRESTBLACK 89 08/18/2020    MERRILL 9.1 06/07/2024    MERRILL 8.8 08/18/2020        A1C RESULTS:  Lab  Results   Component Value Date    A1C 5.6 12/04/2017       INR RESULTS:  Lab Results   Component Value Date    INR 1.05 01/23/2014           CC  Kat Huber PA-C  [id number]  [place] [id number]

## 2024-06-07 NOTE — TELEPHONE ENCOUNTER
Left Voicemail (1st Attempt) for the patient to call back and schedule the following:    Appointment type: RTA  Provider: BEATRIZ MADRID  Return date: 09/2024  Specialty phone number: [phone]  Additional appointment(s) needed: N/A  Additonal Notes: N/A

## 2024-06-07 NOTE — LETTER
6/7/2024    Gail Crespo, APRN CNP  [id number] [address] [facility] [place] [address]    RE: Mar Zeng       Dear Colleague,     I had the pleasure of seeing Mar Zeng in the [facility].  HPI and Plan:   Mar Zeng is a 68 year old female who presents with nonischemic cardiomyopathy with severe LV dysfunction, EF estimated around 36%,HFrEF, chronic left bundle branch block.  She is here for a follow-up visit today.  She has had some adjustment to her GDMT, including a decrease in her Entresto dose in the morning due to some renal insufficiency noted on her previous visit earlier in May.  With this slight decline in the a.m. dose her creatinine has come down from 1.36-1.1 on BMP today.  Electrolytes look normal.  She is still very tired and fatigued and feels like she may be getting a cold with some chest congestion.  She denies symptoms of dyspnea on exertion or orthopnea or peripheral edema.  On exam cardiovascular tones are regular and lung fields are clear    Summary    1.  Nonischemic cardiomyopathy-class II with persistent fatigue and exercise intolerance.  GDMT is optimized and she has persistent severe LV dysfunction with a left bundle branch block.  Will recommend follow-up with the EP to discuss candidacy for CRT therapy.  Will order repeat ECG with that visit.  I recommend to continue current medical management  I will plan to see her back in 6 months with repeat echocardiogram or earlier if needed.    Please feel free to contact me with any questions given regards to her care         Today's clinic visit entailed:  Review of the result(s) of each unique test - BMP    Provider  Link to [facility] Help Grid     The level of medical decision making during this visit was of moderate complexity.    Orders Placed This Encounter   Procedures    Follow-Up with Cardiology    Follow-Up with Cardiology    EKG 12-lead complete w/read - Clinics (to be scheduled)     No orders of  the defined types were placed in this encounter.    There are no discontinued medications.      Encounter Diagnoses   Name Primary?    Nonischemic cardiomyopathy (H)     LBBB (left bundle branch block) Yes       CURRENT MEDICATIONS:  Current Outpatient Medications   Medication Sig Dispense Refill    albuterol (ACCUNEB) 0.63 MG/3ML neb solution Take 3 mLs (0.63 mg) by nebulization every 6 hours as needed for shortness of breath, wheezing or cough 90 mL 0    albuterol (PROAIR HFA/PROVENTIL HFA/VENTOLIN HFA) 108 (90 Base) MCG/ACT inhaler Inhale 2 puffs into the lungs every 4 hours as needed for shortness of breath, wheezing or cough 18 g 3    aluminum chloride (DRYSOL) 20 % external solution Apply topically at bedtime 60 mL 0    amphetamine-dextroamphetamine (ADDERALL XR) 25 MG 24 hr capsule Take 1 capsule (25 mg) by mouth every morning 90 capsule 0    aspirin 81 MG EC tablet Take 1 tablet (81 mg) by mouth daily 90 tablet 3    atorvastatin (LIPITOR) 40 MG tablet Take 1 tablet (40 mg) by mouth every evening 90 tablet 3    azelastine-fluticasone (DYMISTA) 137-50 MCG/ACT nasal spray Spray 1 spray into both nostrils 2 times daily as needed      budesonide-formoterol (SYMBICORT) 160-4.5 MCG/ACT Inhaler Inhale 2 puffs into the lungs 2 times daily (Patient taking differently: Inhale 2 puffs into the lungs as needed) 10.2 g 2    buPROPion (WELLBUTRIN XL) 150 MG 24 hr tablet TAKE 1 TABLET(150 MG) BY MOUTH EVERY MORNING 90 tablet 2    carvedilol (COREG) 3.125 MG tablet Take 1 tablet (3.125 mg) by mouth 2 times daily (with meals) 180 tablet 3    cetirizine (ZYRTEC) 10 MG tablet Take 1 tablet (10 mg) by mouth daily 30 tablet 2    empagliflozin (JARDIANCE) 10 MG TABS tablet Take 1 tablet (10 mg) by mouth daily (Patient taking differently: Take 10 mg by mouth at bedtime) 90 tablet 1    fexofenadine (ALLEGRA) 180 MG tablet Take 180 mg by mouth every other day      fluorouracil (EFUDEX) 5 % external cream APPLY SPARINGLY EXTERNALLY TO  FACE EVERY SUNDAY AT NIGHT. AVOID EYES AND LIPS      furosemide (LASIX) 20 MG tablet Take 0.5 tablets (10 mg) by mouth daily as needed (weight gain of 3 pounds in one day or 5 pounds in 1 week)      levocetirizine (XYZAL) 5 MG tablet Take 1 tablet (5 mg) by mouth every evening 30 tablet 5    loratadine (CLARITIN) 10 MG tablet Take 10 mg by mouth every other day Alternating with allegra      LORazepam (ATIVAN) 1 MG tablet Take 1 tablet (1 mg) by mouth as needed for anxiety (30 min prior to MRI.  May take second dose 30 min laterl if needed.) 2 tablet 0    nitroGLYcerin (NITROSTAT) 0.4 MG sublingual tablet For chest pain place 1 tablet under the tongue every 5 minutes for 3 doses. If symptoms persist 5 minutes after 1st dose call 911. 30 tablet 11    sacubitril-valsartan (ENTRESTO) 49-51 MG per tablet Take half tab in AM, full tab in  tablet 3    spironolactone (ALDACTONE) 25 MG tablet Take 0.5 tablets (12.5 mg) by mouth daily (Patient taking differently: Take 12.5 mg by mouth every morning) 45 tablet 4    timolol maleate (TIMOPTIC) 0.5 % ophthalmic solution Apply 1 drop topically nightly as needed (dry skin cracks) Apply 1 drop to lesion every night at bedtime. Do Not use on normal skin.         ALLERGIES     Allergies   Allergen Reactions    Codeine Itching    Concerta [Methylphenidate] Itching    Hay Fever & [A.R.M.]        PAST MEDICAL HISTORY:  Past Medical History:   Diagnosis Date    Mumps     NO ACTIVE PROBLEMS        PAST SURGICAL HISTORY:  Past Surgical History:   Procedure Laterality Date    COLONOSCOPY  8/23/2016    Dr. Shaw [facility]    COLONOSCOPY N/A 8/23/2016    Procedure: COLONOSCOPY;  Surgeon: Peter Shaw MD;  Location:  GI    COLONOSCOPY N/A 10/19/2022    Procedure: COLONOSCOPY with polypectomy using cold exacto snare;  Surgeon: Peter Shaw MD;  Location:  GI    CV CORONARY ANGIOGRAM N/A 10/27/2023    Procedure: Coronary Angiogram;  Surgeon: Elmer Andre MD;  Location:  [facility]    FINGER SURGERY      right little finger joint replacement    HYSTERECTOMY VAGINAL  age 30    retained ovaries. no cervix     HYSTERECTOMY, PAP NO LONGER INDICATED      ORTHOPEDIC SURGERY      SURGICAL HISTORY OF -   10/25/13    left index cyst removal    SURGICAL HISTORY OF -       Bilat knee miniscus repair       FAMILY HISTORY:  Family History   Problem Relation Age of Onset    No Known Problems Mother     Hypertension Father     Cancer Father     CABG Father     No Known Problems Sister     Asthma Brother     Hypertension Brother     Cancer Maternal Grandmother     Cancer Maternal Grandfather     Colon Cancer Paternal Grandmother     Cancer Paternal Grandfather     Thyroid Disease Daughter     No Known Problems Son     No Known Problems Son     Colon Cancer Paternal Aunt     Colon Cancer Cousin        SOCIAL HISTORY:  Social History     Socioeconomic History    Marital status:      Spouse name: None    Number of children: None    Years of education: None    Highest education level: None   Tobacco Use    Smoking status: Former     Types: Cigarettes     Passive exposure: Past    Smokeless tobacco: Never   Vaping Use    Vaping status: Never Used   Substance and Sexual Activity    Alcohol use: Yes     Comment: avg:3-4 drink/wk social    Drug use: No    Sexual activity: Yes     Partners: Male     Birth control/protection: Female Surgical   Other Topics Concern    Parent/sibling w/ CABG, MI or angioplasty before 65F 55M? No   Social History Narrative    [place] in [place]     Social Determinants of Health     Financial Resource Strain: Low Risk  (1/29/2024)    Financial Resource Strain     Within the past 12 months, have you or your family members you live with been unable to get utilities (heat, electricity) when it was really needed?: No   Food Insecurity: Low Risk  (1/29/2024)    Food Insecurity     Within the past 12 months, did you worry that your food would run out before you  "got money to buy more?: No     Within the past 12 months, did the food you bought just not last and you didn t have money to get more?: No   Transportation Needs: Low Risk  (1/29/2024)    Transportation Needs     Within the past 12 months, has lack of transportation kept you from medical appointments, getting your medicines, non-medical meetings or appointments, work, or from getting things that you need?: No   Interpersonal Safety: Low Risk  (11/2/2023)    Interpersonal Safety     Do you feel physically and emotionally safe where you currently live?: Yes     Within the past 12 months, have you been hit, slapped, kicked or otherwise physically hurt by someone?: No     Within the past 12 months, have you been humiliated or emotionally abused in other ways by your partner or ex-partner?: No   Housing Stability: Low Risk  (1/29/2024)    Housing Stability     Do you have housing? : Yes     Are you worried about losing your housing?: No       Review of Systems:  Skin:  not assessed     Eyes:  not assessed    ENT:  not assessed    Respiratory:  Positive for dyspnea on exertion  Cardiovascular:    Positive for;chest pain  Gastroenterology: not assessed    Genitourinary:  not assessed    Musculoskeletal:  not assessed    Neurologic:  not assessed    Psychiatric:  not assessed    Heme/Lymph/Imm:  not assessed    Endocrine:  not assessed      Physical Exam:  Vitals: /69 (BP Location: Right arm, Patient Position: Sitting, Cuff Size: Adult Regular)   Pulse 72   Ht 1.676 m (5' 6\")   Wt 54.3 kg (119 lb 9.6 oz)   LMP  (LMP Unknown)   SpO2 97%   BMI 19.30 kg/m      Constitutional:  cooperative, alert and oriented, well developed, well nourished, in no acute distress thin      Skin:  warm and dry to the touch, no apparent skin lesions or masses noted          Head:  normocephalic, no masses or lesions        Eyes:  pupils equal and round;conjunctivae and lids unremarkable;sclera white        Lymph:      ENT:  no pallor " or cyanosis        Neck:  JVP normal        Respiratory:  clear to auscultation         Cardiac: regular rhythm, normal S1/S2, no S3 or S4, apical impulse not displaced, no murmurs, gallops or rubs                                                         GI:  abdomen soft;BS normoactive        Extremities and Muscular Skeletal:  no edema;no deformities, clubbing, cyanosis, erythema observed              Neurological:  no gross motor deficits;affect appropriate        Psych:  Alert and Oriented x 3        Recent Lab Results:  LIPID RESULTS:  Lab Results   Component Value Date    CHOL 201 (H) 10/26/2023    CHOL 231 (H) 08/18/2020    HDL 76 10/26/2023    HDL 72 08/18/2020     (H) 10/26/2023     (H) 08/18/2020    TRIG 70 10/26/2023    TRIG 110 08/18/2020    CHOLHDLRATIO 2.3 09/03/2014       LIVER ENZYME RESULTS:  Lab Results   Component Value Date    AST 20 08/18/2022    AST 23 08/18/2020    ALT 20 08/18/2022    ALT 19 08/18/2020       CBC RESULTS:  Lab Results   Component Value Date    WBC 6.4 10/26/2023    WBC 5.8 10/16/2019    RBC 3.93 10/26/2023    RBC 4.17 10/16/2019    HGB 13.0 12/05/2023    HGB 12.5 10/16/2019    HCT 36.5 10/26/2023    HCT 38.2 10/16/2019    MCV 93 10/26/2023    MCV 92 10/16/2019    MCH 29.8 10/26/2023    MCH 30.0 10/16/2019    MCHC 32.1 10/26/2023    MCHC 32.7 10/16/2019    RDW 13.3 10/26/2023    RDW 12.7 10/16/2019     10/29/2023     10/16/2019       BMP RESULTS:  Lab Results   Component Value Date     06/07/2024     08/18/2020    POTASSIUM 4.1 06/07/2024    POTASSIUM 4.5 08/18/2022    POTASSIUM 4.1 08/18/2020    CHLORIDE 101 06/07/2024    CHLORIDE 103 08/18/2022    CHLORIDE 106 08/18/2020    CO2 24 06/07/2024    CO2 28 08/18/2022    CO2 27 08/18/2020    ANIONGAP 12 06/07/2024    ANIONGAP 4 08/18/2022    ANIONGAP 8 08/18/2020    GLC 80 06/07/2024    GLC 91 08/18/2022    GLC 82 08/18/2020    BUN 24.5 (H) 06/07/2024    BUN 14 08/18/2022    BUN 11 08/18/2020     CR 1.10 (H) 06/07/2024    CR 0.80 08/18/2020    GFRESTIMATED 54 (L) 06/07/2024    GFRESTIMATED 77 08/18/2020    GFRESTBLACK 89 08/18/2020    MERRILL 9.1 06/07/2024    MERRILL 8.8 08/18/2020        A1C RESULTS:  Lab Results   Component Value Date    A1C 5.6 12/04/2017       INR RESULTS:  Lab Results   Component Value Date    INR 1.05 01/23/2014           CC  Kat Huber PA-C  [address]  [place] [address]      Thank you for allowing me to participate in the care of your patient.      Sincerely,     Yanira Tinoco DO     [facility]

## 2024-06-10 ENCOUNTER — TELEPHONE (OUTPATIENT)
Dept: PULMONOLOGY | Facility: CLINIC | Age: 69
End: 2024-06-10
Payer: MEDICARE

## 2024-06-10 NOTE — TELEPHONE ENCOUNTER
Patient confirmed scheduled appointment:  Date: 06/12/2024  Time: 1:35PM  Visit type: RTA  Provider: TRISHA  Location: [facility]  Testing/imaging: N/A  Additional notes: Per Dr. Lorenzo, schedule with another provider due to symptoms, scheduled first available with Dr. Solis

## 2024-06-11 NOTE — PROGRESS NOTES
[facility]   Pulmonary Clinic  Follow-Up Note 06/12/2024  Mar Zeng MRN: [id number]      Assessment & Plan      Mar Zeng is a 68 year old female with non-ischemic cardiomyopathy (EF 35%) and mild intermittent asthma being seen for acute upper respiratory infection.    #Acute upper respiratory infection  Symptoms and history most consistent with acute URI that is slowly improving.  Her vital signs, lung exam, and history are not consistent with pneumonia, but she remains at risk for progression due to her underlying medical conditions.  Will hold off on antibiotics and CXR at this time, but if she worsens or fails to improve, that will be our next plan.    For now, we will do supportive care with cough medication and nebulized albuterol.  I provided precautionary instructions regarding the risk for palpitations and tachycardia from frequent albuterol use in case she needs to increase her nebulizer use in the coming days.    Recommendations:    - Tessalon perles TID PRN   - OTC acetaminophen, guaifenesin PRN   - Albuterol nebulizer q4h PRN   - If patient worsens or fails to improve:  - 5 days Augmentin+Azithromycin   - Probable CXR+sputum culture if progression of symptoms concerning for pneumonia   - RTC PRN    Patient seen & discussed w/  Dr. Ham, who agrees with the above assessment and plan.    Betty Solis M.D.  Pulmonary and Critical Care Medicine Fellow  06/12/2024 1:31 PM      I saw and evaluated patient with Fellow.  Case discussed - agree with note.  I advised patient to contact our pulmonary nurses if cough worsens or she develops purulent sputum or fevers.    RADHA HAM M.D.            History of Present Illness:   Mar Zeng is a 68 year old female with non-ischemic cardiomyopathy (EF 35%) who was referred to pulmonary clinic for dyspnea.  She has been treated as mild intermittent asthma and had been doing well when she saw Dr. Dickerson 2/2024.   Since that time, she has followed closely with cardiology to get onto GDMT.    Starting about a week ago, however, she developed cough and congestion.  She mentioned it to her cardiologist at her appointment on 6/7 and also called the pulmonary nurse line.  Dr. Dickerson recommended OTC remedies and supportive care and then, if not improved, to come in for an in-person evaluation this week.    Patient reports that just after Memorial Day, she developed these symptoms.  She denies fevers, but has felt body aches.  She has been using acetaminophen, ibuprofen, albuterol inhaler, and albuterol nebulizers to help with symptoms.  She feels like the nebulizer helps some, but the cough is persistent with aches and green phlegm.    She denies difficulty breathing except during coughing.  She has had difficulty sleeping due to waking up with her cough.  She is concerned because her heart doctors told her that her heart failure was likely because of a virus and she doesn't want to make her heart worse.  She is hesitant to take medication OTC due to concern for interactions.    She has stopped using her Symbicort entirely.  She takes furosemide PRN and rarely needs it for leg swelling.          Social/Occupational/Exposures:     Social History     Tobacco Use    Smoking status: Former     Types: Cigarettes     Passive exposure: Past    Smokeless tobacco: Never   Vaping Use    Vaping status: Never Used   Substance Use Topics    Alcohol use: Yes     Comment: avg:3-4 drink/wk social    Drug use: No             Review of Symptoms:   10-point ROS reviewed, & found negative w/ exceptions noted in the HPI.          Past Medical History:     Past Medical History:   Diagnosis Date    Mumps     NO ACTIVE PROBLEMS        Past Surgical History:   Procedure Laterality Date    COLONOSCOPY  8/23/2016    Dr. Shaw [facility]    COLONOSCOPY N/A 8/23/2016    Procedure: COLONOSCOPY;  Surgeon: Peter Shaw MD;  Location:  GI    COLONOSCOPY  N/A 10/19/2022    Procedure: COLONOSCOPY with polypectomy using cold exacto snare;  Surgeon: Peter Shaw MD;  Location:  GI    CV CORONARY ANGIOGRAM N/A 10/27/2023    Procedure: Coronary Angiogram;  Surgeon: Elmer Andre MD;  Location:  HEART CARDIAC CATH LAB    FINGER SURGERY      right little finger joint replacement    HYSTERECTOMY VAGINAL  age 30    retained ovaries. no cervix     HYSTERECTOMY, PAP NO LONGER INDICATED      ORTHOPEDIC SURGERY      SURGICAL HISTORY OF -   10/25/13    left index cyst removal    SURGICAL HISTORY OF -       Bilat knee miniscus repair            Allergies:     Allergies   Allergen Reactions    Codeine Itching    Concerta [Methylphenidate] Itching    Hay Fever & [A.R.M.]              Outpatient Medications:     Current Outpatient Medications   Medication Sig Dispense Refill    albuterol (ACCUNEB) 0.63 MG/3ML neb solution Take 3 mLs (0.63 mg) by nebulization every 6 hours as needed for shortness of breath, wheezing or cough 90 mL 0    albuterol (PROAIR HFA/PROVENTIL HFA/VENTOLIN HFA) 108 (90 Base) MCG/ACT inhaler Inhale 2 puffs into the lungs every 4 hours as needed for shortness of breath, wheezing or cough 18 g 3    aluminum chloride (DRYSOL) 20 % external solution Apply topically at bedtime 60 mL 0    amphetamine-dextroamphetamine (ADDERALL XR) 25 MG 24 hr capsule Take 1 capsule (25 mg) by mouth every morning 90 capsule 0    aspirin 81 MG EC tablet Take 1 tablet (81 mg) by mouth daily 90 tablet 3    atorvastatin (LIPITOR) 40 MG tablet Take 1 tablet (40 mg) by mouth every evening 90 tablet 3    azelastine-fluticasone (DYMISTA) 137-50 MCG/ACT nasal spray Spray 1 spray into both nostrils 2 times daily as needed      budesonide-formoterol (SYMBICORT) 160-4.5 MCG/ACT Inhaler Inhale 2 puffs into the lungs 2 times daily (Patient taking differently: Inhale 2 puffs into the lungs as needed) 10.2 g 2    buPROPion (WELLBUTRIN XL) 150 MG 24 hr tablet TAKE 1 TABLET(150 MG) BY  MOUTH EVERY MORNING 90 tablet 2    carvedilol (COREG) 3.125 MG tablet Take 1 tablet (3.125 mg) by mouth 2 times daily (with meals) 180 tablet 3    cetirizine (ZYRTEC) 10 MG tablet Take 1 tablet (10 mg) by mouth daily 30 tablet 2    empagliflozin (JARDIANCE) 10 MG TABS tablet Take 1 tablet (10 mg) by mouth daily (Patient taking differently: Take 10 mg by mouth at bedtime) 90 tablet 1    fexofenadine (ALLEGRA) 180 MG tablet Take 180 mg by mouth every other day      fluorouracil (EFUDEX) 5 % external cream APPLY SPARINGLY EXTERNALLY TO FACE EVERY SUNDAY AT NIGHT. AVOID EYES AND LIPS      furosemide (LASIX) 20 MG tablet Take 0.5 tablets (10 mg) by mouth daily as needed (weight gain of 3 pounds in one day or 5 pounds in 1 week)      levocetirizine (XYZAL) 5 MG tablet Take 1 tablet (5 mg) by mouth every evening 30 tablet 5    loratadine (CLARITIN) 10 MG tablet Take 10 mg by mouth every other day Alternating with allegra      LORazepam (ATIVAN) 1 MG tablet Take 1 tablet (1 mg) by mouth as needed for anxiety (30 min prior to MRI.  May take second dose 30 min laterl if needed.) 2 tablet 0    nitroGLYcerin (NITROSTAT) 0.4 MG sublingual tablet For chest pain place 1 tablet under the tongue every 5 minutes for 3 doses. If symptoms persist 5 minutes after 1st dose call 911. 30 tablet 11    sacubitril-valsartan (ENTRESTO) 49-51 MG per tablet Take half tab in AM, full tab in  tablet 3    spironolactone (ALDACTONE) 25 MG tablet Take 0.5 tablets (12.5 mg) by mouth daily (Patient taking differently: Take 12.5 mg by mouth every morning) 45 tablet 4    timolol maleate (TIMOPTIC) 0.5 % ophthalmic solution Apply 1 drop topically nightly as needed (dry skin cracks) Apply 1 drop to lesion every night at bedtime. Do Not use on normal skin.       No current facility-administered medications for this visit.             Family History:     Family History   Problem Relation Age of Onset    No Known Problems Mother     Hypertension Father   "   Cancer Father     CABG Father     No Known Problems Sister     Asthma Brother     Hypertension Brother     Cancer Maternal Grandmother     Cancer Maternal Grandfather     Colon Cancer Paternal Grandmother     Cancer Paternal Grandfather     Thyroid Disease Daughter     No Known Problems Son     No Known Problems Son     Colon Cancer Paternal Aunt     Colon Cancer Cousin                Physical Exam:   /61   Pulse 79   Ht 1.676 m (5' 6\")   Wt 54.7 kg (120 lb 9.6 oz)   LMP  (LMP Unknown)   SpO2 96%   BMI 19.47 kg/m      Wt Readings from Last 3 Encounters:   06/12/24 54.7 kg (120 lb 9.6 oz)   06/07/24 54.3 kg (119 lb 9.6 oz)   05/21/24 54.3 kg (119 lb 11.2 oz)     General: adult female in no acute distress  HENT: external ears without visible abnormalities, no rhinorrhea, no epistaxis  Lungs: CTAB, on room air, no accessory muscle use, occasional productive cough  Heart: RRR, no murmurs  Abdomen: soft, non-distended, bowel tones present  Extremities: no bilateral pitting edema, no clubbing or cyanosis, large edematous MCP of bilateral index fingers  Skin: no visible rashes, no mottling  Neurologic: moving all 4 extremities spontaneously, awake and alert  Psych: full affect, appropriate insight, appropriate judgment          Data:   Labs: notable labs in HPI above.    Imaging and other diagnostic testing (all imaging studies reviewed by me)    Chest xray 11/8/2023: no parenchymal, pleural, or bony abnormalities, normal lung volumes    PFTs 5/10/2024: mild baseline obstruction with negative methacholine challenge      Transthoracic echocardiogram 2/26/2024: EF 36%, global hypokinesis of LV, normal RV size/function, RVSP 14.7 mmHg  "

## 2024-06-12 ENCOUNTER — OFFICE VISIT (OUTPATIENT)
Dept: PULMONOLOGY | Facility: CLINIC | Age: 69
End: 2024-06-12
Attending: STUDENT IN AN ORGANIZED HEALTH CARE EDUCATION/TRAINING PROGRAM
Payer: MEDICARE

## 2024-06-12 VITALS
HEIGHT: 66 IN | BODY MASS INDEX: 19.38 KG/M2 | DIASTOLIC BLOOD PRESSURE: 61 MMHG | WEIGHT: 120.6 LBS | SYSTOLIC BLOOD PRESSURE: 128 MMHG | OXYGEN SATURATION: 96 % | HEART RATE: 79 BPM

## 2024-06-12 DIAGNOSIS — R06.09 DYSPNEA ON EXERTION: ICD-10-CM

## 2024-06-12 DIAGNOSIS — J06.9 UPPER RESPIRATORY INFECTION, VIRAL: Primary | ICD-10-CM

## 2024-06-12 PROCEDURE — G0463 HOSPITAL OUTPT CLINIC VISIT: HCPCS | Performed by: STUDENT IN AN ORGANIZED HEALTH CARE EDUCATION/TRAINING PROGRAM

## 2024-06-12 PROCEDURE — 99214 OFFICE O/P EST MOD 30 MIN: CPT | Mod: GC | Performed by: STUDENT IN AN ORGANIZED HEALTH CARE EDUCATION/TRAINING PROGRAM

## 2024-06-12 RX ORDER — BENZONATATE 200 MG/1
200 CAPSULE ORAL 3 TIMES DAILY PRN
Qty: 30 CAPSULE | Refills: 0 | Status: SHIPPED | OUTPATIENT
Start: 2024-06-12 | End: 2024-08-14

## 2024-06-12 RX ORDER — ALBUTEROL SULFATE 90 UG/1
2 AEROSOL, METERED RESPIRATORY (INHALATION) EVERY 4 HOURS PRN
Qty: 18 G | Refills: 3 | Status: SHIPPED | OUTPATIENT
Start: 2024-06-12

## 2024-06-12 ASSESSMENT — ASTHMA QUESTIONNAIRES
ACT_TOTALSCORE: 12
QUESTION_5 LAST FOUR WEEKS HOW WOULD YOU RATE YOUR ASTHMA CONTROL: SOMEWHAT CONTROLLED
QUESTION_2 LAST FOUR WEEKS HOW OFTEN HAVE YOU HAD SHORTNESS OF BREATH: ONCE A DAY
QUESTION_1 LAST FOUR WEEKS HOW MUCH OF THE TIME DID YOUR ASTHMA KEEP YOU FROM GETTING AS MUCH DONE AT WORK, SCHOOL OR AT HOME: A LITTLE OF THE TIME
HOSPITALIZATION_OVERNIGHT_LAST_YEAR_TOTAL: ONE
QUESTION_3 LAST FOUR WEEKS HOW OFTEN DID YOUR ASTHMA SYMPTOMS (WHEEZING, COUGHING, SHORTNESS OF BREATH, CHEST TIGHTNESS OR PAIN) WAKE YOU UP AT NIGHT OR EARLIER THAN USUAL IN THE MORNING: FOUR OR MORE NIGHTS A WEEK
QUESTION_4 LAST FOUR WEEKS HOW OFTEN HAVE YOU USED YOUR RESCUE INHALER OR NEBULIZER MEDICATION (SUCH AS ALBUTEROL): ONE OR TWO TIMES PER DAY
ACT_TOTALSCORE: 12

## 2024-06-12 ASSESSMENT — PAIN SCALES - GENERAL: PAINLEVEL: MILD PAIN (3)

## 2024-06-12 NOTE — LETTER
6/12/2024      Mar Zeng  [address]  [facility] [address]      Dear Colleague,    Thank you for referring your patient, Mar Zeng, to the [facility] AND [facility]. Please see a copy of my visit note below.    [facility]   Pulmonary Clinic  Follow-Up Note 06/12/2024  Mar Zeng MRN: [id number]      Assessment & Plan     Mar Zeng is a 68 year old female with non-ischemic cardiomyopathy (EF 35%) and mild intermittent asthma being seen for acute upper respiratory infection.    #Acute upper respiratory infection  Symptoms and history most consistent with acute URI that is slowly improving.  Her vital signs, lung exam, and history are not consistent with pneumonia, but she remains at risk for progression due to her underlying medical conditions.  Will hold off on antibiotics and CXR at this time, but if she worsens or fails to improve, that will be our next plan.    For now, we will do supportive care with cough medication and nebulized albuterol.  I provided precautionary instructions regarding the risk for palpitations and tachycardia from frequent albuterol use in case she needs to increase her nebulizer use in the coming days.    Recommendations:    - Tessalon perles TID PRN   - OTC acetaminophen, guaifenesin PRN   - Albuterol nebulizer q4h PRN   - If patient worsens or fails to improve:  - 5 days Augmentin+Azithromycin   - Probable CXR+sputum culture if progression of symptoms concerning for pneumonia   - RTC PRN    Patient seen & discussed w/  Dr. Mcclain, who agrees with the above assessment and plan.    Betty Solis M.D.  Pulmonary and Critical Care Medicine Fellow  06/12/2024 1:31 PM      I saw and evaluated patient with Fellow.  Case discussed - agree with note.  I advised patient to contact our pulmonary nurses if cough worsens or she develops purulent sputum or fevers.    RADHA MCCLAIN  M.D.            History of Present Illness:   Mar Zeng is a 68 year old female with non-ischemic cardiomyopathy (EF 35%) who was referred to pulmonary clinic for dyspnea.  She has been treated as mild intermittent asthma and had been doing well when she saw Dr. Dickerson 2/2024.  Since that time, she has followed closely with cardiology to get onto GDMT.    Starting about a week ago, however, she developed cough and congestion.  She mentioned it to her cardiologist at her appointment on 6/7 and also called the pulmonary nurse line.  Dr. Dickerson recommended OTC remedies and supportive care and then, if not improved, to come in for an in-person evaluation this week.    Patient reports that just after Memorial Day, she developed these symptoms.  She denies fevers, but has felt body aches.  She has been using acetaminophen, ibuprofen, albuterol inhaler, and albuterol nebulizers to help with symptoms.  She feels like the nebulizer helps some, but the cough is persistent with aches and green phlegm.    She denies difficulty breathing except during coughing.  She has had difficulty sleeping due to waking up with her cough.  She is concerned because her heart doctors told her that her heart failure was likely because of a virus and she doesn't want to make her heart worse.  She is hesitant to take medication OTC due to concern for interactions.    She has stopped using her Symbicort entirely.  She takes furosemide PRN and rarely needs it for leg swelling.          Social/Occupational/Exposures:     Social History     Tobacco Use     Smoking status: Former     Types: Cigarettes     Passive exposure: Past     Smokeless tobacco: Never   Vaping Use     Vaping status: Never Used   Substance Use Topics     Alcohol use: Yes     Comment: avg:3-4 drink/wk social     Drug use: No             Review of Symptoms:   10-point ROS reviewed, & found negative w/ exceptions noted in the HPI.          Past Medical  History:     Past Medical History:   Diagnosis Date     Mumps      NO ACTIVE PROBLEMS        Past Surgical History:   Procedure Laterality Date     COLONOSCOPY  8/23/2016    Dr. Shaw [facility]     COLONOSCOPY N/A 8/23/2016    Procedure: COLONOSCOPY;  Surgeon: Peter Shaw MD;  Location:  GI     COLONOSCOPY N/A 10/19/2022    Procedure: COLONOSCOPY with polypectomy using cold exacto snare;  Surgeon: Peter Shaw MD;  Location:  GI     CV CORONARY ANGIOGRAM N/A 10/27/2023    Procedure: Coronary Angiogram;  Surgeon: Elmer Andre MD;  Location:  HEART CARDIAC CATH LAB     FINGER SURGERY      right little finger joint replacement     HYSTERECTOMY VAGINAL  age 30    retained ovaries. no cervix      HYSTERECTOMY, PAP NO LONGER INDICATED       ORTHOPEDIC SURGERY       SURGICAL HISTORY OF -   10/25/13    left index cyst removal     SURGICAL HISTORY OF -       Bilat knee miniscus repair            Allergies:     Allergies   Allergen Reactions     Codeine Itching     Concerta [Methylphenidate] Itching     Hay Fever & [A.R.M.]              Outpatient Medications:     Current Outpatient Medications   Medication Sig Dispense Refill     albuterol (ACCUNEB) 0.63 MG/3ML neb solution Take 3 mLs (0.63 mg) by nebulization every 6 hours as needed for shortness of breath, wheezing or cough 90 mL 0     albuterol (PROAIR HFA/PROVENTIL HFA/VENTOLIN HFA) 108 (90 Base) MCG/ACT inhaler Inhale 2 puffs into the lungs every 4 hours as needed for shortness of breath, wheezing or cough 18 g 3     aluminum chloride (DRYSOL) 20 % external solution Apply topically at bedtime 60 mL 0     amphetamine-dextroamphetamine (ADDERALL XR) 25 MG 24 hr capsule Take 1 capsule (25 mg) by mouth every morning 90 capsule 0     aspirin 81 MG EC tablet Take 1 tablet (81 mg) by mouth daily 90 tablet 3     atorvastatin (LIPITOR) 40 MG tablet Take 1 tablet (40 mg) by mouth every evening 90 tablet 3     azelastine-fluticasone (DYMISTA) 137-50  MCG/ACT nasal spray Spray 1 spray into both nostrils 2 times daily as needed       budesonide-formoterol (SYMBICORT) 160-4.5 MCG/ACT Inhaler Inhale 2 puffs into the lungs 2 times daily (Patient taking differently: Inhale 2 puffs into the lungs as needed) 10.2 g 2     buPROPion (WELLBUTRIN XL) 150 MG 24 hr tablet TAKE 1 TABLET(150 MG) BY MOUTH EVERY MORNING 90 tablet 2     carvedilol (COREG) 3.125 MG tablet Take 1 tablet (3.125 mg) by mouth 2 times daily (with meals) 180 tablet 3     cetirizine (ZYRTEC) 10 MG tablet Take 1 tablet (10 mg) by mouth daily 30 tablet 2     empagliflozin (JARDIANCE) 10 MG TABS tablet Take 1 tablet (10 mg) by mouth daily (Patient taking differently: Take 10 mg by mouth at bedtime) 90 tablet 1     fexofenadine (ALLEGRA) 180 MG tablet Take 180 mg by mouth every other day       fluorouracil (EFUDEX) 5 % external cream APPLY SPARINGLY EXTERNALLY TO FACE EVERY SUNDAY AT NIGHT. AVOID EYES AND LIPS       furosemide (LASIX) 20 MG tablet Take 0.5 tablets (10 mg) by mouth daily as needed (weight gain of 3 pounds in one day or 5 pounds in 1 week)       levocetirizine (XYZAL) 5 MG tablet Take 1 tablet (5 mg) by mouth every evening 30 tablet 5     loratadine (CLARITIN) 10 MG tablet Take 10 mg by mouth every other day Alternating with allegra       LORazepam (ATIVAN) 1 MG tablet Take 1 tablet (1 mg) by mouth as needed for anxiety (30 min prior to MRI.  May take second dose 30 min laterl if needed.) 2 tablet 0     nitroGLYcerin (NITROSTAT) 0.4 MG sublingual tablet For chest pain place 1 tablet under the tongue every 5 minutes for 3 doses. If symptoms persist 5 minutes after 1st dose call 911. 30 tablet 11     sacubitril-valsartan (ENTRESTO) 49-51 MG per tablet Take half tab in AM, full tab in  tablet 3     spironolactone (ALDACTONE) 25 MG tablet Take 0.5 tablets (12.5 mg) by mouth daily (Patient taking differently: Take 12.5 mg by mouth every morning) 45 tablet 4     timolol maleate (TIMOPTIC) 0.5 %  "ophthalmic solution Apply 1 drop topically nightly as needed (dry skin cracks) Apply 1 drop to lesion every night at bedtime. Do Not use on normal skin.       No current facility-administered medications for this visit.             Family History:     Family History   Problem Relation Age of Onset     No Known Problems Mother      Hypertension Father      Cancer Father      CABG Father      No Known Problems Sister      Asthma Brother      Hypertension Brother      Cancer Maternal Grandmother      Cancer Maternal Grandfather      Colon Cancer Paternal Grandmother      Cancer Paternal Grandfather      Thyroid Disease Daughter      No Known Problems Son      No Known Problems Son      Colon Cancer Paternal Aunt      Colon Cancer Cousin                Physical Exam:   /61   Pulse 79   Ht 1.676 m (5' 6\")   Wt 54.7 kg (120 lb 9.6 oz)   LMP  (LMP Unknown)   SpO2 96%   BMI 19.47 kg/m      Wt Readings from Last 3 Encounters:   06/12/24 54.7 kg (120 lb 9.6 oz)   06/07/24 54.3 kg (119 lb 9.6 oz)   05/21/24 54.3 kg (119 lb 11.2 oz)     General: adult female in no acute distress  HENT: external ears without visible abnormalities, no rhinorrhea, no epistaxis  Lungs: CTAB, on room air, no accessory muscle use, occasional productive cough  Heart: RRR, no murmurs  Abdomen: soft, non-distended, bowel tones present  Extremities: no bilateral pitting edema, no clubbing or cyanosis, large edematous MCP of bilateral index fingers  Skin: no visible rashes, no mottling  Neurologic: moving all 4 extremities spontaneously, awake and alert  Psych: full affect, appropriate insight, appropriate judgment          Data:   Labs: notable labs in HPI above.    Imaging and other diagnostic testing (all imaging studies reviewed by me)    Chest xray 11/8/2023: no parenchymal, pleural, or bony abnormalities, normal lung volumes    PFTs 5/10/2024: mild baseline obstruction with negative methacholine challenge      Transthoracic " echocardiogram 2/26/2024: EF 36%, global hypokinesis of LV, normal RV size/function, RVSP 14.7 mmHg      Again, thank you for allowing me to participate in the care of your patient.        Sincerely,        Betty Solis MD

## 2024-06-12 NOTE — PATIENT INSTRUCTIONS
You have bronchitis which is caused by a virus.  For your cough, I am prescribing a medication called Tessalon Perles (benzonatate) that you can take up to 3 times a day.  I recommend taking it at night just before bed to help with your sleep.  You can also take Mucinex (guaifenesin) to help with phlegm/congestion.    For over the counter medications, read the label and don't take ones that have pseudoephedrine, ephedrine, phenylephrine, dextromethorphan, or oxymetazoline in them.      It is also preferable to take acetaminophen (Tyelnol) and do not take ibuprofen (Advil/Motrin) when you have heart failure.    I expect that you should be feeling better over the next week to 10 days.  If you start feeling worse or develop fevers greater than 100.4 degrees, please call me back and we will get an x-ray and prescribe antibiotics at that time.    You can use your inhaler or nebulizer every 4 hours while you are sick.  If you start feeling like your heart is fluttering or beating fast, that could be because of the inhaler, so let me know and I can switch it to a similar medication that is more heart-safe.    It was a pleasure meeting you today!  I hope you get to feeling better soon!    Sincerely,  Betty Solis MD

## 2024-06-12 NOTE — NURSING NOTE
Chief Complaint   Patient presents with    RECHECK     Return Asthma     Medications reviewed and vital signs taken.   Angel Betancourt CMA

## 2024-06-13 ENCOUNTER — TELEPHONE (OUTPATIENT)
Dept: CARDIOLOGY | Facility: CLINIC | Age: 69
End: 2024-06-13
Payer: MEDICARE

## 2024-06-13 NOTE — TELEPHONE ENCOUNTER
Patient called to report a 2.5 lb weight gain in 24 hours.  Patient states her weight was 116.5 and today is 119 lb.  Patient denies any swelling and states she has mild SOB but was see by PCP yesterday who felt she may have the start of bronchitis.  Patient advised to use PRN dose of Furosemide for weight gain of over 2 lb in 24 hours.  Patient will update RN tomorrow with weight and symptoms.  Love Dunlap RN on 6/13/2024 at 11:29 AM

## 2024-07-01 ENCOUNTER — TELEPHONE (OUTPATIENT)
Dept: PULMONOLOGY | Facility: CLINIC | Age: 69
End: 2024-07-01
Payer: MEDICARE

## 2024-07-01 DIAGNOSIS — J06.9 UPPER RESPIRATORY INFECTION, VIRAL: Primary | ICD-10-CM

## 2024-07-01 NOTE — TELEPHONE ENCOUNTER
"RN spoke with patient to gather more information on symptoms. Patient states that her chest pain has subsided, but cough continues to worsen.Patient report constant cough spells with green phlegm.  Patient states that tesslaon pearls did not provide any relief to the coughs. Patient does not want cxr, woud like culture and antibiotics.    Per Dr. Solis's note  \"  - If patient worsens or fails to improve:  - 5 days Augmentin+Azithromycin   - Probable CXR+sputum culture if progression of symptoms concerning for pneumonia\"      -SHERRON Turk  "

## 2024-07-01 NOTE — TELEPHONE ENCOUNTER
[facility] Call Center    Phone Message    May a detailed message be left on voicemail: yes     Reason for Call: Other: Pt saw Dr. Solis 06/12 and was told if she is not feeling better to call back for medication. Pt calling now, still has not gotten better. Please follow up with pt.      Action Taken: Other: Pulm     Travel Screening: Not Applicable     Date of Service:

## 2024-07-02 RX ORDER — AZITHROMYCIN 250 MG/1
TABLET, FILM COATED ORAL
Qty: 6 TABLET | Refills: 0 | Status: SHIPPED | OUTPATIENT
Start: 2024-07-02 | End: 2024-07-07

## 2024-07-02 NOTE — TELEPHONE ENCOUNTER
Call back received from pt to check on the status of this message: she reports that it was Dr. Solis that she saw on 6/12/24, and Dr. Solis was the one who had indicated that if pt's symptoms did not improve, that she could call back for more antibiotics.   Pt informed we would send this message to Dr. Solis as well to see if she is able to help in this situation.    [facility]

## 2024-07-02 NOTE — TELEPHONE ENCOUNTER
Rn spoke to patient to inform that antibiotics have been ordered and sent to [facility]. Provider would also like sputum sample, and cups have been sent to patients address.     -SHERRON Turk

## 2024-07-08 DIAGNOSIS — I50.22 CHRONIC SYSTOLIC HEART FAILURE (H): ICD-10-CM

## 2024-07-12 LAB
BACTERIA SPT CULT: NORMAL
GRAM STAIN RESULT: NORMAL

## 2024-07-12 PROCEDURE — 87070 CULTURE OTHR SPECIMN AEROBIC: CPT | Performed by: INTERNAL MEDICINE

## 2024-07-12 PROCEDURE — 87205 SMEAR GRAM STAIN: CPT | Performed by: INTERNAL MEDICINE

## 2024-08-02 DIAGNOSIS — F41.1 GENERALIZED ANXIETY DISORDER: ICD-10-CM

## 2024-08-02 RX ORDER — BUPROPION HYDROCHLORIDE 150 MG/1
TABLET ORAL
Qty: 90 TABLET | Refills: 0 | Status: SHIPPED | OUTPATIENT
Start: 2024-08-02

## 2024-08-13 ENCOUNTER — PATIENT OUTREACH (OUTPATIENT)
Dept: CARE COORDINATION | Facility: CLINIC | Age: 69
End: 2024-08-13
Payer: MEDICARE

## 2024-08-13 DIAGNOSIS — F41.1 GENERALIZED ANXIETY DISORDER: ICD-10-CM

## 2024-08-13 RX ORDER — BUPROPION HYDROCHLORIDE 150 MG/1
TABLET ORAL
Qty: 90 TABLET | Refills: 0 | OUTPATIENT
Start: 2024-08-13

## 2024-08-14 ENCOUNTER — OFFICE VISIT (OUTPATIENT)
Dept: CARDIOLOGY | Facility: CLINIC | Age: 69
End: 2024-08-14
Payer: MEDICARE

## 2024-08-14 VITALS
OXYGEN SATURATION: 93 % | DIASTOLIC BLOOD PRESSURE: 62 MMHG | WEIGHT: 123.2 LBS | HEART RATE: 61 BPM | BODY MASS INDEX: 19.89 KG/M2 | SYSTOLIC BLOOD PRESSURE: 120 MMHG

## 2024-08-14 DIAGNOSIS — I44.7 LBBB (LEFT BUNDLE BRANCH BLOCK): ICD-10-CM

## 2024-08-14 DIAGNOSIS — I50.22 CHRONIC SYSTOLIC HEART FAILURE (H): Primary | ICD-10-CM

## 2024-08-14 DIAGNOSIS — I42.8 NONISCHEMIC CARDIOMYOPATHY (H): ICD-10-CM

## 2024-08-14 LAB
ATRIAL RATE - MUSE: 64 BPM
DIASTOLIC BLOOD PRESSURE - MUSE: NORMAL MMHG
INTERPRETATION ECG - MUSE: NORMAL
P AXIS - MUSE: 86 DEGREES
PR INTERVAL - MUSE: 162 MS
QRS DURATION - MUSE: 96 MS
QT - MUSE: 428 MS
QTC - MUSE: 441 MS
R AXIS - MUSE: 58 DEGREES
SYSTOLIC BLOOD PRESSURE - MUSE: NORMAL MMHG
T AXIS - MUSE: 55 DEGREES
VENTRICULAR RATE- MUSE: 64 BPM

## 2024-08-14 PROCEDURE — 93000 ELECTROCARDIOGRAM COMPLETE: CPT | Performed by: INTERNAL MEDICINE

## 2024-08-14 PROCEDURE — 99204 OFFICE O/P NEW MOD 45 MIN: CPT | Performed by: INTERNAL MEDICINE

## 2024-08-14 RX ORDER — AZELASTINE 1 MG/ML
1 SPRAY, METERED NASAL 2 TIMES DAILY
COMMUNITY

## 2024-08-14 RX ORDER — BUDESONIDE 0.5 MG/2ML
0.5 INHALANT ORAL PRN
COMMUNITY
Start: 2024-06-21

## 2024-08-14 NOTE — LETTER
2024    Gail Crespo, APRN CNP  [address] [facility] [address]    RE: Mar Zeng       Dear Colleague,     I had the pleasure of seeing Mar Zeng in the [facility].     [facility]  Cardiac Electrophysiology  [address]  [place] [address]   Office: [phone]  Fax: [phone]     Patient: Mar Zeng   : 1955       CHIEF COMPLAINT/REASON FOR VISIT  Chronic systolic heart failure related to nonischemic cardiomyopathy (LVEF 36% by 2024 TTE)  LBBB      Assessment/Recommendations     Chronic systolic heart failure related to nonischemic cardiomyopathy - LVEF 36%, NYHA II, intermittent non-Sada LBBB QRS 132ms (QRS today 96ms)  In the setting of LVEF >35%, she does not presently have indication for CRT or primary prevention ICD.  In case of LVEF <35%, she would have a class I indication for primary prevention ICD and a class IIa indication for CRT given non-Sada LBBB <150ms.  - continue heart failure medical therapy titration and longitudinal LVEF follow-up, with future reconsideration of CRT-D in case of LVEF <35%           History of Present Illness   Mar Zeng is a 68 year old female with chronic systolic heart failure related to nonischemic cardiomyopathy (LVEF 36% by 2024 TTE), IVCD/non-Sada LBBB QRS 132ms, moderate-severe mitral regurgitation referred by Dr. Tinoco for consultation regarding primary prevention CRT-D.    Mrs. Zeng noted URI symptoms 2023, with progressive dyspnea thereafter was found to have LVEF 30-35% by 10/26/2023 TTE.  She underwent cardiac MRI 2023 showing LVFE 32% without ventricular fibrosis.  She has been initiated on heart failure medical therapies including carvedilol 3.125mg twice daily, Entresto, spironolactone.  2024 TTE showed LVEF 36%.  She notes intermittent dyspnea with moderate activity eg. Doing laundry, walking  outdoors, biking .  She notes fatigue.  She denies syncope.      She used to be very active, though has been limited since 6/2023.       Physical Examination  Review of Systems   VITALS: /62 (BP Location: Left arm, Patient Position: Sitting, Cuff Size: Adult Regular)   Pulse 61   Wt 55.9 kg (123 lb 3.2 oz)   LMP  (LMP Unknown)   SpO2 93%   BMI 19.89 kg/m    Wt Readings from Last 3 Encounters:   06/12/24 54.7 kg (120 lb 9.6 oz)   06/07/24 54.3 kg (119 lb 9.6 oz)   05/21/24 54.3 kg (119 lb 11.2 oz)     CONSTITUTIONAL: well nourished, comfortable, no distress  EYES:  Conjunctivae pink, sclerae clear.    E/N/T:  Oral mucosa pink  RESPIRATORY:  Respiratory effort is normal  CARDIOVASCULAR:  normal S1 and S2  GASTROINTESTINAL:  Abdomen without masses or tenderness  EXTREMITIES:  No clubbing or cyanosis.    MUSCULOSKELETAL:  Overall grossly normal muscle strength  SKIN:  Overall, skin warm and dry, no lesions.  NEURO/PSYCH:  Oriented x 3 with normal affect.   Constitutional:  No weight loss or loss of appetite    Eyes:  No difficulty with vision, no double vision, no dry eyes  ENT:  No sore throat, difficulty swallowing; changes in hearing or tinnitus  Cardiovascular: As detailed above  Respiratory:  No cough  Musculoskeletal  No joint pain, muscle aches  Neurologic:  No syncope, lightheadedness, fainting spells   Hematologic: No easy bruising, excessive bleeding tendency   Gastrointestinal:  No jaundice, abdominal pain or abdominal bloating  Genitourinary: No changes in urinary habits, no trouble urinating    Psychiatric: No anxiety or depression      Medical History  Surgical History   Past Medical History:   Diagnosis Date     Mumps      NO ACTIVE PROBLEMS     Past Surgical History:   Procedure Laterality Date     COLONOSCOPY  8/23/2016    Dr. Shaw [facility]     COLONOSCOPY N/A 8/23/2016    Procedure: COLONOSCOPY;  Surgeon: Peter Shaw MD;  Location:  GI     COLONOSCOPY N/A 10/19/2022    Procedure:  COLONOSCOPY with polypectomy using cold exacto snare;  Surgeon: Peter Shaw MD;  Location:  GI     CV CORONARY ANGIOGRAM N/A 10/27/2023    Procedure: Coronary Angiogram;  Surgeon: Elmer Andre MD;  Location:  HEART CARDIAC CATH LAB     FINGER SURGERY      right little finger joint replacement     HYSTERECTOMY VAGINAL  age 30    retained ovaries. no cervix      HYSTERECTOMY, PAP NO LONGER INDICATED       ORTHOPEDIC SURGERY       SURGICAL HISTORY OF -   10/25/13    left index cyst removal     SURGICAL HISTORY OF -       Bilat knee miniscus repair         Family History Social History   Family History   Problem Relation Age of Onset     No Known Problems Mother      Hypertension Father      Cancer Father      CABG Father      No Known Problems Sister      Asthma Brother      Hypertension Brother      Cancer Maternal Grandmother      Cancer Maternal Grandfather      Colon Cancer Paternal Grandmother      Cancer Paternal Grandfather      Thyroid Disease Daughter      No Known Problems Son      No Known Problems Son      Colon Cancer Paternal Aunt      Colon Cancer Cousin         Social History     Tobacco Use     Smoking status: Former     Types: Cigarettes     Passive exposure: Past     Smokeless tobacco: Never   Vaping Use     Vaping status: Never Used   Substance Use Topics     Alcohol use: Yes     Comment: avg:3-4 drink/wk social     Drug use: No         Medications  Allergies     Current Outpatient Medications:      albuterol (ACCUNEB) 0.63 MG/3ML neb solution, Take 3 mLs (0.63 mg) by nebulization every 6 hours as needed for shortness of breath, wheezing or cough, Disp: 90 mL, Rfl: 0     albuterol (PROAIR HFA/PROVENTIL HFA/VENTOLIN HFA) 108 (90 Base) MCG/ACT inhaler, Inhale 2 puffs into the lungs every 4 hours as needed for shortness of breath, wheezing or cough, Disp: 18 g, Rfl: 3     aluminum chloride (DRYSOL) 20 % external solution, Apply topically at bedtime, Disp: 60 mL, Rfl: 0      amoxicillin-clavulanate (AUGMENTIN) 875-125 MG tablet, Take 1 tablet by mouth 2 times daily, Disp: 10 tablet, Rfl: 0     amphetamine-dextroamphetamine (ADDERALL XR) 25 MG 24 hr capsule, Take 1 capsule (25 mg) by mouth every morning, Disp: 90 capsule, Rfl: 0     aspirin 81 MG EC tablet, Take 1 tablet (81 mg) by mouth daily, Disp: 90 tablet, Rfl: 3     atorvastatin (LIPITOR) 40 MG tablet, Take 1 tablet (40 mg) by mouth every evening, Disp: 90 tablet, Rfl: 3     azelastine-fluticasone (DYMISTA) 137-50 MCG/ACT nasal spray, Spray 1 spray into both nostrils 2 times daily as needed, Disp: , Rfl:      benzonatate (TESSALON) 200 MG capsule, Take 1 capsule (200 mg) by mouth 3 times daily as needed for cough, Disp: 30 capsule, Rfl: 0     budesonide-formoterol (SYMBICORT) 160-4.5 MCG/ACT Inhaler, Inhale 2 puffs into the lungs 2 times daily (Patient taking differently: Inhale 2 puffs into the lungs as needed), Disp: 10.2 g, Rfl: 2     buPROPion (WELLBUTRIN XL) 150 MG 24 hr tablet, TAKE 1 TABLET(150 MG) BY MOUTH EVERY MORNING, Disp: 90 tablet, Rfl: 0     carvedilol (COREG) 3.125 MG tablet, Take 1 tablet (3.125 mg) by mouth 2 times daily (with meals), Disp: 180 tablet, Rfl: 3     cetirizine (ZYRTEC) 10 MG tablet, Take 1 tablet (10 mg) by mouth daily, Disp: 30 tablet, Rfl: 2     empagliflozin (JARDIANCE) 10 MG TABS tablet, Take 1 tablet (10 mg) by mouth daily, Disp: 90 tablet, Rfl: 3     fexofenadine (ALLEGRA) 180 MG tablet, Take 180 mg by mouth every other day, Disp: , Rfl:      fluorouracil (EFUDEX) 5 % external cream, APPLY SPARINGLY EXTERNALLY TO FACE EVERY SUNDAY AT NIGHT. AVOID EYES AND LIPS, Disp: , Rfl:      furosemide (LASIX) 20 MG tablet, Take 0.5 tablets (10 mg) by mouth daily as needed (weight gain of 3 pounds in one day or 5 pounds in 1 week), Disp: , Rfl:      levocetirizine (XYZAL) 5 MG tablet, Take 1 tablet (5 mg) by mouth every evening, Disp: 30 tablet, Rfl: 5     loratadine (CLARITIN) 10 MG tablet, Take 10 mg by  "mouth every other day Alternating with allegra, Disp: , Rfl:      LORazepam (ATIVAN) 1 MG tablet, Take 1 tablet (1 mg) by mouth as needed for anxiety (30 min prior to MRI.  May take second dose 30 min laterl if needed.), Disp: 2 tablet, Rfl: 0     nitroGLYcerin (NITROSTAT) 0.4 MG sublingual tablet, For chest pain place 1 tablet under the tongue every 5 minutes for 3 doses. If symptoms persist 5 minutes after 1st dose call 911., Disp: 30 tablet, Rfl: 11     sacubitril-valsartan (ENTRESTO) 49-51 MG per tablet, Take half tab in AM, full tab in PM, Disp: 180 tablet, Rfl: 3     spironolactone (ALDACTONE) 25 MG tablet, Take 0.5 tablets (12.5 mg) by mouth daily (Patient taking differently: Take 12.5 mg by mouth every morning), Disp: 45 tablet, Rfl: 4     timolol maleate (TIMOPTIC) 0.5 % ophthalmic solution, Apply 1 drop topically nightly as needed (dry skin cracks) Apply 1 drop to lesion every night at bedtime. Do Not use on normal skin., Disp: , Rfl:      Allergies   Allergen Reactions     Codeine Itching     Concerta [Methylphenidate] Itching     Hay Fever & [A.R.M.]           Lab Results    Chemistry CBC Cardiac Enzymes/BNP/TSH/INR   Recent Labs   Lab Test 06/07/24  0911      POTASSIUM 4.1   CHLORIDE 101   CO2 24   GLC 80   BUN 24.5*   CR 1.10*   GFRESTIMATED 54*   MERRILL 9.1     Recent Labs   Lab Test 06/07/24  0911 05/21/24  1434 05/16/24  1342   CR 1.10* 1.36* 1.63*          Recent Labs   Lab Test 12/05/23  0841 10/29/23  0640 10/26/23  0806   WBC  --   --  6.4   HGB 13.0  --  11.7   HCT  --   --  36.5   MCV  --   --  93   PLT  --  373 352     Recent Labs   Lab Test 12/05/23  0841 10/26/23  0806 10/25/23  2033   HGB 13.0 11.7 11.6*    No results for input(s): \"TROPONINI\" in the last 37979 hours.  Recent Labs   Lab Test 12/05/23  0841 10/30/23  0913 10/25/23  2033   NTBNPI  --   --  5,176*   NTBNP 1,745* 2,381*  --      Recent Labs   Lab Test 09/26/23  1146   TSH 0.74     No results for input(s): \"INR\" in the last " 65574 hours.      Data Review    ECGs (tracings independently reviewed)  8/14/2024 - SR 64bpm, QRS 96ms  10/25/2023 - SR 104bpm, IVCD/non-Sada LBBB QRS 132ms    Zio monitoring 11/13/2023 to 11/20/2023 (independently reviewed)  SR 48-116bpm, avg 75bpm  2 nonsustained AT  Rare PACs  Rare PVCs    2/26/2024 TTE  3D LVEF volumetric analysis is 36%.  There is mod-severe global hypokinesia of the left ventricle.  No significant valvular heart disease.    11/2/2023 cardiac MRI  1. Severe non-ischemic cardiomyopathy.  2. No late enhancement to suggest prior infarct, inflammation or infiltration.  3. Mild mitral regurgitation.     10/26/2023 TTE  Left ventricular systolic function is moderate to severely reduced.  The visual ejection fraction is 30-35%.  There is moderately severe (3+) mitral regurgitation.  There is no comparison study available    10/27/2023 coronary angiography  LMCA normal  LAD 20% proximal  CX 30 to 40% distal  RCA dominant normal       Cc: Zak Waters DO, Jenna Ra, APRN CNP    Renetta Lynn MD  8/14/2024  9:24 AM        Thank you for allowing me to participate in the care of your patient.      Sincerely,     Renetta Lynn MD     [facility]  cc:   Yanira Tinoco DO  [address]  [place] [address]

## 2024-08-14 NOTE — PATIENT INSTRUCTIONS
[facility]  Cardiac Electrophysiology  [address]  [place] [address]   Office: [phone]  Fax: [phone]       Thank you for seeing us in clinic today - it is a pleasure to be a part of your care team.  Below is a summary of our plan from today's visit.       You have had chronic ventricular dysfunction (heart failure), with left ventricular ejection fraction (LVEF) 36% by your 2/26/2024 echocardiogram, and also have mild left bundle branch block (LBBB).    Preventative implantable cardiac defibrillator (ICD) is recommended for patients with LVEF less than 35%, and cardiac resynchronization therapy (CRT) can be considered in patients with mild LBBB (vs recommended for those patients with more severe LBBB).      We will plan for the following:  - continue to follow with your cardiology team and continue adjusting medications as able  - future reconsideration of ICD +/- CRT in case of LVEF less than 35% and mild or severe LBBB (QRS >120 - mild, >150 - severe)    Please do not hesitate to be in touch with our office at [phone] with any questions that may arise.      Thank you for trusting us with your care,    Renetta Lynn MD  Clinical Cardiac Electrophysiology  [facility]  [address]  [place] [address]   Office: [phone]  Fax: [phone]

## 2024-08-14 NOTE — PROGRESS NOTES
[facility]  Cardiac Electrophysiology  [address]  [place] [address]   Office: [phone]  Fax: [phone]     Patient: Mar Zeng   : 1955       CHIEF COMPLAINT/REASON FOR VISIT  Chronic systolic heart failure related to nonischemic cardiomyopathy (LVEF 36% by 2024 TTE)  LBBB      Assessment/Recommendations     Chronic systolic heart failure related to nonischemic cardiomyopathy - LVEF 36%, NYHA II, intermittent non-Sada LBBB QRS 132ms (QRS today 96ms)  In the setting of LVEF >35%, she does not presently have indication for CRT or primary prevention ICD.  In case of LVEF <35%, she would have a class I indication for primary prevention ICD and a class IIa indication for CRT given non-Sada LBBB <150ms.  - continue heart failure medical therapy titration and longitudinal LVEF follow-up, with future reconsideration of CRT-D in case of LVEF <35%           History of Present Illness   Mar Zeng is a 68 year old female with chronic systolic heart failure related to nonischemic cardiomyopathy (LVEF 36% by 2024 TTE), IVCD/non-Sada LBBB QRS 132ms, moderate-severe mitral regurgitation referred by Dr. Tinoco for consultation regarding primary prevention CRT-D.    Mrs. Zeng noted URI symptoms 2023, with progressive dyspnea thereafter was found to have LVEF 30-35% by 10/26/2023 TTE.  She underwent cardiac MRI 2023 showing LVFE 32% without ventricular fibrosis.  She has been initiated on heart failure medical therapies including carvedilol 3.125mg twice daily, Entresto, spironolactone.  2024 TTE showed LVEF 36%.  She notes intermittent dyspnea with moderate activity eg. Doing laundry, walking outdoors, biking .  She notes fatigue.  She denies syncope.      She used to be very active, though has been limited since 2023.       Physical Examination  Review of Systems   VITALS: /62 (BP Location: Left arm, Patient  Position: Sitting, Cuff Size: Adult Regular)   Pulse 61   Wt 55.9 kg (123 lb 3.2 oz)   LMP  (LMP Unknown)   SpO2 93%   BMI 19.89 kg/m    Wt Readings from Last 3 Encounters:   06/12/24 54.7 kg (120 lb 9.6 oz)   06/07/24 54.3 kg (119 lb 9.6 oz)   05/21/24 54.3 kg (119 lb 11.2 oz)     CONSTITUTIONAL: well nourished, comfortable, no distress  EYES:  Conjunctivae pink, sclerae clear.    E/N/T:  Oral mucosa pink  RESPIRATORY:  Respiratory effort is normal  CARDIOVASCULAR:  normal S1 and S2  GASTROINTESTINAL:  Abdomen without masses or tenderness  EXTREMITIES:  No clubbing or cyanosis.    MUSCULOSKELETAL:  Overall grossly normal muscle strength  SKIN:  Overall, skin warm and dry, no lesions.  NEURO/PSYCH:  Oriented x 3 with normal affect.   Constitutional:  No weight loss or loss of appetite    Eyes:  No difficulty with vision, no double vision, no dry eyes  ENT:  No sore throat, difficulty swallowing; changes in hearing or tinnitus  Cardiovascular: As detailed above  Respiratory:  No cough  Musculoskeletal  No joint pain, muscle aches  Neurologic:  No syncope, lightheadedness, fainting spells   Hematologic: No easy bruising, excessive bleeding tendency   Gastrointestinal:  No jaundice, abdominal pain or abdominal bloating  Genitourinary: No changes in urinary habits, no trouble urinating    Psychiatric: No anxiety or depression      Medical History  Surgical History   Past Medical History:   Diagnosis Date    Mumps     NO ACTIVE PROBLEMS     Past Surgical History:   Procedure Laterality Date    COLONOSCOPY  8/23/2016    Dr. hSaw [facility]    COLONOSCOPY N/A 8/23/2016    Procedure: COLONOSCOPY;  Surgeon: Peter Shaw MD;  Location:  GI    COLONOSCOPY N/A 10/19/2022    Procedure: COLONOSCOPY with polypectomy using cold exacto snare;  Surgeon: Peter Shaw MD;  Location:  GI    CV CORONARY ANGIOGRAM N/A 10/27/2023    Procedure: Coronary Angiogram;  Surgeon: Elmer Andre MD;  [facility]  CARDIAC CATH LAB    FINGER SURGERY      right little finger joint replacement    HYSTERECTOMY VAGINAL  age 30    retained ovaries. no cervix     HYSTERECTOMY, PAP NO LONGER INDICATED      ORTHOPEDIC SURGERY      SURGICAL HISTORY OF -   10/25/13    left index cyst removal    SURGICAL HISTORY OF -       Bilat knee miniscus repair         Family History Social History   Family History   Problem Relation Age of Onset    No Known Problems Mother     Hypertension Father     Cancer Father     CABG Father     No Known Problems Sister     Asthma Brother     Hypertension Brother     Cancer Maternal Grandmother     Cancer Maternal Grandfather     Colon Cancer Paternal Grandmother     Cancer Paternal Grandfather     Thyroid Disease Daughter     No Known Problems Son     No Known Problems Son     Colon Cancer Paternal Aunt     Colon Cancer Cousin         Social History     Tobacco Use    Smoking status: Former     Types: Cigarettes     Passive exposure: Past    Smokeless tobacco: Never   Vaping Use    Vaping status: Never Used   Substance Use Topics    Alcohol use: Yes     Comment: avg:3-4 drink/wk social    Drug use: No         Medications  Allergies     Current Outpatient Medications:     albuterol (ACCUNEB) 0.63 MG/3ML neb solution, Take 3 mLs (0.63 mg) by nebulization every 6 hours as needed for shortness of breath, wheezing or cough, Disp: 90 mL, Rfl: 0    albuterol (PROAIR HFA/PROVENTIL HFA/VENTOLIN HFA) 108 (90 Base) MCG/ACT inhaler, Inhale 2 puffs into the lungs every 4 hours as needed for shortness of breath, wheezing or cough, Disp: 18 g, Rfl: 3    aluminum chloride (DRYSOL) 20 % external solution, Apply topically at bedtime, Disp: 60 mL, Rfl: 0    amoxicillin-clavulanate (AUGMENTIN) 875-125 MG tablet, Take 1 tablet by mouth 2 times daily, Disp: 10 tablet, Rfl: 0    amphetamine-dextroamphetamine (ADDERALL XR) 25 MG 24 hr capsule, Take 1 capsule (25 mg) by mouth every morning, Disp: 90 capsule, Rfl: 0    aspirin 81 MG  EC tablet, Take 1 tablet (81 mg) by mouth daily, Disp: 90 tablet, Rfl: 3    atorvastatin (LIPITOR) 40 MG tablet, Take 1 tablet (40 mg) by mouth every evening, Disp: 90 tablet, Rfl: 3    azelastine-fluticasone (DYMISTA) 137-50 MCG/ACT nasal spray, Spray 1 spray into both nostrils 2 times daily as needed, Disp: , Rfl:     benzonatate (TESSALON) 200 MG capsule, Take 1 capsule (200 mg) by mouth 3 times daily as needed for cough, Disp: 30 capsule, Rfl: 0    budesonide-formoterol (SYMBICORT) 160-4.5 MCG/ACT Inhaler, Inhale 2 puffs into the lungs 2 times daily (Patient taking differently: Inhale 2 puffs into the lungs as needed), Disp: 10.2 g, Rfl: 2    buPROPion (WELLBUTRIN XL) 150 MG 24 hr tablet, TAKE 1 TABLET(150 MG) BY MOUTH EVERY MORNING, Disp: 90 tablet, Rfl: 0    carvedilol (COREG) 3.125 MG tablet, Take 1 tablet (3.125 mg) by mouth 2 times daily (with meals), Disp: 180 tablet, Rfl: 3    cetirizine (ZYRTEC) 10 MG tablet, Take 1 tablet (10 mg) by mouth daily, Disp: 30 tablet, Rfl: 2    empagliflozin (JARDIANCE) 10 MG TABS tablet, Take 1 tablet (10 mg) by mouth daily, Disp: 90 tablet, Rfl: 3    fexofenadine (ALLEGRA) 180 MG tablet, Take 180 mg by mouth every other day, Disp: , Rfl:     fluorouracil (EFUDEX) 5 % external cream, APPLY SPARINGLY EXTERNALLY TO FACE EVERY SUNDAY AT NIGHT. AVOID EYES AND LIPS, Disp: , Rfl:     furosemide (LASIX) 20 MG tablet, Take 0.5 tablets (10 mg) by mouth daily as needed (weight gain of 3 pounds in one day or 5 pounds in 1 week), Disp: , Rfl:     levocetirizine (XYZAL) 5 MG tablet, Take 1 tablet (5 mg) by mouth every evening, Disp: 30 tablet, Rfl: 5    loratadine (CLARITIN) 10 MG tablet, Take 10 mg by mouth every other day Alternating with allegra, Disp: , Rfl:     LORazepam (ATIVAN) 1 MG tablet, Take 1 tablet (1 mg) by mouth as needed for anxiety (30 min prior to MRI.  May take second dose 30 min laterl if needed.), Disp: 2 tablet, Rfl: 0    nitroGLYcerin (NITROSTAT) 0.4 MG sublingual  "tablet, For chest pain place 1 tablet under the tongue every 5 minutes for 3 doses. If symptoms persist 5 minutes after 1st dose call 911., Disp: 30 tablet, Rfl: 11    sacubitril-valsartan (ENTRESTO) 49-51 MG per tablet, Take half tab in AM, full tab in PM, Disp: 180 tablet, Rfl: 3    spironolactone (ALDACTONE) 25 MG tablet, Take 0.5 tablets (12.5 mg) by mouth daily (Patient taking differently: Take 12.5 mg by mouth every morning), Disp: 45 tablet, Rfl: 4    timolol maleate (TIMOPTIC) 0.5 % ophthalmic solution, Apply 1 drop topically nightly as needed (dry skin cracks) Apply 1 drop to lesion every night at bedtime. Do Not use on normal skin., Disp: , Rfl:      Allergies   Allergen Reactions    Codeine Itching    Concerta [Methylphenidate] Itching    Hay Fever & [A.R.M.]           Lab Results    Chemistry CBC Cardiac Enzymes/BNP/TSH/INR   Recent Labs   Lab Test 06/07/24  0911      POTASSIUM 4.1   CHLORIDE 101   CO2 24   GLC 80   BUN 24.5*   CR 1.10*   GFRESTIMATED 54*   MERRILL 9.1     Recent Labs   Lab Test 06/07/24  0911 05/21/24  1434 05/16/24  1342   CR 1.10* 1.36* 1.63*          Recent Labs   Lab Test 12/05/23  0841 10/29/23  0640 10/26/23  0806   WBC  --   --  6.4   HGB 13.0  --  11.7   HCT  --   --  36.5   MCV  --   --  93   PLT  --  373 352     Recent Labs   Lab Test 12/05/23  0841 10/26/23  0806 10/25/23  2033   HGB 13.0 11.7 11.6*    No results for input(s): \"TROPONINI\" in the last 97479 hours.  Recent Labs   Lab Test 12/05/23  0841 10/30/23  0913 10/25/23  2033   NTBNPI  --   --  5,176*   NTBNP 1,745* 2,381*  --      Recent Labs   Lab Test 09/26/23  1146   TSH 0.74     No results for input(s): \"INR\" in the last 41658 hours.      Data Review    ECGs (tracings independently reviewed)  8/14/2024 - SR 64bpm, QRS 96ms  10/25/2023 - SR 104bpm, IVCD/non-Sada LBBB QRS 132ms    Zio monitoring 11/13/2023 to 11/20/2023 (independently reviewed)  SR 48-116bpm, avg 75bpm  2 nonsustained AT  Rare PACs  Rare " PVCs    2/26/2024 TTE  3D LVEF volumetric analysis is 36%.  There is mod-severe global hypokinesia of the left ventricle.  No significant valvular heart disease.    11/2/2023 cardiac MRI  1. Severe non-ischemic cardiomyopathy.  2. No late enhancement to suggest prior infarct, inflammation or infiltration.  3. Mild mitral regurgitation.     10/26/2023 TTE  Left ventricular systolic function is moderate to severely reduced.  The visual ejection fraction is 30-35%.  There is moderately severe (3+) mitral regurgitation.  There is no comparison study available    10/27/2023 coronary angiography  LMCA normal  LAD 20% proximal  CX 30 to 40% distal  RCA dominant normal       Cc: Zak Waters DO, Jenna Ra, APRN CNP    Renetta Lynn MD  8/14/2024  9:24 AM

## 2024-08-22 ENCOUNTER — OFFICE VISIT (OUTPATIENT)
Dept: CARDIOLOGY | Facility: CLINIC | Age: 69
End: 2024-08-22
Payer: MEDICARE

## 2024-08-22 VITALS
HEART RATE: 64 BPM | WEIGHT: 122.5 LBS | SYSTOLIC BLOOD PRESSURE: 128 MMHG | BODY MASS INDEX: 19.69 KG/M2 | HEIGHT: 66 IN | DIASTOLIC BLOOD PRESSURE: 72 MMHG

## 2024-08-22 DIAGNOSIS — I42.8 NONISCHEMIC CARDIOMYOPATHY (H): Primary | ICD-10-CM

## 2024-08-22 DIAGNOSIS — I50.9 CONGESTIVE HEART FAILURE, UNSPECIFIED HF CHRONICITY, UNSPECIFIED HEART FAILURE TYPE (H): ICD-10-CM

## 2024-08-22 DIAGNOSIS — E78.5 HYPERLIPIDEMIA LDL GOAL <70: ICD-10-CM

## 2024-08-22 PROCEDURE — 99214 OFFICE O/P EST MOD 30 MIN: CPT | Performed by: NURSE PRACTITIONER

## 2024-08-22 RX ORDER — ATORVASTATIN CALCIUM 40 MG/1
40 TABLET, FILM COATED ORAL EVERY EVENING
Qty: 90 TABLET | Refills: 3 | Status: SHIPPED | OUTPATIENT
Start: 2024-08-22

## 2024-08-22 RX ORDER — CARVEDILOL 3.12 MG/1
3.12 TABLET ORAL 2 TIMES DAILY WITH MEALS
Qty: 180 TABLET | Refills: 3 | Status: CANCELLED | OUTPATIENT
Start: 2024-08-22

## 2024-08-22 NOTE — PROGRESS NOTES
CARDIOLOGY CLINIC NOTE    PRIMARY CARDIOLOGIST  Dr. Tinoco     PRIMARY CARE PHYSICIAN:  Gail Crespo    HISTORY OF PRESENT ILLNESS:  Mar Zeng is a very pleasant 68-year-old female with a past medical history significant for nonischemic cardiomyopathy with EF estimated at 36%, HFrEF, chronic left bundle branch block and ADD on Adderall.     In review, patient was hospitalized in October 2023 with acute systolic heart failure. Echocardiogram showed a moderate to severely reduced LV function with EF 30-35%. There is severe inferior wall kinesis moderate to severe global hypokinesis of the LV.  There was moderately severe mitral regurgitation.    Coronary angiogram showed nonobstructive CAD with 30 to 40% distal circumflex disease    Cardiac MRI on 11/2/2023 showed severe nonischemic cardiomyopathy with an ejection fraction of 32% with severe global hypokinesis of the LV.  Left bundle branch block.  Moderate left atrial enlargement.  No late enhancement to suggest prior infarct, inflammation or infiltration.    Echocardiogram on 2/26/2024 showed an ejection fraction of 36%, moderate to severe global hypokinesis of the LV, mitral regurgitation is now trace.  She was initiated on Entresto and Jardiance.  Entresto was further uptitrated but subsequently reduced due to worsening renal function.  She did not tolerate metoprolol and was transitioned to carvedilol which improved fatigue mildly.    She was seen in consultation with Dr. Lynn (EP at [place]) last week who felt she did not have indication for CRT at this time and recommend continued heart failure medical therapy.    She presents to the office today for follow-up.  Her primary complaint is physically limiting fatigue.  She denies chest pain, shortness of breath, palpitations, PND, orthopnea, presyncope, syncope, or edema.  Upon exam, lungs are clear bilaterally, heart rate and rhythm regular, no evidence of fluid overload.    Blood pressure is  well-controlled at 128/72  BMP showed sodium 137, potassium 4.1, BUN 24.5, creatinine 1.10 GFR 54    She does not engage in routine exercise due to fatigue, which has her very frustrated as she walked miles ~ 1 year ago.     Sleeps poorly, unchanged for years.   She does not smoke, uses alcohol regularly.   Compliant with all medications.     PAST MEDICAL HISTORY:  Past Medical History:   Diagnosis Date    Mumps     NO ACTIVE PROBLEMS        MEDICATIONS:  Current Outpatient Medications   Medication Sig Dispense Refill    albuterol (ACCUNEB) 0.63 MG/3ML neb solution Take 3 mLs (0.63 mg) by nebulization every 6 hours as needed for shortness of breath, wheezing or cough 90 mL 0    albuterol (PROAIR HFA/PROVENTIL HFA/VENTOLIN HFA) 108 (90 Base) MCG/ACT inhaler Inhale 2 puffs into the lungs every 4 hours as needed for shortness of breath, wheezing or cough 18 g 3    aluminum chloride (DRYSOL) 20 % external solution Apply topically at bedtime 60 mL 0    aspirin 81 MG EC tablet Take 1 tablet (81 mg) by mouth daily 90 tablet 3    atorvastatin (LIPITOR) 40 MG tablet Take 1 tablet (40 mg) by mouth every evening. 90 tablet 3    azelastine (ASTELIN) 0.1 % nasal spray Spray 1 spray into both nostrils 2 times daily      budesonide (PULMICORT) 0.5 MG/2ML neb solution Take 0.5 mg by nebulization as needed      buPROPion (WELLBUTRIN XL) 150 MG 24 hr tablet TAKE 1 TABLET(150 MG) BY MOUTH EVERY MORNING 90 tablet 0    cetirizine (ZYRTEC) 10 MG tablet Take 1 tablet (10 mg) by mouth daily 30 tablet 2    empagliflozin (JARDIANCE) 10 MG TABS tablet Take 1 tablet (10 mg) by mouth daily 90 tablet 3    fluorouracil (EFUDEX) 5 % external cream APPLY SPARINGLY EXTERNALLY TO FACE EVERY SUNDAY AT NIGHT. AVOID EYES AND LIPS      furosemide (LASIX) 20 MG tablet Take 0.5 tablets (10 mg) by mouth daily as needed (weight gain of 3 pounds in one day or 5 pounds in 1 week)      levocetirizine (XYZAL) 5 MG tablet Take 1 tablet (5 mg) by mouth every  evening 30 tablet 5    sacubitril-valsartan (ENTRESTO) 49-51 MG per tablet Take half tab in AM, full tab in  tablet 3    spironolactone (ALDACTONE) 25 MG tablet Take 0.5 tablets (12.5 mg) by mouth daily (Patient taking differently: Take 12.5 mg by mouth every morning.) 45 tablet 4    timolol maleate (TIMOPTIC) 0.5 % ophthalmic solution Apply 1 drop topically nightly as needed (dry skin cracks) Apply 1 drop to lesion every night at bedtime. Do Not use on normal skin.      fexofenadine (ALLEGRA) 180 MG tablet Take 180 mg by mouth every other day (Patient not taking: Reported on 8/14/2024)      loratadine (CLARITIN) 10 MG tablet Take 10 mg by mouth every other day Alternating with allegra (Patient not taking: Reported on 8/14/2024)      LORazepam (ATIVAN) 1 MG tablet Take 1 tablet (1 mg) by mouth as needed for anxiety (30 min prior to MRI.  May take second dose 30 min laterl if needed.) (Patient not taking: Reported on 8/14/2024) 2 tablet 0    nitroGLYcerin (NITROSTAT) 0.4 MG sublingual tablet For chest pain place 1 tablet under the tongue every 5 minutes for 3 doses. If symptoms persist 5 minutes after 1st dose call 911. (Patient not taking: Reported on 8/14/2024) 30 tablet 11     No current facility-administered medications for this visit.       SOCIAL HISTORY:  I have reviewed this patient's social history and updated it with pertinent information if needed. Mar Zeng  reports that she has quit smoking. Her smoking use included cigarettes. She has been exposed to tobacco smoke. She has never used smokeless tobacco. She reports current alcohol use. She reports that she does not use drugs.    PHYSICAL EXAM:  Pulse:  [64] 64  BP: (128)/(72) 128/72  122 lbs 8 oz    Constitutional: alert, no distress  Respiratory: Good bilateral air entry  Cardiovascular: Regular rate and rhythm   GI: nondistended  Neuropsychiatric: appropriate affact    ASSESSMENT/PLAN:  Pertinent issues addressed/ reviewed during this  cardiology visit  HFrEF/ nonischemic cardiomyopathy - Echocardiogram on 2/26/2024 showed an ejection fraction of 36%, moderate to severe global hypokinesis of the LV, mitral regurgitation is now trace. Euvolemic upon exam, weight stable. In the setting of physically limiting fatigue, recommend stopping low dose carvedilol. Continue jardiance, entresto, spironolactone and as needed lasix. Recommend a limited echocardiogram to reassess LV function. Encourage exercise and strict low sodium diet. Avoid alcohol.   LBBB - Chronic.   CAD - Coronary angiogram showed nonobstructive CAD with 30 to 40% distal circumflex disease. Continue risk modification with statin and aspirin.   Hyperlipidemia - on Atorvastatin   Sleep disturbance - Sleep medicine referral.     Follow up in 6 months with FABI or sooner if needed.     It was a pleasure seeing this patient in clinic today. Please do not hesitate to contact me with any future questions.     DUKE Watkins, CNP  Cardiology - [facility]  08/22/2024       The level of medical decision making during this visit was of moderate complexity.    This note was completed in part using dictation via the Dragon voice recognition software. Some word and grammatical errors may occur and must be interpreted in the appropriate clinical context.  If there are any questions pertaining to this issue, please contact me for further clarification.

## 2024-08-22 NOTE — LETTER
8/22/2024    Gail Crespo, APRN CNP  [address] [place] [address]    RE: Mar Zeng       Dear Colleague,     I had the pleasure of seeing Mar Zeng in the [facility].  CARDIOLOGY CLINIC NOTE    PRIMARY CARDIOLOGIST  Dr. Tinoco     PRIMARY CARE PHYSICIAN:  Gail Crespo    HISTORY OF PRESENT ILLNESS:  Mar Zeng is a very pleasant 68-year-old female with a past medical history significant for nonischemic cardiomyopathy with EF estimated at 36%, HFrEF, chronic left bundle branch block and ADD on Adderall.     In review, patient was hospitalized in October 2023 with acute systolic heart failure. Echocardiogram showed a moderate to severely reduced LV function with EF 30-35%. There is severe inferior wall kinesis moderate to severe global hypokinesis of the LV.  There was moderately severe mitral regurgitation.    Coronary angiogram showed nonobstructive CAD with 30 to 40% distal circumflex disease    Cardiac MRI on 11/2/2023 showed severe nonischemic cardiomyopathy with an ejection fraction of 32% with severe global hypokinesis of the LV.  Left bundle branch block.  Moderate left atrial enlargement.  No late enhancement to suggest prior infarct, inflammation or infiltration.    Echocardiogram on 2/26/2024 showed an ejection fraction of 36%, moderate to severe global hypokinesis of the LV, mitral regurgitation is now trace.  She was initiated on Entresto and Jardiance.  Entresto was further uptitrated but subsequently reduced due to worsening renal function.  She did not tolerate metoprolol and was transitioned to carvedilol which improved fatigue mildly.    She was seen in consultation with Dr. Lynn (EP at [place]) last week who felt she did not have indication for CRT at this time and recommend continued heart failure medical therapy.    She presents to the office today for follow-up.  Her primary complaint is physically limiting fatigue.  She  denies chest pain, shortness of breath, palpitations, PND, orthopnea, presyncope, syncope, or edema.  Upon exam, lungs are clear bilaterally, heart rate and rhythm regular, no evidence of fluid overload.    Blood pressure is well-controlled at 128/72  BMP showed sodium 137, potassium 4.1, BUN 24.5, creatinine 1.10 GFR 54    She does not engage in routine exercise due to fatigue, which has her very frustrated as she walked miles ~ 1 year ago.     Sleeps poorly, unchanged for years.   She does not smoke, uses alcohol regularly.   Compliant with all medications.     PAST MEDICAL HISTORY:  Past Medical History:   Diagnosis Date     Mumps      NO ACTIVE PROBLEMS        MEDICATIONS:  Current Outpatient Medications   Medication Sig Dispense Refill     albuterol (ACCUNEB) 0.63 MG/3ML neb solution Take 3 mLs (0.63 mg) by nebulization every 6 hours as needed for shortness of breath, wheezing or cough 90 mL 0     albuterol (PROAIR HFA/PROVENTIL HFA/VENTOLIN HFA) 108 (90 Base) MCG/ACT inhaler Inhale 2 puffs into the lungs every 4 hours as needed for shortness of breath, wheezing or cough 18 g 3     aluminum chloride (DRYSOL) 20 % external solution Apply topically at bedtime 60 mL 0     aspirin 81 MG EC tablet Take 1 tablet (81 mg) by mouth daily 90 tablet 3     atorvastatin (LIPITOR) 40 MG tablet Take 1 tablet (40 mg) by mouth every evening. 90 tablet 3     azelastine (ASTELIN) 0.1 % nasal spray Spray 1 spray into both nostrils 2 times daily       budesonide (PULMICORT) 0.5 MG/2ML neb solution Take 0.5 mg by nebulization as needed       buPROPion (WELLBUTRIN XL) 150 MG 24 hr tablet TAKE 1 TABLET(150 MG) BY MOUTH EVERY MORNING 90 tablet 0     cetirizine (ZYRTEC) 10 MG tablet Take 1 tablet (10 mg) by mouth daily 30 tablet 2     empagliflozin (JARDIANCE) 10 MG TABS tablet Take 1 tablet (10 mg) by mouth daily 90 tablet 3     fluorouracil (EFUDEX) 5 % external cream APPLY SPARINGLY EXTERNALLY TO FACE EVERY SUNDAY AT NIGHT. AVOID  EYES AND LIPS       furosemide (LASIX) 20 MG tablet Take 0.5 tablets (10 mg) by mouth daily as needed (weight gain of 3 pounds in one day or 5 pounds in 1 week)       levocetirizine (XYZAL) 5 MG tablet Take 1 tablet (5 mg) by mouth every evening 30 tablet 5     sacubitril-valsartan (ENTRESTO) 49-51 MG per tablet Take half tab in AM, full tab in  tablet 3     spironolactone (ALDACTONE) 25 MG tablet Take 0.5 tablets (12.5 mg) by mouth daily (Patient taking differently: Take 12.5 mg by mouth every morning.) 45 tablet 4     timolol maleate (TIMOPTIC) 0.5 % ophthalmic solution Apply 1 drop topically nightly as needed (dry skin cracks) Apply 1 drop to lesion every night at bedtime. Do Not use on normal skin.       fexofenadine (ALLEGRA) 180 MG tablet Take 180 mg by mouth every other day (Patient not taking: Reported on 8/14/2024)       loratadine (CLARITIN) 10 MG tablet Take 10 mg by mouth every other day Alternating with allegra (Patient not taking: Reported on 8/14/2024)       LORazepam (ATIVAN) 1 MG tablet Take 1 tablet (1 mg) by mouth as needed for anxiety (30 min prior to MRI.  May take second dose 30 min laterl if needed.) (Patient not taking: Reported on 8/14/2024) 2 tablet 0     nitroGLYcerin (NITROSTAT) 0.4 MG sublingual tablet For chest pain place 1 tablet under the tongue every 5 minutes for 3 doses. If symptoms persist 5 minutes after 1st dose call 911. (Patient not taking: Reported on 8/14/2024) 30 tablet 11     No current facility-administered medications for this visit.       SOCIAL HISTORY:  I have reviewed this patient's social history and updated it with pertinent information if needed. Mar HINA Zeng  reports that she has quit smoking. Her smoking use included cigarettes. She has been exposed to tobacco smoke. She has never used smokeless tobacco. She reports current alcohol use. She reports that she does not use drugs.    PHYSICAL EXAM:  Pulse:  [64] 64  BP: (128)/(72) 128/72  122 lbs 8  oz    Constitutional: alert, no distress  Respiratory: Good bilateral air entry  Cardiovascular: Regular rate and rhythm   GI: nondistended  Neuropsychiatric: appropriate affact    ASSESSMENT/PLAN:  Pertinent issues addressed/ reviewed during this cardiology visit  HFrEF/ nonischemic cardiomyopathy - Echocardiogram on 2/26/2024 showed an ejection fraction of 36%, moderate to severe global hypokinesis of the LV, mitral regurgitation is now trace. Euvolemic upon exam, weight stable. In the setting of physically limiting fatigue, recommend stopping low dose carvedilol. Continue jardiance, entresto, spironolactone and as needed lasix. Recommend a limited echocardiogram to reassess LV function. Encourage exercise and strict low sodium diet. Avoid alcohol.   LBBB - Chronic.   CAD - Coronary angiogram showed nonobstructive CAD with 30 to 40% distal circumflex disease. Continue risk modification with statin and aspirin.   Hyperlipidemia - on Atorvastatin   Sleep disturbance - Sleep medicine referral.     Follow up in 6 months with FABI or sooner if needed.     It was a pleasure seeing this patient in clinic today. Please do not hesitate to contact me with any future questions.     DUKE Watkins, CNP  Cardiology - [facility]  08/22/2024       The level of medical decision making during this visit was of moderate complexity.    This note was completed in part using dictation via the Dragon voice recognition software. Some word and grammatical errors may occur and must be interpreted in the appropriate clinical context.  If there are any questions pertaining to this issue, please contact me for further clarification.      Thank you for allowing me to participate in the care of your patient.      Sincerely,     DUKE Watkins CNP     [facility]  cc:   DUKE Esquivel Ra CNP  [address] [address] [place] [address]

## 2024-08-22 NOTE — PATIENT INSTRUCTIONS
Thanks for participating in a office visit with the [facility] clinic today.    Exertional fatigue for several months, mildly improved with low dose carvedilol.   Will stop carvedilol   Due for echocardiogram to reassess LV function.   Continue on all other medications  Encourage exercise, walk 30 min/day.   Strict low salt diet  Avoid alcohol.   My nurse will call in 1 week for update after stopping carvedilol.     Follow up in 6 months with FABI     Please call my nurse at   [phone]. Call with any questions or concerns.    Scheduling phone number: [phone]  Reminder: Please bring in all current medications, over the counter supplements and vitamin bottles to your next appointment.

## 2024-08-23 ENCOUNTER — TELEPHONE (OUTPATIENT)
Dept: FAMILY MEDICINE | Facility: CLINIC | Age: 69
End: 2024-08-23
Payer: MEDICARE

## 2024-08-23 NOTE — TELEPHONE ENCOUNTER
Reason for Call:  Appointment Request    Patient requesting this type of appt:  Sleep Eval    Requested provider:  NESSA    Reason patient unable to be scheduled: Not within requested timeframe    When does patient want to be seen/preferred time:  sooner than scheduled    Comments:     Okay to leave a detailed message?: Yes at Cell number on file:    Telephone Information:   Mobile [phone]       Call taken on 8/23/2024 at 1:20 PM by Reba Carney

## 2024-08-26 NOTE — TELEPHONE ENCOUNTER
Chart reviewed. Referral is for routine consult. Patient is scheduled first available appointment and on wait list. Clinic will reach out if sooner appointment becomes available.     Molly BARRERA RN  [facility]

## 2024-08-30 ENCOUNTER — TELEPHONE (OUTPATIENT)
Dept: CARDIOLOGY | Facility: CLINIC | Age: 69
End: 2024-08-30
Payer: MEDICARE

## 2024-08-30 NOTE — TELEPHONE ENCOUNTER
August 30, 2024  Contacted patient with a follow up call after stopping carvedilol if her fatigue resolved?   Patient states she feels 100% better off the beta blocker.  Will forward to Irwin with update and plan.  Patient verbalized understanding.  Orin Cobian RN on 8/30/2024 at 8:43 AM          8-22-24  Hello,   Patient was seen in clinic today with complaints of physically limiting fatigue on beta-blocker.  I stopped low-dose carvedilol and asked for her to monitor her symptoms.  Could we please follow-up with her in 1 week for update symptoms ?     Thanks   Irwin

## 2024-08-30 NOTE — TELEPHONE ENCOUNTER
Thanks for the update. Glad to hear she is feeling better.   Will call with results of echocardiogram  She has referral to sleep medicine.   Follow up in 6 months as discussed at [facility].     Fatoumata

## 2024-09-12 ENCOUNTER — HOSPITAL ENCOUNTER (OUTPATIENT)
Dept: CARDIOLOGY | Facility: CLINIC | Age: 69
Discharge: HOME | End: 2024-09-12
Attending: NURSE PRACTITIONER | Admitting: NURSE PRACTITIONER
Payer: MEDICARE

## 2024-09-12 DIAGNOSIS — I42.8 NONISCHEMIC CARDIOMYOPATHY (H): ICD-10-CM

## 2024-09-12 LAB — LVEF ECHO: NORMAL

## 2024-09-12 PROCEDURE — 93325 DOPPLER ECHO COLOR FLOW MAPG: CPT | Mod: 26 | Performed by: INTERNAL MEDICINE

## 2024-09-12 PROCEDURE — 93321 DOPPLER ECHO F-UP/LMTD STD: CPT | Mod: 26 | Performed by: INTERNAL MEDICINE

## 2024-09-12 PROCEDURE — 93325 DOPPLER ECHO COLOR FLOW MAPG: CPT

## 2024-09-12 PROCEDURE — 255N000002 HC RX 255 OP 636: Performed by: NURSE PRACTITIONER

## 2024-09-12 PROCEDURE — 93308 TTE F-UP OR LMTD: CPT | Mod: 26 | Performed by: INTERNAL MEDICINE

## 2024-09-12 RX ADMIN — HUMAN ALBUMIN MICROSPHERES AND PERFLUTREN 9 ML: 10; .22 INJECTION, SOLUTION INTRAVENOUS at 14:45

## 2024-09-19 ENCOUNTER — TELEPHONE (OUTPATIENT)
Dept: CARDIOLOGY | Facility: CLINIC | Age: 69
End: 2024-09-19
Payer: MEDICARE

## 2024-09-19 NOTE — TELEPHONE ENCOUNTER
At last office visit, carvedilol was discontinued due to debilitating fatigue.  She reported a significant improvement after this.  Her most recent echocardiogram showed an improvement in LV function from 36% to 50-55%.  Recommend staying off of carvedilol since her symptoms improved.    Thanks  Fatoumata

## 2024-09-19 NOTE — TELEPHONE ENCOUNTER
Patient notified of results and recommendations. Patient verbalized understanding.  Orin Cobian RN on 9/19/2024 at 5:05 PM

## 2024-09-19 NOTE — TELEPHONE ENCOUNTER
[facility]    Phone Message    May a detailed message be left on voicemail: yes     Reason for Call: Requesting Results     Name/type of test: Echo  Date of test: 9/12/24  Was test done at a location other than [facility] (Please fill in the location if not [facility])?: No    Patient would also like a call back to discuss her Carvedilol medication    Action Taken: Message routed to:  Other: Cardio    Travel Screening: Not Applicable     Date of Service:                                                                    Thank you!  [facility]

## 2024-09-20 ENCOUNTER — ANCILLARY PROCEDURE (OUTPATIENT)
Dept: MAMMOGRAPHY | Facility: CLINIC | Age: 69
End: 2024-09-20
Attending: NURSE PRACTITIONER
Payer: MEDICARE

## 2024-09-20 DIAGNOSIS — Z12.31 VISIT FOR SCREENING MAMMOGRAM: ICD-10-CM

## 2024-09-20 PROCEDURE — 77063 BREAST TOMOSYNTHESIS BI: CPT | Mod: TC | Performed by: RADIOLOGY

## 2024-09-20 PROCEDURE — 77067 SCR MAMMO BI INCL CAD: CPT | Mod: TC | Performed by: RADIOLOGY

## 2024-09-24 ENCOUNTER — TELEPHONE (OUTPATIENT)
Dept: FAMILY MEDICINE | Facility: CLINIC | Age: 69
End: 2024-09-24
Payer: MEDICARE

## 2024-09-24 NOTE — LETTER
[facility]  [address] [id number] [phone]       October 8, 2024    Mar Zeng  [facility] [address]    Dear Mar,    We care about your health and have reviewed your health plan and are making recommendations based on this review, to optimize your health.    You are in particular need of attention regarding:  -Wellness (Physical) Visit     We are recommending that you:  -schedule a WELLNESS (Physical) APPOINTMENT with me.   I will check fasting labs the same day - nothing to eat except water and meds for 8-10 hours prior.    In addition, here is a list of due or overdue Health Maintenance reminders.    Health Maintenance Due   Topic Date Due    Heart Failure Action Plan  Never done    Asthma Action Plan - yearly  Never done    RSV VACCINE (1 - Risk 60-74 years 1-dose series) Never done    Annual Wellness Visit  08/18/2023    Liver Monitoring Lab  08/18/2023    Anxiety Assessment  07/29/2024    Flu Vaccine (1) 09/01/2024    COVID-19 Vaccine (1 - 2024-25 season) Never done    Cholesterol Lab  10/26/2024    Complete Blood Count  10/26/2024    ANNUAL REVIEW OF HM ORDERS  11/02/2024    FALL RISK ASSESSMENT  11/02/2024       To address the above recommendations, we encourage you to contact us at [phone], via IMedExchange or by contacting Central Scheduling toll free at [phone] 24 hours a day. They will assist you with finding the most convenient time and location.    Thank you for trusting [facility] and we appreciate the opportunity to serve you.  We look forward to supporting your healthcare needs in the future.    Healthy Regards,    Your [facility] Team

## 2024-09-24 NOTE — CONFIDENTIAL NOTE
Patient Quality Outreach    Patient is due for the following:   Physical Annual Wellness Visit    Next Steps:   Schedule a Annual Wellness Visit    Type of outreach:    Sent letter.      Questions for provider review:    None           Shiva Alfredo MA

## 2024-09-24 NOTE — LETTER
[facility]  [address] [address] [id number] [phone]       September 24, 2024    Mar Zeng  [facility] [address]    Dear Mar,    We care about your health and have reviewed your health plan and are making recommendations based on this review, to optimize your health.    You are in particular need of attention regarding:  -Wellness (Physical) Visit     We are recommending that you:  -schedule a WELLNESS (Physical) APPOINTMENT with me.   I will check fasting labs the same day - nothing to eat except water and meds for 8-10 hours prior.    In addition, here is a list of due or overdue Health Maintenance reminders.    Health Maintenance Due   Topic Date Due    Heart Failure Action Plan  Never done    Asthma Action Plan - yearly  Never done    RSV VACCINE (1 - Risk 60-74 years 1-dose series) Never done    Annual Wellness Visit  08/18/2023    Liver Monitoring Lab  08/18/2023    Anxiety Assessment  07/29/2024    Flu Vaccine (1) 09/01/2024    COVID-19 Vaccine (1 - 2024-25 season) Never done       To address the above recommendations, we encourage you to contact us at [phone], via DBVu or by contacting Central Scheduling toll free at [phone] 24 hours a day. They will assist you with finding the most convenient time and location.    Thank you for trusting [facility] and we appreciate the opportunity to serve you.  We look forward to supporting your healthcare needs in the future.    Healthy Regards,    Your [facility] Team

## 2024-10-08 NOTE — CONFIDENTIAL NOTE
Patient Quality Outreach    Patient is due for the following:   Physical Annual Wellness Visit    Next Steps:   Schedule a Annual Wellness Visit    Type of outreach:    Sent letter.    Next Steps:  Reach out within 90 days via Letter.    Max number of attempts reached: No. Will try again in 90 days if patient still on fail list.    Questions for provider review:    None           Shiva Alfredo MA

## 2024-10-30 ENCOUNTER — TRANSCRIBE ORDERS (OUTPATIENT)
Dept: OTHER | Age: 69
End: 2024-10-30

## 2024-10-30 DIAGNOSIS — M79.18 MYOFASCIAL PAIN: ICD-10-CM

## 2024-10-30 DIAGNOSIS — M26.629 TMJ ARTHRALGIA: Primary | ICD-10-CM

## 2024-11-05 ENCOUNTER — THERAPY VISIT (OUTPATIENT)
Dept: PHYSICAL THERAPY | Facility: CLINIC | Age: 69
End: 2024-11-05
Payer: MEDICARE

## 2024-11-05 DIAGNOSIS — M79.18 MYOFASCIAL PAIN: ICD-10-CM

## 2024-11-05 DIAGNOSIS — M26.609 TMJ (TEMPOROMANDIBULAR JOINT SYNDROME): ICD-10-CM

## 2024-11-05 DIAGNOSIS — M26.629 TMJ ARTHRALGIA: Primary | ICD-10-CM

## 2024-11-05 PROCEDURE — 97161 PT EVAL LOW COMPLEX 20 MIN: CPT | Mod: GP | Performed by: PHYSICAL THERAPIST

## 2024-11-05 PROCEDURE — 97112 NEUROMUSCULAR REEDUCATION: CPT | Mod: GP | Performed by: PHYSICAL THERAPIST

## 2024-11-05 PROCEDURE — 97110 THERAPEUTIC EXERCISES: CPT | Mod: GP | Performed by: PHYSICAL THERAPIST

## 2024-11-05 NOTE — PROGRESS NOTES
PHYSICAL THERAPY EVALUATION  Type of Visit: Evaluation       Fall Risk Screen:  Fall screen completed by: PT  Have you fallen 2 or more times in the past year?: No  Have you fallen and had an injury in the past year?: (Patient-Rptd) No  Is patient a fall risk?: No    Subjective         Presenting condition or subjective complaint: (Patient-Rptd) jaw pain  Date of onset: 08/05/24    Relevant medical history:     Dates & types of surgery: (Patient-Rptd) knees    Prior diagnostic imaging/testing results:       Prior therapy history for the same diagnosis, illness or injury: (Patient-Rptd) No      Prior Level of Function  Transfers:   Ambulation:   ADL:   IADL:     Living Environment  Social support: (Patient-Rptd) Alone   Type of home: (Patient-Rptd) [facility]   Stairs to enter the home: (Patient-Rptd) Yes       Ramp: (Patient-Rptd) No   Stairs inside the home: (Patient-Rptd) Yes (Patient-Rptd) 20 Is there a railing: (Patient-Rptd) Yes     Help at home: (Patient-Rptd) None  Equipment owned:       Employment: (Patient-Rptd) No    Hobbies/Interests:      Patient goals for therapy: (Patient-Rptd) be pain frwe    Pain assessment:      Objective   TMJ/TMD EVALUATION  ADDITIONAL HISTORY:  Parafunctional habits: none  Stressors: none  Associate symptoms: pain on B TMJ - L>R, popping.  Also has L shoulder blade - intermittent - worse with holding grandbaby and getting dressed - she has been getting injections to help her L shoulder blade pain.  Headache: Location: L temporal  Intensity: 5-7/10  Frequency: constant  Duration: constant over the past couple of month  Exercise routine: yes - walks a couple of miles every other day  Symptoms reported: Clicking  Dentist: regularly - 2x/year  Joint lock: Joint does not lock    PAIN: Pain Level at Rest: 5/10  Pain Level with Use: 10/10  Pain Location: TMJ, B  Pain Quality: Sharp and bad HA  Pain Frequency: constant  Pain is Worst: with chewing  Pain is Exacerbated By: chewing -  especially meat or hard foods  Pain is Relieved By: nothing  Pain Progression: Worsened    She is on heart medicine, which she thinks may be contributing to her dizziness.  Also gets intermittent ear ringing in L ear.    OBSERVATION/FACIAL SYMMETRY:   POSITIONING:   INTEGUMENTARY:   POSTURE:  tends to hold shoulders up - tense, hard to keep them relaxed    INTRAORAL MOUTH APPLIANCE: No  CERVICAL ROM: AROM WNL  TMJ ROM:    mm Joint Noise Deviation Pain   Opening 41 Opening click, With pain, crepitus in L joint Slight deviation to R with opening and closing    Left Lateral Deviation 12 Pain on both sides and crepitus     Right Lateral Deviation 14 Pain on L side and creptisu     Protrusion  Opening click, With pain, on L side     Retrusion         Normal strength with lateral deviation, opening and closing.  Resisted closing produced pain at L TMJ, resisted R lateral deviation produced pain at L TMJ - otherwise no pain with resisted opening and L lateral deviation    STRENGTH:     Left Right   Mid Trap     Lower Trap     Rhomboid     SPECIAL TESTS:   PALPATION:   JOINT MOBILITY/ACCESSORY MOTION:   Movement Mobility/Pain   Distraction Tight on both sides   Anterior Tight on both sides and pain at L joint with testing on both sides   Lateral/Medial Tight on both sides with pain at L joint with both at L and with medial on R   Accessory Motion      TMD FINDINGS:  Bite/Occlusion:  Tongue Positioning:  Mucosal Ridging:  Tongue Scalloping:  Accelerated Tooth Wear:  MYOTOMES: WNL  DERMATOMES:   NEURAL TENSION:   SPINAL SEGMENTAL CONCLUSIONS:     Assessment & Plan   CLINICAL IMPRESSIONS  Medical Diagnosis: TMJ arthralgia  Myofascial pain    Treatment Diagnosis: TMJ syndrome   Impression/Assessment: Patient is a 69 year old female with TMJ complaints.  The following significant findings have been identified: Pain, Decreased ROM/flexibility, Decreased joint mobility, Impaired muscle performance, and Decreased activity  tolerance. These impairments interfere with their ability to perform  chewing  as compared to previous level of function.     Clinical Decision Making (Complexity):  Clinical Presentation: Stable/Uncomplicated  Clinical Presentation Rationale: based on medical and personal factors listed in PT evaluation  Clinical Decision Making (Complexity): Low complexity    PLAN OF CARE  Treatment Interventions:  Modalities: Ultrasound  Interventions: Manual Therapy, Neuromuscular Re-education, Therapeutic Activity, Therapeutic Exercise    Long Term Goals     PT Goal 1  Goal Identifier: Chewing  Goal Description: Pt will be able to chew meat or harder fruit/vegies with 1-3/10 concordant jaw pain in order to eat healthy foods.  Target Date: 12/31/24      Frequency of Treatment: 1x/week  Duration of Treatment: 8 weeks    Recommended Referrals to Other Professionals:   Education Assessment:   Learner/Method: No Barriers to Learning    Risks and benefits of evaluation/treatment have been explained.   Patient/Family/caregiver agrees with Plan of Care.     Evaluation Time:     PT Eval, Low Complexity Minutes (34541): 15       Signing Clinician: PAUL Dickens [facility]                                                                                   OUTPATIENT PHYSICAL THERAPY      PLAN OF TREATMENT FOR OUTPATIENT REHABILITATION   Patient's Last Name, First Name, Mar Moore YOB: 1955   Provider's Name   [facility]   Medical Record No.  [id number]     Onset Date: 08/05/24  Start of Care Date: 11/05/24     Medical Diagnosis:  TMJ arthralgia  Myofascial pain      PT Treatment Diagnosis:  TMJ syndrome Plan of Treatment  Frequency/Duration: 1x/week/ 8 weeks    Certification date from 11/05/24 to 12/31/24         See note for plan of treatment details and functional goals     Terrie Hollins PT                         I CERTIFY THE NEED  FOR THESE SERVICES FURNISHED UNDER        THIS PLAN OF TREATMENT AND WHILE UNDER MY CARE .             Physician Signature               Date    X_____________________________________________________                  Referring Provider:  Kaylin Alexandra    Initial Assessment  See Epic Evaluation- Start of Care Date: 11/05/24

## 2024-11-15 DIAGNOSIS — F41.1 GENERALIZED ANXIETY DISORDER: ICD-10-CM

## 2024-11-15 DIAGNOSIS — I42.9 CARDIOMYOPATHY, UNSPECIFIED TYPE (H): ICD-10-CM

## 2024-11-15 RX ORDER — BUPROPION HYDROCHLORIDE 150 MG/1
TABLET ORAL
Qty: 90 TABLET | Refills: 0 | Status: SHIPPED | OUTPATIENT
Start: 2024-11-15

## 2024-11-15 RX ORDER — FUROSEMIDE 20 MG/1
10 TABLET ORAL DAILY
Qty: 45 TABLET | OUTPATIENT
Start: 2024-11-15

## 2024-11-15 NOTE — TELEPHONE ENCOUNTER
Pt stated she called her pharmacy to refill her bupropion and they stated no refills left and to call clinic. Routing to refill team.      SAL McdonaldN, RN     [facility]    11/15/2024 at 2:43 PM

## 2024-12-03 ENCOUNTER — THERAPY VISIT (OUTPATIENT)
Dept: PHYSICAL THERAPY | Facility: CLINIC | Age: 69
End: 2024-12-03
Payer: MEDICARE

## 2024-12-03 DIAGNOSIS — M79.18 MYOFASCIAL PAIN: ICD-10-CM

## 2024-12-03 DIAGNOSIS — M26.609 TMJ (TEMPOROMANDIBULAR JOINT SYNDROME): ICD-10-CM

## 2024-12-03 DIAGNOSIS — M26.629 TMJ ARTHRALGIA: Primary | ICD-10-CM

## 2024-12-03 PROCEDURE — 97140 MANUAL THERAPY 1/> REGIONS: CPT | Mod: GP | Performed by: PHYSICAL THERAPIST

## 2024-12-03 PROCEDURE — 97110 THERAPEUTIC EXERCISES: CPT | Mod: GP | Performed by: PHYSICAL THERAPIST

## 2025-01-13 ENCOUNTER — OFFICE VISIT (OUTPATIENT)
Dept: FAMILY MEDICINE | Facility: CLINIC | Age: 70
End: 2025-01-13
Payer: COMMERCIAL

## 2025-01-13 VITALS
BODY MASS INDEX: 19.29 KG/M2 | HEART RATE: 72 BPM | WEIGHT: 120 LBS | DIASTOLIC BLOOD PRESSURE: 77 MMHG | OXYGEN SATURATION: 98 % | TEMPERATURE: 98 F | SYSTOLIC BLOOD PRESSURE: 126 MMHG | HEIGHT: 66 IN | RESPIRATION RATE: 13 BRPM

## 2025-01-13 DIAGNOSIS — E78.5 HYPERLIPIDEMIA LDL GOAL <70: Primary | ICD-10-CM

## 2025-01-13 DIAGNOSIS — F41.1 GENERALIZED ANXIETY DISORDER: ICD-10-CM

## 2025-01-13 DIAGNOSIS — N18.31 CHRONIC KIDNEY DISEASE, STAGE 3A (H): ICD-10-CM

## 2025-01-13 DIAGNOSIS — I50.9 CONGESTIVE HEART FAILURE, UNSPECIFIED HF CHRONICITY, UNSPECIFIED HEART FAILURE TYPE (H): ICD-10-CM

## 2025-01-13 DIAGNOSIS — Z00.00 ENCOUNTER FOR MEDICARE ANNUAL WELLNESS EXAM: ICD-10-CM

## 2025-01-13 DIAGNOSIS — I50.33 ACUTE ON CHRONIC DIASTOLIC CONGESTIVE HEART FAILURE (H): ICD-10-CM

## 2025-01-13 DIAGNOSIS — M50.30 DEGENERATION OF CERVICAL INTERVERTEBRAL DISC: ICD-10-CM

## 2025-01-13 LAB
CHOLEST SERPL-MCNC: 156 MG/DL
ERYTHROCYTE [DISTWIDTH] IN BLOOD BY AUTOMATED COUNT: 12.6 % (ref 10–15)
FASTING STATUS PATIENT QL REPORTED: YES
HCT VFR BLD AUTO: 38.4 % (ref 35–47)
HDLC SERPL-MCNC: 72 MG/DL
HGB BLD-MCNC: 12.6 G/DL (ref 11.7–15.7)
LDLC SERPL CALC-MCNC: 69 MG/DL
MCH RBC QN AUTO: 29.2 PG (ref 26.5–33)
MCHC RBC AUTO-ENTMCNC: 32.8 G/DL (ref 31.5–36.5)
MCV RBC AUTO: 89 FL (ref 78–100)
NONHDLC SERPL-MCNC: 84 MG/DL
PLATELET # BLD AUTO: 310 10E3/UL (ref 150–450)
RBC # BLD AUTO: 4.31 10E6/UL (ref 3.8–5.2)
TRIGL SERPL-MCNC: 75 MG/DL
TSH SERPL DL<=0.005 MIU/L-ACNC: 1.07 UIU/ML (ref 0.3–4.2)
WBC # BLD AUTO: 7.3 10E3/UL (ref 4–11)

## 2025-01-13 RX ORDER — BUPROPION HYDROCHLORIDE 150 MG/1
TABLET ORAL
Qty: 90 TABLET | Refills: 3 | Status: SHIPPED | OUTPATIENT
Start: 2025-01-13

## 2025-01-13 RX ORDER — LORAZEPAM 1 MG/1
0.5 TABLET ORAL PRN
Qty: 10 TABLET | Refills: 0 | Status: SHIPPED | OUTPATIENT
Start: 2025-01-13

## 2025-01-13 SDOH — HEALTH STABILITY: PHYSICAL HEALTH: ON AVERAGE, HOW MANY DAYS PER WEEK DO YOU ENGAGE IN MODERATE TO STRENUOUS EXERCISE (LIKE A BRISK WALK)?: 4 DAYS

## 2025-01-13 ASSESSMENT — ANXIETY QUESTIONNAIRES
GAD7 TOTAL SCORE: 4
1. FEELING NERVOUS, ANXIOUS, OR ON EDGE: NOT AT ALL
2. NOT BEING ABLE TO STOP OR CONTROL WORRYING: SEVERAL DAYS
5. BEING SO RESTLESS THAT IT IS HARD TO SIT STILL: SEVERAL DAYS
4. TROUBLE RELAXING: SEVERAL DAYS
IF YOU CHECKED OFF ANY PROBLEMS ON THIS QUESTIONNAIRE, HOW DIFFICULT HAVE THESE PROBLEMS MADE IT FOR YOU TO DO YOUR WORK, TAKE CARE OF THINGS AT HOME, OR GET ALONG WITH OTHER PEOPLE: NOT DIFFICULT AT ALL
3. WORRYING TOO MUCH ABOUT DIFFERENT THINGS: SEVERAL DAYS
8. IF YOU CHECKED OFF ANY PROBLEMS, HOW DIFFICULT HAVE THESE MADE IT FOR YOU TO DO YOUR WORK, TAKE CARE OF THINGS AT HOME, OR GET ALONG WITH OTHER PEOPLE?: NOT DIFFICULT AT ALL
GAD7 TOTAL SCORE: 4
7. FEELING AFRAID AS IF SOMETHING AWFUL MIGHT HAPPEN: NOT AT ALL
6. BECOMING EASILY ANNOYED OR IRRITABLE: NOT AT ALL
GAD7 TOTAL SCORE: 4
7. FEELING AFRAID AS IF SOMETHING AWFUL MIGHT HAPPEN: NOT AT ALL

## 2025-01-13 ASSESSMENT — ASTHMA QUESTIONNAIRES
QUESTION_3 LAST FOUR WEEKS HOW OFTEN DID YOUR ASTHMA SYMPTOMS (WHEEZING, COUGHING, SHORTNESS OF BREATH, CHEST TIGHTNESS OR PAIN) WAKE YOU UP AT NIGHT OR EARLIER THAN USUAL IN THE MORNING: NOT AT ALL
QUESTION_2 LAST FOUR WEEKS HOW OFTEN HAVE YOU HAD SHORTNESS OF BREATH: NOT AT ALL
ACT_TOTALSCORE: 23
ACT_TOTALSCORE: 23
QUESTION_1 LAST FOUR WEEKS HOW MUCH OF THE TIME DID YOUR ASTHMA KEEP YOU FROM GETTING AS MUCH DONE AT WORK, SCHOOL OR AT HOME: NONE OF THE TIME
QUESTION_5 LAST FOUR WEEKS HOW WOULD YOU RATE YOUR ASTHMA CONTROL: WELL CONTROLLED
QUESTION_4 LAST FOUR WEEKS HOW OFTEN HAVE YOU USED YOUR RESCUE INHALER OR NEBULIZER MEDICATION (SUCH AS ALBUTEROL): ONCE A WEEK OR LESS

## 2025-01-13 ASSESSMENT — SOCIAL DETERMINANTS OF HEALTH (SDOH): HOW OFTEN DO YOU GET TOGETHER WITH FRIENDS OR RELATIVES?: MORE THAN THREE TIMES A WEEK

## 2025-01-13 ASSESSMENT — PAIN SCALES - GENERAL: PAINLEVEL_OUTOF10: NO PAIN (0)

## 2025-01-13 NOTE — PATIENT INSTRUCTIONS
Patient Education   Preventive Care Advice   This is general advice given by our system to help you stay healthy. However, your care team may have specific advice just for you. Please talk to your care team about your preventive care needs.  Nutrition  Eat 5 or more servings of fruits and vegetables each day.  Try wheat bread, brown rice and whole grain pasta (instead of white bread, rice, and pasta).  Get enough calcium and vitamin D. Check the label on foods and aim for 100% of the RDA (recommended daily allowance).  Lifestyle  Exercise at least 150 minutes each week  (30 minutes a day, 5 days a week).  Do muscle strengthening activities 2 days a week. These help control your weight and prevent disease.  No smoking.  Wear sunscreen to prevent skin cancer.  Have a dental exam and cleaning every 6 months.  Yearly exams  See your health care team every year to talk about:  Any changes in your health.  Any medicines your care team has prescribed.  Preventive care, family planning, and ways to prevent chronic diseases.  Shots (vaccines)   HPV shots (up to age 26), if you've never had them before.  Hepatitis B shots (up to age 59), if you've never had them before.  COVID-19 shot: Get this shot when it's due.  Flu shot: Get a flu shot every year.  Tetanus shot: Get a tetanus shot every 10 years.  Pneumococcal, hepatitis A, and RSV shots: Ask your care team if you need these based on your risk.  Shingles shot (for age 50 and up)  General health tests  Diabetes screening:  Starting at age 35, Get screened for diabetes at least every 3 years.  If you are younger than age 35, ask your care team if you should be screened for diabetes.  Cholesterol test: At age 39, start having a cholesterol test every 5 years, or more often if advised.  Bone density scan (DEXA): At age 50, ask your care team if you should have this scan for osteoporosis (brittle bones).  Hepatitis C: Get tested at least once in your life.  STIs (sexually  transmitted infections)  Before age 24: Ask your care team if you should be screened for STIs.  After age 24: Get screened for STIs if you're at risk. You are at risk for STIs (including HIV) if:  You are sexually active with more than one person.  You don't use condoms every time.  You or a partner was diagnosed with a sexually transmitted infection.  If you are at risk for HIV, ask about PrEP medicine to prevent HIV.  Get tested for HIV at least once in your life, whether you are at risk for HIV or not.  Cancer screening tests  Cervical cancer screening: If you have a cervix, begin getting regular cervical cancer screening tests starting at age 21.  Breast cancer scan (mammogram): If you've ever had breasts, begin having regular mammograms starting at age 40. This is a scan to check for breast cancer.  Colon cancer screening: It is important to start screening for colon cancer at age 45.  Have a colonoscopy test every 10 years (or more often if you're at risk) Or, ask your provider about stool tests like a FIT test every year or Cologuard test every 3 years.  To learn more about your testing options, visit:   .  For help making a decision, visit:   https://bit.ly/zm33231.  Prostate cancer screening test: If you have a prostate, ask your care team if a prostate cancer screening test (PSA) at age 55 is right for you.  Lung cancer screening: If you are a current or former smoker ages 50 to 80, ask your care team if ongoing lung cancer screenings are right for you.  For informational purposes only. Not to replace the advice of your health care provider. Copyright   2023 [facility]. All rights reserved. Clinically reviewed by the [facility] Transitions Program. [employer] [id number] - REV 01/24.  Learning About Stress  What is stress?     Stress is your body's response to a hard situation. Your body can have a physical, emotional, or mental response. Stress is a fact of life for most people, and it  affects everyone differently. What causes stress for you may not be stressful for someone else.  A lot of things can cause stress. You may feel stress when you go on a job interview, take a test, or run a race. This kind of short-term stress is normal and even useful. It can help you if you need to work hard or react quickly. For example, stress can help you finish an important job on time.  Long-term stress is caused by ongoing stressful situations or events. Examples of long-term stress include long-term health problems, ongoing problems at work, or conflicts in your family. Long-term stress can harm your health.  How does stress affect your health?  When you are stressed, your body responds as though you are in danger. It makes hormones that speed up your heart, make you breathe faster, and give you a burst of energy. This is called the fight-or-flight stress response. If the stress is over quickly, your body goes back to normal and no harm is done.  But if stress happens too often or lasts too long, it can have bad effects. Long-term stress can make you more likely to get sick, and it can make symptoms of some diseases worse. If you tense up when you are stressed, you may develop neck, shoulder, or low back pain. Stress is linked to high blood pressure and heart disease.  Stress also harms your emotional health. It can make you david, tense, or depressed. Your relationships may suffer, and you may not do well at work or school.  What can you do to manage stress?  You can try these things to help manage stress:   Do something active. Exercise or activity can help reduce stress. Walking is a great way to get started. Even everyday activities such as housecleaning or yard work can help.  Try yoga or ant chi. These techniques combine exercise and meditation. You may need some training at first to learn them.  Do something you enjoy. For example, listen to music or go to a movie. Practice your hobby or do volunteer  "work.  Meditate. This can help you relax, because you are not worrying about what happened before or what may happen in the future.  Do guided imagery. Imagine yourself in any setting that helps you feel calm. You can use online videos, books, or a teacher to guide you.  Do breathing exercises. For example:  From a standing position, bend forward from the waist with your knees slightly bent. Let your arms dangle close to the floor.  Breathe in slowly and deeply as you return to a standing position. Roll up slowly and lift your head last.  Hold your breath for just a few seconds in the standing position.  Breathe out slowly and bend forward from the waist.  Let your feelings out. Talk, laugh, cry, and express anger when you need to. Talking with supportive friends or family, a counselor, or a adam leader about your feelings is a healthy way to relieve stress. Avoid discussing your feelings with people who make you feel worse.  Write. It may help to write about things that are bothering you. This helps you find out how much stress you feel and what is causing it. When you know this, you can find better ways to cope.  What can you do to prevent stress?  You might try some of these things to help prevent stress:  Manage your time. This helps you find time to do the things you want and need to do.  Get enough sleep. Your body recovers from the stresses of the day while you are sleeping.  Get support. Your family, friends, and community can make a difference in how you experience stress.  Limit your news feed. Avoid or limit time on social media or news that may make you feel stressed.  Do something active. Exercise or activity can help reduce stress. Walking is a great way to get started.  Where can you learn more?  Go to https://www.[employer].net/patiented  Enter N032 in the search box to learn more about \"Learning About Stress.\"  Current as of: October 24, 2023  Content Version: 14.3    2024 [employer]. "   Care instructions adapted under license by your healthcare professional. If you have questions about a medical condition or this instruction, always ask your healthcare professional. [employer] [facility], [employer] disclaims any warranty or liability for your use of this information.

## 2025-01-13 NOTE — PROGRESS NOTES
Preventive Care Visit  [facility]  DUKE Shepherd CNP, [facility]  Jan 13, 2025      Assessment & Plan     Hyperlipidemia LDL goal <70  Treated by cardiology.  Compliant with medication.  Lipid panel today.    Congestive heart failure, unspecified HF chronicity, unspecified heart failure type (H)  Followed by cardiology.  Lasix just recently adjusted, but believes they are going to be changing her dose again.  Stable.  Monitor.  - Lipid panel reflex to direct LDL Non-fasting; Future  - CBC with Platelets; Future  - Lipid panel reflex to direct LDL Non-fasting  - CBC with Platelets    Generalized anxiety disorder  Stable.  Refilled.  Rare use of lorazepam.  Refilled.  Reviewed no drinking, driving, or big decisions.  - buPROPion (WELLBUTRIN XL) 150 MG 24 hr tablet; TAKE 1 TABLET(150 MG) BY MOUTH EVERY MORNING  - LORazepam (ATIVAN) 1 MG tablet; Take 0.5 tablets (0.5 mg) by mouth as needed for anxiety.    Encounter for Medicare annual wellness exam  - TSH with free T4 reflex; Future  - TSH with free T4 reflex    - LORazepam (ATIVAN) 1 MG tablet; Take 0.5 tablets (0.5 mg) by mouth as needed for anxiety.    Chronic kidney disease, stage 3a (H)  Followed by cardiology.            Counseling  Appropriate preventive services were addressed with this patient via screening, questionnaire, or discussion as appropriate for fall prevention, nutrition, physical activity, Tobacco-use cessation, social engagement, weight loss and cognition.  Checklist reviewing preventive services available has been given to the patient.  Reviewed patient's diet, addressing concerns and/or questions.   She is at risk for psychosocial distress and has been provided with information to reduce risk.           Marely Manuel is a 69 year old, presenting for the following:  Medicare Visit        1/13/2025     9:23 AM   Additional Questions   Roomed by MR   Accompanied by LUTHER         1/13/2025     9:23 AM    Patient Reported Additional Medications   Patient reports taking the following new medications NA       HPI    Notes  PT IS FASTING    DECLINES FLU AND COVID VACCINES      Doing well today.  She is following with cardiology for her med management.  Recently lasix was increased to 40mg daily.  This did help with her breathing.  She is unsure how she should be monitoring for change as she has never noticed any swelling in her ankles.  She has another follow up with cardiology scheduled.  She believes her lasix will be adjusted again.    Mood is stable.  Takes wellbutrin with good results.  She uses 1/2 tablet of lorazepam for increased anxiety at bedtime sparingly, estimates about once per month.              Health Care Directive  Patient does not have a Health Care Directive: Discussed advance care planning with patient; however, patient declined at this time.      1/13/2025   General Health   How would you rate your overall physical health? Good   Feel stress (tense, anxious, or unable to sleep) To some extent   (!) STRESS CONCERN      1/13/2025   Nutrition   Diet: Low salt         1/13/2025   Exercise   Days per week of moderate/strenous exercise 4 days         1/13/2025   Social Factors   Frequency of gathering with friends or relatives More than three times a week   Worry food won't last until get money to buy more No   Food not last or not have enough money for food? No   Do you have housing? (Housing is defined as stable permanent housing and does not include staying ouside in a car, in a tent, in an abandoned building, in an overnight shelter, or couch-surfing.) Yes   Are you worried about losing your housing? No   Lack of transportation? No   Unable to get utilities (heat,electricity)? No         1/13/2025   Fall Risk   Fallen 2 or more times in the past year? No    No   Trouble with walking or balance? No    No       Multiple values from one day are sorted in reverse-chronological order          1/13/2025    Activities of Daily Living- Home Safety   Needs help with the following daily activites None of the above   Safety concerns in the home None of the above         1/13/2025   Dental   Dentist two times every year? Yes         1/13/2025   Hearing Screening   Hearing concerns? None of the above         1/13/2025   Driving Risk Screening   Patient/family members have concerns about driving No         1/13/2025   General Alertness/Fatigue Screening   Have you been more tired than usual lately? No         1/13/2025   Urinary Incontinence Screening   Bothered by leaking urine in past 6 months No         1/13/2025   TB Screening   Were you born outside of the [place]? No         Today's PHQ-2 Score:       1/13/2025     9:10 AM   PHQ-2 ( 1999 Pf[employer])   Q1: Little interest or pleasure in doing things 1   Q2: Feeling down, depressed or hopeless 1   PHQ-2 Score 2    Q1: Little interest or pleasure in doing things Several days   Q2: Feeling down, depressed or hopeless Several days   PHQ-2 Score 2       Patient-reported           1/13/2025   Substance Use   Alcohol more than 3/day or more than 7/wk No   Do you have a current opioid prescription? No   How severe/bad is pain from 1 to 10? 0/10 (No Pain)   Do you use any other substances recreationally? No     Social History     Tobacco Use    Smoking status: Former     Types: Cigarettes     Passive exposure: Past    Smokeless tobacco: Never   Vaping Use    Vaping status: Never Used   Substance Use Topics    Alcohol use: Yes     Comment: occ    Drug use: No           9/20/2024   LAST FHS-7 RESULTS   1st degree relative breast or ovarian cancer No   Any relative bilateral breast cancer Yes   Any male have breast cancer No   Any ONE woman have BOTH breast AND ovarian cancer Yes   Any woman with breast cancer before 50yrs No   2 or more relatives with breast AND/OR ovarian cancer No   2 or more relatives with breast AND/OR bowel cancer No              History of abnormal Pap smear: Status  post hysterectomy with removal of cervix and no history of CIN2 or greater or cervical cancer. Health Maintenance and Surgical History updated.        12/20/2012    12:00 PM   PAP / HPV   PAP (Historical) NIL      ASCVD Risk   The ASCVD Risk score (Oneyda CUMMINS, et al., 2019) failed to calculate for the following reasons:    Risk score cannot be calculated because patient has a medical history suggesting prior/existing ASCVD            Reviewed and updated as needed this visit by Provider                    Patient Active Problem List   Diagnosis    Multiple pigmented nevi    Seasonal allergic rhinitis    Hematoma of rectus sheath    Generalized anxiety disorder    Attention deficit hyperactivity disorder (ADHD), combined type    Benign neoplasm of colon    Fibromyositis    Hammer toe, acquired    Ingrowing nail    Insomnia    Localized, primary osteoarthritis    Loose body in knee    Macrodactylia of toes    Migraine    Other disorders of bone and cartilage(733.99)    Plantar fascial fibromatosis    Postmenopausal HRT (hormone replacement therapy)    Talipes cavus    Fibromyalgia    Splinter hemorrhage of toenail    Congestive heart failure, unspecified HF chronicity, unspecified heart failure type (H)    Anemia    Chronic systolic heart failure (H)    TMJ arthralgia    Myofascial pain    TMJ (temporomandibular joint syndrome)    Acute on chronic diastolic congestive heart failure (H)    Chronic kidney disease, stage 3a (H)     Past Surgical History:   Procedure Laterality Date    COLONOSCOPY  8/23/2016    Dr. Shaw [facility]    COLONOSCOPY N/A 8/23/2016    Procedure: COLONOSCOPY;  Surgeon: Peter Shaw MD;  Location:  GI    COLONOSCOPY N/A 10/19/2022    Procedure: COLONOSCOPY with polypectomy using cold exacto snare;  Surgeon: Peter Shaw MD;  Location:  GI    CV CORONARY ANGIOGRAM N/A 10/27/2023    Procedure: Coronary Angiogram;  Surgeon: Elmer Andre MD;  Location:  HEART CARDIAC  CATH LAB    FINGER SURGERY      right little finger joint replacement    HYSTERECTOMY VAGINAL  age 30    retained ovaries. no cervix     HYSTERECTOMY, PAP NO LONGER INDICATED      ORTHOPEDIC SURGERY      SURGICAL HISTORY OF -   10/25/13    left index cyst removal    SURGICAL HISTORY OF -       Bilat knee miniscus repair       Social History     Tobacco Use    Smoking status: Former     Types: Cigarettes     Passive exposure: Past    Smokeless tobacco: Never   Substance Use Topics    Alcohol use: Yes     Comment: occ     Family History   Problem Relation Age of Onset    No Known Problems Mother     Hypertension Father     Cancer Father     CABG Father     No Known Problems Sister     Asthma Brother     Hypertension Brother     Cancer Maternal Grandmother     Cancer Maternal Grandfather     Colon Cancer Paternal Grandmother     Cancer Paternal Grandfather     Thyroid Disease Daughter     No Known Problems Son     No Known Problems Son     Colon Cancer Paternal Aunt     Colon Cancer Cousin          Current providers sharing in care for this patient include:  Patient Care Team:  Gail Crespo APRN CNP as PCP - General (Family Practice)  Gail Crespo APRN CNP as Assigned PCP  Mackenzie Fung NP as Nurse Practitioner (Pulmonary Disease)  Britni Escobar MD as Fellow (Critical Care)  Meme Cleveland RN as Registered Nurse (Cardiology)  Lotus Johnson, RN as Registered Nurse (Cardiology)  Kat Huebr PA-C as Physician Assistant (Cardiovascular Disease)  Yeo, Albert, MD as Assigned Musculoskeletal Provider  Elmer Ortiz DPM as Assigned Surgical Provider  Rn,  Cardiology C.O.R.E. as Specialty Care Coordinator (Cardiology)  Yanira Tinoco DO as Assigned Heart and Vascular Provider  Fatoumata Potter APRN CNP as Nurse Practitioner (Cardiovascular Disease)  An Lopez PA-C as Physician Assistant (OB/Gyn)    The following health maintenance items are  "reviewed in Epic and correct as of today:  Health Maintenance   Topic Date Due    HF ACTION PLAN  Never done    ASTHMA ACTION PLAN  Never done    RSV VACCINE (1 - Risk 60-74 years 1-dose series) Never done    MEDICARE ANNUAL WELLNESS VISIT  08/18/2023    ALT  08/18/2023    INFLUENZA VACCINE (1) 09/01/2024    COVID-19 Vaccine (1 - 2024-25 season) Never done    LIPID  10/26/2024    CBC  10/26/2024    BMP  12/07/2024    TARIQ ASSESSMENT  07/13/2025    ASTHMA CONTROL TEST  07/13/2025    MAMMO SCREENING  09/20/2025    ANNUAL REVIEW OF HM ORDERS  01/13/2026    FALL RISK ASSESSMENT  01/13/2026    DTAP/TDAP/TD IMMUNIZATION (4 - Td or Tdap) 07/18/2026    GLUCOSE  06/07/2027    COLORECTAL CANCER SCREENING  10/19/2027    ADVANCE CARE PLANNING  01/13/2030    DEXA  12/07/2035    TSH W/FREE T4 REFLEX  Completed    PHQ-2 (once per calendar year)  Completed    Pneumococcal Vaccine: 50+ Years  Completed    ZOSTER IMMUNIZATION  Completed    HPV IMMUNIZATION  Aged Out    MENINGITIS IMMUNIZATION  Aged Out    RSV MONOCLONAL ANTIBODY  Aged Out    HEPATITIS C SCREENING  Discontinued    LUNG CANCER SCREENING  Discontinued         Review of Systems  Constitutional, HEENT, cardiovascular, pulmonary, gi and gu systems are negative, except as otherwise noted.     Objective    Exam  /77 (BP Location: Right arm, Patient Position: Sitting, Cuff Size: Adult Regular)   Pulse 72   Temp 98  F (36.7  C) (Skin)   Resp 13   Ht 1.676 m (5' 6\")   Wt 54.4 kg (120 lb)   LMP  (LMP Unknown)   SpO2 98%   BMI 19.37 kg/m     Estimated body mass index is 19.37 kg/m  as calculated from the following:    Height as of this encounter: 1.676 m (5' 6\").    Weight as of this encounter: 54.4 kg (120 lb).    Physical Exam  GENERAL: alert and no distress  EYES: Eyes grossly normal to inspection  HENT: ear canals and TM's normal, nose and mouth without ulcers or lesions  NECK: no adenopathy, no asymmetry, masses, or scars  RESP: lungs clear to auscultation - no " rales, rhonchi or wheezes  CV: regular rate and rhythm, normal S1 S2, no S3 or S4, no murmur, click or rub, no peripheral edema  ABDOMEN: soft, nontender, no hepatosplenomegaly, no masses and bowel sounds normal  NEURO: Normal strength and tone, mentation intact and speech normal  PSYCH: mentation appears normal, affect normal/bright         1/13/2025   Mini Cog   Clock Draw Score 2 Normal   3 Item Recall 2 objects recalled   Mini Cog Total Score 4              Signed Electronically by: DUKE Shepherd CNP    Answers submitted by the patient for this visit:  Patient Health Questionnaire (G7) (Submitted on 1/13/2025)  TARIQ 7 TOTAL SCORE: 4

## 2025-01-13 NOTE — LETTER
January 14, 2025      Mar Zeng  [address] [place] [address]        Dear Mar,     The results of your recent labs look great.  Normal thyroid function.  Also, your cholesterol levels are very well controlled and there was no sign of anemia or infection.   Please let me know if you have any questions or concerns.     Thank you,     Gail Crespo, FNP     Resulted Orders   Lipid panel reflex to direct LDL Non-fasting   Result Value Ref Range    Cholesterol 156 <200 mg/dL    Triglycerides 75 <150 mg/dL    Direct Measure HDL 72 >=50 mg/dL    LDL Cholesterol Calculated 69 <100 mg/dL    Non HDL Cholesterol 84 <130 mg/dL    Patient Fasting > 8hrs? Yes     Narrative    Cholesterol  Desirable: < 200 mg/dL  Borderline High: 200 - 239 mg/dL  High: >= 240 mg/dL    Triglycerides  Normal: < 150 mg/dL  Borderline High: 150 - 199 mg/dL  High: 200-499 mg/dL  Very High: >= 500 mg/dL    Direct Measure HDL  Female: >= 50 mg/dL   Male: >= 40 mg/dL    LDL Cholesterol  Desirable: < 100 mg/dL  Above Desirable: 100 - 129 mg/dL   Borderline High: 130 - 159 mg/dL   High:  160 - 189 mg/dL   Very High: >= 190 mg/dL    Non HDL Cholesterol  Desirable: < 130 mg/dL  Above Desirable: 130 - 159 mg/dL  Borderline High: 160 - 189 mg/dL  High: 190 - 219 mg/dL  Very High: >= 220 mg/dL   CBC with Platelets   Result Value Ref Range    WBC Count 7.3 4.0 - 11.0 10e3/uL    RBC Count 4.31 3.80 - 5.20 10e6/uL    Hemoglobin 12.6 11.7 - 15.7 g/dL    Hematocrit 38.4 35.0 - 47.0 %    MCV 89 78 - 100 fL    MCH 29.2 26.5 - 33.0 pg    MCHC 32.8 31.5 - 36.5 g/dL    RDW 12.6 10.0 - 15.0 %    Platelet Count 310 150 - 450 10e3/uL   TSH with free T4 reflex   Result Value Ref Range    TSH 1.07 0.30 - 4.20 uIU/mL

## 2025-01-14 ENCOUNTER — TELEPHONE (OUTPATIENT)
Dept: FAMILY MEDICINE | Facility: CLINIC | Age: 70
End: 2025-01-14
Payer: MEDICARE

## 2025-01-14 ENCOUNTER — TELEPHONE (OUTPATIENT)
Dept: OBGYN | Facility: CLINIC | Age: 70
End: 2025-01-14
Payer: MEDICARE

## 2025-01-14 NOTE — TELEPHONE ENCOUNTER
General Call    Contacts       Contact Date/Time Type Contact Phone/Fax    01/14/2025 09:58 AM CST Phone (Incoming) Mar Zeng (Self) [phone] (H)          Reason for Call:  Pt received notification to schedule follow up from annual wellness visit w Gail Crespo.    What are your questions or concerns:  Pt states she received a notification from PCP team requesting a follow up visit, pt thought she scheduled it online but I see no appointments with Gail Crespo and her chart shows she does not have mychart...     Patient wants to know why the appt is requested, and if so, when it should be scheduled. Next available appt with this provider being  early April.    Date of last appointment with provider: 1/13/25    Okay to leave a detailed message?: Yes at Home number on file [phone] (home)

## 2025-01-14 NOTE — TELEPHONE ENCOUNTER
[facility] Call Center    Phone Message    May a detailed message be left on voicemail: yes     Reason for Call: Medication Question or concern regarding medication   Prescription Clarification  Name of Medication: clobetasol (TEMOVATE) 0.05 % external ointment   Prescribing Provider: Angella Gilmore PA-C    Pharmacy: [id number] [id number]    What on the order needs clarification? Patient called stating that the above medication isn't working. States it hasn't done anything, and the itching is driving her nuts. Provider prescribed medication for 14 days, patient has only had it for 5 but states she hasn't got any relief. Patient is wondering if there's something else's she can try that provider can prescribe to her. Writer sending TE to clinic in regards to this message. Please contact patient in regards to message. Thank you      Action Taken: Other: Women's    Travel Screening: Not Applicable     Date of Service:

## 2025-01-14 NOTE — TELEPHONE ENCOUNTER
Spoke with patient. Notification was to schedule preventative in 2026 with Gail. Patient would like to wait to schedule this appointment at this time.     Allison Medina  Lead   [facility]

## 2025-01-15 DIAGNOSIS — L29.2 VULVOVAGINAL ITCHING: Primary | ICD-10-CM

## 2025-01-15 RX ORDER — CLOTRIMAZOLE 1 %
1 CREAM WITH APPLICATOR VAGINAL AT BEDTIME
Qty: 1 G | Refills: 0 | Status: SHIPPED | OUTPATIENT
Start: 2025-01-15 | End: 2025-01-22

## 2025-01-15 NOTE — TELEPHONE ENCOUNTER
Discussed patients symptoms. Itching has improved slightly, but still very bothersome. Sent Rx for Clobetasol to pharmacy, discussed use along labial folds and vaginally with applicator. Patient will contact office if symptoms continue or worsen.     Also discussed results of vulvar biopsy / lesion removal - benign skin changes.     Angella Gilmore PA-C

## 2025-01-29 ENCOUNTER — TELEPHONE (OUTPATIENT)
Dept: OBGYN | Facility: CLINIC | Age: 70
End: 2025-01-29
Payer: MEDICARE

## 2025-01-29 NOTE — TELEPHONE ENCOUNTER
I do recommend further evaluation in person. While she is out of town, she could be seen in urgent care if needed/desired.     She could also try warm tub soaks / epsom salt soaks for symptomatic relief. Additionally could try an equal mix of vaseline/aquafor, an antifungal cream, and hydrocortisone and apply to the irritated tissue.     Angella Gilmore PA-C

## 2025-01-29 NOTE — TELEPHONE ENCOUNTER
1/10/25 apt miguel Gilmore - lesion removed  Still had itching/irritation 1/15/25 and switched to clobetasol    Pt calling w no relief - still irritating and now has discharge.  Recommended in person visit but she is out of town until next week.    Routing to HINA Gilmore - can you advise in the mean time?    Lotus Johnson RN on 1/29/2025 at 11:25 AM

## 2025-02-03 ENCOUNTER — OFFICE VISIT (OUTPATIENT)
Dept: OBGYN | Facility: CLINIC | Age: 70
End: 2025-02-03
Payer: MEDICARE

## 2025-02-03 VITALS — WEIGHT: 120 LBS | DIASTOLIC BLOOD PRESSURE: 60 MMHG | SYSTOLIC BLOOD PRESSURE: 100 MMHG | BODY MASS INDEX: 19.37 KG/M2

## 2025-02-03 DIAGNOSIS — L29.2 VULVAR ITCHING: ICD-10-CM

## 2025-02-03 DIAGNOSIS — N89.8 ITCHING OF VAGINA: Primary | ICD-10-CM

## 2025-02-03 LAB
BACTERIAL VAGINOSIS VAG-IMP: POSITIVE
CANDIDA DNA VAG QL NAA+PROBE: NOT DETECTED
CANDIDA GLABRATA / CANDIDA KRUSEI DNA: NOT DETECTED
T VAGINALIS DNA VAG QL NAA+PROBE: NOT DETECTED

## 2025-02-03 PROCEDURE — 99213 OFFICE O/P EST LOW 20 MIN: CPT

## 2025-02-03 PROCEDURE — 81515 NFCT DS BV&VAGINITIS DNA ALG: CPT

## 2025-02-03 PROCEDURE — 99459 PELVIC EXAMINATION: CPT

## 2025-02-03 NOTE — PROGRESS NOTES
SUBJECTIVE:                                                   Mar Zeng is a 69 year old female who presents to clinic today for the following health issue(s):  Patient presents with:  Vaginal Itching: Patient would like to discuss continuing vaginal itching, white discharge and burning.    HPI:  Mar is a 68 yo postmenopausal female who presents with ongoing vulvovaginal itching x 2 weeks.     She was last seen by myself on 1/10/2025 for removal of irritated vulvar skin tag (pathology normal). At the time, had some mild vulvar irritation around the site of skin tag so was prescribed Clobetasol BID x 2 weeks to help promote tissue healing.     Patient utilized Clobetasol x 5 days but had significant increase in itching so discontinued and called clinic. [place], recommended in-person follow up and sent empiric Clotrimazole x 7 days.     Patient has completed course of clotrimazole with some improvement but no resolution and now increasing vaginal discharge.     She was previously using vaginal estrogen cream, but has discontinued this medication.     Site of skin tag removal has been healing well, no pain/irritation/bleeding at location.     No LMP recorded (lmp unknown). Patient has had a hysterectomy..     Patient is sexually active, .  Using hysterectomy for contraception.    reports that she has quit smoking. Her smoking use included cigarettes. She has been exposed to tobacco smoke. She has never used smokeless tobacco.    STD testing offered?  Declined    Health maintenance updated:  yes    Today's PHQ-2 Score:       2025     9:10 AM   PHQ-2 (  [employer])   Q1: Little interest or pleasure in doing things 1   Q2: Feeling down, depressed or hopeless 1   PHQ-2 Score 2    Q1: Little interest or pleasure in doing things Several days   Q2: Feeling down, depressed or hopeless Several days   PHQ-2 Score 2       Patient-reported     Today's PHQ-9 Score:        1/10/2025    10:36 AM   PHQ-9 SCORE   PHQ-9 Total Score 3     Today's TARIQ-7 Score:       1/13/2025     9:07 AM   TARIQ-7 SCORE   Total Score 4 (minimal anxiety)   Total Score 4        Patient-reported       Problem list and histories reviewed & adjusted, as indicated.  Additional history: as documented.    Patient Active Problem List   Diagnosis    Multiple pigmented nevi    Seasonal allergic rhinitis    Hematoma of rectus sheath    Generalized anxiety disorder    Attention deficit hyperactivity disorder (ADHD), combined type    Benign neoplasm of colon    Fibromyositis    Hammer toe, acquired    Ingrowing nail    Insomnia    Localized, primary osteoarthritis    Loose body in knee    Macrodactylia of toes    Migraine    Other disorders of bone and cartilage(733.99)    Plantar fascial fibromatosis    Postmenopausal HRT (hormone replacement therapy)    Talipes cavus    Fibromyalgia    Splinter hemorrhage of toenail    Congestive heart failure, unspecified HF chronicity, unspecified heart failure type (H)    Anemia    Chronic systolic heart failure (H)    TMJ arthralgia    Myofascial pain    TMJ (temporomandibular joint syndrome)    Acute on chronic diastolic congestive heart failure (H)    Chronic kidney disease, stage 3a (H)     Past Surgical History:   Procedure Laterality Date    COLONOSCOPY  8/23/2016    Dr. Shaw [facility]    COLONOSCOPY N/A 8/23/2016    Procedure: COLONOSCOPY;  Surgeon: Peter Shaw MD;  Location:  GI    COLONOSCOPY N/A 10/19/2022    Procedure: COLONOSCOPY with polypectomy using cold exacto snare;  Surgeon: Peter Shaw MD;  Location:  GI    CV CORONARY ANGIOGRAM N/A 10/27/2023    Procedure: Coronary Angiogram;  Surgeon: Elmer Andre MD;  Location:  HEART CARDIAC CATH LAB    FINGER SURGERY      right little finger joint replacement    HYSTERECTOMY VAGINAL  age 30    retained ovaries. no cervix     HYSTERECTOMY, PAP NO LONGER INDICATED      ORTHOPEDIC SURGERY      SURGICAL  HISTORY OF -   10/25/13    left index cyst removal    SURGICAL HISTORY OF -       Bilat knee miniscus repair      Social History     Tobacco Use    Smoking status: Former     Types: Cigarettes     Passive exposure: Past    Smokeless tobacco: Never   Substance Use Topics    Alcohol use: Yes     Comment: occ      Problem (# of Occurrences) Relation (Name,Age of Onset)    Asthma (1) Brother    Cancer (4) Father, Maternal Grandmother, Maternal Grandfather, Paternal Grandfather    Hypertension (2) Father, Brother    Thyroid Disease (1) Daughter    CABG (1) Father    Colon Cancer (3) Paternal Grandmother, Paternal Aunt, Cousin    No Known Problems (4) Mother, Sister, Son, Son              Current Outpatient Medications   Medication Sig Dispense Refill    albuterol (ACCUNEB) 0.63 MG/3ML neb solution Take 3 mLs (0.63 mg) by nebulization every 6 hours as needed for shortness of breath, wheezing or cough 90 mL 0    albuterol (PROAIR HFA/PROVENTIL HFA/VENTOLIN HFA) 108 (90 Base) MCG/ACT inhaler Inhale 2 puffs into the lungs every 4 hours as needed for shortness of breath, wheezing or cough 18 g 3    aspirin 81 MG EC tablet Take 1 tablet (81 mg) by mouth daily 90 tablet 3    atorvastatin (LIPITOR) 40 MG tablet Take 1 tablet (40 mg) by mouth every evening. 90 tablet 3    azelastine (ASTELIN) 0.1 % nasal spray Spray 1 spray into both nostrils 2 times daily      budesonide (PULMICORT) 0.5 MG/2ML neb solution Take 0.5 mg by nebulization as needed      buPROPion (WELLBUTRIN XL) 150 MG 24 hr tablet TAKE 1 TABLET(150 MG) BY MOUTH EVERY MORNING 90 tablet 3    cetirizine (ZYRTEC) 10 MG tablet Take 1 tablet (10 mg) by mouth daily 30 tablet 2    empagliflozin (JARDIANCE) 10 MG TABS tablet Take 1 tablet (10 mg) by mouth daily 90 tablet 3    estradiol (ESTRACE) 0.1 MG/GM vaginal cream Place 1 g vaginally twice a week. Using fingertip OR applicator (per patient comfort) 42.5 g 4    fluorouracil (EFUDEX) 5 % external cream APPLY SPARINGLY  EXTERNALLY TO FACE EVERY SUNDAY AT NIGHT. AVOID EYES AND LIPS      furosemide (LASIX) 20 MG tablet Take 0.5 tablets (10 mg) by mouth daily as needed (weight gain of 3 pounds in one day or 5 pounds in 1 week)      LORazepam (ATIVAN) 1 MG tablet Take 0.5 tablets (0.5 mg) by mouth as needed for anxiety. 10 tablet 0    nitroGLYcerin (NITROSTAT) 0.4 MG sublingual tablet For chest pain place 1 tablet under the tongue every 5 minutes for 3 doses. If symptoms persist 5 minutes after 1st dose call 911. 30 tablet 11    sacubitril-valsartan (ENTRESTO) 49-51 MG per tablet Take half tab in AM, full tab in  tablet 3    spironolactone (ALDACTONE) 25 MG tablet Take 0.5 tablets (12.5 mg) by mouth daily 45 tablet 4    timolol maleate (TIMOPTIC) 0.5 % ophthalmic solution Apply 1 drop topically nightly as needed (dry skin cracks) Apply 1 drop to lesion every night at bedtime. Do Not use on normal skin.       No current facility-administered medications for this visit.     Allergies   Allergen Reactions    Codeine Itching    Concerta [Methylphenidate] Itching    Hay Fever & [A.R.M.]     Morphine Itching     OBJECTIVE:     /60 (BP Location: Right arm, Patient Position: Sitting, Cuff Size: Adult Regular)   Wt 54.4 kg (120 lb)   LMP  (LMP Unknown)   BMI 19.37 kg/m    Body mass index is 19.37 kg/m .    Exam:  Constitutional:  Appearance: Well nourished, well developed alert, in no acute distress  Neurologic:  Mental Status:  Oriented X3.  Normal strength and tone, sensory exam grossly normal, mentation intact and speech normal.    Psychiatric:  Mentation appears normal and affect normal/bright.  Pelvic Exam:  External Genitalia:     Fusion of labia, no discharge present, no tenderness present, no inflammatory lesions present, color normal  Vagina:     Atrophic vaginal vault without central or paravaginal defects, no discharge present, no inflammatory lesions present, no masses present  Bladder:     Nontender to  palpation  Urethra:   Urethral Body:  Urethra palpation normal, urethra structural support normal   Urethral Meatus:  No erythema or lesions present  Cervix:     Appearance healthy, no lesions present, nontender to palpation, no bleeding present  Uterus:     Uterus: firm, normal sized and non-tender  Adnexa:     No adnexal tenderness present, no adnexal masses present  Perineum:     Perineum within normal limits, no evidence of trauma, no rashes or skin lesions present  Anus:     Anus within normal limits, no hemorrhoids present  Inguinal Lymph Nodes:     No lymphadenopathy present  Pubic Hair:     Normal pubic hair distribution for age  Genitalia and Groin:     No rashes present, no lesions present, no areas of discoloration, no masses present     In-Clinic Test Results:  No results found for this or any previous visit (from the past 24 hours).    ASSESSMENT/PLAN:                                                        ICD-10-CM    1. Itching of vagina  N89.8 Multiplex Vaginal Panel by PCR     Multiplex Vaginal Panel by PCR      2. Vulvar itching  L29.2 Multiplex Vaginal Panel by PCR     Multiplex Vaginal Panel by PCR          Patient Instructions   Thank you for visiting the [facility].    Today we completed testing for vaginal infections. You will receive your results via "[employer] along with treatment recommendations as soon as they are complete.      Please do not hesitate to contact the office if you have any questions or concerns.     Mar is a 68 yo postmenopausal female who presents with ongoing vulvovaginal itching x 2 weeks.     Vulvovaginal Itching  - atrophic appearance on exam, loss of vulvar architecture  - no improvement in sx with clobetasol or clotrimazole  - MVP for infectious r/o  - if testing negative, will re-start vaginal estrogen cream    Angella Gilmore PA-C  [facility] [place]

## 2025-02-03 NOTE — PATIENT INSTRUCTIONS
Thank you for visiting the [facility].    Today we completed testing for vaginal infections. You will receive your results via [employer] along with treatment recommendations as soon as they are complete.      Please do not hesitate to contact the office if you have any questions or concerns.

## 2025-02-04 RX ORDER — METRONIDAZOLE 500 MG/1
500 TABLET ORAL 2 TIMES DAILY
Qty: 14 TABLET | Refills: 0 | Status: SHIPPED | OUTPATIENT
Start: 2025-02-04 | End: 2025-02-11

## 2025-02-07 ENCOUNTER — ANCILLARY PROCEDURE (OUTPATIENT)
Dept: GENERAL RADIOLOGY | Facility: CLINIC | Age: 70
End: 2025-02-07
Attending: INTERNAL MEDICINE
Payer: MEDICARE

## 2025-02-07 DIAGNOSIS — I50.9 CONGESTIVE HEART FAILURE, UNSPECIFIED HF CHRONICITY, UNSPECIFIED HEART FAILURE TYPE (H): ICD-10-CM

## 2025-02-07 DIAGNOSIS — J30.9 ALLERGIC RHINITIS, UNSPECIFIED SEASONALITY, UNSPECIFIED TRIGGER: Primary | ICD-10-CM

## 2025-02-07 DIAGNOSIS — R06.02 SOB (SHORTNESS OF BREATH): ICD-10-CM

## 2025-02-07 DIAGNOSIS — I44.7 LBBB (LEFT BUNDLE BRANCH BLOCK): ICD-10-CM

## 2025-02-07 DIAGNOSIS — I42.8 NONISCHEMIC CARDIOMYOPATHY (H): ICD-10-CM

## 2025-02-07 PROCEDURE — 71046 X-RAY EXAM CHEST 2 VIEWS: CPT | Mod: TC | Performed by: RADIOLOGY

## 2025-02-10 RX ORDER — AZELASTINE 1 MG/ML
1 SPRAY, METERED NASAL 2 TIMES DAILY
Qty: 30 ML | Refills: 0 | Status: SHIPPED | OUTPATIENT
Start: 2025-02-10

## 2025-02-14 ASSESSMENT — SLEEP AND FATIGUE QUESTIONNAIRES
HOW LIKELY ARE YOU TO NOD OFF OR FALL ASLEEP WHILE SITTING INACTIVE IN A PUBLIC PLACE: WOULD NEVER DOZE
HOW LIKELY ARE YOU TO NOD OFF OR FALL ASLEEP WHILE WATCHING TV: WOULD NEVER DOZE
HOW LIKELY ARE YOU TO NOD OFF OR FALL ASLEEP IN A CAR, WHILE STOPPED FOR A FEW MINUTES IN TRAFFIC: WOULD NEVER DOZE
HOW LIKELY ARE YOU TO NOD OFF OR FALL ASLEEP WHILE SITTING AND TALKING TO SOMEONE: WOULD NEVER DOZE
HOW LIKELY ARE YOU TO NOD OFF OR FALL ASLEEP WHEN YOU ARE A PASSENGER IN A CAR FOR AN HOUR WITHOUT A BREAK: WOULD NEVER DOZE
HOW LIKELY ARE YOU TO NOD OFF OR FALL ASLEEP WHILE SITTING QUIETLY AFTER LUNCH WITHOUT ALCOHOL: WOULD NEVER DOZE
HOW LIKELY ARE YOU TO NOD OFF OR FALL ASLEEP WHILE SITTING AND READING: WOULD NEVER DOZE
HOW LIKELY ARE YOU TO NOD OFF OR FALL ASLEEP WHILE LYING DOWN TO REST IN THE AFTERNOON WHEN CIRCUMSTANCES PERMIT: WOULD NEVER DOZE

## 2025-02-17 ENCOUNTER — LAB (OUTPATIENT)
Dept: LAB | Facility: CLINIC | Age: 70
End: 2025-02-17
Payer: MEDICARE

## 2025-02-17 ENCOUNTER — OFFICE VISIT (OUTPATIENT)
Dept: OBGYN | Facility: CLINIC | Age: 70
End: 2025-02-17
Payer: MEDICARE

## 2025-02-17 VITALS — SYSTOLIC BLOOD PRESSURE: 104 MMHG | BODY MASS INDEX: 19.89 KG/M2 | DIASTOLIC BLOOD PRESSURE: 58 MMHG | WEIGHT: 123.2 LBS

## 2025-02-17 DIAGNOSIS — I50.20 HEART FAILURE WITH REDUCED EJECTION FRACTION, NYHA CLASS II (H): ICD-10-CM

## 2025-02-17 DIAGNOSIS — Q52.5 LABIAL FUSION: ICD-10-CM

## 2025-02-17 DIAGNOSIS — N90.89 VULVAR IRRITATION: Primary | ICD-10-CM

## 2025-02-17 LAB
ANION GAP SERPL CALCULATED.3IONS-SCNC: 10 MMOL/L (ref 7–15)
BUN SERPL-MCNC: 19.3 MG/DL (ref 8–23)
CALCIUM SERPL-MCNC: 9.3 MG/DL (ref 8.8–10.4)
CHLORIDE SERPL-SCNC: 105 MMOL/L (ref 98–107)
CREAT SERPL-MCNC: 1.14 MG/DL (ref 0.51–0.95)
EGFRCR SERPLBLD CKD-EPI 2021: 52 ML/MIN/1.73M2
GLUCOSE SERPL-MCNC: 84 MG/DL (ref 70–99)
HCO3 SERPL-SCNC: 25 MMOL/L (ref 22–29)
POTASSIUM SERPL-SCNC: 4.5 MMOL/L (ref 3.4–5.3)
SODIUM SERPL-SCNC: 140 MMOL/L (ref 135–145)

## 2025-02-17 PROCEDURE — 80048 BASIC METABOLIC PNL TOTAL CA: CPT

## 2025-02-17 PROCEDURE — 99459 PELVIC EXAMINATION: CPT

## 2025-02-17 PROCEDURE — 99213 OFFICE O/P EST LOW 20 MIN: CPT

## 2025-02-17 PROCEDURE — 36415 COLL VENOUS BLD VENIPUNCTURE: CPT

## 2025-02-17 NOTE — PROGRESS NOTES
SUBJECTIVE:                                                   Mar Zeng is a 69 year old female who presents to clinic today for the following health issue(s):  Patient presents with:  Vaginal Problem: Patient is in for vulvar itching. Discharge is now gone. Did get some relief with medication that she took but still is back.     HPI:  Mar is a 68 yo postmenopausal female who presents with ongoing vulvar itching.     She was last seen on 2/3/2025 for vulvovaginal itching and vaginal discharge, diagnosed with BV. She has completed course of oral Flagyl with resolution of vaginal itching and discharge, but reports continued vulvar itching. She also reports a tugging / ripping sensation along labial fold with certain body positions and with defecation, right side more than left. Also endorses burning of this area when in contact with urine.     She is using nightly estrace internally and along labial folds, as prescribed at visit in January.     No LMP recorded (lmp unknown). Patient has had a hysterectomy..     Patient is sexually active, .  Using hysterectomy for contraception.    reports that she has quit smoking. Her smoking use included cigarettes. She has been exposed to tobacco smoke. She has never used smokeless tobacco.    STD testing offered?  Declined    Health maintenance updated:  yes    Today's PHQ-2 Score:       2025     9:10 AM   PHQ-2 (  [employer])   Q1: Little interest or pleasure in doing things 1   Q2: Feeling down, depressed or hopeless 1   PHQ-2 Score 2    Q1: Little interest or pleasure in doing things Several days   Q2: Feeling down, depressed or hopeless Several days   PHQ-2 Score 2       Patient-reported     Today's PHQ-9 Score:       1/10/2025    10:36 AM   PHQ-9 SCORE   PHQ-9 Total Score 3     Today's TARIQ-7 Score:       2025     9:07 AM   TARIQ-7 SCORE   Total Score 4 (minimal anxiety)   Total Score 4        Patient-reported       Problem list and histories  reviewed & adjusted, as indicated.  Additional history: as documented.    Patient Active Problem List   Diagnosis    Multiple pigmented nevi    Seasonal allergic rhinitis    Hematoma of rectus sheath    Generalized anxiety disorder    Attention deficit hyperactivity disorder (ADHD), combined type    Benign neoplasm of colon    Fibromyositis    Hammer toe, acquired    Ingrowing nail    Insomnia    Localized, primary osteoarthritis    Loose body in knee    Macrodactylia of toes    Migraine    Other disorders of bone and cartilage(733.99)    Plantar fascial fibromatosis    Postmenopausal HRT (hormone replacement therapy)    Talipes cavus    Fibromyalgia    Splinter hemorrhage of toenail    Congestive heart failure, unspecified HF chronicity, unspecified heart failure type (H)    Anemia    Chronic systolic heart failure (H)    TMJ arthralgia    Myofascial pain    TMJ (temporomandibular joint syndrome)    Acute on chronic diastolic congestive heart failure (H)    Chronic kidney disease, stage 3a (H)     Past Surgical History:   Procedure Laterality Date    COLONOSCOPY  8/23/2016    Dr. Shaw [facility]    COLONOSCOPY N/A 8/23/2016    Procedure: COLONOSCOPY;  Surgeon: Peter Shaw MD;  Location:  GI    COLONOSCOPY N/A 10/19/2022    Procedure: COLONOSCOPY with polypectomy using cold exacto snare;  Surgeon: Peter Shaw MD;  Location:  GI    CV CORONARY ANGIOGRAM N/A 10/27/2023    Procedure: Coronary Angiogram;  Surgeon: Elmer Andre MD;  Location:  HEART CARDIAC CATH LAB    FINGER SURGERY      right little finger joint replacement    HYSTERECTOMY VAGINAL  age 30    retained ovaries. no cervix     HYSTERECTOMY, PAP NO LONGER INDICATED      ORTHOPEDIC SURGERY      SURGICAL HISTORY OF -   10/25/13    left index cyst removal    SURGICAL HISTORY OF -       Bilat knee miniscus repair      Social History     Tobacco Use    Smoking status: Former     Types: Cigarettes     Passive exposure: Past    Smokeless  tobacco: Never   Substance Use Topics    Alcohol use: Yes     Comment: occ      Problem (# of Occurrences) Relation (Name,Age of Onset)    Asthma (1) Brother    Cancer (4) Father, Maternal Grandmother, Maternal Grandfather, Paternal Grandfather    Hypertension (2) Father, Brother    Thyroid Disease (1) Daughter    CABG (1) Father    Colon Cancer (3) Paternal Grandmother, Paternal Aunt, Cousin    No Known Problems (4) Mother, Sister, Son, Son              Current Outpatient Medications   Medication Sig Dispense Refill    albuterol (ACCUNEB) 0.63 MG/3ML neb solution Take 3 mLs (0.63 mg) by nebulization every 6 hours as needed for shortness of breath, wheezing or cough 90 mL 0    albuterol (PROAIR HFA/PROVENTIL HFA/VENTOLIN HFA) 108 (90 Base) MCG/ACT inhaler Inhale 2 puffs into the lungs every 4 hours as needed for shortness of breath, wheezing or cough 18 g 3    aspirin 81 MG EC tablet Take 1 tablet (81 mg) by mouth daily 90 tablet 3    atorvastatin (LIPITOR) 40 MG tablet Take 1 tablet (40 mg) by mouth every evening. 90 tablet 3    azelastine (ASTELIN) 0.1 % nasal spray Spray 1 spray into both nostrils 2 times daily. 30 mL 0    budesonide (PULMICORT) 0.5 MG/2ML neb solution Take 0.5 mg by nebulization as needed      buPROPion (WELLBUTRIN XL) 150 MG 24 hr tablet TAKE 1 TABLET(150 MG) BY MOUTH EVERY MORNING 90 tablet 3    Calcium Carbonate-Vit D-Min (CALCIUM 1200 PO) Take by mouth daily.      empagliflozin (JARDIANCE) 10 MG TABS tablet Take 1 tablet (10 mg) by mouth daily 90 tablet 3    estradiol (ESTRACE) 0.1 MG/GM vaginal cream Place 1 g vaginally twice a week. Using fingertip OR applicator (per patient comfort) 42.5 g 4    fluorouracil (EFUDEX) 5 % external cream APPLY SPARINGLY EXTERNALLY TO FACE EVERY SUNDAY AT NIGHT. AVOID EYES AND LIPS      furosemide (LASIX) 20 MG tablet Take 1 tablet (20 mg) by mouth daily as needed (weight gain of 3 pounds in one day or 5 pounds in 1 week). 30 tablet 11    Levocetirizine  Dihydrochloride (XYZAL PO) Take by mouth daily. Dosage unknown      LORazepam (ATIVAN) 1 MG tablet Take 0.5 tablets (0.5 mg) by mouth as needed for anxiety. 10 tablet 0    nitroGLYcerin (NITROSTAT) 0.4 MG sublingual tablet For chest pain place 1 tablet under the tongue every 5 minutes for 3 doses. If symptoms persist 5 minutes after 1st dose call 911. 30 tablet 11    sacubitril-valsartan (ENTRESTO) 49-51 MG per tablet Take half tab in AM, full tab in  tablet 3    spironolactone (ALDACTONE) 25 MG tablet Take 0.5 tablets (12.5 mg) by mouth daily 45 tablet 4    timolol maleate (TIMOPTIC) 0.5 % ophthalmic solution Apply 1 drop topically nightly as needed (dry skin cracks) Apply 1 drop to lesion every night at bedtime. Do Not use on normal skin.      cetirizine (ZYRTEC) 10 MG tablet Take 1 tablet (10 mg) by mouth daily (Patient not taking: Reported on 2/17/2025) 30 tablet 2     No current facility-administered medications for this visit.     Allergies   Allergen Reactions    Codeine Itching    Concerta [Methylphenidate] Itching    Hay Fever & [A.R.M.]     Morphine Itching     OBJECTIVE:     /58 (BP Location: Right arm, Patient Position: Sitting, Cuff Size: Adult Regular)   Wt 55.9 kg (123 lb 3.2 oz)   LMP  (LMP Unknown)   BMI 19.89 kg/m    Body mass index is 19.89 kg/m .    Exam:  Constitutional:  Appearance: Well nourished, well developed alert, in no acute distress  Neurologic:  Mental Status:  Oriented X3.  Normal strength and tone, sensory exam grossly normal, mentation intact and speech normal.    Psychiatric:  Mentation appears normal and affect normal/bright.  Pelvic Exam:  External Genitalia:     Bilateral labial fusion / loss of architecture - right side with erythema and small fissure, no discharge present, no tenderness present, no inflammatory lesions present, color normal  Vagina:     Atrophic vaginal vault without central or paravaginal defects, erythema improved from last exam - almost  resolved, no discharge present, no inflammatory lesions present, no masses present  Bladder:     Nontender to palpation  Urethra:   Urethral Body:  Urethra palpation normal, urethra structural support normal   Urethral Meatus:  No erythema or lesions present  Cervix:     Appearance healthy, no lesions present, nontender to palpation, no bleeding present  Perineum:     Perineum within normal limits, no evidence of trauma, no rashes or skin lesions present  Anus:     Anus within normal limits, no hemorrhoids present  Inguinal Lymph Nodes:     No lymphadenopathy present  Pubic Hair:     Normal pubic hair distribution for age  Genitalia and Groin:     No rashes present, no lesions present, no areas of discoloration, no masses present     In-Clinic Test Results:  No results found for this or any previous visit (from the past 24 hours).    ASSESSMENT/PLAN:                                                        ICD-10-CM    1. Vulvar irritation  N90.89       2. Labial fusion  Q52.5           Patient Instructions   Thank you for visiting the [facility].    Please do not hesitate to contact the office if you have any questions or concerns.     Mar is a 68 yo postmenopausal female who presents with ongoing vulvar itching.     Vaginal Atrophy / Vulvar Loss of Architecture  - discussed loss of architecture, labial fusion seen on exam  - patient's symptoms are all along right labial fold, erythema along this line and symptoms reproduced with palpation  - counseled on continue estrace use to area nightly x 2 weeks, then taper to 2x/week  - add in thick layer of vaseline in AM and after bathing for barrier protection and moisture to promote healing     Angella Gilmore PA-C  [facility] [place]

## 2025-02-18 NOTE — PATIENT INSTRUCTIONS
Thank you for visiting the [facility].    Please do not hesitate to contact the office if you have any questions or concerns.

## 2025-02-19 ENCOUNTER — OFFICE VISIT (OUTPATIENT)
Dept: SLEEP MEDICINE | Facility: CLINIC | Age: 70
End: 2025-02-19
Attending: NURSE PRACTITIONER
Payer: MEDICARE

## 2025-02-19 VITALS
HEIGHT: 66 IN | BODY MASS INDEX: 19.93 KG/M2 | DIASTOLIC BLOOD PRESSURE: 74 MMHG | SYSTOLIC BLOOD PRESSURE: 117 MMHG | HEART RATE: 70 BPM | WEIGHT: 124 LBS | OXYGEN SATURATION: 99 %

## 2025-02-19 DIAGNOSIS — I50.22 CHRONIC SYSTOLIC HEART FAILURE (H): ICD-10-CM

## 2025-02-19 DIAGNOSIS — F51.04 CHRONIC INSOMNIA: ICD-10-CM

## 2025-02-19 DIAGNOSIS — R06.83 SNORING: Primary | ICD-10-CM

## 2025-02-19 DIAGNOSIS — F41.1 GENERALIZED ANXIETY DISORDER: ICD-10-CM

## 2025-02-19 DIAGNOSIS — I42.8 NONISCHEMIC CARDIOMYOPATHY (H): ICD-10-CM

## 2025-02-19 PROCEDURE — 99204 OFFICE O/P NEW MOD 45 MIN: CPT | Performed by: PHYSICIAN ASSISTANT

## 2025-02-19 NOTE — PATIENT INSTRUCTIONS
"          MY TREATMENT INFORMATION FOR SLEEP APNEA-  Mar Zeng    Frequently asked questions:  1. What is Obstructive Sleep Apnea (MARCE)? MARCE is the most common type of sleep apnea. Apnea means, \"without breath.\"  Apnea is most often caused by narrowing or collapse of the upper airway as muscles relax during sleep.   Almost everyone has occasional apneas. Most people with sleep apnea have had brief interruptions at night frequently for many years.  The severity of sleep apnea is related to how frequent and severe the events are.   2. What are the consequences of MARCE? Symptoms include: feeling sleepy during the day, snoring loudly, gasping or stopping of breathing, trouble sleeping, and occasionally morning headaches or heartburn at night.  Sleepiness can be serious and even increase the risk of falling asleep while driving. Other health consequences may include development of high blood pressure and other cardiovascular disease in persons who are susceptible. Untreated MARCE  can contribute to heart disease, stroke and diabetes.   3. What are the treatment options? In most situations, sleep apnea is a lifelong disease that must be managed with daily therapy. Medications are not effective for sleep apnea and surgery is generally not considered until other therapies have been tried. Your treatment is your choice . Continuous Positive Airway (CPAP) works right away and is the therapy that is effective in nearly everyone. An oral device to hold your jaw forward is usually the next most reliable option. Other options include postioning devices (to keep you off your back), weight loss, and surgery including a tongue pacing device. There is more detail about some of these options below.  4. Are my sleep studies covered by insurance? Although we will request verification of coverage, we advise you also check in advance of the study to ensure there is coverage.    Important tips for those choosing CPAP and similar " devices  REMEMBER-IF YOU RECEIVE A CALL FROM  [phone]-->IT IS TO SETUP A DEVICE  For new devices, sign up for device MAXIM to monitor your device for your followup visits  We encourage you to utilize the [employer] maxim or website ( https://SecretBuilders.[employer]cuer/ ) to monitor your therapy progress and share the data with your healthcare team when you discuss your sleep apnea.                                                    Know your equipment:  CPAP is continuous positive airway pressure that prevents obstructive sleep apnea by keeping the throat from collapsing while you are sleeping. In most cases, the device is  smart  and can slowly self-adjusts if your throat collapses and keeps a record every day of how well you are treated-this information is available to you and your care team.  BPAP is bilevel positive airway pressure that keeps your throat open and also assists each breath with a pressure boost to maintain adequate breathing.  Special kinds of BPAP are used in patients who have inadequate breathing from lung or heart disease. In most cases, the device is  smart  and can slowly self-adjusts to assist breathing. Like CPAP, the device keeps a record of how well you are treated.  Your mask is your connection to the device. You get to choose what feels most comfortable and the staff will help to make sure if fits. Here: are some examples of the different masks that are available: Magnetic mask aids may assist with use but there are safety issues that should be addressed when considering with magnets* ( see end of discussion).       Key points to remember on your journey with sleep apnea:  Sleep study.  PAP devices often need to be adjusted during a sleep study to show that they are effective and adjusted right.  Good tips to remember: Try wearing just the mask during a quiet time during the day so your body adapts to wearing it. A humidifier is recommended for comfort in most cases to prevent drying of  your nose and throat. Allergy medication from your provider may help you if you are having nasal congestion.  Getting settled-in. It takes more than one night for most of us to get used to wearing a mask. Try wearing just the mask during a quiet time during the day so your body adapts to wearing it. A humidifier is recommended for comfort in most cases. Our team will work with you carefully on the first day and will be in contact within 4 days and again at 2 and 4 weeks for advice and remote device adjustments. Your therapy is evaluated by the device each day.   Use it every night. The more you are able to sleep naturally for 7-8 hours, the more likely you will have good sleep and to prevent health risks or symptoms from sleep apnea. Even if you use it 4 hours it helps. Occasionally all of us are unable to use a medical therapy, in sleep apnea, it is not dangerous to miss one night.   Communicate. Call our skilled team on the number provided on the first day if your visit for problems that make it difficult to wear the device. Over 2 out of 3 patients can learn to wear the device long-term with help from our team. Remember to call our team or your sleep providers if you are unable to wear the device as we may have other solutions for those who cannot adapt to mask CPAP therapy. It is recommended that you sleep your sleep provider within the first 3 months and yearly after that if you are not having problems.   Use it for your health. We encourage use of CPAP masks during daytime quiet periods to allow your face and brain to adapt to the sensation of CPAP so that it will be a more natural sensation to awaken to at night or during naps. This can be very useful during the first few weeks or months of adapting to CPAP though it does not help medically to wear CPAP during wakefulness and  should not be used as a strategy just to meet guidelines.  Take care of your equipment. Make sure you clean your mask and tubing using  directions every day and that your filter and mask are replaced as recommended or if they are not working.     *Masks with magnets:  Updated Contraindications  Masks with magnetic components are contraindicated for use by patients where they, or anyone in close physical contact while using the mask, have the following:   Active medical implants that interact with magnets (i.e., pacemakers, implantable cardioverter defibrillators (ICD), neurostimulators, cerebrospinal fluid (CSF) shunts, insulin/infusion pumps)   Metallic implants/objects containing ferromagnetic material (i.e., aneurysm clips/flow disruption devices, embolic coils, stents, valves, electrodes, implants to restore hearing or balance with implanted magnets, ocular implants, metallic splinters in the eye)  Updated Warning  Keep the mask magnets at a safe distance of at least 6 inches (150 mm) away from implants or medical devices that may be adversely affected by magnetic interference. This warning applies to you or anyone in close physical contact with your mask. The magnets are in the frame and lower headgear clips, with a magnetic field strength of up to 400mT. When worn, they connect to secure the mask but may inadvertently detach while asleep.  Implants/medical devices, including those listed within contraindications, may be adversely affected if they change function under external magnetic fields or contain ferromagnetic materials that attract/repel to magnetic fields (some metallic implants, e.g., contact lenses with metal, dental implants, metallic cranial plates, screws, richi hole covers, and bone substitute devices). Consult your physician and  of your implant / other medical device for information on the potential adverse effects of magnetic fields.    BESIDES CPAP, WHAT OTHER THERAPIES ARE THERE?    Positioning Device  Positioning devices are generally used when sleep apnea is mild and only occurs on your back.This example shows  a pillow that straps around the waist. It may be appropriate for those whose sleep study shows milder sleep apnea that occurs primarily when lying flat on one's back. Preliminary studies have shown benefit but effectiveness at home may need to be verified by a home sleep test. These devices are generally not covered by medical insurance.  Examples of devices that maintain sleeping on the back to prevent snoring and mild sleep apnea.    Belt type body positioner  http://[employer]    Electronic reminder  http://nightshifttherapy.com/            Oral Appliance  What is oral appliance therapy?  An oral appliance device fits on your teeth at night like a retainer used after having braces. The device is made by a specialized dentist and requires several visits over 1-2 months before a manufactured device is made to fit your teeth and is adjusted to prevent your sleep apnea. Once an oral device is working properly, snoring should be improved. A home sleep test may be recommended at that time if to determine whether the sleep apnea is adequately treated.       Some things to remember:  -Oral devices are often, but not always, covered by your medical insurance. Be sure to check with your insurance provider.   -If you are referred for oral therapy, you will be given a list of specialized dentists to consider or you may choose to visit the Web site of the American Academy of Dental Sleep Medicine  -Oral devices are less likely to work if you have severe sleep apnea or are extremely overweight.     More detailed information  An oral appliance is a small acrylic device that fits over the upper and lower teeth  (similar to a retainer or a mouth guard). This device slightly moves jaw forward, which moves the base of the tongue forward, opens the airway, improves breathing for effective treat snoring and obstructive sleep apnea in perhaps 7 out of 10 people .  The best working devices are custom-made by a dental device   after a mold is made of the teeth 1, 2, 3.  When is an oral appliance indicated?  Oral appliance therapy is recommended as a first-line treatment for patients with primary snoring, mild sleep apnea, and for patients with moderate sleep apnea who prefer appliance therapy to use of CPAP4, 5. Severity of sleep apnea is determined by sleep testing and is based on the number of respiratory events per hour of sleep.   How successful is oral appliance therapy?  The success rate of oral appliance therapy in patients with mild sleep apnea is 75-80% while in patients with moderate sleep apnea it is 50-70%. The chance of success in patients with severe sleep apnea is 40-50%. The research also shows that oral appliances have a beneficial effect on the cardiovascular health of MARCE patients at the same magnitude as CPAP therapy7.  Oral appliances should be a second-line treatment in cases of severe sleep apnea, but if not completely successful then a combination therapy utilizing CPAP plus oral appliance therapy may be effective. Oral appliances tend to be effective in a broad range of patients although studies show that the patients who have the highest success are females, younger patients, those with milder disease, and less severe obesity. 3, 6.   Finding a dentist that practices dental sleep medicine  Specific training is available through the American Academy of Dental Sleep Medicine for dentists interested in working in the field of sleep. To find a dentist who is educated in the field of sleep and the use of oral appliances, near you, visit the Web site of the American Academy of Dental Sleep Medicine.    References  1. Jess, et al. Objectively measured vs self-reported compliance during oral appliance therapy for sleep-disordered breathing. Chest 2013; 144(5): 8946-2871.  2. Makenna et al. Objective measurement of compliance during oral appliance therapy for sleep-disordered breathing. Thorax  2013; 68(1): 91-96.  3. See et al. Mandibular advancement devices in 620 men and women with MARCE and snoring: tolerability and predictors of treatment success. Chest 2004; 125: 0801-6667.  4. Lizzeth et al. Oral appliances for snoring and MARCE: a review. Sleep 2006; 29: 244-262.  5. Theresa et al. Oral appliance treatment for MARCE: an update. J Clin Sleep Med 2014; 10(2): 215-227.  6. Vinay et al. Predictors of OSAH treatment outcome. J Dent Res 2007; 86: 3667-6299.      Weight Loss:   Your Body mass index is 20.01 kg/m .    Being overweight does not necessarily mean you will have health consequences.  Those who have BMI over 35 or over 27 with existing medical conditions carries greater risk.   Weight loss decreases severity of sleep apnea in most people with obesity. For those with mild obesity who have developed snoring with weight gain, even 15-30 pound weight loss can improve and occasionally milder eliminate sleep apnea.  Structured and life-long dietary and health habits are necessary to lose weight and keep healthier weight levels.     The Comprehensive Weight loss program offers all aspects of weight loss strategies including two Non-Surgical Weight Loss Programs: Medical Weight Management and our 24 Week Healthy Lifestyle Program:  Medical Weight Management: You will meet with a Medical Weight Management Provider, as well as a Registered Dietician. The program may include medication therapy, dietary education, recommended exercise and physical therapy programs, monthly support group meetings, and possible psychological counseling. Follow up visits with the provider or dietician are scheduled based on your progress and needs.  24 Week Healthy Lifestyle Program: This unique program is designed to give you the support of weekly appointments and activities thru a 24-week period. It may include all of the components of the basic program (above), with the addition of 11 individual Health   Visits, 24-week access to the [employer] website for over 700 online classes, and monthly support group meetings. This program has an out-of-pocket expense of $499 to cover the items that can not be billed to insurance (health coaches and [employer] access), and is non-refundable/non-transferable (you may be able to use a Health Savings Account; ask your HSA provider). There may be an optional meal replacement plan prescribed as well.   Medication therapy has been approved for the treatment of sleep apnea: The FDA approved tirzepatide for moderate to severe sleep apnea(apnea-hypopnea index greater than or equal to 15 (in patients with BMI of greater than or equal to 30 greater than or equal to 27 was at least 1 weight-related condition such as hypertension or dyslipidemia.  Surgical management achieves meaningful long-term weight loss and improvement in health risks in most patients with more severe obesity.      Sleep Apnea Surgery:    Surgery for obstructive sleep apnea is considered generally only when other therapies fail to work. Surgery may be discussed with you if you are having a difficult time tolerating CPAP and or when there is an abnormal structure that requires surgical correction.  Nose and throat surgeries often enlarge the airway to prevent collapse.  Most of these surgeries create pain for 1-2 weeks and up to half of the most common surgeries are not effective throughout life.  You should carefully discuss the benefits and drawbacks to surgery with your sleep provider and surgeon to determine if it is the best solution for you.   More information  Surgery for MARCE is directed at areas that are responsible for narrowing or complete obstruction of the airway during sleep.  There are a wide range of procedures available to enlarge and/or stabilize the airway to prevent blockage of breathing in the three major areas where it can occur: the palate, tongue, and nasal regions.  Successful surgical treatment  depends on the accurate identification of the factors responsible for obstructive sleep apnea in each person.  A personalized approach is required because there is no single treatment that works well for everyone.  Because of anatomic variation, consultation with an examination by a sleep surgeon is a critical first step in determining what surgical options are best for each patient.  In some cases, examination during sedation may be recommended in order to guide the selection of procedures.  Patients will be counseled about risks and benefits as well as the typical recovery course after surgery. Surgery is typically not a cure for a person s MARCE.  However, surgery will often significantly improve one s MARCE severity (termed  success rate ).  Even in the absence of a cure, surgery will decrease the cardiovascular risk associated with OSA7; improve overall quality of life8 (sleepiness, functionality, sleep quality, etc).      Palate Procedures:  Patients with MARCE often have narrowing of their airway in the region of their tonsils and uvula.  The goals of palate procedures are to widen the airway in this region as well as to help the tissues resist collapse.  Modern palate procedure techniques focus on tissue conservation and soft tissue rearrangement, rather than tissue removal.  Often the uvula is preserved in this procedure. Residual sleep apnea is common in patient after pharyngoplasty with an average reduction in sleep apnea events of 33%2.      Tongue Procedures:  ExamWhile patients are awake, the muscles that surround the throat are active and keep this region open for breathing. These muscles relax during sleep, allowing the tongue and other structures to collapse and block breathing.  There are several different tongue procedures available.  Selection of a tongue base procedure depends on characteristics seen on physical exam.  Generally, procedures are aimed at removing bulky tissues in this area or  preventing the back of the tongue from falling back during sleep.  Success rates for tongue surgery range from 50-62%3.    Hypoglossal Nerve Stimulation:  Hypoglossal nerve stimulation has recently received approval from the [place] Food and Drug Administration for the treatment of obstructive sleep apnea.  This is based on research showing that the system was safe and effective in treating sleep apnea6.  Results showed that the median AHI score decreased 68%, from 29.3 to 9.0. This therapy uses an implant system that senses breathing patterns and delivers mild stimulation to airway muscles, which keeps the airway open during sleep.  The system consists of three fully implanted components: a small generator (similar in size to a pacemaker), a breathing sensor, and a stimulation lead.  Using a small handheld remote, a patient turns the therapy on before bed and off upon awakening.    Candidates for this device must be greater than 18 years of age, have moderate to severe obstructive sleep apnea with less than 25% central events  (AHI between 15-65), BMI less than 35, have tried CPAP/oral appliance for at least 8 weeks without success, and have appropriate upper airway anatomy (determined by a sleep endoscopy performed by Dr. Micheal Song or Dr. Yimi Montana).    Nasal Procedures:  Nasal obstruction can interfere with nasal breathing during the day and night.  Studies have shown that relief of nasal obstruction can improve the ability of some patients to tolerate positive airway pressure therapy for obstructive sleep apnea1.  Treatment options include medications such as nasal saline, topical corticosteroid and antihistamine sprays, and oral medications such as antihistamines or decongestants. Non-surgical treatments can include external nasal dilators for selected patients. If these are not successful by themselves, surgery can improve the nasal airway either alone or in combination with these other  options.        Combination Procedures:  Combination of surgical procedures and other treatments may be recommended, particularly if patients have more than one area of narrowing or persistent positional disease.  The success rate of combination surgery ranges from 66-80%2,3.    References  Funmilayo MARTINEZ. The Role of the Nose in Snoring and Obstructive Sleep Apnoea: An Update.  Eur Arch Otorhinolaryngol. 2011; 268: 1365-73.   Anju SM; Miguel JA; Sukumar JR; Pallanch JF; Tavon MB; Lauren SG; Octavio ZENDEJAS. Surgical modifications of the upper airway for obstructive sleep apnea in adults: a systematic review and meta-analysis. SLEEP 2010;33(10):1174-0322. Renetta HO. Hypopharyngeal surgery in obstructive sleep apnea: an evidence-based medicine review.  Arch Otolaryngol Head Neck Surg. 2006 Feb;132(2):206-13.  Ron YH1, Marino Y, Narinder RONDA. The efficacy of anatomically based multilevel surgery for obstructive sleep apnea. Otolaryngol Head Neck Surg. 2003 Oct;129(4):327-35.  Kezirian E, Goldberg A. Hypopharyngeal Surgery in Obstructive Sleep Apnea: An Evidence-Based Medicine Review. Arch Otolaryngol Head Neck Surg. 2006 Feb;132(2):206-13.  Ale WOODARD et al. Upper-Airway Stimulation for Obstructive Sleep Apnea.  N Engl J Med. 2014 Jan 9;370(2):139-49.  Colton Y et al. Increased Incidence of Cardiovascular Disease in Middle-aged Men with Obstructive Sleep Apnea. Am J Respir Crit Care Med; 2002 166: 159-165  Mo EM et al. Studying Life Effects and Effectiveness of Palatopharyngoplasty (SLEEP) study: Subjective Outcomes of Isolated Uvulopalatopharyngoplasty. Otolaryngol Head Neck Surg. 2011; 144: 623-631.        WHAT IF I ONLY HAVE SNORING?    Mandibular advancement devices, lateral sleep positioning, long-term weight loss and treatment of nasal allergies have been shown to improve snoring.  Exercising tongue muscles with a game (https://apps.[employer]/us/maxim/soundly-reduce-snoring/qm2275009914) or stimulating the tongue during  the day with a device (https://doi.org/10.3390/fdq09514499) have improved snoring in some individuals.  https://www.[employer]/  https://www.sleepfoundation.org/best-anti-snoring-mouthpieces-and-mouthguards    Remember to Drive Safe... Drive Alive     Sleep health profoundly affects your health, mood, and your safety.  Thirty three percent of the population (one in three of us) is not getting enough sleep and many have a sleep disorder. Not getting enough sleep or having an untreated / undertreated sleep condition may make us sleepy without even knowing it. In fact, our driving could be dramatically impaired due to our sleep health. As your provider, here are some things I would like you to know about driving:     Here are some warning signs for impairment and dangerous drowsy driving:              -Having been awake more than 16 hours               -Looking tired               -Eyelid drooping              -Head nodding (it could be too late at this point)              -Driving for more than 30 minutes     Some things you could do to make the driving safer if you are experiencing some drowsiness:              -Stop driving and rest              -Call for transportation              -Make sure your sleep disorder is adequately treated     Some things that have been shown NOT to work when experiencing drowsiness while driving:              -Turning on the radio              -Opening windows              -Eating any  distracting  /  entertaining  foods (e.g., sunflower seeds, candy, or any other)              -Talking on the phone      Your decision may not only impact your life, but also the life of others. Please, remember to drive safe for yourself and all of us.             [facility]  Clinic Office Hours  Monday-- Friday  8:00 am -- 4:30 pm [facility]  Clinic Numbers- [phone]  Sleep Lab Address   [address] [address]  Sleep Lab Phone: [phone]    Your sleep study has been  scheduled for: 4/4/2025 at 8pm           Sleep Lab Telephone: [phone]      Thank you for choosing [facility] at [facility].  Please take your time to read through this information.  This information has been designed to help you understand what it takes to begin your journey to a better night's rest. The contents of this packet include:  Study Preparation Instructions  An outline of your Sleep Study's Procedures.  Directions to the [facility] Location.    Our technical staff will accommodate you as much as they are able. However, if you have special needs during the night in which you require a Personal Care Attendant, please make arrangements to bring that person along with you and notify us ahead of time.      [facility]     Upon arrival please park in the [address] Parking Ramp and park on the main level. Take elevator or stairs to Level B of ramp.  Enter back door and press silver button on the wall to your right (doorbell) this will alert staff you have arrived as building is locked after 5:30.  Please bring your insurance card to the study with you  You will receive a confirmation call 48 hours prior to your study time. If you do not receive a confirmation call, please call [facility] at [phone].  if you need to cancel or reschedule for any reason, please contact us 48 hours before your scheduled appointment at [phone]  Make arrangements to follow-up with your specialist to review your sleep study.  We hope to make your night with us as comfortable and as pleasant as possible. If you have any questions after reading through this packet, please feel free to call us.        TO PREPARE FOR YOUR SLEEP STUDY, PLEASE FOLLOW THE FOLLOWING GUIDELINES:    Please have the next day available for continued testing if  necessary.   Please bathe and wash your hair the day of the tests. Hair should be clean, dry, and free from excessive oils, gels or sprays.  Upon  arrival, men should be clean shaven (no stubble). If you have a mustache, goatee, or beard, it is not necessary to shave it off.  Bring comfortable 2-piece sleepwear.  If you prefer, you can bring your own pillow, blanket, orthopedic device, etc.  Arise at your regular time on the morning of your study and DO NOT NAP during the day.  Avoid stimulants (coffee, tea, chocolate, soda pop, energy drinks, any other caffeine drinks) after noon on the day of the study.  Eat a normal evening meal before you arrive for your study.   9.  If you prefer a specific snack before bedtime, please bring it along. We have a refrigerator and a microwave available.  10. Continue to take all prescribed medications unless otherwise ordered by your provider.  11. Bring medications for a 24-hour period, as well as any medications prescribed specifically for your sleep study.  12. If you are incontinent please bring your Adult pads, depends or diapers.  13. Feel free to bring your own toiletries (Our lab will have them if you forget).  14. Please DO NOT wear gel nail polish or gel covering, it is ideal not to have any polish on at all, this can cause discrepancies to the equipment that is used and needed to perform your sleep study.      1. Arrival      Please arrive at [facility] at 8:00 P.M. to complete any necessary paperwork.  2. Interview      After settling into your room, your technologist will explain what will happen during your study. Please feel free to ask any questions about your study.   3. Patient Set-up  The entire process is non-invasive and painless. We will be measuring your head and placing electrodes on your head and face. This will allow us to monitor your sleep. In addition, we will place monitors and sensors on your body that will monitor your breathing, limb movements, snoring and heart rate. Your set-up will take about 45 minutes to 1 hour to complete.  Lights Out  When it is time for bed, your technologist  will help you get comfortable. Comfort is essential for a good night's sleep. If you are uncomfortable in any way, please tell your technologist. Before turning out the lights, your technologist will check your equipment. These checks will help you to have the best study possible. After the lights are turned off, you are free to sleep in whatever position is most comfortable to you. Please note: at some point during the night, you will be asked to sleep on your back. If you are unable to sleep on your back, please let your technologist know. Also, even though you will be connected to wires, you will be able to use the restroom when needed.  **Remember: this is a medical test so phones and television will be turned off for this test.  5. Lights On  The technologist will allow you to sleep in until 6:00 -- 7:00 A.M. If you have a specific wake-up time requirement, please notify the technologist so that he or she may accommodate your needs. All the water-soluble conduction gel, electrodes and monitoring devices are easily removed. Upon completion, you will want to shower before you start your day. We do supply soap, shampoo and towels, though, if you prefer to bring your own supplies, please feel free to do so. A light breakfast of juice, fresh fruit, cereal, toast or English muffin is available for you in our [facility] nutrition area. Please be aware that your technologist will not be able to disclose any information regarding the findings of your study. This information will come from your provider at your follow-up appt.  Please schedule follow up appointment two weeks after your sleep study, if a follow up has not already been scheduled for your results.  Scheduling number is [phone]      CANCELLATION / RESCHEDULING    Our care team at [facility], strives to provide exceptional - high quality care to all of our customers.     We have set aside our technical experts time and a room especially for  you to perform your sleep study.     We appreciate your partnership in helping us best meet the need for our services for as many people in need as we possibly can. This is limited to the number of sleep study rooms and skilled care providers we have available.     Working together to serve your comprehensive sleep health needs is very important to us. However, we do understand that changing an appointment is sometimes necessary.    If you find that you are unable to make your scheduled appointment, please contact us directly at least 24-hours prior to your scheduled appointment at [phone]. If you are greeted by our answering system, please spell your name, give your date of birth and leave a detailed message.    If you cancel with less than a 24-hour notice or do not show up for your sleep study without prior notification, we will send a letter to your primary care provider and our sleep  will contact you personally to assist in rescheduling your study.            Thank you for choosing [facility] for your sleep health needs.  Please note that [facility] are not responsible for any items left during your stay.

## 2025-02-19 NOTE — PROGRESS NOTES
Outpatient Sleep Medicine Consultation:      Name: Mar Zeng MRN# [id number]   Age: 69 year old YOB: 1955     Date of Consultation: February 19, 2025  Consultation is requested by: Fatoumata Potter, [facility]KE CNP  [address] [place] [address] Fatoumata Potter  Primary care provider: Gail Crespo       Reason for Sleep Consult:     Mar Zeng is sent by Fatoumata Potter for a sleep consultation regarding 1.  Nonischemic cardiomyopathy (H)    Patient s Reason for visit  Mar Zeng main reason for visit: (Patient-Rptd) Hard falling asleep and staying asleep  Patient states problem(s) started: (Patient-Rptd) 2006  Mar HINA Karri's goals for this visit: (Patient-Rptd) Get better sleep         Assessment and Plan:     Summary Sleep Diagnoses:  1. Snoring (Primary)  2. Chronic insomnia  Comorbid Diagnoses:  3. Nonischemic cardiomyopathy (H)  4. Chronic systolic heart failure (H)  5. Generalized anxiety disorder    Patient presents to clinic today after referral from cardiology for evaluation of sleep disruption.  Reports a roughly 18-year history of insomnia with both difficulty falling asleep and staying asleep.  Insomnia appears to be combination of psychophysiological and mood/anxiety related though underlying sleep apnea could be contributing to some nighttime arousals as well.  Though not particularly high risk agreed to pursue sleep study to evaluate for any underlying sleep apnea contributing to her insomnia and cardiovascular health/CHF.  She does snore but no witnessed apneic events by bed partner and denies any gasping or choking arousals.  Reviewed pathophysiology of sleep apnea today and testing with overnight polysomnogram evaluation.  Will plan to see patient back following PSG for results discussion but if study is significantly positive she is open to starting on CPAP therapy and is comfortable with me placing empiric auto CPAP orders to expedite  "treatment set up.  Will plan to revisit insomnia following PSG but did discuss avoidance of sleep waiting today.  She may be a good candidate for CBT-I as well given chronic nature, consider sleep psychology referral next visit.  Education materials provided in AVS.  - Comprehensive Sleep Study; Future         History of Present Illness:     Mar Zeng is a 69-year-old female with allergic rhinitis, asthma, anxiety, ADHD, fibromyalgia, nonischemic cardiomyopathy, HFrEF who presents to clinic today after referral from cardiology for evaluation of insomnia and concern of apnea.  Reports she struggled with insomnia the last 18 years, will wax and wane both difficulties falling asleep and staying asleep.    SLEEP-WAKE SCHEDULE:     Work/School Days: Patient goes to school/work: (Patient-Rptd) No   Usually gets into bed at (Patient-Rptd) 11pm  Takes patient about (Patient-Rptd) 2-3 hours to fall asleep   \"I just lay there and shut my eyes and to relax\"  Has trouble falling asleep (Patient-Rptd) 7 nights per week  Wakes up in the middle of the night (Patient-Rptd) 1-3 times.  Wakes up due to (Patient-Rptd) Use the bathroom;Anxiety;Uncertain - \"I don't know\"   She has trouble falling back asleep (Patient-Rptd) Nightly times a week.   It usually takes \"it just depends sometimes I do fast a lot of the time I can;t right away\" to get back to sleep can take up to a couple hours  Patient is usually up at (Patient-Rptd) 6-7am  Uses alarm: (Patient-Rptd) No    Weekends/Non-work Days/All Other Days:  Usually gets into bed at (Patient-Rptd) 11 pm  Takes patient about (Patient-Rptd) 1-2 hours to fall asleep  Patient is usually up at (Patient-Rptd) 6-7am  Uses alarm: (Patient-Rptd) No    Sleep Need  Patient estimates she gets (Patient-Rptd) 3-5 hours sleep on average   Patient thinks she needs about (Patient-Rptd) 6-8 hours sleep    Mar Zeng prefers to sleep in this position(s): (Patient-Rptd) Back;Side "     Naps  Patient takes a purposeful nap (Patient-Rptd) 0 times a week and naps are usually (Patient-Rptd) 0 in duration  She feels better after a nap: (Patient-Rptd) No  She dozes off unintentionally (Patient-Rptd) 0 days per week  Patient has had a driving accident or near-miss due to sleepiness/drowsiness: (Patient-Rptd) No  She had a total [place] Sleepiness Scale of (Patient-Rptd) 0 (02/14/25 1428), with scores of 10 or higher indicating abnormal levels of sleepiness.     SLEEP DISRUPTIONS:    Breathing/Snoring  Patient snores:(Patient-Rptd) Yes   Other people complain about her snoring: (Patient-Rptd) Yes  Patient has been told she stops breathing in her sleep:(Patient-Rptd) No  Gasping/choking:No   She has issues with the following: (Patient-Rptd) Morning mouth dryness;Stuffy nose when you wake up.  Denies morning headache, frequent nocturia, nocturnal GERD    Movement:  Patient gets pain, discomfort, with an urge to move:  (Patient-Rptd) No  It happens when she is resting:  (Patient-Rptd) No  It happens more at night:  (Patient-Rptd) No  Patient has been told she kicks her legs at night:  (Patient-Rptd) No     Behaviors in Sleep:  Denies sleepwalking, sleep talking, sleep eating, bruxism, dream enactment, recurring nightmares, night terrors, sleep paralysis, hypnagogic/hypnopompic hallucinations, cataplexy      CAFFEINE AND OTHER SUBSTANCES:    Patient consumes caffeinated beverages per day:  (Patient-Rptd) 0-1  Last caffeine use is usually: (Patient-Rptd) 730 am  List of any prescribed or over the counter stimulants that patient takes:  None  List of any prescribed or over the counter sleep medication patient takes: (Patient-Rptd) Occasionally lorazepam  List of previous sleep medications that patient has tried: (Patient-Rptd) Tramidol, etc  Patient drinks alcohol to help them sleep: (Patient-Rptd) No  Patient drinks alcohol near bedtime: (Patient-Rptd) No    Family History:  Patient has a family member  been diagnosed with a sleep disorder: (Patient-Rptd) Yes  (Patient-Rptd) Son         SCALES:    EPWORTH SLEEPINESS SCALE         2/14/2025     2:28 PM    [place] Sleepiness Scale ( NANCI Diaz  [id number]<br>ESS - [place]/English - Final version - 21 Nov 07 - [facility] [place].)   Sitting and reading Would never doze   Watching TV Would never doze   Sitting, inactive in a public place (e.g. a theatre or a meeting) Would never doze   As a passenger in a car for an hour without a break Would never doze   Lying down to rest in the afternoon when circumstances permit Would never doze   Sitting and talking to someone Would never doze   Sitting quietly after a lunch without alcohol Would never doze   In a car, while stopped for a few minutes in traffic Would never doze   [place] Score (MC) 0   [place] Score (Sleep) 0        Patient-reported           INSOMNIA SEVERITY INDEX (ADRIAN)          2/14/2025     2:15 PM   Insomnia Severity Index (ADRIAN)   Difficulty falling asleep 4   Difficulty staying asleep 3   Problems waking up too early 3   How SATISFIED/DISSATISFIED are you with your CURRENT sleep pattern? 4   How NOTICEABLE to others do you think your sleep problem is in terms of impairing the quality of your life? 4   How WORRIED/DISTRESSED are you about your current sleep problem? 4   To what extent do you consider your sleep problem to INTERFERE with your daily functioning (e.g. daytime fatigue, mood, ability to function at work/daily chores, concentration, memory, mood, etc.) CURRENTLY? 4   ADRIAN Total Score 26        Patient-reported         Guidelines for Scoring/Interpretation:  Total score categories:  0-7 = No clinically significant insomnia   8-14 = Subthreshold insomnia   15-21 = Clinical insomnia (moderate severity)  22-28 = Clinical insomnia (severe)  Used via courtesy of www.MobileOCTth.va.gov with permission from John Knox PhD., [facility]    GAD7        1/13/2025     9:07 AM   TARIQ-7    1. Feeling  nervous, anxious, or on edge 0   2. Not being able to stop or control worrying 1   3. Worrying too much about different things 1   4. Trouble relaxing 1   5. Being so restless that it is hard to sit still 1   6. Becoming easily annoyed or irritable 0   7. Feeling afraid, as if something awful might happen 0   TARIQ-7 Total Score 4    If you checked any problems, how difficult have they made it for you to do your work, take care of things at home, or get along with other people? Not difficult at all       Patient-reported       PATIENT HEALTH QUESTIONNAIRE-9 (PHQ - 9)        1/10/2025    10:36 AM   PHQ-9 ([employer])   1.  Little interest or pleasure in doing things 0   2.  Feeling down, depressed, or hopeless 0   3.  Trouble falling or staying asleep, or sleeping too much 3   4.  Feeling tired or having little energy 0   5.  Poor appetite or overeating 0   6.  Feeling bad about yourself - or that you are a failure or have let yourself or your family down 0   7.  Trouble concentrating on things, such as reading the newspaper or watching television 0   8.  Moving or speaking so slowly that other people could have noticed. Or the opposite - being so fidgety or restless that you have been moving around a lot more than usual 0   9.  Thoughts that you would be better off dead, or of hurting yourself in some way 0   PHQ-9 Total Score 3   If you checked off any problems, how difficult have these problems made it for you to do your work, take care of things at home, or get along with other people? Not difficult at all   6.  Feeling bad about yourself 0   7.  Trouble concentrating 0   8.  Moving slowly or restless 0   9.  Suicidal or self-harm thoughts 0   Difficulty at work, home, or with people Not difficult at all       Developed by Hazel Gusman, Joyce Gunter, Jamal Walker and colleagues, with an educational sacha from [employer] No permission required to reproduce, translate, display or  distribute.        Allergies:    Allergies   Allergen Reactions    Codeine Itching    Concerta [Methylphenidate] Itching    Hay Fever & [A.R.M.]     Morphine Itching       Medications:    Current Outpatient Medications   Medication Sig Dispense Refill    albuterol (ACCUNEB) 0.63 MG/3ML neb solution Take 3 mLs (0.63 mg) by nebulization every 6 hours as needed for shortness of breath, wheezing or cough 90 mL 0    albuterol (PROAIR HFA/PROVENTIL HFA/VENTOLIN HFA) 108 (90 Base) MCG/ACT inhaler Inhale 2 puffs into the lungs every 4 hours as needed for shortness of breath, wheezing or cough 18 g 3    aspirin 81 MG EC tablet Take 1 tablet (81 mg) by mouth daily 90 tablet 3    atorvastatin (LIPITOR) 40 MG tablet Take 1 tablet (40 mg) by mouth every evening. 90 tablet 3    azelastine (ASTELIN) 0.1 % nasal spray Spray 1 spray into both nostrils 2 times daily. 30 mL 0    budesonide (PULMICORT) 0.5 MG/2ML neb solution Take 0.5 mg by nebulization as needed      buPROPion (WELLBUTRIN XL) 150 MG 24 hr tablet TAKE 1 TABLET(150 MG) BY MOUTH EVERY MORNING 90 tablet 3    Calcium Carbonate-Vit D-Min (CALCIUM 1200 PO) Take by mouth daily.      cetirizine (ZYRTEC) 10 MG tablet Take 1 tablet (10 mg) by mouth daily (Patient not taking: Reported on 2/17/2025) 30 tablet 2    empagliflozin (JARDIANCE) 10 MG TABS tablet Take 1 tablet (10 mg) by mouth daily 90 tablet 3    estradiol (ESTRACE) 0.1 MG/GM vaginal cream Place 1 g vaginally twice a week. Using fingertip OR applicator (per patient comfort) 42.5 g 4    fluorouracil (EFUDEX) 5 % external cream APPLY SPARINGLY EXTERNALLY TO FACE EVERY SUNDAY AT NIGHT. AVOID EYES AND LIPS      furosemide (LASIX) 20 MG tablet Take 1 tablet (20 mg) by mouth daily as needed (weight gain of 3 pounds in one day or 5 pounds in 1 week). 30 tablet 11    Levocetirizine Dihydrochloride (XYZAL PO) Take by mouth daily. Dosage unknown      LORazepam (ATIVAN) 1 MG tablet Take 0.5 tablets (0.5 mg) by mouth as needed for  anxiety. 10 tablet 0    nitroGLYcerin (NITROSTAT) 0.4 MG sublingual tablet For chest pain place 1 tablet under the tongue every 5 minutes for 3 doses. If symptoms persist 5 minutes after 1st dose call 911. 30 tablet 11    sacubitril-valsartan (ENTRESTO) 49-51 MG per tablet Take half tab in AM, full tab in  tablet 3    spironolactone (ALDACTONE) 25 MG tablet Take 0.5 tablets (12.5 mg) by mouth daily 45 tablet 4    timolol maleate (TIMOPTIC) 0.5 % ophthalmic solution Apply 1 drop topically nightly as needed (dry skin cracks) Apply 1 drop to lesion every night at bedtime. Do Not use on normal skin.         Problem List:  Patient Active Problem List    Diagnosis Date Noted    Acute on chronic diastolic congestive heart failure (H) 01/13/2025     Priority: Medium    Chronic kidney disease, stage 3a (H) 01/13/2025     Priority: Medium    TMJ arthralgia 11/05/2024     Priority: Medium    Myofascial pain 11/05/2024     Priority: Medium    TMJ (temporomandibular joint syndrome) 11/05/2024     Priority: Medium    Chronic systolic heart failure (H) 08/14/2024     Priority: Medium    Anemia 02/29/2024     Priority: Medium    Congestive heart failure, unspecified HF chronicity, unspecified heart failure type (H) 10/25/2023     Priority: Medium    Splinter hemorrhage of toenail 06/05/2023     Priority: Medium    Attention deficit hyperactivity disorder (ADHD), combined type 12/09/2021     Priority: Medium    Fibromyositis 10/09/2021     Priority: Medium    Loose body in knee 05/10/2019     Priority: Medium    Localized, primary osteoarthritis 12/20/2017     Priority: Medium    Generalized anxiety disorder 06/08/2015     Priority: Medium     Diagnosis updated by automated process. Provider to review and confirm.      Hematoma of rectus sheath 01/23/2014     Priority: Medium    Seasonal allergic rhinitis 08/30/2013     Priority: Medium    Multiple pigmented nevi 12/20/2012     Priority: Medium    Postmenopausal HRT (hormone  replacement therapy) 12/06/2011     Priority: Medium     Formatting of this note might be different from the original.  Started on Vivelle dot 11/10      Insomnia 02/11/2009     Priority: Medium    Macrodactylia of toes 09/20/2007     Priority: Medium     Formatting of this note might be different from the original.  ELONGATED METATARSALS      Fibromyalgia 07/10/2007     Priority: Medium    Other disorders of bone and cartilage(733.99) 06/29/2007     Priority: Medium     Formatting of this note might be different from the original.  LATERAL LEFT FOOT  ; SESAMOIDITIS      Ingrowing nail 03/06/2007     Priority: Medium     Formatting of this note might be different from the original.  MEDIAL LEFT HALLUX      Hammer toe, acquired 12/22/2006     Priority: Medium     Formatting of this note might be different from the original.  Other hammer toe (acquired)      Plantar fascial fibromatosis 12/22/2006     Priority: Medium    Talipes cavus 12/22/2006     Priority: Medium    Benign neoplasm of colon 05/03/2006     Priority: Medium    Migraine 05/03/2006     Priority: Medium     Formatting of this note might be different from the original.  Epic          Past Medical/Surgical History:  Past Medical History:   Diagnosis Date    Mumps     NO ACTIVE PROBLEMS      Past Surgical History:   Procedure Laterality Date    COLONOSCOPY  8/23/2016    Dr. Shaw [facility]    COLONOSCOPY N/A 8/23/2016    Procedure: COLONOSCOPY;  Surgeon: Peter Shaw MD;  Location:  GI    COLONOSCOPY N/A 10/19/2022    Procedure: COLONOSCOPY with polypectomy using cold exacto snare;  Surgeon: Peter Shaw MD;  Location:  GI    CV CORONARY ANGIOGRAM N/A 10/27/2023    Procedure: Coronary Angiogram;  Surgeon: Elmer Andre MD;  Location:  [facility]ATH LAB    FINGER SURGERY      right little finger joint replacement    HYSTERECTOMY VAGINAL  age 30    retained ovaries. no cervix     HYSTERECTOMY, PAP NO LONGER INDICATED       ORTHOPEDIC SURGERY      SURGICAL HISTORY OF -   10/25/13    left index cyst removal    SURGICAL HISTORY OF -       Bilat knee miniscus repair       Social History:  Social History     Socioeconomic History    Marital status:      Spouse name: Not on file    Number of children: Not on file    Years of education: Not on file    Highest education level: Not on file   Occupational History    Not on file   Tobacco Use    Smoking status: Former     Types: Cigarettes     Passive exposure: Past    Smokeless tobacco: Never   Vaping Use    Vaping status: Never Used   Substance and Sexual Activity    Alcohol use: Yes     Comment: occ    Drug use: No    Sexual activity: Yes     Partners: Male     Birth control/protection: Female Surgical   Other Topics Concern    Parent/sibling w/ CABG, MI or angioplasty before 65F 55M? No   Social History Narrative    [place] in [place]     Social Drivers of Health     Financial Resource Strain: Low Risk  (1/13/2025)    Financial Resource Strain     Within the past 12 months, have you or your family members you live with been unable to get utilities (heat, electricity) when it was really needed?: No   Food Insecurity: Low Risk  (1/13/2025)    Food Insecurity     Within the past 12 months, did you worry that your food would run out before you got money to buy more?: No     Within the past 12 months, did the food you bought just not last and you didn t have money to get more?: No   Transportation Needs: Low Risk  (1/13/2025)    Transportation Needs     Within the past 12 months, has lack of transportation kept you from medical appointments, getting your medicines, non-medical meetings or appointments, work, or from getting things that you need?: No   Physical Activity: Unknown (1/13/2025)    Exercise Vital Sign     Days of Exercise per Week: 4 days     Minutes of Exercise per Session: Not on file   Stress: Stress Concern Present (1/13/2025)    [employer] -  Occupational Stress Questionnaire     Feeling of Stress : To some extent   Social Connections: Unknown (1/13/2025)    Social Connection and Isolation Panel [NHANES]     Frequency of Communication with Friends and Family: Not on file     Frequency of Social Gatherings with Friends and Family: More than three times a week     Attends Roman Catholic Services: Not on file     Active Member of Clubs or Organizations: Not on file     Attends Club or Organization Meetings: Not on file     Marital Status: Not on file   Interpersonal Safety: Low Risk  (1/13/2025)    Interpersonal Safety     Do you feel physically and emotionally safe where you currently live?: Yes     Within the past 12 months, have you been hit, slapped, kicked or otherwise physically hurt by someone?: No     Within the past 12 months, have you been humiliated or emotionally abused in other ways by your partner or ex-partner?: No   Housing Stability: Low Risk  (1/13/2025)    Housing Stability     Do you have housing? : Yes     Are you worried about losing your housing?: No       Family History:  Family History   Problem Relation Age of Onset    No Known Problems Mother     Hypertension Father     Cancer Father     CABG Father     No Known Problems Sister     Asthma Brother     Hypertension Brother     Cancer Maternal Grandmother     Cancer Maternal Grandfather     Colon Cancer Paternal Grandmother     Cancer Paternal Grandfather     Thyroid Disease Daughter     No Known Problems Son     No Known Problems Son     Colon Cancer Paternal Aunt     Colon Cancer Cousin        Review of Systems:  In the last TWO WEEKS have you experienced any of the following symptoms?  Fevers: (Patient-Rptd) No  Night Sweats: (Patient-Rptd) No  Weight Gain: (Patient-Rptd) No  Pain at Night: (Patient-Rptd) No  Double Vision: (Patient-Rptd) No  Changes in Vision: (Patient-Rptd) No  Difficulty Breathing through Nose: (Patient-Rptd) Yes  Sore Throat in Morning: (Patient-Rptd) No  Dry Mouth  "in the Morning: (Patient-Rptd) Yes  Shortness of Breath Lying Flat: (Patient-Rptd) No  Shortness of Breath With Activity: (Patient-Rptd) Yes  Awakening with Shortness of Breath: (Patient-Rptd) No  Increased Cough: (Patient-Rptd) No  Heart Racing at Night: (Patient-Rptd) No  Swelling in Feet or Legs: (Patient-Rptd) No  Diarrhea at Night: (Patient-Rptd) No  Heartburn at Night: (Patient-Rptd) No  Urinating More than Once at Night: (Patient-Rptd) No  Losing Control of Urine at Night: (Patient-Rptd) No  Joint Pains at Night: (Patient-Rptd) No  Headaches in Morning: (Patient-Rptd) No  Weakness in Arms or Legs: (Patient-Rptd) No  Depressed Mood: (Patient-Rptd) No  Anxiety: (Patient-Rptd) Yes     Physical Examination:  Vitals: /74   Pulse 70   Ht 1.676 m (5' 6\")   Wt 56.2 kg (124 lb)   LMP  (LMP Unknown)   SpO2 99%   BMI 20.01 kg/m    BMI= Body mass index is 20.01 kg/m .  General appearance: Awake, alert, cooperative. Well groomed. Sitting comfortably in chair. In no apparent distress.  HEENT: Head: Normocephalic, atraumatic. Eyes: PERRL. Conjunctiva clear. Sclera normal. Nose: External appearance normal.  Neck: Neck Cir (cm): 31 cm (2/19/2025  9:33 AM)  Skin:  No rashes or significant lesions.   Neurologic: Alert, oriented x3. No focal neurological deficit. Gait normal.   Psychiatric: Mood euthymic. Affect congruent with full range and intensity.           Data: All pertinent previous laboratory data reviewed     Recent Labs   Lab Test 02/17/25  1437 02/07/25  1052    135   POTASSIUM 4.5 4.7   CHLORIDE 105 99   CO2 25 25   ANIONGAP 10 11   GLC 84 90   BUN 19.3 27.6*   CR 1.14* 1.40*   MERRILL 9.3 9.8       Recent Labs   Lab Test 01/13/25  1007   WBC 7.3   RBC 4.31   HGB 12.6   HCT 38.4   MCV 89   MCH 29.2   MCHC 32.8   RDW 12.6          Recent Labs   Lab Test 08/18/22  1145   PROTTOTAL 6.9   ALBUMIN 3.9   BILITOTAL 0.4   ALKPHOS 94   AST 20   ALT 20       TSH   Date Value   01/13/2025 1.07 uIU/mL " "  09/26/2023 0.74 uIU/mL   09/27/2018 0.59 mU/L   12/06/2017 0.73 mU/L       No results found for: \"UAMP\", \"UBARB\", \"BENZODIAZEUR\", \"UCANN\", \"UCOC\", \"OPIT\", \"UPCP\"    Iron Sat Index   Date/Time Value Ref Range Status   02/27/2024 10:03 AM 28 15 - 46 % Final     Ferritin   Date/Time Value Ref Range Status   02/27/2024 10:03 AM 77 11 - 328 ng/mL Final       No results found for: \"PH\", \"PHARTERIAL\", \"PO2\", \"[id number]\", \"SAT\", \"PCO2\", \"HCO3\", \"BASEEXCESS\", \"YADIRA\", \"BEB\"    @LABRCNTIPR(phv:4,pco2v:4,po2v:4,hco3v:4,merrill:4,o2per:4)@    Echocardiology:   Study Date: 09/12/2024 02:27 PM  Age: 69 yrs  Gender: Female  Patient [facility]  Reason For Study: Nonischemic cardiomyopathy (H)  Ordering Physician: JOHNY CEJA  Referring Physician: Gail Crespo Ra, APRN CNP  Performed By: Jn Christy     BSA: 1.6 m2  Height: 66 in  Weight: 122 lb  HR: 82  BP: 114/72 mmHg  ______________________________________________________________________________  Procedure  Limited Echo Adult. Optison (NDC #[id number]) given intravenously.     ______________________________________________________________________________  Interpretation Summary     The visual ejection fraction is 50-55%.  The right ventricle is normal in size and function.  There is no pericardial effusion.  The inferior vena cava was normal in size with preserved respiratory  variability.     Limited echo  Chest x-ray:   X-ray Chest 2 vws* 02/07/2025    Narrative  EXAM: XR CHEST 2 VIEWS  LOCATION: [facility]  DATE: 2/7/2025    INDICATION:  Nonischemic cardiomyopathy (H), Congestive heart failure, unspecified HF chronicity, unspecified heart failure type (H), SOB (shortness of breath), LBBB (left bundle branch block)  COMPARISON: 11/08/2023    Impression  IMPRESSION: Cardiac silhouette is within normal limits. Hyperinflated lungs are evidence of COPD. No infiltrate, effusion, or pneumothorax. No significant bony abnormalities.      Chest CT: " "No results found for this or any previous visit from the past 365 days.      PFT: Most Recent [place] Pulmonary Function Testing    FVC-Pred   Date Value Ref Range Status   05/10/2024 2.82 L      FVC-Pre   Date Value Ref Range Status   05/10/2024 3.58 L      FVC-%Pred-Pre   Date Value Ref Range Status   05/10/2024 126 %      FEV1-Pre   Date Value Ref Range Status   05/10/2024 2.43 L      FEV1-%Pred-Pre   Date Value Ref Range Status   05/10/2024 110 %      FEV1FVC-Pred   Date Value Ref Range Status   05/10/2024 79 %      FEV1FVC-Pre   Date Value Ref Range Status   05/10/2024 68 %      No results found for: \"20029\"  FEFMax-Pred   Date Value Ref Range Status   05/10/2024 5.98 L/sec      FEFMax-Pre   Date Value Ref Range Status   05/10/2024 6.28 L/sec      FEFMax-%Pred-Pre   Date Value Ref Range Status   05/10/2024 104 %      ExpTime-Pre   Date Value Ref Range Status   05/10/2024 7.91 sec      FIFMax-Pre   Date Value Ref Range Status   05/10/2024 4.41 L/sec      FEV1FEV6-Pred   Date Value Ref Range Status   05/10/2024 79 %      FEV1FEV6-Pre   Date Value Ref Range Status   05/10/2024 69 %      No results found for: \"20055\"      Landy Dockery PA-C 2/19/2025     [facility]  [address] [address]     [facility]  [address] [address]    Chart documentation was completed, in part, with [employer] voice-recognition software. Even though reviewed, some grammatical, spelling, and word errors may remain.    45 minutes spent on day of encounter reviewing medical records, history and physical examination, review of previous testing and interpretation, documentation and further activities as noted above    "

## 2025-02-19 NOTE — NURSING NOTE
"Chief Complaint   Patient presents with    Sleep Problem     Referred by cardiologist, insomnia        Initial /74   Pulse 70   Ht 1.676 m (5' 6\")   Wt 56.2 kg (124 lb)   LMP  (LMP Unknown)   SpO2 99%   BMI 20.01 kg/m   Estimated body mass index is 20.01 kg/m  as calculated from the following:    Height as of this encounter: 1.676 m (5' 6\").    Weight as of this encounter: 56.2 kg (124 lb).    Medication Reconciliation: complete  ESS 0  ADRIAN 26  Neck circumference: 12.25 inches / 31 centimeters.  Sarah Cool MA    "

## 2025-03-30 ASSESSMENT — SLEEP AND FATIGUE QUESTIONNAIRES
HOW LIKELY ARE YOU TO NOD OFF OR FALL ASLEEP WHILE SITTING QUIETLY AFTER LUNCH WITHOUT ALCOHOL: WOULD NEVER DOZE
HOW LIKELY ARE YOU TO NOD OFF OR FALL ASLEEP WHILE SITTING AND TALKING TO SOMEONE: WOULD NEVER DOZE
HOW LIKELY ARE YOU TO NOD OFF OR FALL ASLEEP WHILE SITTING INACTIVE IN A PUBLIC PLACE: WOULD NEVER DOZE
HOW LIKELY ARE YOU TO NOD OFF OR FALL ASLEEP WHILE SITTING AND READING: WOULD NEVER DOZE
HOW LIKELY ARE YOU TO NOD OFF OR FALL ASLEEP IN A CAR, WHILE STOPPED FOR A FEW MINUTES IN TRAFFIC: WOULD NEVER DOZE
HOW LIKELY ARE YOU TO NOD OFF OR FALL ASLEEP WHILE WATCHING TV: WOULD NEVER DOZE
HOW LIKELY ARE YOU TO NOD OFF OR FALL ASLEEP WHILE LYING DOWN TO REST IN THE AFTERNOON WHEN CIRCUMSTANCES PERMIT: WOULD NEVER DOZE
HOW LIKELY ARE YOU TO NOD OFF OR FALL ASLEEP WHEN YOU ARE A PASSENGER IN A CAR FOR AN HOUR WITHOUT A BREAK: WOULD NEVER DOZE

## 2025-03-30 NOTE — PROGRESS NOTES
Cardiology Clinic Progress Note  Mar Zeng MRN# [id number]   YOB: 1955 Age: 69 year old   Primary Cardiologist: Dr. Tinoco  Reason for visit: 1 month follow up             Assessment and Plan:   Mar Zeng is a 69 year old female who is here today for 1 month follow up.      1.  Nonischemic Cardiomyopathy, History of HFrEF with improved EF from 25-30% to 50-55% 9/2024 - cMRI 2023 with LVEF 32%, severe global hypokinesis of LV, severe nonischemic cardiomyopathy, no late enhancement to suggest prior infarct, inflation, or infiltration, mild mitral regurgitation.    - Appears euvolemic, takes furosemide 20 mg as needed for weight gain/swelling/shortness of breath  - GDMT: Entresto 49-51 mg half tablet in the a.m., full tablet in p.m., spironolactone 12.5 mg daily, Jardiance 10 mg daily    2.  Nonobstructive coronary artery disease - coronary angiogram 10/2023 with 20% LAD, 30-40% distal left circumflex. Denies any symptoms concerning of angina.  Aspirin/statin, sublingual nitroglycerin as needed for chest pain    3.  Chronic left bundle branch block - noted     4.  Concern for obstructive sleep apnea -sleep study scheduled for 4/4/2025        Changes today:   - Patient is here for 1 month follow-up. Today she reports some dyspnea on exertion and increased fatigue. She denies any orthopnea, chest pain/pressure, lower extremity edema, or shortness of breath at rest.  She appears euvolemic on exam. She has switched her furosemide to as needed and is concerned about her renal function.  Will recheck a BMP and a CBC per patient request.  Additionally will repeat echocardiogram.  Did offer Zio monitor today but patient would like to hold off at this time.  Chest x-ray 2/7/2025 did show hyperinflated lungs.  She is a former smoker and does have as needed albuterol which she has not tried when she is short of breath.  Discussed with patient to trial as needed albuterol for shortness of breath  and see if this helps her symptoms.    Follow up plan:   - Follow up in 3 months or sooner if needed       Debby Holden PA-C  [facility]        History of Presenting Illness:    Mar Zeng is a very pleasant 69 year old female with nonischemic cardiomyopathy, chronic heart failure with improved ejection fraction from 25-30% to 50-55%, chronic left bundle branch block, ADD on Adderall, asthma, and rheumatic fever as a child.    She became known to our clinic in 2023 during an admission for NSTEMI and heart failure with reduced ejection fraction.  TTE at that time showed LVEF of 30-35%, severe inferior wall hypokinesis, moderate to severe global hypokinesia of left ventricle, and moderately severe mitral regurgitation.  She was recommended coronary angiogram which showed nonobstructive coronary artery disease.  Following admission she was enrolled in [facility].  She was recommended cardiac MRI which was completed 11/2023 and notable for LVEF 32%, severe global hypokinesis of left ventricle, moderate left atrial enlargement, mild mitral regurgitation, and no late enhancement to suggest prior infarct, inflammation, or infiltration.  Repeat TTE 12/2023 showed LVEF 36%, moderate to severe global hypokinesia of left ventricle, no significant valvular heart disease.  She was started on GDMT but did not did not tolerate beta-blockers due to fatigue.  Entresto was able to be increased to 49-51 mg twice daily though this did have to be decreased to half tablet in the a.m. and continued on full tablet in the p.m. She was referred to EP for possible CRT therapy.  She met with EP 8/2024 in which she did not have an indication for CRT therapy or primary prevention ICD.  Recommended continued heart failure medical therapy.  She was seen in general cardiology the same month in which she continued to note fatigue that was limiting her daily life.  Her carvedilol was discontinued and recommended repeat  echo.  Repeat limited echo ordered and showed LVEF 50-55%, mild global hypokinesia of left ventricle.  Compared to prior LVEF improved.     She was seen at [facility] cardiology 12/2024 in which she had appeared mildly volume up on exam.  At this time she was started on Lasix 20 mg daily, which was briefly titrated up to 40 mg a day along with titrating up spironolactone 25 mg a day.  Unfortunately both medications had to be titrated back down to Lasix 20 mg daily and spironolactone 12.5 mg daily due to worsening renal function.     She followed up with Dr. Tinoco 2/7/2025 in which she noted continued shortness of breath and fluid retention.  She had been recommended to get NT proBNP and BMP as well as a chest x-ray.  NT proBNP 187 which is improved from 1745 in 2023.  BMP did show LILIANE and CKD.  Patient noted she was taking Lasix 10 mg daily and this was increased to 20 mg daily.  Repeat BMP with improvement in renal function.  She presents today for 1 month follow-up    Patient reports feeling fatigued today.  She notes that since her last follow-up in February she has been having dyspnea on exertion and fatigue.  For example when she is going upstairs she notes that she is getting short of breath.  She denies any orthopnea, shortness of breath at rest, lower extremity edema, abdominal bloating, chest pains/discomfort.  She reports she does not feel like she is retaining fluid.  She started taking her Lasix as needed about a week ago and would like to have her renal function rechecked. She has not trialed as needed inhalers at home when she is feeling short of breath.  She is a former smoker though she is quit many years ago.    Blood pressure 114/66 and HR 64 in clinic today. Weight today 120 lbs, stable.       Social History       Social History     Socioeconomic History    Marital status:      Spouse name: Not on file    Number of children: Not on file    Years of education: Not on file    Highest education  level: Not on file   Occupational History    Not on file   Tobacco Use    Smoking status: Former     Types: Cigarettes     Passive exposure: Past    Smokeless tobacco: Never   Vaping Use    Vaping status: Never Used   Substance and Sexual Activity    Alcohol use: Yes     Comment: occ    Drug use: No    Sexual activity: Yes     Partners: Male     Birth control/protection: Female Surgical   Other Topics Concern    Parent/sibling w/ CABG, MI or angioplasty before 65F 55M? No   Social History Narrative    [place] in [place]     Social Drivers of Health     Financial Resource Strain: Low Risk  (1/13/2025)    Financial Resource Strain     Within the past 12 months, have you or your family members you live with been unable to get utilities (heat, electricity) when it was really needed?: No   Food Insecurity: Low Risk  (1/13/2025)    Food Insecurity     Within the past 12 months, did you worry that your food would run out before you got money to buy more?: No     Within the past 12 months, did the food you bought just not last and you didn t have money to get more?: No   Transportation Needs: Low Risk  (1/13/2025)    Transportation Needs     Within the past 12 months, has lack of transportation kept you from medical appointments, getting your medicines, non-medical meetings or appointments, work, or from getting things that you need?: No   Physical Activity: Unknown (1/13/2025)    Exercise Vital Sign     Days of Exercise per Week: 4 days     Minutes of Exercise per Session: Not on file   Stress: Stress Concern Present (1/13/2025)    [employer] - Occupational Stress Questionnaire     Feeling of Stress : To some extent   Social Connections: Unknown (1/13/2025)    Social Connection and Isolation Panel [NHANES]     Frequency of Communication with Friends and Family: Not on file     Frequency of Social Gatherings with Friends and Family: More than three times a week     Attends Jainism Services: Not  on file     Active Member of Clubs or Organizations: Not on file     Attends Club or Organization Meetings: Not on file     Marital Status: Not on file   Interpersonal Safety: Low Risk  (1/13/2025)    Interpersonal Safety     Do you feel physically and emotionally safe where you currently live?: Yes     Within the past 12 months, have you been hit, slapped, kicked or otherwise physically hurt by someone?: No     Within the past 12 months, have you been humiliated or emotionally abused in other ways by your partner or ex-partner?: No   Housing Stability: Low Risk  (1/13/2025)    Housing Stability     Do you have housing? : Yes     Are you worried about losing your housing?: No            Review of Systems:   Please see HPI         Physical Exam:   Vitals: LMP  (LMP Unknown)    Wt Readings from Last 4 Encounters:   02/19/25 56.2 kg (124 lb)   02/17/25 55.9 kg (123 lb 3.2 oz)   02/07/25 54.7 kg (120 lb 11.2 oz)   02/03/25 54.4 kg (120 lb)     GEN: well nourished, in no acute distress.  HEENT:  Pupils equal, round. Sclerae nonicteric.   NECK: Supple, no masses appreciated. No JVD with patient.  C/V:  Regular rate and rhythm, no murmur, rub or gallop.    RESP: Respirations are unlabored. Clear to auscultation bilaterally without wheezing, rales, or rhonchi.  GI: Abdomen soft, nontender.  EXTREM: No LE edema.  NEURO: Alert and oriented, cooperative.  SKIN: Warm and dry.        Data:   LIPID RESULTS:  Lab Results   Component Value Date    CHOL 156 01/13/2025    CHOL 231 (H) 08/18/2020    HDL 72 01/13/2025    HDL 72 08/18/2020    LDL 69 01/13/2025     (H) 08/18/2020    TRIG 75 01/13/2025    TRIG 110 08/18/2020    CHOLHDLRATIO 2.3 09/03/2014     LIVER ENZYME RESULTS:  Lab Results   Component Value Date    AST 20 08/18/2022    AST 23 08/18/2020    ALT 20 08/18/2022    ALT 19 08/18/2020     CBC RESULTS:  Lab Results   Component Value Date    WBC 7.3 01/13/2025    WBC 5.8 10/16/2019    RBC 4.31 01/13/2025    RBC 4.17  10/16/2019    HGB 12.6 01/13/2025    HGB 12.5 10/16/2019    HCT 38.4 01/13/2025    HCT 38.2 10/16/2019    MCV 89 01/13/2025    MCV 92 10/16/2019    MCH 29.2 01/13/2025    MCH 30.0 10/16/2019    MCHC 32.8 01/13/2025    MCHC 32.7 10/16/2019    RDW 12.6 01/13/2025    RDW 12.7 10/16/2019     01/13/2025     10/16/2019     BMP RESULTS:  Lab Results   Component Value Date     02/17/2025     08/18/2020    POTASSIUM 4.5 02/17/2025    POTASSIUM 4.5 08/18/2022    POTASSIUM 4.1 08/18/2020    CHLORIDE 105 02/17/2025    CHLORIDE 103 08/18/2022    CHLORIDE 106 08/18/2020    CO2 25 02/17/2025    CO2 28 08/18/2022    CO2 27 08/18/2020    ANIONGAP 10 02/17/2025    ANIONGAP 4 08/18/2022    ANIONGAP 8 08/18/2020    GLC 84 02/17/2025    GLC 91 08/18/2022    GLC 82 08/18/2020    BUN 19.3 02/17/2025    BUN 14 08/18/2022    BUN 11 08/18/2020    CR 1.14 (H) 02/17/2025    CR 0.80 08/18/2020    GFRESTIMATED 52 (L) 02/17/2025    GFRESTIMATED 77 08/18/2020    GFRESTBLACK 89 08/18/2020    MERRILL 9.3 02/17/2025    MERRILL 8.8 08/18/2020      A1C RESULTS:  Lab Results   Component Value Date    A1C 5.6 12/04/2017     INR RESULTS:  Lab Results   Component Value Date    INR 1.05 01/23/2014            Medications     Current Outpatient Medications   Medication Sig Dispense Refill    albuterol (ACCUNEB) 0.63 MG/3ML neb solution Take 3 mLs (0.63 mg) by nebulization every 6 hours as needed for shortness of breath, wheezing or cough 90 mL 0    albuterol (PROAIR HFA/PROVENTIL HFA/VENTOLIN HFA) 108 (90 Base) MCG/ACT inhaler Inhale 2 puffs into the lungs every 4 hours as needed for shortness of breath, wheezing or cough 18 g 3    aspirin 81 MG EC tablet Take 1 tablet (81 mg) by mouth daily 90 tablet 3    atorvastatin (LIPITOR) 40 MG tablet Take 1 tablet (40 mg) by mouth every evening. 90 tablet 3    azelastine (ASTELIN) 0.1 % nasal spray Spray 1 spray into both nostrils 2 times daily. 30 mL 0    budesonide (PULMICORT) 0.5 MG/2ML neb  solution Take 0.5 mg by nebulization as needed      buPROPion (WELLBUTRIN XL) 150 MG 24 hr tablet TAKE 1 TABLET(150 MG) BY MOUTH EVERY MORNING 90 tablet 3    Calcium Carbonate-Vit D-Min (CALCIUM 1200 PO) Take by mouth daily.      cetirizine (ZYRTEC) 10 MG tablet Take 1 tablet (10 mg) by mouth daily (Patient not taking: Reported on 2/17/2025) 30 tablet 2    empagliflozin (JARDIANCE) 10 MG TABS tablet Take 1 tablet (10 mg) by mouth daily 90 tablet 3    estradiol (ESTRACE) 0.1 MG/GM vaginal cream Place 1 g vaginally twice a week. Using fingertip OR applicator (per patient comfort) 42.5 g 4    fluorouracil (EFUDEX) 5 % external cream APPLY SPARINGLY EXTERNALLY TO FACE EVERY SUNDAY AT NIGHT. AVOID EYES AND LIPS      furosemide (LASIX) 20 MG tablet Take 1 tablet (20 mg) by mouth daily as needed (weight gain of 3 pounds in one day or 5 pounds in 1 week). 30 tablet 11    Levocetirizine Dihydrochloride (XYZAL PO) Take by mouth daily. Dosage unknown      LORazepam (ATIVAN) 1 MG tablet Take 0.5 tablets (0.5 mg) by mouth as needed for anxiety. 10 tablet 0    nitroGLYcerin (NITROSTAT) 0.4 MG sublingual tablet For chest pain place 1 tablet under the tongue every 5 minutes for 3 doses. If symptoms persist 5 minutes after 1st dose call 911. 30 tablet 11    sacubitril-valsartan (ENTRESTO) 49-51 MG per tablet Take half tab in AM, full tab in  tablet 3    spironolactone (ALDACTONE) 25 MG tablet Take 0.5 tablets (12.5 mg) by mouth daily 45 tablet 4    timolol maleate (TIMOPTIC) 0.5 % ophthalmic solution Apply 1 drop topically nightly as needed (dry skin cracks) Apply 1 drop to lesion every night at bedtime. Do Not use on normal skin.            Past Medical History     Past Medical History:   Diagnosis Date    Mumps     NO ACTIVE PROBLEMS      Past Surgical History:   Procedure Laterality Date    COLONOSCOPY  8/23/2016    Dr. Shaw [facility]    COLONOSCOPY N/A 8/23/2016    Procedure: COLONOSCOPY;  Surgeon: Peter Shaw MD;   Location: [facility]    COLONOSCOPY N/A 10/19/2022    Procedure: COLONOSCOPY with polypectomy using cold exacto snare;  Surgeon: Peter Shaw MD;  Location:  GI    CV CORONARY ANGIOGRAM N/A 10/27/2023    Procedure: Coronary Angiogram;  Surgeon: Elmer Andre MD;  Location:  HEART [facility]ARD[facility] CATH LAB    FINGER SURGERY      right little finger joint replacement    HYSTERECTOMY VAGINAL  age 30    retained ovaries. no cervix     HYSTERECTOMY, PAP NO LONGER INDICATED      ORTHOPEDIC SURGERY      SURGICAL HISTORY OF -   10/25/13    left index cyst removal    SURGICAL HISTORY OF -       Bilat knee miniscus repair     Family History   Problem Relation Age of Onset    No Known Problems Mother     Hypertension Father     Cancer Father     CABG Father     No Known Problems Sister     Asthma Brother     Hypertension Brother     Cancer Maternal Grandmother     Cancer Maternal Grandfather     Colon Cancer Paternal Grandmother     Cancer Paternal Grandfather     Thyroid Disease Daughter     No Known Problems Son     No Known Problems Son     Colon Cancer Paternal Aunt     Colon Cancer Cousin             Allergies   Codeine, Concerta [methylphenidate], Hay fever & [a.r.m.], and Morphine      35 minutes spent on the date of the encounter doing chart review, history and exam, documentation and further activities as noted above

## 2025-04-01 ENCOUNTER — OFFICE VISIT (OUTPATIENT)
Dept: CARDIOLOGY | Facility: CLINIC | Age: 70
End: 2025-04-01
Attending: INTERNAL MEDICINE
Payer: MEDICARE

## 2025-04-01 ENCOUNTER — TELEPHONE (OUTPATIENT)
Dept: CARDIOLOGY | Facility: CLINIC | Age: 70
End: 2025-04-01

## 2025-04-01 ENCOUNTER — LAB (OUTPATIENT)
Dept: LAB | Facility: CLINIC | Age: 70
End: 2025-04-01
Payer: MEDICARE

## 2025-04-01 VITALS
HEIGHT: 66 IN | BODY MASS INDEX: 19.37 KG/M2 | WEIGHT: 120.5 LBS | DIASTOLIC BLOOD PRESSURE: 66 MMHG | HEART RATE: 64 BPM | SYSTOLIC BLOOD PRESSURE: 114 MMHG

## 2025-04-01 DIAGNOSIS — I50.9 CONGESTIVE HEART FAILURE, UNSPECIFIED HF CHRONICITY, UNSPECIFIED HEART FAILURE TYPE (H): ICD-10-CM

## 2025-04-01 DIAGNOSIS — R06.02 SOB (SHORTNESS OF BREATH): ICD-10-CM

## 2025-04-01 LAB
ANION GAP SERPL CALCULATED.3IONS-SCNC: 9 MMOL/L (ref 7–15)
BUN SERPL-MCNC: 18.5 MG/DL (ref 8–23)
CALCIUM SERPL-MCNC: 9.3 MG/DL (ref 8.8–10.4)
CHLORIDE SERPL-SCNC: 103 MMOL/L (ref 98–107)
CREAT SERPL-MCNC: 0.96 MG/DL (ref 0.51–0.95)
EGFRCR SERPLBLD CKD-EPI 2021: 63 ML/MIN/1.73M2
ERYTHROCYTE [DISTWIDTH] IN BLOOD BY AUTOMATED COUNT: 12.5 % (ref 10–15)
GLUCOSE SERPL-MCNC: 90 MG/DL (ref 70–99)
HCO3 SERPL-SCNC: 25 MMOL/L (ref 22–29)
HCT VFR BLD AUTO: 37.6 % (ref 35–47)
HGB BLD-MCNC: 12 G/DL (ref 11.7–15.7)
MCH RBC QN AUTO: 29.1 PG (ref 26.5–33)
MCHC RBC AUTO-ENTMCNC: 31.9 G/DL (ref 31.5–36.5)
MCV RBC AUTO: 91 FL (ref 78–100)
PLATELET # BLD AUTO: 367 10E3/UL (ref 150–450)
POTASSIUM SERPL-SCNC: 4.5 MMOL/L (ref 3.4–5.3)
RBC # BLD AUTO: 4.12 10E6/UL (ref 3.8–5.2)
SODIUM SERPL-SCNC: 137 MMOL/L (ref 135–145)
WBC # BLD AUTO: 10.3 10E3/UL (ref 4–11)

## 2025-04-01 PROCEDURE — 99214 OFFICE O/P EST MOD 30 MIN: CPT | Performed by: STUDENT IN AN ORGANIZED HEALTH CARE EDUCATION/TRAINING PROGRAM

## 2025-04-01 PROCEDURE — 3074F SYST BP LT 130 MM HG: CPT | Performed by: STUDENT IN AN ORGANIZED HEALTH CARE EDUCATION/TRAINING PROGRAM

## 2025-04-01 PROCEDURE — 36415 COLL VENOUS BLD VENIPUNCTURE: CPT | Performed by: STUDENT IN AN ORGANIZED HEALTH CARE EDUCATION/TRAINING PROGRAM

## 2025-04-01 PROCEDURE — 80048 BASIC METABOLIC PNL TOTAL CA: CPT | Performed by: STUDENT IN AN ORGANIZED HEALTH CARE EDUCATION/TRAINING PROGRAM

## 2025-04-01 PROCEDURE — 3078F DIAST BP <80 MM HG: CPT | Performed by: STUDENT IN AN ORGANIZED HEALTH CARE EDUCATION/TRAINING PROGRAM

## 2025-04-01 PROCEDURE — 85027 COMPLETE CBC AUTOMATED: CPT | Performed by: STUDENT IN AN ORGANIZED HEALTH CARE EDUCATION/TRAINING PROGRAM

## 2025-04-01 NOTE — PATIENT INSTRUCTIONS
It was nice seeing you today, please call [phone] if you have any questions or concerns     Plan   No medications changes today  Get labs today   If you are needing >4-5 days of furosemide (lasix) let cardiology know   Schedule Echocardiogram (heart ultrasound)  Follow up    - Follow up in 3 months or sooner if needed   - Scheduling phone number [phone]    Debby Holden PA-C  [facility]

## 2025-04-01 NOTE — TELEPHONE ENCOUNTER
----- Message from Debby Holden sent at 4/1/2025  9:17 AM CDT -----  Results reviewed.  Can we call patient and let her know that her CBC and BMP look good.  Hemoglobin is within normal limits, no anemia.  Renal function improved paired to prior.  Electrolytes within normal limits.    Thanks  Debby Holden PA-C  4/1/2025 9:17 AM

## 2025-04-01 NOTE — LETTER
4/1/2025    Gail Crespo, APRN CNP  [address]  [facility] [address]    RE: Mra Zeng       Dear Colleague,     I had the pleasure of seeing Mar Zeng in the [facility].  Cardiology Clinic Progress Note  Mar Zeng MRN# [id number]   YOB: 1955 Age: 69 year old   Primary Cardiologist: Dr. Tinoco  Reason for visit: 1 month follow up             Assessment and Plan:   Mar Zeng is a 69 year old female who is here today for 1 month follow up.      1.  Nonischemic Cardiomyopathy, History of HFrEF with improved EF from 25-30% to 50-55% 9/2024 - cMRI 2023 with LVEF 32%, severe global hypokinesis of LV, severe nonischemic cardiomyopathy, no late enhancement to suggest prior infarct, inflation, or infiltration, mild mitral regurgitation.    - Appears euvolemic, takes furosemide 20 mg as needed for weight gain/swelling/shortness of breath  - GDMT: Entresto 49-51 mg half tablet in the a.m., full tablet in p.m., spironolactone 12.5 mg daily, Jardiance 10 mg daily    2.  Nonobstructive coronary artery disease - coronary angiogram 10/2023 with 20% LAD, 30-40% distal left circumflex. Denies any symptoms concerning of angina.  Aspirin/statin, sublingual nitroglycerin as needed for chest pain    3.  Chronic left bundle branch block - noted     4.  Concern for obstructive sleep apnea -sleep study scheduled for 4/4/2025        Changes today:   - Patient is here for 1 month follow-up. Today she reports some dyspnea on exertion and increased fatigue. She denies any orthopnea, chest pain/pressure, lower extremity edema, or shortness of breath at rest.  She appears euvolemic on exam. She has switched her furosemide to as needed and is concerned about her renal function.  Will recheck a BMP and a CBC per patient request.  Additionally will repeat echocardiogram.  Did offer Zio monitor today but patient would like to hold off at this time.  Chest x-ray  2/7/2025 did show hyperinflated lungs.  She is a former smoker and does have as needed albuterol which she has not tried when she is short of breath.  Discussed with patient to trial as needed albuterol for shortness of breath and see if this helps her symptoms.    Follow up plan:   - Follow up in 3 months or sooner if needed       Debby Holden PA-C  [facility] - Heart Care        History of Presenting Illness:    Mar Zeng is a very pleasant 69 year old female with nonischemic cardiomyopathy, chronic heart failure with improved ejection fraction from 25-30% to 50-55%, chronic left bundle branch block, ADD on Adderall, asthma, and rheumatic fever as a child.    She became known to our clinic in 2023 during an admission for NSTEMI and heart failure with reduced ejection fraction.  TTE at that time showed LVEF of 30-35%, severe inferior wall hypokinesis, moderate to severe global hypokinesia of left ventricle, and moderately severe mitral regurgitation.  She was recommended coronary angiogram which showed nonobstructive coronary artery disease.  Following admission she was enrolled in CORE.  She was recommended cardiac MRI which was completed 11/2023 and notable for LVEF 32%, severe global hypokinesis of left ventricle, moderate left atrial enlargement, mild mitral regurgitation, and no late enhancement to suggest prior infarct, inflammation, or infiltration.  Repeat TTE 12/2023 showed LVEF 36%, moderate to severe global hypokinesia of left ventricle, no significant valvular heart disease.  She was started on GDMT but did not did not tolerate beta-blockers due to fatigue.  Entresto was able to be increased to 49-51 mg twice daily though this did have to be decreased to half tablet in the a.m. and continued on full tablet in the p.m. She was referred to EP for possible CRT therapy.  She met with EP 8/2024 in which she did not have an indication for CRT therapy or primary prevention ICD.   Recommended continued heart failure medical therapy.  She was seen in general cardiology the same month in which she continued to note fatigue that was limiting her daily life.  Her carvedilol was discontinued and recommended repeat echo.  Repeat limited echo ordered and showed LVEF 50-55%, mild global hypokinesia of left ventricle.  Compared to prior LVEF improved.     She was seen at [facility] cardiology 12/2024 in which she had appeared mildly volume up on exam.  At this time she was started on Lasix 20 mg daily, which was briefly titrated up to 40 mg a day along with titrating up spironolactone 25 mg a day.  Unfortunately both medications had to be titrated back down to Lasix 20 mg daily and spironolactone 12.5 mg daily due to worsening renal function.     She followed up with Dr. Tinoco 2/7/2025 in which she noted continued shortness of breath and fluid retention.  She had been recommended to get NT proBNP and BMP as well as a chest x-ray.  NT proBNP 187 which is improved from 1745 in 2023.  BMP did show LILIANE and CKD.  Patient noted she was taking Lasix 10 mg daily and this was increased to 20 mg daily.  Repeat BMP with improvement in renal function.  She presents today for 1 month follow-up    Patient reports feeling fatigued today.  She notes that since her last follow-up in February she has been having dyspnea on exertion and fatigue.  For example when she is going upstairs she notes that she is getting short of breath.  She denies any orthopnea, shortness of breath at rest, lower extremity edema, abdominal bloating, chest pains/discomfort.  She reports she does not feel like she is retaining fluid.  She started taking her Lasix as needed about a week ago and would like to have her renal function rechecked. She has not trialed as needed inhalers at home when she is feeling short of breath.  She is a former smoker though she is quit many years ago.    Blood pressure 114/66 and HR 64 in clinic today. Weight today  120 lbs, stable.       Social History       Social History     Socioeconomic History     Marital status:      Spouse name: Not on file     Number of children: Not on file     Years of education: Not on file     Highest education level: Not on file   Occupational History     Not on file   Tobacco Use     Smoking status: Former     Types: Cigarettes     Passive exposure: Past     Smokeless tobacco: Never   Vaping Use     Vaping status: Never Used   Substance and Sexual Activity     Alcohol use: Yes     Comment: occ     Drug use: No     Sexual activity: Yes     Partners: Male     Birth control/protection: Female Surgical   Other Topics Concern     Parent/sibling w/ CABG, MI or angioplasty before 65F 55M? No   Social History Narrative    [place] in [place]     Social Drivers of Health     Financial Resource Strain: Low Risk  (1/13/2025)    Financial Resource Strain      Within the past 12 months, have you or your family members you live with been unable to get utilities (heat, electricity) when it was really needed?: No   Food Insecurity: Low Risk  (1/13/2025)    Food Insecurity      Within the past 12 months, did you worry that your food would run out before you got money to buy more?: No      Within the past 12 months, did the food you bought just not last and you didn t have money to get more?: No   Transportation Needs: Low Risk  (1/13/2025)    Transportation Needs      Within the past 12 months, has lack of transportation kept you from medical appointments, getting your medicines, non-medical meetings or appointments, work, or from getting things that you need?: No   Physical Activity: Unknown (1/13/2025)    Exercise Vital Sign      Days of Exercise per Week: 4 days      Minutes of Exercise per Session: Not on file   Stress: Stress Concern Present (1/13/2025)    [employer] [place] [employer] - Occupational Stress Questionnaire      Feeling of Stress : To some extent   Social Connections: Unknown  (1/13/2025)    Social Connection and Isolation Panel [NHANES]      Frequency of Communication with Friends and Family: Not on file      Frequency of Social Gatherings with Friends and Family: More than three times a week      Attends Druze Services: Not on file      Active Member of Clubs or Organizations: Not on file      Attends Club or Organization Meetings: Not on file      Marital Status: Not on file   Interpersonal Safety: Low Risk  (1/13/2025)    Interpersonal Safety      Do you feel physically and emotionally safe where you currently live?: Yes      Within the past 12 months, have you been hit, slapped, kicked or otherwise physically hurt by someone?: No      Within the past 12 months, have you been humiliated or emotionally abused in other ways by your partner or ex-partner?: No   Housing Stability: Low Risk  (1/13/2025)    Housing Stability      Do you have housing? : Yes      Are you worried about losing your housing?: No            Review of Systems:   Please see HPI         Physical Exam:   Vitals: LMP  (LMP Unknown)    Wt Readings from Last 4 Encounters:   02/19/25 56.2 kg (124 lb)   02/17/25 55.9 kg (123 lb 3.2 oz)   02/07/25 54.7 kg (120 lb 11.2 oz)   02/03/25 54.4 kg (120 lb)     GEN: well nourished, in no acute distress.  HEENT:  Pupils equal, round. Sclerae nonicteric.   NECK: Supple, no masses appreciated. No JVD with patient.  C/V:  Regular rate and rhythm, no murmur, rub or gallop.    RESP: Respirations are unlabored. Clear to auscultation bilaterally without wheezing, rales, or rhonchi.  GI: Abdomen soft, nontender.  EXTREM: No LE edema.  NEURO: Alert and oriented, cooperative.  SKIN: Warm and dry.        Data:   LIPID RESULTS:  Lab Results   Component Value Date    CHOL 156 01/13/2025    CHOL 231 (H) 08/18/2020    HDL 72 01/13/2025    HDL 72 08/18/2020    LDL 69 01/13/2025     (H) 08/18/2020    TRIG 75 01/13/2025    TRIG 110 08/18/2020    CHOLHDLRATIO 2.3 09/03/2014     LIVER  ENZYME RESULTS:  Lab Results   Component Value Date    AST 20 08/18/2022    AST 23 08/18/2020    ALT 20 08/18/2022    ALT 19 08/18/2020     CBC RESULTS:  Lab Results   Component Value Date    WBC 7.3 01/13/2025    WBC 5.8 10/16/2019    RBC 4.31 01/13/2025    RBC 4.17 10/16/2019    HGB 12.6 01/13/2025    HGB 12.5 10/16/2019    HCT 38.4 01/13/2025    HCT 38.2 10/16/2019    MCV 89 01/13/2025    MCV 92 10/16/2019    MCH 29.2 01/13/2025    MCH 30.0 10/16/2019    MCHC 32.8 01/13/2025    MCHC 32.7 10/16/2019    RDW 12.6 01/13/2025    RDW 12.7 10/16/2019     01/13/2025     10/16/2019     BMP RESULTS:  Lab Results   Component Value Date     02/17/2025     08/18/2020    POTASSIUM 4.5 02/17/2025    POTASSIUM 4.5 08/18/2022    POTASSIUM 4.1 08/18/2020    CHLORIDE 105 02/17/2025    CHLORIDE 103 08/18/2022    CHLORIDE 106 08/18/2020    CO2 25 02/17/2025    CO2 28 08/18/2022    CO2 27 08/18/2020    ANIONGAP 10 02/17/2025    ANIONGAP 4 08/18/2022    ANIONGAP 8 08/18/2020    GLC 84 02/17/2025    GLC 91 08/18/2022    GLC 82 08/18/2020    BUN 19.3 02/17/2025    BUN 14 08/18/2022    BUN 11 08/18/2020    CR 1.14 (H) 02/17/2025    CR 0.80 08/18/2020    GFRESTIMATED 52 (L) 02/17/2025    GFRESTIMATED 77 08/18/2020    GFRESTBLACK 89 08/18/2020    MERRILL 9.3 02/17/2025    MERRILL 8.8 08/18/2020      A1C RESULTS:  Lab Results   Component Value Date    A1C 5.6 12/04/2017     INR RESULTS:  Lab Results   Component Value Date    INR 1.05 01/23/2014            Medications     Current Outpatient Medications   Medication Sig Dispense Refill     albuterol (ACCUNEB) 0.63 MG/3ML neb solution Take 3 mLs (0.63 mg) by nebulization every 6 hours as needed for shortness of breath, wheezing or cough 90 mL 0     albuterol (PROAIR HFA/PROVENTIL HFA/VENTOLIN HFA) 108 (90 Base) MCG/ACT inhaler Inhale 2 puffs into the lungs every 4 hours as needed for shortness of breath, wheezing or cough 18 g 3     aspirin 81 MG EC tablet Take 1 tablet (81  mg) by mouth daily 90 tablet 3     atorvastatin (LIPITOR) 40 MG tablet Take 1 tablet (40 mg) by mouth every evening. 90 tablet 3     azelastine (ASTELIN) 0.1 % nasal spray Spray 1 spray into both nostrils 2 times daily. 30 mL 0     budesonide (PULMICORT) 0.5 MG/2ML neb solution Take 0.5 mg by nebulization as needed       buPROPion (WELLBUTRIN XL) 150 MG 24 hr tablet TAKE 1 TABLET(150 MG) BY MOUTH EVERY MORNING 90 tablet 3     Calcium Carbonate-Vit D-Min (CALCIUM 1200 PO) Take by mouth daily.       cetirizine (ZYRTEC) 10 MG tablet Take 1 tablet (10 mg) by mouth daily (Patient not taking: Reported on 2/17/2025) 30 tablet 2     empagliflozin (JARDIANCE) 10 MG TABS tablet Take 1 tablet (10 mg) by mouth daily 90 tablet 3     estradiol (ESTRACE) 0.1 MG/GM vaginal cream Place 1 g vaginally twice a week. Using fingertip OR applicator (per patient comfort) 42.5 g 4     fluorouracil (EFUDEX) 5 % external cream APPLY SPARINGLY EXTERNALLY TO FACE EVERY SUNDAY AT NIGHT. AVOID EYES AND LIPS       furosemide (LASIX) 20 MG tablet Take 1 tablet (20 mg) by mouth daily as needed (weight gain of 3 pounds in one day or 5 pounds in 1 week). 30 tablet 11     Levocetirizine Dihydrochloride (XYZAL PO) Take by mouth daily. Dosage unknown       LORazepam (ATIVAN) 1 MG tablet Take 0.5 tablets (0.5 mg) by mouth as needed for anxiety. 10 tablet 0     nitroGLYcerin (NITROSTAT) 0.4 MG sublingual tablet For chest pain place 1 tablet under the tongue every 5 minutes for 3 doses. If symptoms persist 5 minutes after 1st dose call 911. 30 tablet 11     sacubitril-valsartan (ENTRESTO) 49-51 MG per tablet Take half tab in AM, full tab in  tablet 3     spironolactone (ALDACTONE) 25 MG tablet Take 0.5 tablets (12.5 mg) by mouth daily 45 tablet 4     timolol maleate (TIMOPTIC) 0.5 % ophthalmic solution Apply 1 drop topically nightly as needed (dry skin cracks) Apply 1 drop to lesion every night at bedtime. Do Not use on normal skin.            Past  Medical History     Past Medical History:   Diagnosis Date     Mumps      NO ACTIVE PROBLEMS      Past Surgical History:   Procedure Laterality Date     COLONOSCOPY  8/23/2016    Dr. Shaw [facility]     COLONOSCOPY N/A 8/23/2016    Procedure: COLONOSCOPY;  Surgeon: Peter Shaw MD;  Location:  GI     COLONOSCOPY N/A 10/19/2022    Procedure: COLONOSCOPY with polypectomy using cold exacto snare;  Surgeon: Peter Shaw MD;  Location:  GI     CV CORONARY ANGIOGRAM N/A 10/27/2023    Procedure: Coronary Angiogram;  Surgeon: Elmer Andre MD;  Location:  HEART CARDIAC CATH LAB     FINGER SURGERY      right little finger joint replacement     HYSTERECTOMY VAGINAL  age 30    retained ovaries. no cervix      HYSTERECTOMY, PAP NO LONGER INDICATED       ORTHOPEDIC SURGERY       SURGICAL HISTORY OF -   10/25/13    left index cyst removal     SURGICAL HISTORY OF -       Bilat knee miniscus repair     Family History   Problem Relation Age of Onset     No Known Problems Mother      Hypertension Father      Cancer Father      CABG Father      No Known Problems Sister      Asthma Brother      Hypertension Brother      Cancer Maternal Grandmother      Cancer Maternal Grandfather      Colon Cancer Paternal Grandmother      Cancer Paternal Grandfather      Thyroid Disease Daughter      No Known Problems Son      No Known Problems Son      Colon Cancer Paternal Aunt      Colon Cancer Cousin             Allergies   Codeine, Concerta [methylphenidate], Hay fever & [a.r.m.], and Morphine      35 minutes spent on the date of the encounter doing chart review, history and exam, documentation and further activities as noted above      Thank you for allowing me to participate in the care of your patient.      Sincerely,     Debby Holden PA-C     [facility] [place] [facility]  cc:   Yanira Tinoco DO  [address] [address]

## 2025-04-04 ENCOUNTER — THERAPY VISIT (OUTPATIENT)
Dept: SLEEP MEDICINE | Facility: CLINIC | Age: 70
End: 2025-04-04
Attending: PHYSICIAN ASSISTANT
Payer: MEDICARE

## 2025-04-04 ENCOUNTER — TRANSFERRED RECORDS (OUTPATIENT)
Dept: HEALTH INFORMATION MANAGEMENT | Facility: CLINIC | Age: 70
End: 2025-04-04

## 2025-04-04 DIAGNOSIS — F51.04 CHRONIC INSOMNIA: ICD-10-CM

## 2025-04-04 DIAGNOSIS — F41.1 GENERALIZED ANXIETY DISORDER: ICD-10-CM

## 2025-04-04 DIAGNOSIS — I50.22 CHRONIC SYSTOLIC HEART FAILURE (H): ICD-10-CM

## 2025-04-04 DIAGNOSIS — R06.83 SNORING: ICD-10-CM

## 2025-04-04 DIAGNOSIS — I42.8 NONISCHEMIC CARDIOMYOPATHY (H): ICD-10-CM

## 2025-04-09 ENCOUNTER — TELEPHONE (OUTPATIENT)
Dept: CARDIOLOGY | Facility: CLINIC | Age: 70
End: 2025-04-09
Payer: MEDICARE

## 2025-04-09 NOTE — TELEPHONE ENCOUNTER
"              After Visit Summary   2018    Alka Mcfadden    MRN: [id number]           Patient Information     Date Of Birth          1959        Visit Information        Provider Department      2018 3:00 PM Vaughn Eldridge[place] [facility]        Today's Diagnoses     Anxiety    -  1       Follow-ups after your visit        Who to contact     If you have questions or need follow up information about today's clinic visit or your schedule please contact [facility] directly at [phone].  Normal or non-critical lab and imaging results will be communicated to you by Beelinehart, letter or phone within 4 business days after the clinic has received the results. If you do not hear from us within 7 days, please contact the clinic through Beelinehart or phone. If you have a critical or abnormal lab result, we will notify you by phone as soon as possible.  Submit refill requests through Urjanet or call your pharmacy and they will forward the refill request to us. Please allow 3 business days for your refill to be completed.          Additional Information About Your Visit        MyChart Information     Urjanet lets you send messages to your doctor, view your test results, renew your prescriptions, schedule appointments and more. To sign up, go to [email or web address] . Click on \"Log in\" on the left side of the screen, which will take you to the Welcome page. Then click on \"Sign up Now\" on the right side of the page.     You will be asked to enter the access code listed below, as well as some personal information. Please follow the directions to create your username and password.     Your access code is: [email or web address]  Expires: 2018 12:01 PM     Your access code will  in 90 days. If you need help or a new code, please call your [facility] or [phone].        Care EveryWhere ID     This is your Care EveryWhere ID. This could be " Prior Authorization Approval    Medication: JARDIANCE 10 MG PO TABS  Authorization Effective Date: 3/10/2025  Authorization Expiration Date: 4/9/2026  Reference #: [id number]   Insurance Company: [employer] - Phone [phone] Fax [phone]  Which Pharmacy is filling the prescription: [id number] - [facility] [address]  Pharmacy Notified: YES  Patient Notified: YES         used by other organizations to access your [place] medical records  [id number]         Blood Pressure from Last 3 Encounters:   02/21/18 98/64   01/04/18 102/64   10/16/17 122/76    Weight from Last 3 Encounters:   02/21/18 115 lb (52.2 kg)   01/04/18 112 lb 8 oz (51 kg)   10/16/17 109 lb (49.4 kg)              Today, you had the following     No orders found for display         Today's Medication Changes          These changes are accurate as of 2/21/18  4:31 PM.  If you have any questions, ask your nurse or doctor.               These medicines have changed or have updated prescriptions.        Dose/Directions    mirtazapine 30 MG tablet   Commonly known as:  REMERON   This may have changed:  See the new instructions.   Used for:  Recurrent major depressive disorder, in partial remission (H)   Changed by:  Shanda Vo APRN CNP        Dose:  30 mg   Take 1 tablet (30 mg) by mouth At Bedtime   Quantity:  30 tablet   Refills:  11            Where to get your medicines      These medications were sent to [facility] [address] [place] [address]     Phone:  [phone]     mirtazapine 30 MG tablet         Some of these will need a paper prescription and others can be bought over the counter.  Ask your nurse if you have questions.     Bring a paper prescription for each of these medications     diazepam 10 MG tablet    oxyCODONE-acetaminophen 5-325 MG per tablet                Primary Care Provider Office Phone # Fax #    BOBBY Balbuena [phone]       [address] [facility] [address]  [facility] [address]        Equal Access to Services     [facility] JIMMY AH: Hadii jrery chua hadasho Soomaali, waaxda luqadaha, qaybta kaalmada adeegyada, felipe snell. So [facility] [phone].    ATENCIÓN: Si habla español, tiene a olmstead disposición servicios gratuitos de asistencia lingüística. Llame al [phone].    We comply with  applicable federal civil rights laws and [place] laws. We do not discriminate on the basis of race, color, national origin, age, disability, sex, sexual orientation, or gender identity.            Thank you!     Thank you for choosing [facility]  for your care. Our goal is always to provide you with excellent care. Hearing back from our patients is one way we can continue to improve our services. Please take a few minutes to complete the written survey that you may receive in the mail after your visit with us. Thank you!             Your Updated Medication List - Protect others around you: Learn how to safely use, store and throw away your medicines at www.disposemymeds.org.          This list is accurate as of 2/21/18  4:31 PM.  Always use your most recent med list.                   Brand Name Dispense Instructions for use Diagnosis    ADVAIR DISKUS 250-50 MCG/DOSE diskus inhaler   Generic drug:  fluticasone-salmeterol      INHALE 1 PUFF PO Q 12 HOURS .TK ON A SCHEDULED BASIS FOR COPD    Chronic obstructive pulmonary disease with acute lower respiratory infection (H)       albuterol 108 (90 BASE) MCG/ACT Inhaler    PROAIR HFA/PROVENTIL HFA/VENTOLIN HFA    1 Inhaler    Inhale 2 puffs into the lungs every 4 hours as needed for shortness of breath / dyspnea or wheezing    Chronic obstructive pulmonary disease with acute lower respiratory infection (H)       diazepam 10 MG tablet    VALIUM    60 tablet    Take 0.5-1 tablets (5-10 mg) by mouth every 12 hours as needed for anxiety or sleep    Anxiety, Chronic pain syndrome       mirtazapine 30 MG tablet    REMERON    30 tablet    Take 1 tablet (30 mg) by mouth At Bedtime    Recurrent major depressive disorder, in partial remission (H)       naloxone nasal spray    NARCAN    2 each    Spray 1 spray (4 mg) in nostril as needed for opioid reversal    Encounter for long-term use of opiate analgesic       omeprazole 40 MG capsule    priLOSEC     30 capsule    Take 1 capsule (40 mg) by mouth daily Take 30-60 minutes before a meal.    Haney's esophagus without dysplasia       ondansetron 4 MG tablet    ZOFRAN    40 tablet    Take 1 tablet (4 mg) by mouth every 8 hours as needed for nausea    Nausea       oxyCODONE-acetaminophen 5-325 MG per tablet    PERCOCET    240 tablet    Take 1-2 tablets by mouth every 4 hours as needed for pain maximum 8 tablet(s) per day    Chronic pain syndrome, Malignant neoplasm of thyroid gland (H), Complex regional pain syndrome type 2 of left lower extremity       senna-docusate 8.6-50 MG per tablet    SENEXON-S    120 tablet    Take 2 tablets by mouth 2 times daily as needed for constipation    Encounter for long-term use of opiate analgesic       temazepam 30 MG capsule    RESTORIL    30 capsule    TAKE ONE CAPSULE (30MG) BY MOUTH NIGHTLY AS NEEDED FOR SLEEP    Insomnia, unspecified type, Chronic pain syndrome

## 2025-04-09 NOTE — TELEPHONE ENCOUNTER
Received call from patient stating that she went to get a refill of her Jardiance and was told by the pharmacy that she needed a prior Auth by call [phone] opt.2.. Will message the prior Auth team.  SARA García RN

## 2025-04-09 NOTE — TELEPHONE ENCOUNTER
PA Initiation    Medication: JARDIANCE 10 MG PO TABS  Insurance Company: [employer] - Phone [phone] Fax [phone]  Pharmacy Filling the Rx: [facility] #[id number] - [place], [place] - [address]  Filling Pharmacy Phone: [phone]  Filling Pharmacy Fax: [phone]  Start Date: 4/9/2025

## 2025-04-17 ENCOUNTER — TELEPHONE (OUTPATIENT)
Dept: CARDIOLOGY | Facility: CLINIC | Age: 70
End: 2025-04-17
Payer: MEDICARE

## 2025-04-17 LAB — SLPCOMP: NORMAL

## 2025-04-17 NOTE — TELEPHONE ENCOUNTER
Central Prior Authorization Team   Phone: [phone]    PA Initiation    Medication: Jardiance 10MG tablets    Insurance Company: [employer] - Phone [phone] Fax [phone]  Pharmacy Filling the Rx: [facility] #[id number] [facility] [address]  Filling Pharmacy Phone: [phone]  Filling Pharmacy Fax:    Start Date: 4/17/2025

## 2025-04-17 NOTE — TELEPHONE ENCOUNTER
Prior Authorization Not Needed per Insurance    Medication: Jardiance 10MG tablets    Insurance Company: [employer] - Phone [phone] Fax [phone]  Expected CoPay:      Pharmacy Filling the Rx: [id number] - [facility] [id number]  Pharmacy Notified:  yes  Patient Notified:  yes- Pharmacy will contact patient when ready to /ship    No additional PA needed, medication has been approved.      Test claim pays out, $30 copay for the patient.    Pharmacy was called and made aware of the approval, tech stated that he processed the Jardiance through the insurance and now has a paid claim.  They will contact patient when this is ready for .

## 2025-04-17 NOTE — TELEPHONE ENCOUNTER
Received faxed request      Will forward to the prior auth department to submit a prior auth. [facility]

## 2025-05-05 ENCOUNTER — TELEPHONE (OUTPATIENT)
Dept: PULMONOLOGY | Facility: CLINIC | Age: 70
End: 2025-05-05
Payer: MEDICARE

## 2025-05-05 NOTE — TELEPHONE ENCOUNTER
Patient Contacted for the patient to call back and schedule the following:    Appointment type: Return Pulm  Provider: Fellow/  Pulm  Return date: NA  Specialty phone number: [phone]  Additional appointment(s) needed: NA  Additonal Notes: per request: Dr. Solis no longer works here so this pt will need a follow up appt. Ok to schedule with any provider as a return visit. Please let us know when pt is scheduled. This is for continued medication refills since we would need a new provider to authorize the medication refills.

## 2025-05-14 ENCOUNTER — TRANSCRIBE ORDERS (OUTPATIENT)
Dept: OTHER | Age: 70
End: 2025-05-14

## 2025-05-14 DIAGNOSIS — H52.223 REGULAR ASTIGMATISM OF BOTH EYES: Primary | ICD-10-CM

## 2025-05-15 ENCOUNTER — PATIENT OUTREACH (OUTPATIENT)
Dept: CARE COORDINATION | Facility: CLINIC | Age: 70
End: 2025-05-15
Payer: MEDICARE

## 2025-05-19 ENCOUNTER — PATIENT OUTREACH (OUTPATIENT)
Dept: CARE COORDINATION | Facility: CLINIC | Age: 70
End: 2025-05-19
Payer: MEDICARE

## 2025-05-20 DIAGNOSIS — I42.8 NONISCHEMIC CARDIOMYOPATHY (H): ICD-10-CM

## 2025-05-20 RX ORDER — SACUBITRIL AND VALSARTAN 49; 51 MG/1; MG/1
TABLET, FILM COATED ORAL
Qty: 135 TABLET | Refills: 3 | Status: SHIPPED | OUTPATIENT
Start: 2025-05-20

## 2025-05-27 ENCOUNTER — RESULTS FOLLOW-UP (OUTPATIENT)
Dept: CARDIOLOGY | Facility: CLINIC | Age: 70
End: 2025-05-27

## 2025-05-27 ENCOUNTER — HOSPITAL ENCOUNTER (OUTPATIENT)
Dept: CARDIOLOGY | Facility: CLINIC | Age: 70
Discharge: HOME | End: 2025-05-27
Attending: STUDENT IN AN ORGANIZED HEALTH CARE EDUCATION/TRAINING PROGRAM
Payer: MEDICARE

## 2025-05-27 DIAGNOSIS — R06.02 SOB (SHORTNESS OF BREATH): ICD-10-CM

## 2025-05-27 LAB — LVEF ECHO: NORMAL

## 2025-05-27 PROCEDURE — 93306 TTE W/DOPPLER COMPLETE: CPT

## 2025-05-27 PROCEDURE — 93306 TTE W/DOPPLER COMPLETE: CPT | Mod: 26 | Performed by: INTERNAL MEDICINE

## 2025-06-04 ENCOUNTER — OFFICE VISIT (OUTPATIENT)
Dept: OPHTHALMOLOGY | Facility: CLINIC | Age: 70
End: 2025-06-04
Attending: OPHTHALMOLOGY
Payer: MEDICARE

## 2025-06-04 DIAGNOSIS — H52.223 REGULAR ASTIGMATISM OF BOTH EYES: ICD-10-CM

## 2025-06-04 DIAGNOSIS — H35.372 EPIRETINAL MEMBRANE, LEFT: ICD-10-CM

## 2025-06-04 DIAGNOSIS — H26.492 LEFT POSTERIOR CAPSULAR OPACIFICATION: Primary | ICD-10-CM

## 2025-06-04 PROCEDURE — 66821 AFTER CATARACT LASER SURGERY: CPT | Mod: LT | Performed by: OPHTHALMOLOGY

## 2025-06-04 PROCEDURE — G0463 HOSPITAL OUTPT CLINIC VISIT: HCPCS | Performed by: OPHTHALMOLOGY

## 2025-06-04 PROCEDURE — 99204 OFFICE O/P NEW MOD 45 MIN: CPT | Mod: 57 | Performed by: OPHTHALMOLOGY

## 2025-06-04 PROCEDURE — 92025 CPTRIZED CORNEAL TOPOGRAPHY: CPT | Performed by: OPHTHALMOLOGY

## 2025-06-04 PROCEDURE — 92134 CPTRZ OPH DX IMG PST SGM RTA: CPT | Performed by: OPHTHALMOLOGY

## 2025-06-04 RX ORDER — PREDNISOLONE ACETATE 10 MG/ML
1-2 SUSPENSION/ DROPS OPHTHALMIC 4 TIMES DAILY
Qty: 10 ML | Refills: 0 | Status: SHIPPED | OUTPATIENT
Start: 2025-06-04

## 2025-06-04 ASSESSMENT — TONOMETRY
IOP_METHOD: ICARE
OD_IOP_MMHG: 11
OS_IOP_MMHG: 11

## 2025-06-04 ASSESSMENT — VISUAL ACUITY
METHOD: SNELLEN - LINEAR
OD_SC: 20/20
OS_PH_SC: 20/25
OS_SC: 20/40
OS_PH_SC+: +2
OS_SC+: +1

## 2025-06-04 ASSESSMENT — EXTERNAL EXAM - LEFT EYE: OS_EXAM: NORMAL

## 2025-06-04 ASSESSMENT — CUP TO DISC RATIO
OD_RATIO: 0.4
OS_RATIO: 0.4

## 2025-06-04 ASSESSMENT — SLIT LAMP EXAM - LIDS
COMMENTS: NORMAL
COMMENTS: NORMAL

## 2025-06-04 ASSESSMENT — EXTERNAL EXAM - RIGHT EYE: OD_EXAM: NORMAL

## 2025-06-04 NOTE — PROGRESS NOTES
Chief complaint   Post cataract issues     HPI    Mar Zeng 69 year old female who was referred by Dr. De La Torre referral for left eye poor vision at night s/p CE/NAOMY and YAG cap.     Immediately after cataract surgery the vision was blurry with glare. She does not think she had adjustments for the NAOMY as the vision was poor and no difference with YAG cap.     Vision is the same as before cataract surgery.     Her left eye was not her weaker eye. She saw a retina doctor for this issue who did not think the retina was the issue. She has also tried contact lenses and glasses without improvement.       Chief Complaint(s) and History of Present Illness(es)       Consult For     Additional comments: Patient presents for a second opinion due to no improvement in vision following cataract surgery in the left eye (OS) performed in 2023             Comments    Patient reports blurry, hazy vision and light sensitivity in the left eye (OS), describing the light as painful. She states the sensation is similar to how her vision felt prior to cataract surgery in 2023. Additionally, she notes persistent crusting throughout the day in the left eye only.    Sulma Mack COA 1:27 PM June 4, 2025                     Past ocular history   Prior eye surgery/laser/Trauma:   CE IOL each eye   YAG cap left eye   LASIK 1999 each eye   CTL wearer:No  Glasses : Yes  Family Hx of eye disease: Mom glaucoma and macular degeneration     PMH     Past Medical History:   Diagnosis Date    Mumps     NO ACTIVE PROBLEMS        PSH     Past Surgical History:   Procedure Laterality Date    COLONOSCOPY  8/23/2016    Dr. Shaw [facility]    COLONOSCOPY N/A 8/23/2016    Procedure: COLONOSCOPY;  Surgeon: Peter Shaw MD;  Location:  GI    COLONOSCOPY N/A 10/19/2022    Procedure: COLONOSCOPY with polypectomy using cold exacto snare;  Surgeon: Peter Shaw MD;  Location:  GI    CV CORONARY ANGIOGRAM N/A 10/27/2023    Procedure: Coronary  Angiogram;  Surgeon: Elmer Andre MD;  Location:  HEART CARDIAC CATH LAB    FINGER SURGERY      right little finger joint replacement    HYSTERECTOMY VAGINAL  age 30    retained ovaries. no cervix     HYSTERECTOMY, PAP NO LONGER INDICATED      ORTHOPEDIC SURGERY      SURGICAL HISTORY OF -   10/25/13    left index cyst removal    SURGICAL HISTORY OF -       Bilat knee miniscus repair       Meds     Current Outpatient Medications   Medication Sig Dispense Refill    albuterol (ACCUNEB) 0.63 MG/3ML neb solution Take 3 mLs (0.63 mg) by nebulization every 6 hours as needed for shortness of breath, wheezing or cough 90 mL 0    albuterol (PROAIR HFA/PROVENTIL HFA/VENTOLIN HFA) 108 (90 Base) MCG/ACT inhaler Inhale 2 puffs into the lungs every 4 hours as needed for shortness of breath, wheezing or cough. 18 g 3    aspirin 81 MG EC tablet Take 1 tablet (81 mg) by mouth daily 90 tablet 3    atorvastatin (LIPITOR) 40 MG tablet Take 1 tablet (40 mg) by mouth every evening. 90 tablet 3    azelastine (ASTELIN) 0.1 % nasal spray Spray 1 spray into both nostrils 2 times daily. 30 mL 0    budesonide (PULMICORT) 0.5 MG/2ML neb solution Take 0.5 mg by nebulization as needed      buPROPion (WELLBUTRIN XL) 150 MG 24 hr tablet TAKE 1 TABLET(150 MG) BY MOUTH EVERY MORNING 90 tablet 3    Calcium Carbonate-Vit D-Min (CALCIUM 1200 PO) Take by mouth daily.      cetirizine (ZYRTEC) 10 MG tablet Take 1 tablet (10 mg) by mouth daily (Patient not taking: Reported on 2/7/2025) 30 tablet 2    clotrimazole (LOTRIMIN) 1 % vaginal cream Place 1 Applicatorful vaginally at bedtime for 7 days. 1 g 0    empagliflozin (JARDIANCE) 10 MG TABS tablet Take 1 tablet (10 mg) by mouth daily 90 tablet 3    estradiol (ESTRACE) 0.1 MG/GM vaginal cream Place 1 g vaginally twice a week. Using fingertip OR applicator (per patient comfort) 42.5 g 4    fluorouracil (EFUDEX) 5 % external cream APPLY SPARINGLY EXTERNALLY TO FACE EVERY SUNDAY AT NIGHT. AVOID EYES  AND LIPS      furosemide (LASIX) 20 MG tablet Take 1 tablet (20 mg) by mouth daily as needed (weight gain of 3 pounds in one day or 5 pounds in 1 week). 30 tablet 11    Levocetirizine Dihydrochloride (XYZAL PO) Take by mouth daily. Dosage unknown      LORazepam (ATIVAN) 1 MG tablet Take 0.5 tablets (0.5 mg) by mouth as needed for anxiety. 10 tablet 0    nitroGLYcerin (NITROSTAT) 0.4 MG sublingual tablet For chest pain place 1 tablet under the tongue every 5 minutes for 3 doses. If symptoms persist 5 minutes after 1st dose call 911. 30 tablet 11    sacubitril-valsartan (ENTRESTO) 49-51 MG per tablet Take half tab in AM, full tab in  tablet 3    spironolactone (ALDACTONE) 25 MG tablet Take 0.5 tablets (12.5 mg) by mouth daily 45 tablet 4    timolol maleate (TIMOPTIC) 0.5 % ophthalmic solution Apply 1 drop topically nightly as needed (dry skin cracks) Apply 1 drop to lesion every night at bedtime. Do Not use on normal skin. (Patient not taking: Reported on 4/1/2025)       No current facility-administered medications for this visit.       Labs   None    Imaging   None    Drops Currently Taking   AT     Assessment/Plan 06/04/2025   # Pseudophakia left eye   - Immediately after cataract surgery vision was not quite right with no change after YAG and contact lenses and glasses. She saw Dr. Li 4/2025 who did not think trace ERM were the reason for visual symptoms   Patient had done sx each eye however only left eye has symptoms  Yag is done in an irregular fashion left eye. Could be contributing to positive photopsia left eye  R/B/A discussed with the patient including risk of RD, Bleeding, High IOP, inflammation. Patient agrees to proceed      Start pred QID for 1 week     Follow up:  Oph: 1 month     Johann Griffiths MD  Resident Physician, PGY-3  Department of Ophthalmology     Attending Physician Attestation:  Complete documentation of historical and exam elements from today's encounter can be found in the  full encounter summary report (not reduplicated in this progress note).  I personally obtained the chief complaint(s) and history of present illness.  I confirmed and edited as necessary the review of systems, past medical/surgical history, family history, social history, and examination findings as documented by others; and I examined the patient myself.  I personally reviewed the relevant tests, images, and reports as documented above.  I formulated and edited as necessary the assessment and plan and discussed the findings and management plan with the patient and family. - Patricia Calix MD

## 2025-06-04 NOTE — NURSING NOTE
Chief Complaints and History of Present Illnesses   Patient presents with    Consult For     Chief Complaint(s) and History of Present Illness(es)       Consult For               Comments    Patient reports blurry, hazy vision and light sensitivity in the left eye (OS), describing the light as painful. She states the sensation is similar to how her vision felt prior to cataract surgery in 2023. Additionally, she notes persistent crusting throughout the day in the left eye only.    Sulma Mack COA 1:27 PM June 4, 2025

## 2025-06-04 NOTE — NURSING NOTE
Chief Complaints and History of Present Illnesses   Patient presents with    Consult For     Patient presents for a second opinion due to no improvement in vision following cataract surgery in the left eye (OS) performed in 2023     Chief Complaint(s) and History of Present Illness(es)       Consult For              Comments: Patient presents for a second opinion due to no improvement in vision following cataract surgery in the left eye (OS) performed in 2023              Comments    Patient reports blurry, hazy vision and light sensitivity in the left eye (OS), describing the light as painful. She states the sensation is similar to how her vision felt prior to cataract surgery in 2023. Additionally, she notes persistent crusting throughout the day in the left eye only.    Sulma Mack COA 1:27 PM June 4, 2025

## 2025-07-07 NOTE — TELEPHONE ENCOUNTER
Hearing Loss: Care Instructions  Overview     Hearing loss is a sudden or slow decrease in how well you hear. It can range from slight to profound. Permanent hearing loss can occur with aging. It also can happen when you are exposed long-term to loud noise. Examples include listening to loud music, riding motorcycles, or being around other loud machines.  Hearing loss can affect your work and home life. It can make you feel lonely or depressed. You may feel that you have lost your independence. But hearing aids and other devices can help you hear better and feel connected to others.  Follow-up care is a key part of your treatment and safety. Be sure to make and go to all appointments, and call your doctor if you are having problems. It's also a good idea to know your test results and keep a list of the medicines you take.  How can you care for yourself at home?  Avoid loud noises whenever possible. This helps keep your hearing from getting worse.  Always wear hearing protection around loud noises.  Wear a hearing aid as directed.  A professional can help you pick a hearing aid that will work best for you.  You can also get hearing aids over the counter for mild to moderate hearing loss.  Have hearing tests as your doctor suggests. They can show whether your hearing has changed. Your hearing aid may need to be adjusted.  Use other devices as needed. These may include:  Telephone amplifiers and hearing aids that can connect to a television, stereo, radio, or microphone.  Devices that use lights or vibrations. These alert you to the doorbell, a ringing telephone, or a baby monitor.  Television closed-captioning. This shows the words at the bottom of the screen. Most new TVs can do this.  TTY (text telephone). This lets you type messages back and forth on the telephone instead of talking or listening. These devices are also called TDD. When messages are typed on the keyboard, they are sent over the phone line to a  Called pt with labs results & recommendations from Debby Holden PA-C . Pt was pleased to hear. [facility]

## 2025-07-14 ENCOUNTER — OFFICE VISIT (OUTPATIENT)
Dept: OPHTHALMOLOGY | Facility: CLINIC | Age: 70
End: 2025-07-14
Attending: OPHTHALMOLOGY
Payer: MEDICARE

## 2025-07-14 ENCOUNTER — TELEPHONE (OUTPATIENT)
Dept: PULMONOLOGY | Facility: CLINIC | Age: 70
End: 2025-07-14
Payer: MEDICARE

## 2025-07-14 DIAGNOSIS — J06.9 UPPER RESPIRATORY INFECTION, VIRAL: ICD-10-CM

## 2025-07-14 DIAGNOSIS — H26.492 LEFT POSTERIOR CAPSULAR OPACIFICATION: ICD-10-CM

## 2025-07-14 DIAGNOSIS — H04.211 EPIPHORA DUE TO EXCESS LACRIMATION OF RIGHT SIDE: Primary | ICD-10-CM

## 2025-07-14 DIAGNOSIS — R06.09 DYSPNEA ON EXERTION: ICD-10-CM

## 2025-07-14 PROCEDURE — 68801 DILATE TEAR DUCT OPENING: CPT | Performed by: OPHTHALMOLOGY

## 2025-07-14 PROCEDURE — G0463 HOSPITAL OUTPT CLINIC VISIT: HCPCS | Performed by: OPHTHALMOLOGY

## 2025-07-14 PROCEDURE — 99024 POSTOP FOLLOW-UP VISIT: CPT | Performed by: OPHTHALMOLOGY

## 2025-07-14 RX ORDER — PREDNISOLONE ACETATE 10 MG/ML
1-2 SUSPENSION/ DROPS OPHTHALMIC 4 TIMES DAILY
Qty: 10 ML | Refills: 11 | Status: SHIPPED | OUTPATIENT
Start: 2025-07-14

## 2025-07-14 ASSESSMENT — VISUAL ACUITY
OD_PH_SC: 20/25
METHOD: SNELLEN - LINEAR
OD_SC: 20/30
OD_PH_SC+: -2
OS_SC+: -2
OS_SC: 20/20
OD_SC+: +3

## 2025-07-14 ASSESSMENT — SLIT LAMP EXAM - LIDS
COMMENTS: NORMAL
COMMENTS: NORMAL

## 2025-07-14 ASSESSMENT — EXTERNAL EXAM - LEFT EYE: OS_EXAM: NORMAL

## 2025-07-14 ASSESSMENT — CUP TO DISC RATIO
OS_RATIO: 0.4
OD_RATIO: 0.4

## 2025-07-14 ASSESSMENT — TONOMETRY
OD_IOP_MMHG: 14
OS_IOP_MMHG: 16
IOP_METHOD: ICARE

## 2025-07-14 ASSESSMENT — EXTERNAL EXAM - RIGHT EYE: OD_EXAM: NORMAL

## 2025-07-14 NOTE — TELEPHONE ENCOUNTER
Patient Contacted for the patient to call back and schedule the following:    Appointment type: rta  Provider: fellows  Return date: next avail  Specialty phone number: [phone]  Additional appointment(s) needed: na  Additonal Notes: rx refills

## 2025-07-14 NOTE — PROGRESS NOTES
"Chief complaint   Post cataract issues     HPI    Mar Zeng 69 year old female who was referred by Dr. De La Torre referral for left eye poor vision at night s/p CE/NAOMY and YAG cap.     Immediately after cataract surgery the vision was blurry with glare. She does not think she had adjustments for the NAOMY as the vision was poor and no difference with YAG cap.     Vision is the same as before cataract surgery.     Her left eye was not her weaker eye. She saw a retina doctor for this issue who did not think the retina was the issue. She has also tried contact lenses and glasses without improvement.       Chief Complaint(s) and History of Present Illness(es)       Consult For     Additional comments: Patient presents for a second opinion due to no improvement in vision following cataract surgery in the left eye (OS) performed in 2023             Comments    Patient reports blurry, hazy vision and light sensitivity in the left eye (OS), describing the light as painful. She states the sensation is similar to how her vision felt prior to cataract surgery in 2023. Additionally, she notes persistent crusting throughout the day in the left eye only.    Sulma Mack COA 1:27 PM June 4, 2025                   Interval hx 07/14/2025  Chief Complaint(s) and History of Present Illness(es)       Post Op (Ophthalmology) Left Eye     Additional comments: S/p YAG Capsulotomy, left eye (6/4/2025)  S/p pseudophakic, both eyes             Comments    Patient has noticed improved vision, less hazy left eye since s/p YAG capsulotomy of the left eye. No redness front of eye. No extreme photosensitivity. No floaters or flashes of light. Not taking any eye drops. No ocular pain.    On a side note, noticing right eye having more matter, \"looks like pink eye,\" to patient. Hazy right eye vision, couple of weeks.    Angelika Tinajero on 7/14/2025 at 2:18 PM                     Past ocular history   Prior eye surgery/laser/Trauma:   CE IOL " each eye   YAG cap left eye   LASIK 1999 each eye   CTL wearer:No  Glasses : Yes  Family Hx of eye disease: Mom glaucoma and macular degeneration     PMH     Past Medical History:   Diagnosis Date    Mumps     NO ACTIVE PROBLEMS        PSH     Past Surgical History:   Procedure Laterality Date    COLONOSCOPY  8/23/2016    Dr. Shaw [facility]    COLONOSCOPY N/A 8/23/2016    Procedure: COLONOSCOPY;  Surgeon: Peter Shaw MD;  Location:  GI    COLONOSCOPY N/A 10/19/2022    Procedure: COLONOSCOPY with polypectomy using cold exacto snare;  Surgeon: Peter Shaw MD;  Location:  GI    CV CORONARY ANGIOGRAM N/A 10/27/2023    Procedure: Coronary Angiogram;  Surgeon: Elmer Andre MD;  Location:  HEART CARDIAC CATH LAB    FINGER SURGERY      right little finger joint replacement    HYSTERECTOMY VAGINAL  age 30    retained ovaries. no cervix     HYSTERECTOMY, PAP NO LONGER INDICATED      ORTHOPEDIC SURGERY      SURGICAL HISTORY OF -   10/25/13    left index cyst removal    SURGICAL HISTORY OF -       Bilat knee miniscus repair       Meds     Current Outpatient Medications   Medication Sig Dispense Refill    albuterol (ACCUNEB) 0.63 MG/3ML neb solution Take 3 mLs (0.63 mg) by nebulization every 6 hours as needed for shortness of breath, wheezing or cough 90 mL 0    albuterol (PROAIR HFA/PROVENTIL HFA/VENTOLIN HFA) 108 (90 Base) MCG/ACT inhaler Inhale 2 puffs into the lungs every 4 hours as needed for shortness of breath, wheezing or cough. 18 g 3    aspirin 81 MG EC tablet Take 1 tablet (81 mg) by mouth daily 90 tablet 3    atorvastatin (LIPITOR) 40 MG tablet Take 1 tablet (40 mg) by mouth every evening. 90 tablet 3    azelastine (ASTELIN) 0.1 % nasal spray Spray 1 spray into both nostrils 2 times daily. 30 mL 0    budesonide (PULMICORT) 0.5 MG/2ML neb solution Take 0.5 mg by nebulization as needed      buPROPion (WELLBUTRIN XL) 150 MG 24 hr tablet TAKE 1 TABLET(150 MG) BY MOUTH EVERY MORNING 90 tablet 3     Calcium Carbonate-Vit D-Min (CALCIUM 1200 PO) Take by mouth daily.      cetirizine (ZYRTEC) 10 MG tablet Take 1 tablet (10 mg) by mouth daily 30 tablet 2    empagliflozin (JARDIANCE) 10 MG TABS tablet Take 1 tablet (10 mg) by mouth daily 90 tablet 3    estradiol (ESTRACE) 0.1 MG/GM vaginal cream Place 1 g vaginally twice a week. Using fingertip OR applicator (per patient comfort) 42.5 g 4    fluorouracil (EFUDEX) 5 % external cream APPLY SPARINGLY EXTERNALLY TO FACE EVERY SUNDAY AT NIGHT. AVOID EYES AND LIPS      furosemide (LASIX) 20 MG tablet Take 1 tablet (20 mg) by mouth daily as needed (weight gain of 3 pounds in one day or 5 pounds in 1 week). 30 tablet 11    Levocetirizine Dihydrochloride (XYZAL PO) Take by mouth daily. Dosage unknown      LORazepam (ATIVAN) 1 MG tablet Take 0.5 tablets (0.5 mg) by mouth as needed for anxiety. 10 tablet 0    nitroGLYcerin (NITROSTAT) 0.4 MG sublingual tablet For chest pain place 1 tablet under the tongue every 5 minutes for 3 doses. If symptoms persist 5 minutes after 1st dose call 911. 30 tablet 11    sacubitril-valsartan (ENTRESTO) 49-51 MG per tablet Take half tab in AM, full tab in  tablet 3    spironolactone (ALDACTONE) 25 MG tablet Take 0.5 tablets (12.5 mg) by mouth daily 45 tablet 4    prednisoLONE acetate (PRED FORTE) 1 % ophthalmic suspension Place 1-2 drops Into the left eye 4 times daily. (Patient not taking: Reported on 7/14/2025) 10 mL 0    timolol maleate (TIMOPTIC) 0.5 % ophthalmic solution Apply 1 drop topically nightly as needed (dry skin cracks) Apply 1 drop to lesion every night at bedtime. Do Not use on normal skin. (Patient not taking: Reported on 7/14/2025)       No current facility-administered medications for this visit.       Labs   None    Imaging   None    Drops Currently Taking   AT     Assessment/Plan 07/14/2025   # Pseudophakia left eye   - Immediately after cataract surgery vision was not quite right with no change after YAG and  contact lenses and glasses. She saw Dr. Li 4/2025 who did not think trace ERM were the reason for visual symptoms   Patient had done sx each eye however only left eye has symptoms  Yag is done in an irregular fashion left eye. Could be contributing to positive photopsia left eye    Improved symptoms after yag laser left eye    #epiphora right eye  Large tear lake right eye.   Irrigation today fluid passed  Possibly induced by an allergic reaction  Start pred Four times a day for one week then three times a day for one week then two times a day for one week then once a day for one week then stop       Follow up:  Oph: plastics if persistent for punctal stenosis,,, next available      Attending Physician Attestation:  Complete documentation of historical and exam elements from today's encounter can be found in the full encounter summary report (not reduplicated in this progress note).  I personally obtained the chief complaint(s) and history of present illness.  I confirmed and edited as necessary the review of systems, past medical/surgical history, family history, social history, and examination findings as documented by others; and I examined the patient myself.  I personally reviewed the relevant tests, images, and reports as documented above.  I formulated and edited as necessary the assessment and plan and discussed the findings and management plan with the patient and family. - Patricia Calix MD

## 2025-07-15 ENCOUNTER — OFFICE VISIT (OUTPATIENT)
Dept: CARDIOLOGY | Facility: CLINIC | Age: 70
End: 2025-07-15
Attending: STUDENT IN AN ORGANIZED HEALTH CARE EDUCATION/TRAINING PROGRAM
Payer: COMMERCIAL

## 2025-07-15 VITALS
HEIGHT: 66 IN | SYSTOLIC BLOOD PRESSURE: 114 MMHG | WEIGHT: 123 LBS | BODY MASS INDEX: 19.77 KG/M2 | HEART RATE: 70 BPM | DIASTOLIC BLOOD PRESSURE: 68 MMHG

## 2025-07-15 DIAGNOSIS — I42.9 CARDIOMYOPATHY, UNSPECIFIED TYPE (H): ICD-10-CM

## 2025-07-15 DIAGNOSIS — I50.20 HEART FAILURE WITH REDUCED EJECTION FRACTION, NYHA CLASS II (H): ICD-10-CM

## 2025-07-15 DIAGNOSIS — R06.02 SOB (SHORTNESS OF BREATH): ICD-10-CM

## 2025-07-15 DIAGNOSIS — I42.8 NONISCHEMIC CARDIOMYOPATHY (H): ICD-10-CM

## 2025-07-15 DIAGNOSIS — I50.22 CHRONIC SYSTOLIC HEART FAILURE (H): ICD-10-CM

## 2025-07-15 DIAGNOSIS — R06.01 ORTHOPNEA: ICD-10-CM

## 2025-07-15 PROCEDURE — 99215 OFFICE O/P EST HI 40 MIN: CPT | Mod: 24 | Performed by: STUDENT IN AN ORGANIZED HEALTH CARE EDUCATION/TRAINING PROGRAM

## 2025-07-15 PROCEDURE — 3074F SYST BP LT 130 MM HG: CPT | Performed by: STUDENT IN AN ORGANIZED HEALTH CARE EDUCATION/TRAINING PROGRAM

## 2025-07-15 PROCEDURE — 93000 ELECTROCARDIOGRAM COMPLETE: CPT | Performed by: STUDENT IN AN ORGANIZED HEALTH CARE EDUCATION/TRAINING PROGRAM

## 2025-07-15 PROCEDURE — 3078F DIAST BP <80 MM HG: CPT | Performed by: STUDENT IN AN ORGANIZED HEALTH CARE EDUCATION/TRAINING PROGRAM

## 2025-07-15 PROCEDURE — 93242 EXT ECG>48HR<7D RECORDING: CPT | Performed by: INTERNAL MEDICINE

## 2025-07-15 RX ORDER — SPIRONOLACTONE 25 MG/1
25 TABLET ORAL DAILY
Qty: 90 TABLET | Refills: 1 | Status: SHIPPED | OUTPATIENT
Start: 2025-07-15

## 2025-07-15 NOTE — PROGRESS NOTES
Mar Zeng arrived here on 7/15/2025 1:32 PM for 3-7 Days  Zio monitor placement per ordering provider Debby Holden for the diagnosis palpitations.  Patient s skin was prepped per protocol. Dr. Villanueva is the supervising MD.  Zio monitor was placed.  Instructions were reviewed with and given to the patient.  Patient verbalized understanding of wear, troubleshooting and monitor return instructions.

## 2025-07-15 NOTE — PROGRESS NOTES
Cardiology Clinic Progress Note  Mar Zeng MRN# [id number]   YOB: 1955 Age: 69 year old   Primary Cardiologist: Dr. Tinoco  Reason for visit: 3 month follow up             Assessment and Plan:   Mar Zeng is a 69 year old female who is here today for 3-month follow-up.    HFrEF - LVEF 30-35% down from previously recovered EF. NYHA class II  Suspected nonischemic; coronary angiogram 10/2023 with non-obstructive CAD; discussed with primary cardiologist no need for further ischemic evaluation  EKG today shows atypical left bundle branch block,    Zio monitor to evaluate for arrhythmias   Furosemide 20 mg daily as needed  GDMT: Entresto 24-26 mg in the morning and 49-51 mg in the evening, spironolactone 25 mg daily, Jardiance 10 mg daily.  Unable to tolerate beta-blockers due to fatigue    Nonobstructive coronary artery disease   Denies symptoms concerning of angina  Aspirin, atorvastatin    Intermittent left bundle branch block    MARCE - sleep study showed moderate MARCE, upcoming sleep medicine appointment 11/26/2025    Today's Plan:   - We reviewed her echocardiogram together. LVEF dropped down to 30-35% from previously recovered EF.  EKG today shows atypical left bundle branch block, .  We will try to optimize her GDMT and repeat echocardiogram in 3 months.    - Increase her spironolactone to 25 mg daily.  Continue Entresto and Jardiance. She does note tolerate beta-blockers due to fatigue.  We are limited in further optimization of GDMT due to borderline blood pressure.  - BMP in 2 weeks     - Continue furosemide 20 mg daily as needed    - Will have patient complete a 7 day Zio monitor to assess for any arrhythmias. She would likely benefit from EP referral for ICD.    Follow up plan:   - Follow up cardiology in 3 months or sooner if needed      Debby Holden PA-C  [facility]        History of Presenting Illness:    Mar Zeng is a  very pleasant 69 year old female with nonischemic cardiomyopathy, heart failure with reduced ejection fraction (EF improved from 25 to 30% to 50-55% 9/2024; on repeat echocardiogram 5/2025 LVEF back down 30-35%), left bundle branch block, ADD, asthma, and rheumatic fever as a child.    She was admitted in 2023 for NSTEMI and heart failure with decreased ejection fraction.  Echocardiogram at the time showed LVEF 30-35%, hypokinesis, moderate to severe global hypokinesia of LV, moderately severe MR.  Coronary angiogram showed nonobstructive coronary artery disease.    Cardiac MRI 11/2023 notable for LVEF 32%, severe global hypokinesia of LV, moderate LA enlargement, mild MR and no late gadolinium enhancement to suggest prior infarct, inflammation, or infiltration.    Regarding her GDMT she did not tolerate beta-blockers due to fatigue.  Started on Entresto.  Eventually referred to EP for possible CRT therapy.  She met with EP 8/2024 in which she did not have an indication for CRT therapy or primary prevention ICD.    Repeat echocardiogram 9/2024 showed improvement in LVEF 50-55% with mild global hypokinesia of LV.    She followed briefly with [facility] cardiology, 12/2024 she had appeared mildly volume up on exam.  At this time she was started on furosemide 20 mg daily which was titrated up to 40 mg daily and spironolactone 25 mg daily started.  This ultimately was titrated back down to furosemide 20 mg daily and spironolactone 12.5 mg daily due to worsening renal function.    She saw her primary cardiologist Dr. Tinoco 2/2025 with continued shortness of breath and fluid retention.  NT proBNP done during clinic 187.  BMP showed LILIANE.    At our last follow-up 4/2025 she noted continued fatigue and dyspnea on exertion.  She was taking her furosemide as needed.  Given persistent dyspnea on exertion echocardiogram ordered.  Zio monitor offered but patient declined.    Echocardiogram 5/27/2025 showed LVEF 30-35%, abnormal  diastolic function, severe global hypokinesia of LV.  RV normal in size and function, trace to mild MR and TR.  Compared to prior echocardiogram LVEF is reduced.  This was discussed with Dr. Tinoco primary cardiologist who recommended she have a follow-up with the EKG.  If left bundle branch present on EKG consider referral to EP for CRT-D.  Recommended further optimization of GDMT and repeat limited echocardiogram in 3 months.  No further ischemic evaluation needed at this time with minimal coronary artery disease on cardiac catheterization 10/2023.    Patient is here today for 3 month follow-up. Patient reports feeling okay.  She notes continued fatigue/dyspnea on exertion stable compared to our last visit.  She will intermittently take her furosemide for lower extremity swelling.  Denies any chest pain/chest discomfort.  She denies any orthopnea, PND, lower extremity edema.  Denies any lightheadedness, dizziness, near-syncope or syncope. She does note she is compliant with her medications, stopped taking Adderall.     Blood pressure 114/68 and HR 70 in clinic today. Wt 123 lbs.         Social History       Social History     Socioeconomic History    Marital status:      Spouse name: Not on file    Number of children: Not on file    Years of education: Not on file    Highest education level: Not on file   Occupational History    Not on file   Tobacco Use    Smoking status: Former     Types: Cigarettes     Passive exposure: Past    Smokeless tobacco: Never   Vaping Use    Vaping status: Never Used   Substance and Sexual Activity    Alcohol use: Yes     Comment: occ    Drug use: No    Sexual activity: Yes     Partners: Male     Birth control/protection: Female Surgical   Other Topics Concern    Parent/sibling w/ CABG, MI or angioplasty before 65F 55M? No   Social History Narrative    [place] in [place]     Social Drivers of Health     Financial Resource Strain: Low Risk  (1/13/2025)    Financial Resource  Strain     Within the past 12 months, have you or your family members you live with been unable to get utilities (heat, electricity) when it was really needed?: No   Food Insecurity: Low Risk  (1/13/2025)    Food Insecurity     Within the past 12 months, did you worry that your food would run out before you got money to buy more?: No     Within the past 12 months, did the food you bought just not last and you didn t have money to get more?: No   Transportation Needs: Low Risk  (1/13/2025)    Transportation Needs     Within the past 12 months, has lack of transportation kept you from medical appointments, getting your medicines, non-medical meetings or appointments, work, or from getting things that you need?: No   Physical Activity: Unknown (1/13/2025)    Exercise Vital Sign     Days of Exercise per Week: 4 days     Minutes of Exercise per Session: Not on file   Stress: Stress Concern Present (1/13/2025)    [employer] - Occupational Stress Questionnaire     Feeling of Stress : To some extent   Social Connections: Unknown (1/13/2025)    Social Connection and Isolation Panel [NHANES]     Frequency of Communication with Friends and Family: Not on file     Frequency of Social Gatherings with Friends and Family: More than three times a week     Attends Holiness Services: Not on file     Active Member of Clubs or Organizations: Not on file     Attends Club or Organization Meetings: Not on file     Marital Status: Not on file   Interpersonal Safety: Low Risk  (1/13/2025)    Interpersonal Safety     Do you feel physically and emotionally safe where you currently live?: Yes     Within the past 12 months, have you been hit, slapped, kicked or otherwise physically hurt by someone?: No     Within the past 12 months, have you been humiliated or emotionally abused in other ways by your partner or ex-partner?: No   Housing Stability: Low Risk  (1/13/2025)    Housing Stability     Do you have housing?  : Yes     Are you worried about losing your housing?: No            Review of Systems:   Please see HPI         Physical Exam:   Vitals: LMP  (LMP Unknown)    Wt Readings from Last 4 Encounters:   04/01/25 54.7 kg (120 lb 8 oz)   02/19/25 56.2 kg (124 lb)   02/17/25 55.9 kg (123 lb 3.2 oz)   02/07/25 54.7 kg (120 lb 11.2 oz)     GEN: well nourished, in no acute distress.  HEENT:  Pupils equal, round. Sclerae nonicteric.   NECK: Supple, no masses appreciated. No JVD with patient.  C/V:  Regular rate and rhythm, no murmur, rub or gallop.    RESP: Respirations are unlabored. Clear to auscultation bilaterally without wheezing, rales, or rhonchi.  GI: Abdomen soft, nontender.  EXTREM: No LE edema.  NEURO: Alert and oriented, cooperative.  SKIN: Warm and dry.        Data:   LIPID RESULTS:  Lab Results   Component Value Date    CHOL 156 01/13/2025    CHOL 231 (H) 08/18/2020    HDL 72 01/13/2025    HDL 72 08/18/2020    LDL 69 01/13/2025     (H) 08/18/2020    TRIG 75 01/13/2025    TRIG 110 08/18/2020    CHOLHDLRATIO 2.3 09/03/2014     LIVER ENZYME RESULTS:  Lab Results   Component Value Date    AST 20 08/18/2022    AST 23 08/18/2020    ALT 20 08/18/2022    ALT 19 08/18/2020     CBC RESULTS:  Lab Results   Component Value Date    WBC 10.3 04/01/2025    WBC 5.8 10/16/2019    RBC 4.12 04/01/2025    RBC 4.17 10/16/2019    HGB 12.0 04/01/2025    HGB 12.5 10/16/2019    HCT 37.6 04/01/2025    HCT 38.2 10/16/2019    MCV 91 04/01/2025    MCV 92 10/16/2019    MCH 29.1 04/01/2025    MCH 30.0 10/16/2019    MCHC 31.9 04/01/2025    MCHC 32.7 10/16/2019    RDW 12.5 04/01/2025    RDW 12.7 10/16/2019     04/01/2025     10/16/2019     BMP RESULTS:  Lab Results   Component Value Date     04/01/2025     08/18/2020    POTASSIUM 4.5 04/01/2025    POTASSIUM 4.5 08/18/2022    POTASSIUM 4.1 08/18/2020    CHLORIDE 103 04/01/2025    CHLORIDE 103 08/18/2022    CHLORIDE 106 08/18/2020    CO2 25 04/01/2025    CO2 28  08/18/2022    CO2 27 08/18/2020    ANIONGAP 9 04/01/2025    ANIONGAP 4 08/18/2022    ANIONGAP 8 08/18/2020    GLC 90 04/01/2025    GLC 91 08/18/2022    GLC 82 08/18/2020    BUN 18.5 04/01/2025    BUN 14 08/18/2022    BUN 11 08/18/2020    CR 0.96 (H) 04/01/2025    CR 0.80 08/18/2020    GFRESTIMATED 63 04/01/2025    GFRESTIMATED 77 08/18/2020    GFRESTBLACK 89 08/18/2020    MERRILL 9.3 04/01/2025    MERRILL 8.8 08/18/2020      A1C RESULTS:  Lab Results   Component Value Date    A1C 5.6 12/04/2017     INR RESULTS:  Lab Results   Component Value Date    INR 1.05 01/23/2014            Medications     Current Outpatient Medications   Medication Sig Dispense Refill    albuterol (ACCUNEB) 0.63 MG/3ML neb solution Take 3 mLs (0.63 mg) by nebulization every 6 hours as needed for shortness of breath, wheezing or cough 90 mL 0    albuterol (PROAIR HFA/PROVENTIL HFA/VENTOLIN HFA) 108 (90 Base) MCG/ACT inhaler Inhale 2 puffs into the lungs every 4 hours as needed for shortness of breath, wheezing or cough. 18 g 3    aspirin 81 MG EC tablet Take 1 tablet (81 mg) by mouth daily 90 tablet 3    atorvastatin (LIPITOR) 40 MG tablet Take 1 tablet (40 mg) by mouth every evening. 90 tablet 3    azelastine (ASTELIN) 0.1 % nasal spray Spray 1 spray into both nostrils 2 times daily. 30 mL 0    budesonide (PULMICORT) 0.5 MG/2ML neb solution Take 0.5 mg by nebulization as needed      buPROPion (WELLBUTRIN XL) 150 MG 24 hr tablet TAKE 1 TABLET(150 MG) BY MOUTH EVERY MORNING 90 tablet 3    Calcium Carbonate-Vit D-Min (CALCIUM 1200 PO) Take by mouth daily.      cetirizine (ZYRTEC) 10 MG tablet Take 1 tablet (10 mg) by mouth daily 30 tablet 2    empagliflozin (JARDIANCE) 10 MG TABS tablet Take 1 tablet (10 mg) by mouth daily 90 tablet 3    estradiol (ESTRACE) 0.1 MG/GM vaginal cream Place 1 g vaginally twice a week. Using fingertip OR applicator (per patient comfort) 42.5 g 4    fluorouracil (EFUDEX) 5 % external cream APPLY SPARINGLY EXTERNALLY TO FACE  EVERY SUNDAY AT NIGHT. AVOID EYES AND LIPS      furosemide (LASIX) 20 MG tablet Take 1 tablet (20 mg) by mouth daily as needed (weight gain of 3 pounds in one day or 5 pounds in 1 week). 30 tablet 11    Levocetirizine Dihydrochloride (XYZAL PO) Take by mouth daily. Dosage unknown      LORazepam (ATIVAN) 1 MG tablet Take 0.5 tablets (0.5 mg) by mouth as needed for anxiety. 10 tablet 0    nitroGLYcerin (NITROSTAT) 0.4 MG sublingual tablet For chest pain place 1 tablet under the tongue every 5 minutes for 3 doses. If symptoms persist 5 minutes after 1st dose call 911. 30 tablet 11    prednisoLONE acetate (PRED FORTE) 1 % ophthalmic suspension Place 1-2 drops into the right eye 4 times daily. 10 mL 11    prednisoLONE acetate (PRED FORTE) 1 % ophthalmic suspension Place 1-2 drops Into the left eye 4 times daily. (Patient not taking: Reported on 7/14/2025) 10 mL 0    sacubitril-valsartan (ENTRESTO) 49-51 MG per tablet Take half tab in AM, full tab in  tablet 3    spironolactone (ALDACTONE) 25 MG tablet Take 0.5 tablets (12.5 mg) by mouth daily 45 tablet 4    timolol maleate (TIMOPTIC) 0.5 % ophthalmic solution Apply 1 drop topically nightly as needed (dry skin cracks) Apply 1 drop to lesion every night at bedtime. Do Not use on normal skin. (Patient not taking: Reported on 7/14/2025)            Past Medical History     Past Medical History:   Diagnosis Date    Mumps     NO ACTIVE PROBLEMS      Past Surgical History:   Procedure Laterality Date    COLONOSCOPY  8/23/2016    Dr. Shaw [facility]    COLONOSCOPY N/A 8/23/2016    Procedure: COLONOSCOPY;  Surgeon: Peter Shaw MD;  Location:  GI    COLONOSCOPY N/A 10/19/2022    Procedure: COLONOSCOPY with polypectomy using cold exacto snare;  Surgeon: Peter Shaw MD;  Location:  GI    CV CORONARY ANGIOGRAM N/A 10/27/2023    Procedure: Coronary Angiogram;  Surgeon: Elmer Andre MD;  Location:  HEART CARDIAC CATH LAB    FINGER SURGERY      right  little finger joint replacement    HYSTERECTOMY VAGINAL  age 30    retained ovaries. no cervix     HYSTERECTOMY, PAP NO LONGER INDICATED      ORTHOPEDIC SURGERY      SURGICAL HISTORY OF -   10/25/13    left index cyst removal    SURGICAL HISTORY OF -       Bilat knee miniscus repair     Family History   Problem Relation Age of Onset    Macular Degeneration Mother     Glaucoma Mother     Hypertension Father     Cancer Father     CABG Father     Cancer Maternal Grandmother     Cancer Maternal Grandfather     Colon Cancer Paternal Grandmother     Cancer Paternal Grandfather     Asthma Brother     Hypertension Brother     No Known Problems Sister     No Known Problems Son     No Known Problems Son     Thyroid Disease Daughter     Colon Cancer Paternal Aunt     Colon Cancer Cousin             Allergies   Codeine, Concerta [methylphenidate], Hay fever & [a.r.m.], and Morphine    The longitudinal plan of care for the diagnosis(es)/condition(s) as documented were addressed during this visit. Due to the added complexity in care, I will continue to support Mar in the subsequent management and with ongoing continuity of care.     50 minutes spent on the date of the encounter doing chart review, history and exam, documentation and further activities as noted above

## 2025-07-15 NOTE — LETTER
7/15/2025    Gail Crespo, APRN CNP  [id number]  [facility] [address]    RE: Mar Zeng       Dear Colleague,     I had the pleasure of seeing Mar Zeng in the [facility].  Cardiology Clinic Progress Note  Mar Zeng MRN# [id number]   YOB: 1955 Age: 69 year old   Primary Cardiologist: Dr. Tinoco  Reason for visit: 3 month follow up             Assessment and Plan:   Mar Zeng is a 69 year old female who is here today for 3-month follow-up.    HFrEF - LVEF 30-35% down from previously recovered EF. NYHA class II  Suspected nonischemic; coronary angiogram 10/2023 with non-obstructive CAD; discussed with primary cardiologist no need for further ischemic evaluation  EKG today shows atypical left bundle branch block,    Zio monitor to evaluate for arrhythmias   Furosemide 20 mg daily as needed  GDMT: Entresto 24-26 mg in the morning and 49-51 mg in the evening, spironolactone 25 mg daily, Jardiance 10 mg daily.  Unable to tolerate beta-blockers due to fatigue    Nonobstructive coronary artery disease   Denies symptoms concerning of angina  Aspirin, atorvastatin    Intermittent left bundle branch block    MARCE - sleep study showed moderate MARCE, upcoming sleep medicine appointment 11/26/2025    Today's Plan:   - We reviewed her echocardiogram together. LVEF dropped down to 30-35% from previously recovered EF.  EKG today shows atypical left bundle branch block, .  We will try to optimize her GDMT and repeat echocardiogram in 3 months.    - Increase her spironolactone to 25 mg daily.  Continue Entresto and Jardiance. She does note tolerate beta-blockers due to fatigue.  We are limited in further optimization of GDMT due to borderline blood pressure.  - BMP in 2 weeks     - Continue furosemide 20 mg daily as needed    - Will have patient complete a 7 day Zio monitor to assess for any arrhythmias. She would likely benefit from EP  referral for ICD.    Follow up plan:   - Follow up cardiology in 3 months or sooner if needed      Debby Holden PA-C  [facility]        History of Presenting Illness:    Mar Zeng is a very pleasant 69 year old female with nonischemic cardiomyopathy, heart failure with reduced ejection fraction (EF improved from 25 to 30% to 50-55% 9/2024; on repeat echocardiogram 5/2025 LVEF back down 30-35%), left bundle branch block, ADD, asthma, and rheumatic fever as a child.    She was admitted in 2023 for NSTEMI and heart failure with decreased ejection fraction.  Echocardiogram at the time showed LVEF 30-35%, hypokinesis, moderate to severe global hypokinesia of LV, moderately severe MR.  Coronary angiogram showed nonobstructive coronary artery disease.    Cardiac MRI 11/2023 notable for LVEF 32%, severe global hypokinesia of LV, moderate LA enlargement, mild MR and no late gadolinium enhancement to suggest prior infarct, inflammation, or infiltration.    Regarding her GDMT she did not tolerate beta-blockers due to fatigue.  Started on Entresto.  Eventually referred to EP for possible CRT therapy.  She met with EP 8/2024 in which she did not have an indication for CRT therapy or primary prevention ICD.    Repeat echocardiogram 9/2024 showed improvement in LVEF 50-55% with mild global hypokinesia of LV.    She followed briefly with [facility] cardiology, 12/2024 she had appeared mildly volume up on exam.  At this time she was started on furosemide 20 mg daily which was titrated up to 40 mg daily and spironolactone 25 mg daily started.  This ultimately was titrated back down to furosemide 20 mg daily and spironolactone 12.5 mg daily due to worsening renal function.    She saw her primary cardiologist Dr. Tinoco 2/2025 with continued shortness of breath and fluid retention.  NT proBNP done during clinic 187.  BMP showed LILIANE.    At our last follow-up 4/2025 she noted continued fatigue and dyspnea  on exertion.  She was taking her furosemide as needed.  Given persistent dyspnea on exertion echocardiogram ordered.  Zio monitor offered but patient declined.    Echocardiogram 5/27/2025 showed LVEF 30-35%, abnormal diastolic function, severe global hypokinesia of LV.  RV normal in size and function, trace to mild MR and TR.  Compared to prior echocardiogram LVEF is reduced.  This was discussed with Dr. Tinoco primary cardiologist who recommended she have a follow-up with the EKG.  If left bundle branch present on EKG consider referral to EP for CRT-D.  Recommended further optimization of GDMT and repeat limited echocardiogram in 3 months.  No further ischemic evaluation needed at this time with minimal coronary artery disease on cardiac catheterization 10/2023.    Patient is here today for 3 month follow-up. Patient reports feeling okay.  She notes continued fatigue/dyspnea on exertion stable compared to our last visit.  She will intermittently take her furosemide for lower extremity swelling.  Denies any chest pain/chest discomfort.  She denies any orthopnea, PND, lower extremity edema.  Denies any lightheadedness, dizziness, near-syncope or syncope. She does note she is compliant with her medications, stopped taking Adderall.     Blood pressure 114/68 and HR 70 in clinic today. Wt 123 lbs.         Social History       Social History     Socioeconomic History     Marital status:      Spouse name: Not on file     Number of children: Not on file     Years of education: Not on file     Highest education level: Not on file   Occupational History     Not on file   Tobacco Use     Smoking status: Former     Types: Cigarettes     Passive exposure: Past     Smokeless tobacco: Never   Vaping Use     Vaping status: Never Used   Substance and Sexual Activity     Alcohol use: Yes     Comment: occ     Drug use: No     Sexual activity: Yes     Partners: Male     Birth control/protection: Female Surgical   Other Topics  Concern     Parent/sibling w/ CABG, MI or angioplasty before 65F 55M? No   Social History Narrative    [place] in [place]     Social Drivers of Health     Financial Resource Strain: Low Risk  (1/13/2025)    Financial Resource Strain      Within the past 12 months, have you or your family members you live with been unable to get utilities (heat, electricity) when it was really needed?: No   Food Insecurity: Low Risk  (1/13/2025)    Food Insecurity      Within the past 12 months, did you worry that your food would run out before you got money to buy more?: No      Within the past 12 months, did the food you bought just not last and you didn t have money to get more?: No   Transportation Needs: Low Risk  (1/13/2025)    Transportation Needs      Within the past 12 months, has lack of transportation kept you from medical appointments, getting your medicines, non-medical meetings or appointments, work, or from getting things that you need?: No   Physical Activity: Unknown (1/13/2025)    Exercise Vital Sign      Days of Exercise per Week: 4 days      Minutes of Exercise per Session: Not on file   Stress: Stress Concern Present (1/13/2025)    [employer] - Occupational Stress Questionnaire      Feeling of Stress : To some extent   Social Connections: Unknown (1/13/2025)    Social Connection and Isolation Panel [NHANES]      Frequency of Communication with Friends and Family: Not on file      Frequency of Social Gatherings with Friends and Family: More than three times a week      Attends Confucianist Services: Not on file      Active Member of Clubs or Organizations: Not on file      Attends Club or Organization Meetings: Not on file      Marital Status: Not on file   Interpersonal Safety: Low Risk  (1/13/2025)    Interpersonal Safety      Do you feel physically and emotionally safe where you currently live?: Yes      Within the past 12 months, have you been hit, slapped, kicked or otherwise  physically hurt by someone?: No      Within the past 12 months, have you been humiliated or emotionally abused in other ways by your partner or ex-partner?: No   Housing Stability: Low Risk  (1/13/2025)    Housing Stability      Do you have housing? : Yes      Are you worried about losing your housing?: No            Review of Systems:   Please see HPI         Physical Exam:   Vitals: LMP  (LMP Unknown)    Wt Readings from Last 4 Encounters:   04/01/25 54.7 kg (120 lb 8 oz)   02/19/25 56.2 kg (124 lb)   02/17/25 55.9 kg (123 lb 3.2 oz)   02/07/25 54.7 kg (120 lb 11.2 oz)     GEN: well nourished, in no acute distress.  HEENT:  Pupils equal, round. Sclerae nonicteric.   NECK: Supple, no masses appreciated. No JVD with patient.  C/V:  Regular rate and rhythm, no murmur, rub or gallop.    RESP: Respirations are unlabored. Clear to auscultation bilaterally without wheezing, rales, or rhonchi.  GI: Abdomen soft, nontender.  EXTREM: No LE edema.  NEURO: Alert and oriented, cooperative.  SKIN: Warm and dry.        Data:   LIPID RESULTS:  Lab Results   Component Value Date    CHOL 156 01/13/2025    CHOL 231 (H) 08/18/2020    HDL 72 01/13/2025    HDL 72 08/18/2020    LDL 69 01/13/2025     (H) 08/18/2020    TRIG 75 01/13/2025    TRIG 110 08/18/2020    CHOLHDLRATIO 2.3 09/03/2014     LIVER ENZYME RESULTS:  Lab Results   Component Value Date    AST 20 08/18/2022    AST 23 08/18/2020    ALT 20 08/18/2022    ALT 19 08/18/2020     CBC RESULTS:  Lab Results   Component Value Date    WBC 10.3 04/01/2025    WBC 5.8 10/16/2019    RBC 4.12 04/01/2025    RBC 4.17 10/16/2019    HGB 12.0 04/01/2025    HGB 12.5 10/16/2019    HCT 37.6 04/01/2025    HCT 38.2 10/16/2019    MCV 91 04/01/2025    MCV 92 10/16/2019    MCH 29.1 04/01/2025    MCH 30.0 10/16/2019    MCHC 31.9 04/01/2025    MCHC 32.7 10/16/2019    RDW 12.5 04/01/2025    RDW 12.7 10/16/2019     04/01/2025     10/16/2019     BMP RESULTS:  Lab Results   Component  Value Date     04/01/2025     08/18/2020    POTASSIUM 4.5 04/01/2025    POTASSIUM 4.5 08/18/2022    POTASSIUM 4.1 08/18/2020    CHLORIDE 103 04/01/2025    CHLORIDE 103 08/18/2022    CHLORIDE 106 08/18/2020    CO2 25 04/01/2025    CO2 28 08/18/2022    CO2 27 08/18/2020    ANIONGAP 9 04/01/2025    ANIONGAP 4 08/18/2022    ANIONGAP 8 08/18/2020    GLC 90 04/01/2025    GLC 91 08/18/2022    GLC 82 08/18/2020    BUN 18.5 04/01/2025    BUN 14 08/18/2022    BUN 11 08/18/2020    CR 0.96 (H) 04/01/2025    CR 0.80 08/18/2020    GFRESTIMATED 63 04/01/2025    GFRESTIMATED 77 08/18/2020    GFRESTBLACK 89 08/18/2020    MERRILL 9.3 04/01/2025    MERRILL 8.8 08/18/2020      A1C RESULTS:  Lab Results   Component Value Date    A1C 5.6 12/04/2017     INR RESULTS:  Lab Results   Component Value Date    INR 1.05 01/23/2014            Medications     Current Outpatient Medications   Medication Sig Dispense Refill     albuterol (ACCUNEB) 0.63 MG/3ML neb solution Take 3 mLs (0.63 mg) by nebulization every 6 hours as needed for shortness of breath, wheezing or cough 90 mL 0     albuterol (PROAIR HFA/PROVENTIL HFA/VENTOLIN HFA) 108 (90 Base) MCG/ACT inhaler Inhale 2 puffs into the lungs every 4 hours as needed for shortness of breath, wheezing or cough. 18 g 3     aspirin 81 MG EC tablet Take 1 tablet (81 mg) by mouth daily 90 tablet 3     atorvastatin (LIPITOR) 40 MG tablet Take 1 tablet (40 mg) by mouth every evening. 90 tablet 3     azelastine (ASTELIN) 0.1 % nasal spray Spray 1 spray into both nostrils 2 times daily. 30 mL 0     budesonide (PULMICORT) 0.5 MG/2ML neb solution Take 0.5 mg by nebulization as needed       buPROPion (WELLBUTRIN XL) 150 MG 24 hr tablet TAKE 1 TABLET(150 MG) BY MOUTH EVERY MORNING 90 tablet 3     Calcium Carbonate-Vit D-Min (CALCIUM 1200 PO) Take by mouth daily.       cetirizine (ZYRTEC) 10 MG tablet Take 1 tablet (10 mg) by mouth daily 30 tablet 2     empagliflozin (JARDIANCE) 10 MG TABS tablet Take 1  tablet (10 mg) by mouth daily 90 tablet 3     estradiol (ESTRACE) 0.1 MG/GM vaginal cream Place 1 g vaginally twice a week. Using fingertip OR applicator (per patient comfort) 42.5 g 4     fluorouracil (EFUDEX) 5 % external cream APPLY SPARINGLY EXTERNALLY TO FACE EVERY SUNDAY AT NIGHT. AVOID EYES AND LIPS       furosemide (LASIX) 20 MG tablet Take 1 tablet (20 mg) by mouth daily as needed (weight gain of 3 pounds in one day or 5 pounds in 1 week). 30 tablet 11     Levocetirizine Dihydrochloride (XYZAL PO) Take by mouth daily. Dosage unknown       LORazepam (ATIVAN) 1 MG tablet Take 0.5 tablets (0.5 mg) by mouth as needed for anxiety. 10 tablet 0     nitroGLYcerin (NITROSTAT) 0.4 MG sublingual tablet For chest pain place 1 tablet under the tongue every 5 minutes for 3 doses. If symptoms persist 5 minutes after 1st dose call 911. 30 tablet 11     prednisoLONE acetate (PRED FORTE) 1 % ophthalmic suspension Place 1-2 drops into the right eye 4 times daily. 10 mL 11     prednisoLONE acetate (PRED FORTE) 1 % ophthalmic suspension Place 1-2 drops Into the left eye 4 times daily. (Patient not taking: Reported on 7/14/2025) 10 mL 0     sacubitril-valsartan (ENTRESTO) 49-51 MG per tablet Take half tab in AM, full tab in  tablet 3     spironolactone (ALDACTONE) 25 MG tablet Take 0.5 tablets (12.5 mg) by mouth daily 45 tablet 4     timolol maleate (TIMOPTIC) 0.5 % ophthalmic solution Apply 1 drop topically nightly as needed (dry skin cracks) Apply 1 drop to lesion every night at bedtime. Do Not use on normal skin. (Patient not taking: Reported on 7/14/2025)            Past Medical History     Past Medical History:   Diagnosis Date     Mumps      NO ACTIVE PROBLEMS      Past Surgical History:   Procedure Laterality Date     COLONOSCOPY  8/23/2016    Dr. Shaw [facility]     COLONOSCOPY N/A 8/23/2016    Procedure: COLONOSCOPY;  Surgeon: Peter Shaw MD;  Location:  GI     COLONOSCOPY N/A 10/19/2022    Procedure:  COLONOSCOPY with polypectomy using cold exacto snare;  Surgeon: Peter Shaw MD;  Location:  GI     CV CORONARY ANGIOGRAM N/A 10/27/2023    Procedure: Coronary Angiogram;  Surgeon: Elmer Andre MD;  Location:  HEART CARDIAC CATH LAB     FINGER SURGERY      right little finger joint replacement     HYSTERECTOMY VAGINAL  age 30    retained ovaries. no cervix      HYSTERECTOMY, PAP NO LONGER INDICATED       ORTHOPEDIC SURGERY       SURGICAL HISTORY OF -   10/25/13    left index cyst removal     SURGICAL HISTORY OF -       Bilat knee miniscus repair     Family History   Problem Relation Age of Onset     Macular Degeneration Mother      Glaucoma Mother      Hypertension Father      Cancer Father      CABG Father      Cancer Maternal Grandmother      Cancer Maternal Grandfather      Colon Cancer Paternal Grandmother      Cancer Paternal Grandfather      Asthma Brother      Hypertension Brother      No Known Problems Sister      No Known Problems Son      No Known Problems Son      Thyroid Disease Daughter      Colon Cancer Paternal Aunt      Colon Cancer Cousin             Allergies   Codeine, Concerta [methylphenidate], Hay fever & [a.r.m.], and Morphine    The longitudinal plan of care for the diagnosis(es)/condition(s) as documented were addressed during this visit. Due to the added complexity in care, I will continue to support Mar in the subsequent management and with ongoing continuity of care.     50 minutes spent on the date of the encounter doing chart review, history and exam, documentation and further activities as noted above    Thank you for allowing me to participate in the care of your patient.      Sincerely,     Debby Holden PA-C     [facility]  cc:   Debby Holden PA-C  [address] [place] [address]

## 2025-07-15 NOTE — PATIENT INSTRUCTIONS
It was nice seeing you today, please call [phone] if you have any questions or concerns     Plan   INCREASE spironolactone 25 mg (1 tablet) daily   Continue all other cardiac medications without changes  Non-fasting labs in 2 weeks   Wear heart monitor for 7 days   Schedule Echocardiogram (heart ultrasound) in 3 months   Follow up    - Follow up with cardiology in 3 months   - Scheduling phone number [phone]    Debby Holden PA-C  [facility]

## 2025-07-16 RX ORDER — ALBUTEROL SULFATE 90 UG/1
2 INHALANT RESPIRATORY (INHALATION) EVERY 4 HOURS PRN
Qty: 18 G | Refills: 3 | OUTPATIENT
Start: 2025-07-16

## 2025-07-29 ENCOUNTER — LAB (OUTPATIENT)
Dept: LAB | Facility: CLINIC | Age: 70
End: 2025-07-29
Payer: COMMERCIAL

## 2025-07-29 DIAGNOSIS — I42.9 CARDIOMYOPATHY, UNSPECIFIED TYPE (H): ICD-10-CM

## 2025-07-29 LAB
ANION GAP SERPL CALCULATED.3IONS-SCNC: 11 MMOL/L (ref 7–15)
BUN SERPL-MCNC: 19.3 MG/DL (ref 8–23)
CALCIUM SERPL-MCNC: 9.6 MG/DL (ref 8.8–10.4)
CHLORIDE SERPL-SCNC: 101 MMOL/L (ref 98–107)
CREAT SERPL-MCNC: 1.22 MG/DL (ref 0.51–0.95)
EGFRCR SERPLBLD CKD-EPI 2021: 48 ML/MIN/1.73M2
GLUCOSE SERPL-MCNC: 143 MG/DL (ref 70–99)
HCO3 SERPL-SCNC: 25 MMOL/L (ref 22–29)
POTASSIUM SERPL-SCNC: 4.7 MMOL/L (ref 3.4–5.3)
SODIUM SERPL-SCNC: 137 MMOL/L (ref 135–145)

## 2025-07-29 PROCEDURE — 80048 BASIC METABOLIC PNL TOTAL CA: CPT | Performed by: STUDENT IN AN ORGANIZED HEALTH CARE EDUCATION/TRAINING PROGRAM

## 2025-07-29 PROCEDURE — 36415 COLL VENOUS BLD VENIPUNCTURE: CPT | Performed by: STUDENT IN AN ORGANIZED HEALTH CARE EDUCATION/TRAINING PROGRAM

## 2025-07-30 LAB — CV ZIO PRELIM RESULTS: NORMAL

## 2025-08-04 LAB — CV ZIO PRELIM RESULTS: NORMAL

## 2025-08-05 ENCOUNTER — TELEPHONE (OUTPATIENT)
Dept: CARDIOLOGY | Facility: CLINIC | Age: 70
End: 2025-08-05
Payer: MEDICARE

## 2025-08-20 ENCOUNTER — TELEPHONE (OUTPATIENT)
Dept: CARDIOLOGY | Facility: CLINIC | Age: 70
End: 2025-08-20
Payer: MEDICARE

## 2025-08-21 ENCOUNTER — PATIENT OUTREACH (OUTPATIENT)
Dept: CARE COORDINATION | Facility: CLINIC | Age: 70
End: 2025-08-21
Payer: MEDICARE

## (undated) DEVICE — KIT HAND CONTROL ANGIOTOUCH ACIST 65CM AT-P65

## (undated) DEVICE — DEFIB PRO-PADZ LVP LQD GEL ADULT 8900-2105-01

## (undated) DEVICE — MANIFOLD KIT ANGIO AUTOMATED 014613

## (undated) DEVICE — HEMOSTAT COMPRESSION VASC REGULAR OD24 CM 3524

## (undated) DEVICE — KIT ENDO TURNOVER/PROCEDURE W/CLEAN A SCOPE LINERS 103888

## (undated) DEVICE — INTRO GLIDESHEATH SLENDER 6FR 10X45CM 60-1060

## (undated) DEVICE — Device

## (undated) DEVICE — CATH JACKY 5FR 3.5 CURVE 40-5023

## (undated) RX ORDER — ALBUTEROL SULFATE 0.83 MG/ML
SOLUTION RESPIRATORY (INHALATION)
Status: DISPENSED
Start: 2024-05-10

## (undated) RX ORDER — DIAZEPAM 5 MG
TABLET ORAL
Status: DISPENSED
Start: 2023-11-02

## (undated) RX ORDER — FENTANYL CITRATE 0.05 MG/ML
INJECTION, SOLUTION INTRAMUSCULAR; INTRAVENOUS
Status: DISPENSED
Start: 2022-10-19